# Patient Record
Sex: MALE | Race: WHITE | Employment: OTHER | URBAN - METROPOLITAN AREA
[De-identification: names, ages, dates, MRNs, and addresses within clinical notes are randomized per-mention and may not be internally consistent; named-entity substitution may affect disease eponyms.]

---

## 2017-01-09 ENCOUNTER — HOSPITAL ENCOUNTER (OUTPATIENT)
Dept: WOUND CARE | Age: 62
Discharge: HOME | End: 2017-01-09
Attending: PODIATRIST | Admitting: PODIATRIST

## 2017-01-09 VITALS
TEMPERATURE: 97.3 F | SYSTOLIC BLOOD PRESSURE: 141 MMHG | RESPIRATION RATE: 20 BRPM | DIASTOLIC BLOOD PRESSURE: 58 MMHG | HEART RATE: 78 BPM

## 2017-01-09 DIAGNOSIS — I73.9 PVD (PERIPHERAL VASCULAR DISEASE) (HCC): ICD-10-CM

## 2017-01-09 DIAGNOSIS — L97.919 VENOUS STASIS ULCER OF BOTH LOWER EXTREMITIES WITHOUT VARICOSE VEINS (HCC): Primary | ICD-10-CM

## 2017-01-09 DIAGNOSIS — L97.929 VENOUS STASIS ULCER OF BOTH LOWER EXTREMITIES WITHOUT VARICOSE VEINS (HCC): Primary | ICD-10-CM

## 2017-01-09 DIAGNOSIS — L97.912 CHRONIC ULCER OF RIGHT LEG WITH FAT LAYER EXPOSED (HCC): ICD-10-CM

## 2017-01-09 DIAGNOSIS — I87.2 VENOUS STASIS ULCER OF BOTH LOWER EXTREMITIES WITHOUT VARICOSE VEINS (HCC): Primary | ICD-10-CM

## 2017-01-09 DIAGNOSIS — L97.922 CHRONIC ULCER OF LEFT LEG WITH FAT LAYER EXPOSED (HCC): ICD-10-CM

## 2017-01-09 DIAGNOSIS — E11.51 TYPE 2 DIABETES MELLITUS WITH DIABETIC PERIPHERAL ANGIOPATHY WITHOUT GANGRENE, WITHOUT LONG-TERM CURRENT USE OF INSULIN (HCC): ICD-10-CM

## 2018-02-28 ENCOUNTER — HOSPITAL ENCOUNTER (OUTPATIENT)
Dept: GENERAL RADIOLOGY | Age: 63
Discharge: HOME | End: 2018-02-28

## 2018-02-28 DIAGNOSIS — J44.1 COPD WITH ACUTE EXACERBATION (HCC): ICD-10-CM

## 2018-02-28 LAB
ALBUMIN SERPL-MCNC: 4.4 GM/DL (ref 3.4–5)
ALP BLD-CCNC: 74 IU/L (ref 40–128)
ALT SERPL-CCNC: 41 U/L (ref 10–40)
ANION GAP SERPL CALCULATED.3IONS-SCNC: 19 MMOL/L (ref 4–16)
AST SERPL-CCNC: 22 IU/L (ref 15–37)
BILIRUB SERPL-MCNC: 0.2 MG/DL (ref 0–1)
BUN BLDV-MCNC: 25 MG/DL (ref 6–23)
CALCIUM SERPL-MCNC: 9 MG/DL (ref 8.3–10.6)
CHLORIDE BLD-SCNC: 94 MMOL/L (ref 99–110)
CHOLESTEROL: 162 MG/DL
CO2: 23 MMOL/L (ref 21–32)
CREAT SERPL-MCNC: 1.1 MG/DL (ref 0.9–1.3)
ESTIMATED AVERAGE GLUCOSE: 183 MG/DL
GFR AFRICAN AMERICAN: >60 ML/MIN/1.73M2
GFR NON-AFRICAN AMERICAN: >60 ML/MIN/1.73M2
GLUCOSE FASTING: 204 MG/DL (ref 70–99)
HBA1C MFR BLD: 8 % (ref 4.2–6.3)
HCT VFR BLD CALC: 56.3 % (ref 42–52)
HDLC SERPL-MCNC: 63 MG/DL
HEMOGLOBIN: 17.9 GM/DL (ref 13.5–18)
LDL CHOLESTEROL DIRECT: 77 MG/DL
MCH RBC QN AUTO: 29.7 PG (ref 27–31)
MCHC RBC AUTO-ENTMCNC: 31.8 % (ref 32–36)
MCV RBC AUTO: 93.5 FL (ref 78–100)
PDW BLD-RTO: 14.4 % (ref 11.7–14.9)
PLATELET # BLD: 171 K/CU MM (ref 140–440)
PMV BLD AUTO: 11.2 FL (ref 7.5–11.1)
POTASSIUM SERPL-SCNC: 4.9 MMOL/L (ref 3.5–5.1)
PROSTATE SPECIFIC ANTIGEN: 0.49 NG/ML (ref 0–4)
RBC # BLD: 6.02 M/CU MM (ref 4.6–6.2)
SODIUM BLD-SCNC: 136 MMOL/L (ref 135–145)
T4 FREE: 1.39 NG/DL (ref 0.9–1.8)
TOTAL PROTEIN: 7.2 GM/DL (ref 6.4–8.2)
TRIGL SERPL-MCNC: 155 MG/DL
WBC # BLD: 17.8 K/CU MM (ref 4–10.5)

## 2018-10-02 ENCOUNTER — HOSPITAL ENCOUNTER (EMERGENCY)
Age: 63
Discharge: HOME | End: 2018-10-02
Attending: EMERGENCY MEDICINE
Payer: COMMERCIAL

## 2018-10-02 ENCOUNTER — APPOINTMENT (OUTPATIENT)
Dept: ULTRASOUND IMAGING | Age: 63
End: 2018-10-02
Payer: COMMERCIAL

## 2018-10-02 VITALS
OXYGEN SATURATION: 98 % | TEMPERATURE: 97.6 F | BODY MASS INDEX: 46.65 KG/M2 | WEIGHT: 315 LBS | HEIGHT: 69 IN | HEART RATE: 84 BPM | SYSTOLIC BLOOD PRESSURE: 127 MMHG | DIASTOLIC BLOOD PRESSURE: 69 MMHG | RESPIRATION RATE: 16 BRPM

## 2018-10-02 DIAGNOSIS — N28.9 ACUTE RENAL INSUFFICIENCY: ICD-10-CM

## 2018-10-02 DIAGNOSIS — S80.829A: Primary | ICD-10-CM

## 2018-10-02 DIAGNOSIS — L08.9: Primary | ICD-10-CM

## 2018-10-02 LAB
ALBUMIN SERPL-MCNC: 3.8 GM/DL (ref 3.4–5)
ALP BLD-CCNC: 64 IU/L (ref 40–128)
ALT SERPL-CCNC: 31 U/L (ref 10–40)
ANION GAP SERPL CALCULATED.3IONS-SCNC: 15 MMOL/L (ref 4–16)
AST SERPL-CCNC: 24 IU/L (ref 15–37)
BASOPHILS ABSOLUTE: 0 K/CU MM
BASOPHILS RELATIVE PERCENT: 0.3 % (ref 0–1)
BILIRUB SERPL-MCNC: 0.4 MG/DL (ref 0–1)
BUN BLDV-MCNC: 45 MG/DL (ref 6–23)
CALCIUM SERPL-MCNC: 9.3 MG/DL (ref 8.3–10.6)
CHLORIDE BLD-SCNC: 92 MMOL/L (ref 99–110)
CO2: 26 MMOL/L (ref 21–32)
CREAT SERPL-MCNC: 1.8 MG/DL (ref 0.9–1.3)
DIFFERENTIAL TYPE: ABNORMAL
EOSINOPHILS ABSOLUTE: 0.2 K/CU MM
EOSINOPHILS RELATIVE PERCENT: 1.2 % (ref 0–3)
GFR AFRICAN AMERICAN: 46 ML/MIN/1.73M2
GFR NON-AFRICAN AMERICAN: 38 ML/MIN/1.73M2
GLUCOSE BLD-MCNC: 148 MG/DL (ref 70–99)
HCT VFR BLD CALC: 51.4 % (ref 42–52)
HEMOGLOBIN: 17.1 GM/DL (ref 13.5–18)
IMMATURE NEUTROPHIL %: 0.5 % (ref 0–0.43)
LYMPHOCYTES ABSOLUTE: 2.6 K/CU MM
LYMPHOCYTES RELATIVE PERCENT: 20.4 % (ref 24–44)
MCH RBC QN AUTO: 31.5 PG (ref 27–31)
MCHC RBC AUTO-ENTMCNC: 33.3 % (ref 32–36)
MCV RBC AUTO: 94.7 FL (ref 78–100)
MONOCYTES ABSOLUTE: 0.8 K/CU MM
MONOCYTES RELATIVE PERCENT: 5.9 % (ref 0–4)
NUCLEATED RBC %: 0 %
PDW BLD-RTO: 14.6 % (ref 11.7–14.9)
PLATELET # BLD: 188 K/CU MM (ref 140–440)
PMV BLD AUTO: 10.9 FL (ref 7.5–11.1)
POTASSIUM SERPL-SCNC: 4.1 MMOL/L (ref 3.5–5.1)
PRO-BNP: 404.5 PG/ML
RBC # BLD: 5.43 M/CU MM (ref 4.6–6.2)
SEGMENTED NEUTROPHILS ABSOLUTE COUNT: 9.2 K/CU MM
SEGMENTED NEUTROPHILS RELATIVE PERCENT: 71.7 % (ref 36–66)
SODIUM BLD-SCNC: 133 MMOL/L (ref 135–145)
TOTAL IMMATURE NEUTOROPHIL: 0.07 K/CU MM
TOTAL NUCLEATED RBC: 0 K/CU MM
TOTAL PROTEIN: 7.1 GM/DL (ref 6.4–8.2)
WBC # BLD: 12.9 K/CU MM (ref 4–10.5)

## 2018-10-02 PROCEDURE — 85025 COMPLETE CBC W/AUTO DIFF WBC: CPT

## 2018-10-02 PROCEDURE — 93970 EXTREMITY STUDY: CPT

## 2018-10-02 PROCEDURE — 80053 COMPREHEN METABOLIC PANEL: CPT

## 2018-10-02 PROCEDURE — 36415 COLL VENOUS BLD VENIPUNCTURE: CPT

## 2018-10-02 PROCEDURE — 83880 ASSAY OF NATRIURETIC PEPTIDE: CPT

## 2018-10-02 PROCEDURE — 99283 EMERGENCY DEPT VISIT LOW MDM: CPT

## 2018-10-02 RX ORDER — CEPHALEXIN 500 MG/1
500 CAPSULE ORAL 4 TIMES DAILY
COMMUNITY
Start: 2020-04-02 | End: 2021-09-30 | Stop reason: SDUPTHER

## 2018-10-02 ASSESSMENT — PAIN SCALES - GENERAL: PAINLEVEL_OUTOF10: 8

## 2018-10-02 ASSESSMENT — PAIN DESCRIPTION - PAIN TYPE: TYPE: ACUTE PAIN

## 2018-10-02 ASSESSMENT — PAIN DESCRIPTION - ORIENTATION: ORIENTATION: LEFT

## 2018-10-02 ASSESSMENT — PAIN DESCRIPTION - LOCATION: LOCATION: LEG

## 2018-10-02 NOTE — ED NOTES
Discharge instructions reviewed with patient, verbalized understanding and agreeable to discharge.      Janeen Nicolas RN  10/02/18 [id number]

## 2018-10-02 NOTE — ED PROVIDER NOTES
children: N/A    Years of education: N/A     Social History Main Topics    Smoking status: Current Every Day Smoker     Packs/day: 1.00     Types: Cigarettes    Smokeless tobacco: Never Used    Alcohol use None    Drug use: No    Sexual activity: Not Asked     Other Topics Concern    None     Social History Narrative    None     Family History   Problem Relation Age of Onset    Asthma Mother     Breast Cancer Mother     Cancer Mother     Diabetes Mother     High Blood Pressure Mother     Cancer Father     Early Death Brother     Arthritis Paternal Grandmother          PHYSICAL EXAM    VITAL SIGNS: /69   Pulse 84   Temp 97.6 °F (36.4 °C) (Oral)   Resp 16   Ht 5' 9\" (1.753 m)   Wt (!) 335 lb (152 kg)   SpO2 98%   BMI 49.47 kg/m²    Constitutional:  Well developed, No acute distress. HENT:  Normocephalic, Atraumatic, PERRL. EOMI. Sclera clear. Conjunctiva normal.  Neck: supple without ridigity  Cardiovascular:  Regular rate and rhythm, No murmurs  Respiratory:  Nonlabored breathing. Normal breath sounds  Abdomen: Soft, Non tender, bowel sounds present. Musculoskeletal: No edema, No tenderness  Integument:  Warm. Chronic venous stasis of the lower extremities. There are several superficial ulcerations noted over the anterior left lower leg. One small superficial laceration noted over the right anterior lower leg. No surrounding erythema or warmth. No tenderness surrounding the areas. Neurologic:  Alert & oriented , No focal deficits noted.   Speech normal.  Psychiatric:  Affect normal, Mood normal.       Labs:  Labs Reviewed   CBC WITH AUTO DIFFERENTIAL - Abnormal; Notable for the following:        Result Value    WBC 12.9 (*)     MCH 31.5 (*)     Segs Relative 71.7 (*)     Lymphocytes % 20.4 (*)     Monocytes % 5.9 (*)     Immature Neutrophil % 0.5 (*)     All other components within normal limits   COMPREHENSIVE METABOLIC PANEL - Abnormal; Notable for the following:     Sodium 133

## 2018-12-04 ENCOUNTER — HOSPITAL ENCOUNTER (EMERGENCY)
Age: 63
Discharge: HOME | End: 2018-12-04
Attending: EMERGENCY MEDICINE
Payer: COMMERCIAL

## 2018-12-04 VITALS
TEMPERATURE: 98.1 F | WEIGHT: 315 LBS | DIASTOLIC BLOOD PRESSURE: 74 MMHG | HEART RATE: 71 BPM | BODY MASS INDEX: 46.65 KG/M2 | RESPIRATION RATE: 17 BRPM | SYSTOLIC BLOOD PRESSURE: 139 MMHG | OXYGEN SATURATION: 96 % | HEIGHT: 69 IN

## 2018-12-04 DIAGNOSIS — M54.41 RIGHT-SIDED LOW BACK PAIN WITH RIGHT-SIDED SCIATICA, UNSPECIFIED CHRONICITY: Primary | ICD-10-CM

## 2018-12-04 LAB
ALBUMIN SERPL-MCNC: 3.8 GM/DL (ref 3.4–5)
ALP BLD-CCNC: 61 IU/L (ref 40–129)
ALT SERPL-CCNC: 25 U/L (ref 10–40)
ANION GAP SERPL CALCULATED.3IONS-SCNC: 12 MMOL/L (ref 4–16)
AST SERPL-CCNC: 20 IU/L (ref 15–37)
BACTERIA: NEGATIVE /HPF
BASOPHILS ABSOLUTE: 0.1 K/CU MM
BASOPHILS RELATIVE PERCENT: 0.7 % (ref 0–1)
BILIRUB SERPL-MCNC: 0.2 MG/DL (ref 0–1)
BILIRUBIN URINE: NEGATIVE MG/DL
BLOOD, URINE: NEGATIVE
BUN BLDV-MCNC: 16 MG/DL (ref 6–23)
CALCIUM SERPL-MCNC: 9 MG/DL (ref 8.3–10.6)
CHLORIDE BLD-SCNC: 99 MMOL/L (ref 99–110)
CLARITY: CLEAR
CO2: 28 MMOL/L (ref 21–32)
COLOR: YELLOW
CREAT SERPL-MCNC: 0.9 MG/DL (ref 0.9–1.3)
DIFFERENTIAL TYPE: ABNORMAL
EOSINOPHILS ABSOLUTE: 0.5 K/CU MM
EOSINOPHILS RELATIVE PERCENT: 4.5 % (ref 0–3)
GFR AFRICAN AMERICAN: >60 ML/MIN/1.73M2
GFR NON-AFRICAN AMERICAN: >60 ML/MIN/1.73M2
GLUCOSE BLD-MCNC: 108 MG/DL (ref 70–99)
GLUCOSE, URINE: NEGATIVE MG/DL
HCT VFR BLD CALC: 51.1 % (ref 42–52)
HEMOGLOBIN: 16.2 GM/DL (ref 13.5–18)
IMMATURE NEUTROPHIL %: 0.4 % (ref 0–0.43)
KETONES, URINE: NEGATIVE MG/DL
LACTATE: 1 MMOL/L (ref 0.4–2)
LEUKOCYTE ESTERASE, URINE: NEGATIVE
LYMPHOCYTES ABSOLUTE: 2.9 K/CU MM
LYMPHOCYTES RELATIVE PERCENT: 26.3 % (ref 24–44)
MCH RBC QN AUTO: 31.3 PG (ref 27–31)
MCHC RBC AUTO-ENTMCNC: 31.7 % (ref 32–36)
MCV RBC AUTO: 98.8 FL (ref 78–100)
MONOCYTES ABSOLUTE: 0.8 K/CU MM
MONOCYTES RELATIVE PERCENT: 6.8 % (ref 0–4)
MUCUS: ABNORMAL HPF
NITRITE URINE, QUANTITATIVE: NEGATIVE
NUCLEATED RBC %: 0 %
PDW BLD-RTO: 13.8 % (ref 11.7–14.9)
PH, URINE: 6 (ref 5–8)
PLATELET # BLD: 166 K/CU MM (ref 140–440)
PMV BLD AUTO: 10.7 FL (ref 7.5–11.1)
POTASSIUM SERPL-SCNC: 3.9 MMOL/L (ref 3.5–5.1)
PROTEIN UA: 30 MG/DL
RBC # BLD: 5.17 M/CU MM (ref 4.6–6.2)
RBC URINE: <1 /HPF (ref 0–3)
SEGMENTED NEUTROPHILS ABSOLUTE COUNT: 6.9 K/CU MM
SEGMENTED NEUTROPHILS RELATIVE PERCENT: 61.3 % (ref 36–66)
SODIUM BLD-SCNC: 139 MMOL/L (ref 135–145)
SPECIFIC GRAVITY UA: 1.01 (ref 1–1.03)
TOTAL IMMATURE NEUTOROPHIL: 0.04 K/CU MM
TOTAL NUCLEATED RBC: 0 K/CU MM
TOTAL PROTEIN: 7.3 GM/DL (ref 6.4–8.2)
TRICHOMONAS: ABNORMAL /HPF
UROBILINOGEN, URINE: NORMAL MG/DL (ref 0.2–1)
WBC # BLD: 11.2 K/CU MM (ref 4–10.5)
WBC UA: <1 /HPF (ref 0–2)

## 2018-12-04 PROCEDURE — 6370000000 HC RX 637 (ALT 250 FOR IP): Performed by: EMERGENCY MEDICINE

## 2018-12-04 PROCEDURE — 81001 URINALYSIS AUTO W/SCOPE: CPT

## 2018-12-04 PROCEDURE — 83605 ASSAY OF LACTIC ACID: CPT

## 2018-12-04 PROCEDURE — 99284 EMERGENCY DEPT VISIT MOD MDM: CPT

## 2018-12-04 PROCEDURE — 85025 COMPLETE CBC W/AUTO DIFF WBC: CPT

## 2018-12-04 PROCEDURE — 36415 COLL VENOUS BLD VENIPUNCTURE: CPT

## 2018-12-04 PROCEDURE — 80053 COMPREHEN METABOLIC PANEL: CPT

## 2018-12-04 RX ORDER — LIDOCAINE 50 MG/G
1 PATCH TOPICAL DAILY
Qty: 30 PATCH | Refills: 0 | Status: SHIPPED | OUTPATIENT
Start: 2018-12-04 | End: 2020-10-30

## 2018-12-04 RX ORDER — IBUPROFEN 600 MG/1
600 TABLET ORAL EVERY 6 HOURS PRN
Qty: 30 TABLET | Refills: 0 | Status: SHIPPED | OUTPATIENT
Start: 2018-12-04

## 2018-12-04 RX ORDER — LIDOCAINE 4 G/G
1 PATCH TOPICAL DAILY
Status: DISCONTINUED | OUTPATIENT
Start: 2018-12-04 | End: 2018-12-04 | Stop reason: HOSPADM

## 2018-12-04 ASSESSMENT — ENCOUNTER SYMPTOMS
EYE REDNESS: 0
ABDOMINAL PAIN: 0
VOMITING: 0
NAUSEA: 0
RHINORRHEA: 0
BACK PAIN: 1
SORE THROAT: 0
COUGH: 0
SHORTNESS OF BREATH: 0

## 2018-12-04 ASSESSMENT — PAIN DESCRIPTION - ORIENTATION: ORIENTATION: RIGHT;LEFT

## 2018-12-04 ASSESSMENT — PAIN DESCRIPTION - LOCATION: LOCATION: FLANK

## 2018-12-04 ASSESSMENT — PAIN SCALES - GENERAL: PAINLEVEL_OUTOF10: 10

## 2018-12-04 ASSESSMENT — PAIN DESCRIPTION - PAIN TYPE: TYPE: ACUTE PAIN

## 2018-12-04 NOTE — ED PROVIDER NOTES
of motion. He exhibits no deformity. Lumbar back: He exhibits tenderness. He exhibits no bony tenderness. Back:    Neurological: He is alert. No cranial nerve deficit or sensory deficit. He exhibits normal muscle tone. Coordination normal.   Skin: Skin is warm and dry. He is not diaphoretic. Psychiatric: He has a normal mood and affect.  His behavior is normal.       I have reviewed and interpreted all of the currently available lab results from this visit (if applicable):  Results for orders placed or performed during the hospital encounter of 12/04/18   Urinalysis with microscopic   Result Value Ref Range    Color, UA YELLOW UYELL    Clarity, UA CLEAR CLEAR    Glucose, Urine NEGATIVE NEG MG/DL    Bilirubin Urine NEGATIVE NEG MG/DL    Ketones, Urine NEGATIVE NEG MG/DL    Specific Gravity, UA 1.014 1.001 - 1.035    Blood, Urine NEGATIVE NEG    pH, Urine 6.0 5.0 - 8.0    Protein, UA 30 (A) NEG MG/DL    Urobilinogen, Urine NORMAL 0.2 - 1.0 MG/DL    Nitrite Urine, Quantitative NEGATIVE NEG    Leukocyte Esterase, Urine NEGATIVE NEG    RBC, UA <1 0 - 3 /HPF    WBC, UA <1 0 - 2 /HPF    Bacteria, UA NEGATIVE NEG /HPF    Mucus, UA RARE (A) NEG HPF    Trichomonas, UA NONE SEEN NOSEE /HPF   CBC auto diff   Result Value Ref Range    WBC 11.2 (H) 4.0 - 10.5 K/CU MM    RBC 5.17 4.6 - 6.2 M/CU MM    Hemoglobin 16.2 13.5 - 18.0 GM/DL    Hematocrit 51.1 42 - 52 %    MCV 98.8 78 - 100 FL    MCH 31.3 (H) 27 - 31 PG    MCHC 31.7 (L) 32.0 - 36.0 %    RDW 13.8 11.7 - 14.9 %    Platelets 033 567 - 783 K/CU MM    MPV 10.7 7.5 - 11.1 FL    Differential Type AUTOMATED DIFFERENTIAL     Segs Relative 61.3 36 - 66 %    Lymphocytes % 26.3 24 - 44 %    Monocytes % 6.8 (H) 0 - 4 %    Eosinophils % 4.5 (H) 0 - 3 %    Basophils % 0.7 0 - 1 %    Segs Absolute 6.9 K/CU MM    Lymphocytes # 2.9 K/CU MM    Monocytes # 0.8 K/CU MM    Eosinophils # 0.5 K/CU MM    Basophils # 0.1 K/CU MM    Nucleated RBC % 0.0 %    Total Nucleated RBC 0.0

## 2019-04-10 ENCOUNTER — HOSPITAL ENCOUNTER (OUTPATIENT)
Age: 64
Discharge: HOME | End: 2019-04-10
Payer: COMMERCIAL

## 2019-04-10 LAB
ALBUMIN SERPL-MCNC: 3.9 GM/DL (ref 3.4–5)
ALP BLD-CCNC: 67 IU/L (ref 40–128)
ALT SERPL-CCNC: 24 U/L (ref 10–40)
ANION GAP SERPL CALCULATED.3IONS-SCNC: 12 MMOL/L (ref 4–16)
AST SERPL-CCNC: 25 IU/L (ref 15–37)
BILIRUB SERPL-MCNC: 0.6 MG/DL (ref 0–1)
BUN BLDV-MCNC: 18 MG/DL (ref 6–23)
CALCIUM SERPL-MCNC: 9 MG/DL (ref 8.3–10.6)
CHLORIDE BLD-SCNC: 94 MMOL/L (ref 99–110)
CO2: 25 MMOL/L (ref 21–32)
CREAT SERPL-MCNC: 0.9 MG/DL (ref 0.9–1.3)
ESTIMATED AVERAGE GLUCOSE: 146 MG/DL
GFR AFRICAN AMERICAN: >60 ML/MIN/1.73M2
GFR NON-AFRICAN AMERICAN: >60 ML/MIN/1.73M2
GLUCOSE FASTING: 78 MG/DL (ref 70–99)
HBA1C MFR BLD: 6.7 % (ref 4.2–6.3)
POTASSIUM SERPL-SCNC: 4.3 MMOL/L (ref 3.5–5.1)
SODIUM BLD-SCNC: 131 MMOL/L (ref 135–145)
TOTAL PROTEIN: 6.9 GM/DL (ref 6.4–8.2)

## 2019-04-10 PROCEDURE — 36415 COLL VENOUS BLD VENIPUNCTURE: CPT

## 2019-04-10 PROCEDURE — 80053 COMPREHEN METABOLIC PANEL: CPT

## 2019-04-10 PROCEDURE — 83036 HEMOGLOBIN GLYCOSYLATED A1C: CPT

## 2020-04-14 ENCOUNTER — HOSPITAL ENCOUNTER (OUTPATIENT)
Dept: WOUND CARE | Age: 65
Discharge: HOME | End: 2020-04-14
Payer: MEDICARE

## 2020-04-14 VITALS
TEMPERATURE: 97 F | BODY MASS INDEX: 44.43 KG/M2 | WEIGHT: 300 LBS | HEIGHT: 69 IN | SYSTOLIC BLOOD PRESSURE: 143 MMHG | DIASTOLIC BLOOD PRESSURE: 67 MMHG | HEART RATE: 70 BPM | RESPIRATION RATE: 18 BRPM

## 2020-04-14 PROBLEM — I89.0 LYMPHEDEMA OF BOTH LOWER EXTREMITIES: Status: ACTIVE | Noted: 2020-04-14

## 2020-04-14 PROBLEM — I87.8 VENOUS STASIS OF BOTH LOWER EXTREMITIES: Status: ACTIVE | Noted: 2020-04-14

## 2020-04-14 PROCEDURE — 87073 CULTURE BACTERIA ANAEROBIC: CPT

## 2020-04-14 PROCEDURE — 11042 DBRDMT SUBQ TIS 1ST 20SQCM/<: CPT | Performed by: NURSE PRACTITIONER

## 2020-04-14 PROCEDURE — 11042 DBRDMT SUBQ TIS 1ST 20SQCM/<: CPT

## 2020-04-14 PROCEDURE — 99213 OFFICE O/P EST LOW 20 MIN: CPT

## 2020-04-14 PROCEDURE — 87071 CULTURE AEROBIC QUANT OTHER: CPT

## 2020-04-14 PROCEDURE — 99203 OFFICE O/P NEW LOW 30 MIN: CPT | Performed by: NURSE PRACTITIONER

## 2020-04-14 NOTE — PROGRESS NOTES
[facility] Initial Visit      Deb Tavares  AGE: 72 y.o. GENDER: male  : 1955  EPISODE DATE:  2020   Referred by: Dr. Vivienne Carrington    Subjective:     CHIEF COMPLAINT WOUNDS LEFT LOWER LEG     HISTORY of PRESENT ILLNESS      Deb Tavares is a 72 y.o. male who presents to the [address] [place] [address] for an initial visit for evaluation and treatment of Chronic venous  ulcer(s) of of the left lower leg. The condition is of moderate severity. The ulcer has been present for over a year. The underlying cause is thought to be venous. The patients care to date has included Silvadene and Keflex. The patient has significant underlying medical conditions as below. Wound Pain Timing/Severity: intermittent, mild  Quality of pain: aching, tender  Severity of pain:  3 / 10   Modifying Factors: edema, venous stasis, lymphedema, diabetes, obesity, smoking and non-adherence  Associated Signs/Symptoms: edema, erythema, drainage and pain        PAST MEDICAL HISTORY        Diagnosis Date    CHF (congestive heart failure) (HCC)     Chronic ulcer of left leg with fat layer exposed ([address] 2016    Chronic ulcer of right leg with fat layer exposed ([address] 2016    Diabetes mellitus ([address]     Hypertension     PVD (peripheral vascular disease) ([address] 2016    Type 2 diabetes mellitus with diabetic peripheral angiopathy without gangrene, without long-term current use of insulin ([address] 2016    Venous stasis ulcer of both lower extremities without varicose veins ([address] 2016       PAST SURGICAL HISTORY    History reviewed. No pertinent surgical history.     FAMILY HISTORY    Family History   Problem Relation Age of Onset    Asthma Mother     Breast Cancer Mother     Cancer Mother     Diabetes Mother     High Blood Pressure Mother     Cancer Father     Early Death Brother     Arthritis Paternal Grandmother        SOCIAL HISTORY    Social History     Tobacco Use    Smoking status: Current status. At this time appropriate care would include: periodic debridement and wound care as below. Problem List Items Addressed This Visit     WD-Chronic ulcer of left leg with fat layer exposed ([address] - Primary    Relevant Orders    Culture, Wound    WD-Venous stasis of both lower extremities    WD-Lymphedema of both lower extremities            Procedure Note    Indications:  Based on my examination of this patient's wound(s) today, sharp excision into necrotic subcutaneous tissue is required to promote healing and evaluate the extent of previous healing. Performed by: FAITH Silva - CNP    Consent obtained: Yes    Time out taken:  Yes    Pain Control: Anesthetic  Anesthetic: 4% Lidocaine Liquid Topical     Debridement:Excisional Debridement    Using curette the wound(s) was/were sharply debrided down through and including the removal of subcutaneous tissue.         Devitalized Tissue Debrided:  fibrin, biofilm, slough and exudate    Pre Debridement Measurements:  Are located in the Wound Documentation Flow Sheet    All active wounds listed below with today's date are evaluated  Wound(s)    debrided this date include # : 3 and 4     Post  Debridement Measurements:  Wound 04/14/20 Pretibial Left #3 LEFT LATERAL (Active)   Wound Image   4/14/2020 10:50 AM   Wound Cleansed Rinsed/Irrigated with saline 4/14/2020 10:50 AM   Wound Length (cm) 1.3 cm 4/14/2020 10:50 AM   Wound Width (cm) 1.3 cm 4/14/2020 10:50 AM   Wound Depth (cm) 0.2 cm 4/14/2020 10:50 AM   Wound Surface Area (cm^2) 1.69 cm^2 4/14/2020 10:50 AM   Wound Volume (cm^3) 0.34 cm^3 4/14/2020 10:50 AM   Post-Procedure Length (cm) 1.3 cm 4/14/2020 11:12 AM   Post-Procedure Width (cm) 1.3 cm 4/14/2020 11:12 AM   Post-Procedure Depth (cm) 0.2 cm 4/14/2020 11:12 AM   Post-Procedure Surface Area (cm^2) 1.69 cm^2 4/14/2020 11:12 AM   Post-Procedure Volume (cm^3) 0.34 cm^3 4/14/2020 11:12 AM   Distance Tunneling (cm) 0 cm 4/14/2020 10:50 AM Tunneling Position ___ O'Clock 0 4/14/2020 10:50 AM   Undermining Starts ___ O'Clock 0 4/14/2020 10:50 AM   Undermining Ends___ O'Clock 0 4/14/2020 10:50 AM   Undermining Maxium Distance (cm) 0 4/14/2020 10:50 AM   Wound Assessment Yellow 4/14/2020 10:50 AM   Drainage Amount Moderate 4/14/2020 10:50 AM   Drainage Description Serosanguinous 4/14/2020 10:50 AM   Odor None 4/14/2020 10:50 AM   Margins Defined edges 4/14/2020 10:50 AM   [place]-wound Assessment Pink 4/14/2020 10:50 AM   Non-staged Wound Description Full thickness 4/14/2020 10:50 AM   [place]%Wound Bed 0 4/14/2020 10:50 AM   Red%Wound Bed 0 4/14/2020 10:50 AM   Yellow%Wound Bed 100 4/14/2020 10:50 AM   Black%Wound Bed 0 4/14/2020 10:50 AM   Purple%Wound Bed 0 4/14/2020 10:50 AM   Other%Wound Bed 0 4/14/2020 10:50 AM   Number of days: 0       Wound 04/14/20 Tibial Left;Posterior #4 LEFT POSTERIOR LEG  (Active)   Wound Image   4/14/2020 10:50 AM   Wound Cleansed Rinsed/Irrigated with saline 4/14/2020 10:50 AM   Wound Length (cm) 2.8 cm 4/14/2020 10:50 AM   Wound Width (cm) 2.2 cm 4/14/2020 10:50 AM   Wound Depth (cm) 0.2 cm 4/14/2020 10:50 AM   Wound Surface Area (cm^2) 6.16 cm^2 4/14/2020 10:50 AM   Wound Volume (cm^3) 1.23 cm^3 4/14/2020 10:50 AM   Post-Procedure Length (cm) 2.8 cm 4/14/2020 11:12 AM   Post-Procedure Width (cm) 2.2 cm 4/14/2020 11:12 AM   Post-Procedure Depth (cm) 0.2 cm 4/14/2020 11:12 AM   Post-Procedure Surface Area (cm^2) 6.16 cm^2 4/14/2020 11:12 AM   Post-Procedure Volume (cm^3) 1.23 cm^3 4/14/2020 11:12 AM   Distance Tunneling (cm) 0 cm 4/14/2020 10:50 AM   Tunneling Position ___ O'Clock 0 4/14/2020 10:50 AM   Undermining Starts ___ O'Clock 0 4/14/2020 10:50 AM   Undermining Ends___ O'Clock 0 4/14/2020 10:50 AM   Undermining Maxium Distance (cm) 0 4/14/2020 10:50 AM   Wound Assessment Pink 4/14/2020 10:50 AM   Drainage Amount Moderate 4/14/2020 10:50 AM   Drainage Description Serosanguinous 4/14/2020 10:50 AM   Margins Defined edges 4/14/2020 10:50 AM   [place]%Wound Bed 0 4/14/2020 10:50 AM   Red%Wound Bed 0 4/14/2020 10:50 AM   Yellow%Wound Bed 100 4/14/2020 10:50 AM   Black%Wound Bed 0 4/14/2020 10:50 AM   Purple%Wound Bed 0 4/14/2020 10:50 AM   Other%Wound Bed 0 4/14/2020 10:50 AM   Number of days: 0       Percent of Wound(s) Debrided: 100%    Total  Area  Debrided:  7.85 sq cm     Bleeding:  Minimal    Hemostasis Achieved:  by pressure    Procedural Pain:  2  / 10     Post Procedural Pain:  0 / 10     Response to treatment:  Well tolerated by patient. Wound culture obtained, has had arterial blockage in the past with atherectomy and balloon PTA of the left SFA lesion   decreased from 99% to 10% per Dr. Izabella Hughes in January 2017. The patient is a diabetic, states he inconsistently checks blood sugar. He is also a smoker, approximately one pack per day. Diabetes education provided today:    Metabolic syndrome: association of diabetes with dyslipidemia, HTN and obesity. Diabetic Neuropathy: signs and therapy. Foot care: advised to wash feet daily, pat dry and apply lotion at night, avoiding between toes. Need to look at feet daily and report to a physician any signs of inflammation or skin damage. Discussed diabetes shoes and socks. Nutrition as a mainstream of diabetes therapy. [place] about label reading. Carbs: good carbs and bad carbs, importance of carb counting, incorporation of protein with each meal to reduce Glycemic index, importance of portions, Carb/insulin ratio. Different diabetic medications. Managing high and low sugar readings. Effects of smoking and diabetes. Patient counseled in length on smoking cessation including benefits of quitting and health risks of continuing to smoke including but not limited to lung cancer, COPD, risk of coronary artery disease. Patient verbalized understanding today, is not ready to quit.     Plan:     Discharge Instructions       PHYSICIAN ORDERS AND DISCHARGE

## 2020-04-17 ENCOUNTER — HOSPITAL ENCOUNTER (OUTPATIENT)
Dept: WOUND CARE | Age: 65
Discharge: HOME | End: 2020-04-17
Payer: MEDICARE

## 2020-04-17 VITALS
HEART RATE: 89 BPM | DIASTOLIC BLOOD PRESSURE: 79 MMHG | TEMPERATURE: 96.8 F | RESPIRATION RATE: 20 BRPM | SYSTOLIC BLOOD PRESSURE: 186 MMHG

## 2020-04-17 PROCEDURE — 29581 APPL MULTLAYER CMPRN SYS LEG: CPT

## 2020-04-17 PROCEDURE — 97602 WOUND(S) CARE NON-SELECTIVE: CPT

## 2020-04-17 NOTE — PROGRESS NOTES
Nonselective enzymatic debridement performed with Santyl per physician order to wound(s) of the Left Lateral leg  Patient tolerated the procedure well. Multilayer Compression Wrap   (Not Unna) Below the Knee    NAME:  Gabe Tavares  YOB: 1955  MEDICAL RECORD NUMBER:  [id number]  DATE:  4/17/2020    Multilayer compression wrap: Removed old Multilayer wrap if indicated and wash leg with mild soap/water. Applied moisturizing agent to dry skin as needed. Applied primary and secondary dressing as ordered. Applied multilayered dressing below the knee to left lower leg. Instructed patient/caregiver not to remove dressing and to keep it clean and dry. Instructed patient/caregiver on complications to report to provider, such as pain, numbness in toes, heavy drainage, and slippage of dressing. Instructed patient on purpose of compression dressing and on activity and exercise recommendations.       Electronically signed by Feliciano Chavarria RN on 4/17/2020 at 11:29 AM

## 2020-04-17 NOTE — PLAN OF CARE
Problem: Venous:  Goal: Signs of wound healing will improve  Description: Signs of wound healing will improve  Outcome: Ongoing  Note: See Flowsheet     Problem: Venous:  Intervention: Apply contact layer dressing to maintain a moist healing but prevent maceration  Note: See Flowsheet  Intervention: Provide appropriate compression therapy  Note: See Flowsheet  Intervention: Assess lower extremities each visit  Note: See Flowsheet

## 2020-04-18 LAB
CULTURE: NORMAL
Lab: NORMAL
SPECIMEN: NORMAL

## 2020-04-21 ENCOUNTER — HOSPITAL ENCOUNTER (OUTPATIENT)
Dept: WOUND CARE | Age: 65
Discharge: HOME | End: 2020-04-21
Payer: MEDICARE

## 2020-04-21 VITALS
RESPIRATION RATE: 20 BRPM | TEMPERATURE: 97.9 F | HEART RATE: 77 BPM | SYSTOLIC BLOOD PRESSURE: 137 MMHG | DIASTOLIC BLOOD PRESSURE: 70 MMHG

## 2020-04-21 PROBLEM — I87.312 IDIOPATHIC CHRONIC VENOUS HYPERTENSION OF LEFT LOWER EXTREMITY WITH ULCER (HCC): Status: ACTIVE | Noted: 2020-04-21

## 2020-04-21 PROBLEM — L97.929 IDIOPATHIC CHRONIC VENOUS HYPERTENSION OF LEFT LOWER EXTREMITY WITH ULCER (HCC): Status: ACTIVE | Noted: 2020-04-21

## 2020-04-21 PROCEDURE — 11042 DBRDMT SUBQ TIS 1ST 20SQCM/<: CPT

## 2020-04-21 PROCEDURE — 11042 DBRDMT SUBQ TIS 1ST 20SQCM/<: CPT | Performed by: NURSE PRACTITIONER

## 2020-04-21 NOTE — PROGRESS NOTES
Patient is appropriate, is  oriented to place and plan of care. Dermatologic exam: Visual inspection of the periwound reveals the skin to be edematous  Wound exam: see wound description below in procedure note      Assessment:     Problem List Items Addressed This Visit     WD-Chronic ulcer of left leg with fat layer exposed ([address]    PVD (peripheral vascular disease) ([address]    Type 2 diabetes mellitus with diabetic peripheral angiopathy without gangrene, without long-term current use of insulin (HCC)    WD-Venous stasis of both lower extremities    WD-Lymphedema of both lower extremities - Primary    WD-Idiopathic chronic venous hypertension of left lower extremity with ulcer ([address]        Procedure Note    Indications:  Based on my examination of this patient's wound(s) today, sharp excision into necrotic subcutaneous tissue is required to promote healing and evaluate the extent of previous healing. Performed by: FAITH Tinajero - CNP    Consent obtained: Yes    Time out taken:  Yes    Pain Control: Anesthetic  Anesthetic: 4% Lidocaine Liquid Topical     Debridement:Excisional Debridement    Using curette the wound(s) was/were sharply debrided down through and including the removal of subcutaneous tissue. Devitalized Tissue Debrided:  slough and exudate    Pre Debridement Measurements:  Are located in the Wound Documentation Flow Sheet    All active wounds listed below with today's date are evaluated  Wound(s)    debrided this date include # : 3 and 4     Post  Debridement Measurements:  Wound 04/14/20 Pretibial Left #3 LEFT LATERAL (Active)   Wound Image   4/14/2020 10:50 AM   Wound Venous 4/21/2020  9:12 AM   Dressing Status Clean;Dry; Intact 4/21/2020  9:38 AM   Dressing Changed Changed/New 4/21/2020  9:38 AM   Wound Cleansed Wound cleanser;Rinsed/Irrigated with saline 4/21/2020  9:12 AM   Wound Length (cm) 3 cm 4/21/2020  9:12 AM   Wound Width (cm) 2.6 cm 4/21/2020  9:12 AM   Wound Depth (cm) 0.2 cm 4/21/2020  9:12 AM   Wound Surface Area (cm^2) 7.8 cm^2 4/21/2020  9:12 AM   Change in Wound Size % (l*w) -361.54 4/21/2020  9:12 AM   Wound Volume (cm^3) 1.56 cm^3 4/21/2020  9:12 AM   Wound Healing % -359 4/21/2020  9:12 AM   Post-Procedure Length (cm) 3 cm 4/21/2020  9:37 AM   Post-Procedure Width (cm) 2.6 cm 4/21/2020  9:37 AM   Post-Procedure Depth (cm) 0.2 cm 4/21/2020  9:37 AM   Post-Procedure Surface Area (cm^2) 7.8 cm^2 4/21/2020  9:37 AM   Post-Procedure Volume (cm^3) 1.56 cm^3 4/21/2020  9:37 AM   Distance Tunneling (cm) 0 cm 4/21/2020  9:12 AM   Tunneling Position ___ O'Clock 0 4/21/2020  9:12 AM   Undermining Starts ___ O'Clock 0 4/21/2020  9:12 AM   Undermining Ends___ O'Clock 0 4/21/2020  9:12 AM   Undermining [place] Distance (cm) 0 4/21/2020  9:12 AM   Wound Assessment Pink;Yellow 4/21/2020  9:12 AM   Drainage Amount Moderate 4/21/2020  9:12 AM   Drainage Description Serosanguinous 4/21/2020  9:12 AM   Odor None 4/21/2020  9:12 AM   Margins Unattached edges; Undefined edges 4/21/2020  9:12 AM   [place]-wound Assessment Pink 4/21/2020  9:12 AM   Non-staged Wound Description Full thickness 4/21/2020  9:12 AM   [place]%Wound Bed 20 4/21/2020  9:12 AM   Red%Wound Bed 0 4/21/2020  9:12 AM   Yellow%Wound Bed 80 4/21/2020  9:12 AM   Black%Wound Bed 0 4/21/2020  9:12 AM   Purple%Wound Bed 0 4/21/2020  9:12 AM   Other%Wound Bed 0 4/21/2020  9:12 AM   Number of days: 6       Wound 04/14/20 Tibial Left;Posterior #4 LEFT POSTERIOR LEG  (Active)   Wound Image   4/14/2020 10:50 AM   Wound Venous 4/21/2020  9:12 AM   Dressing Status Clean;Dry; Intact 4/21/2020  9:38 AM   Dressing Changed Changed/New 4/21/2020  9:38 AM   Wound Cleansed Wound cleanser;Rinsed/Irrigated with saline 4/21/2020  9:12 AM   Wound Length (cm) 4.1 cm 4/21/2020  9:12 AM   Wound Width (cm) 1.5 cm 4/21/2020  9:12 AM   Wound Depth (cm) 0.2 cm 4/21/2020  9:12 AM   Wound Surface Area (cm^2) 6.15 cm^2 4/21/2020  9:12 AM   Change in Wound Size % (l*w)

## 2020-04-21 NOTE — PROGRESS NOTES
Nonselective enzymatic debridement performed with Santyl per physician order to wound(s) of the left lower leg  Patient tolerated the procedure well. Multilayer Compression Wrap   (Not Unna) Below the Knee    NAME:  Sukumar Tavares  YOB: 1955  MEDICAL RECORD NUMBER:  [id number]  DATE:  4/21/2020    Multilayer compression wrap: Removed old Multilayer wrap if indicated and wash leg with mild soap/water. Applied moisturizing agent to dry skin as needed. Applied primary and secondary dressing as ordered. Applied multilayered dressing below the knee to left lower leg. Instructed patient/caregiver not to remove dressing and to keep it clean and dry. Instructed patient/caregiver on complications to report to provider, such as pain, numbness in toes, heavy drainage, and slippage of dressing. Instructed patient on purpose of compression dressing and on activity and exercise recommendations.       Electronically signed by Speedy Elaine RN on 4/21/2020 at 9:39 AM

## 2020-04-28 ENCOUNTER — HOSPITAL ENCOUNTER (OUTPATIENT)
Dept: WOUND CARE | Age: 65
Discharge: HOME | End: 2020-04-28
Payer: MEDICARE

## 2020-04-28 VITALS
DIASTOLIC BLOOD PRESSURE: 79 MMHG | RESPIRATION RATE: 20 BRPM | HEART RATE: 84 BPM | SYSTOLIC BLOOD PRESSURE: 136 MMHG | TEMPERATURE: 96.8 F

## 2020-04-28 PROCEDURE — 11042 DBRDMT SUBQ TIS 1ST 20SQCM/<: CPT | Performed by: NURSE PRACTITIONER

## 2020-04-28 PROCEDURE — 11042 DBRDMT SUBQ TIS 1ST 20SQCM/<: CPT

## 2020-04-28 NOTE — PROGRESS NOTES
Wound Care Center Progress Note With Procedure    Gabino Tavares  AGE: 72 y.o. GENDER: male  : 1955  EPISODE DATE:  2020     Subjective:     Chief Complaint   Patient presents with    Wound Check     Left Lower Leg         HISTORY of PRESENT ILLNESS      Janae Tavares is a 72 y.o. male who presents today for wound evaluation of Chronic venous and diabetic ulcer(s) of the left lower leg. The ulcer is of moderate severity. The underlying cause of the wound is venous and diabetic. Wound Pain Timing/Severity: intermittent, mild  Quality of pain: aching, tender  Severity of pain:  3 / 10   Modifying Factors: edema, venous stasis, lymphedema, diabetes, obesity, smoking and non-adherence  Associated Signs/Symptoms: edema, erythema, drainage and pain        PAST MEDICAL HISTORY        Diagnosis Date    CHF (congestive heart failure) (HCC)     Chronic ulcer of left leg with fat layer exposed ([address] 2016    Chronic ulcer of right leg with fat layer exposed ([address] 2016    Diabetes mellitus ([address]     Hypertension     PVD (peripheral vascular disease) ([address] 2016    Type 2 diabetes mellitus with diabetic peripheral angiopathy without gangrene, without long-term current use of insulin ([address] 2016    Venous stasis ulcer of both lower extremities without varicose veins ([address] 2016       PAST SURGICAL HISTORY    History reviewed. No pertinent surgical history.     FAMILY HISTORY    Family History   Problem Relation Age of Onset    Asthma Mother     Breast Cancer Mother     Cancer Mother     Diabetes Mother     High Blood Pressure Mother     Cancer Father     Early Death Brother     Arthritis Paternal Grandmother        SOCIAL HISTORY    Social History     Tobacco Use    Smoking status: Current Every Day Smoker     Packs/day: 1.00     Types: Cigarettes    Smokeless tobacco: Never Used    Tobacco comment: healing    Substance Use Topics    Alcohol use: Not on file    status:  Patient is appropriate, is  oriented to place and plan of care. Dermatologic exam: Visual inspection of the periwound reveals the skin to be edematous  Wound exam: see wound description below in procedure note      Assessment:     Problem List Items Addressed This Visit     WD-Chronic ulcer of left leg with fat layer exposed ([address]    WD-Venous stasis of both lower extremities    WD-Lymphedema of both lower extremities    WD-Idiopathic chronic venous hypertension of left lower extremity with ulcer ([address] - Primary        Procedure Note    Indications:  Based on my examination of this patient's wound(s) today, sharp excision into necrotic subcutaneous tissue is required to promote healing and evaluate the extent of previous healing. Performed by: FAITH Carbone CNP    Consent obtained: Yes    Time out taken:  Yes    Pain Control: Anesthetic  Anesthetic: 4% Lidocaine Liquid Topical     Debridement:Excisional Debridement    Using curette the wound(s) was/were sharply debrided down through and including the removal of subcutaneous tissue. Devitalized Tissue Debrided:  slough and exudate    Pre Debridement Measurements:  Are located in the Wound Documentation Flow Sheet    All active wounds listed below with today's date are evaluated  Wound(s)    debrided this date include # : 3 and 4     Post  Debridement Measurements:  Wound 04/14/20 Pretibial Left #3 LEFT LATERAL (Active)   Wound Image   4/14/2020 10:50 AM   Wound Venous 4/28/2020 10:00 AM   Dressing Status Clean;Dry; Intact 4/28/2020 10:23 AM   Dressing Changed Changed/New 4/28/2020 10:23 AM   Wound Cleansed Rinsed/Irrigated with saline 4/28/2020 10:00 AM   Wound Length (cm) 3.8 cm 4/28/2020 10:00 AM   Wound Width (cm) 3 cm 4/28/2020 10:00 AM   Wound Depth (cm) 0.2 cm 4/28/2020 10:00 AM   Wound Surface Area (cm^2) 11.4 cm^2 4/28/2020 10:00 AM   Change in Wound Size % (l*w) -574.56 4/28/2020 10:00 AM   Wound Volume (cm^3) 2.28 cm^3 4/28/2020 10:00 AM   Wound Healing % -571 4/28/2020 10:00 AM   Post-Procedure Length (cm) 3.8 cm 4/28/2020 10:07 AM   Post-Procedure Width (cm) 3 cm 4/28/2020 10:07 AM   Post-Procedure Depth (cm) 0.2 cm 4/28/2020 10:07 AM   Post-Procedure Surface Area (cm^2) 11.4 cm^2 4/28/2020 10:07 AM   Post-Procedure Volume (cm^3) 2.28 cm^3 4/28/2020 10:07 AM   Distance Tunneling (cm) 0 cm 4/28/2020 10:00 AM   Tunneling Position ___ O'Clock 0 4/28/2020 10:00 AM   Undermining Starts ___ O'Clock 0 4/28/2020 10:00 AM   Undermining Ends___ O'Clock 00 4/28/2020 10:00 AM   Undermining Maxium Distance (cm) 0 4/28/2020 10:00 AM   Wound Assessment Pink;Yellow 4/28/2020 10:00 AM   Drainage Amount Moderate 4/28/2020 10:00 AM   Drainage Description Serosanguinous 4/28/2020 10:00 AM   Odor None 4/28/2020 10:00 AM   Margins Unattached edges; Undefined edges 4/28/2020 10:00 AM   Merary-wound Assessment Pink 4/28/2020 10:00 AM   Non-staged Wound Description Full thickness 4/28/2020 10:00 AM   [place]%Wound Bed 20 4/28/2020 10:00 AM   Red%Wound Bed 0 4/28/2020 10:00 AM   Yellow%Wound Bed 80 4/28/2020 10:00 AM   Black%Wound Bed 0 4/28/2020 10:00 AM   Purple%Wound Bed 0 4/28/2020 10:00 AM   Other%Wound Bed 0 4/28/2020 10:00 AM   Number of days: 13       Wound 04/14/20 Tibial Left;Posterior #4 LEFT POSTERIOR LEG  (Active)   Wound Image   4/14/2020 10:50 AM   Wound Venous 4/28/2020 10:00 AM   Dressing Status Clean;Dry; Intact 4/28/2020 10:23 AM   Dressing Changed Changed/New 4/28/2020 10:23 AM   Wound Cleansed Rinsed/Irrigated with saline 4/28/2020 10:00 AM   Wound Length (cm) 2.4 cm 4/28/2020 10:00 AM   Wound Width (cm) 1.9 cm 4/28/2020 10:00 AM   Wound Depth (cm) 0.01 cm 4/28/2020 10:00 AM   Wound Surface Area (cm^2) 4.56 cm^2 4/28/2020 10:00 AM   Change in Wound Size % (l*w) 25.97 4/28/2020 10:00 AM   Wound Volume (cm^3) 0.05 cm^3 4/28/2020 10:00 AM   Wound Healing % 96 4/28/2020 10:00 AM   Post-Procedure Length (cm) 2.4 cm 4/28/2020 10:07 AM   Post-Procedure Width (cm) 1.9 cm 4/28/2020 10:07 AM   Post-Procedure Depth (cm) 0.1 cm 4/28/2020 10:07 AM   Post-Procedure Surface Area (cm^2) 4.56 cm^2 4/28/2020 10:07 AM   Post-Procedure Volume (cm^3) 0.46 cm^3 4/28/2020 10:07 AM   Distance Tunneling (cm) 0 cm 4/28/2020 10:00 AM   Tunneling Position ___ O'Clock 0 4/28/2020 10:00 AM   Undermining Starts ___ O'Clock 0 4/28/2020 10:00 AM   Undermining Ends___ O'Clock 0 4/28/2020 10:00 AM   Undermining Maxium Distance (cm) 0 4/28/2020 10:00 AM   Wound Assessment Pink;Yellow 4/28/2020 10:00 AM   Drainage Amount Moderate 4/28/2020 10:00 AM   Drainage Description Serosanguinous 4/28/2020 10:00 AM   Odor None 4/28/2020 10:00 AM   Margins Unattached edges; Undefined edges 4/28/2020 10:00 AM   Merary-wound Assessment Pink 4/28/2020 10:00 AM   Non-staged Wound Description Full thickness 4/28/2020 10:00 AM   [place]%Wound Bed 30 4/28/2020 10:00 AM   Red%Wound Bed 0 4/28/2020 10:00 AM   Yellow%Wound Bed 70 4/28/2020 10:00 AM   Black%Wound Bed 0 4/28/2020 10:00 AM   Purple%Wound Bed 0 4/28/2020 10:00 AM   Other%Wound Bed 0 4/28/2020 10:00 AM   Number of days: 13       Percent of Wound(s) Debrided: approximately 100%    Total  Area  Debrided:  15.96 sq cm     Bleeding:  Minimal    Hemostasis Achieved:  by pressure    Procedural Pain:  0  / 10     Post Procedural Pain:  0 / 10     Response to treatment:  Well tolerated by patient. Status of wound progress and description from last visit: Left lateral wound larger, left posterior leg smaller. Plan:       Discharge Instructions       PHYSICIAN ORDERS AND DISCHARGE INSTRUCTIONS     NOTE: Upon discharge from the [address], you will receive a patient experience survey.  We would be grateful if you would take the time to fill this survey out.     Wound care order history:                 ANDREW's   Right 0.92     Left 0.95             Date 4/14/20              Vascular studies:   Date               Imaging:   Date               Cultures: Nevaeh Burris

## 2020-05-05 ENCOUNTER — HOSPITAL ENCOUNTER (OUTPATIENT)
Dept: WOUND CARE | Age: 65
Discharge: HOME | End: 2020-05-05
Payer: MEDICARE

## 2020-05-05 VITALS
TEMPERATURE: 97.8 F | SYSTOLIC BLOOD PRESSURE: 166 MMHG | DIASTOLIC BLOOD PRESSURE: 79 MMHG | HEART RATE: 82 BPM | RESPIRATION RATE: 18 BRPM

## 2020-05-05 PROCEDURE — 11042 DBRDMT SUBQ TIS 1ST 20SQCM/<: CPT | Performed by: NURSE PRACTITIONER

## 2020-05-05 PROCEDURE — 11045 DBRDMT SUBQ TISS EACH ADDL: CPT

## 2020-05-05 PROCEDURE — 11045 DBRDMT SUBQ TISS EACH ADDL: CPT | Performed by: NURSE PRACTITIONER

## 2020-05-05 PROCEDURE — 11042 DBRDMT SUBQ TIS 1ST 20SQCM/<: CPT

## 2020-05-05 NOTE — PROGRESS NOTES
Wound Care Center Progress Note With Procedure    Gabino Tavares  AGE: 72 y.o. GENDER: male  : 1955  EPISODE DATE:  2020     Subjective:     Chief Complaint   Patient presents with    Wound Check     Left Lower Leg         HISTORY of PRESENT ILLNESS      Ancelmo Tavares is a 72 y.o. male who presents today for wound evaluation of Chronic venous and diabetic ulcer(s) of the left lower leg. The ulcer is of moderate severity. The underlying cause of the wound is venous and diabetic. Wound Pain Timing/Severity: intermittent, mild  Quality of pain: aching, tender  Severity of pain:  2 / 10   Modifying Factors: edema, venous stasis, lymphedema, diabetes, obesity, smoking and non-adherence  Associated Signs/Symptoms: edema, erythema, drainage and pain        PAST MEDICAL HISTORY        Diagnosis Date    CHF (congestive heart failure) (HCC)     Chronic ulcer of left leg with fat layer exposed ([address] 2016    Chronic ulcer of right leg with fat layer exposed ([address] 2016    Diabetes mellitus ([address]     Hypertension     PVD (peripheral vascular disease) ([address] 2016    Type 2 diabetes mellitus with diabetic peripheral angiopathy without gangrene, without long-term current use of insulin ([address] 2016    Venous stasis ulcer of both lower extremities without varicose veins ([address] 2016       PAST SURGICAL HISTORY    History reviewed. No pertinent surgical history.     FAMILY HISTORY    Family History   Problem Relation Age of Onset    Asthma Mother     Breast Cancer Mother     Cancer Mother     Diabetes Mother     High Blood Pressure Mother     Cancer Father     Early Death Brother     Arthritis Paternal Grandmother        SOCIAL HISTORY    Social History     Tobacco Use    Smoking status: Current Every Day Smoker     Packs/day: 1.00     Types: Cigarettes    Smokeless tobacco: Never Used    Tobacco comment: healing    Substance Use Topics    Alcohol use: Not on file    Drug                  Follow up with CAMRYN HENDERSON   In 1 weeks in the wound care center  Call [phone] for any questions or concerns.   Date__________   Time___________        Treatment Note Wound 04/14/20 Pretibial Left #3 LEFT LATERAL-Dressing/Treatment: (Mitali, Ca Alg, Coban 2)  Wound 04/14/20 Tibial Left;Posterior #4 LEFT POSTERIOR LEG -Dressing/Treatment: (Mitali, Ca Alg, Coban 2)    Written Patient Dismissal Instructions Given            Electronically signed by FAITH Cho CNP on 5/5/2020 at 12:01 PM

## 2020-05-05 NOTE — PLAN OF CARE
Problem: Venous:  Goal: Signs of wound healing will improve  Description: Signs of wound healing will improve  Outcome: Ongoing

## 2020-05-12 ENCOUNTER — HOSPITAL ENCOUNTER (OUTPATIENT)
Dept: WOUND CARE | Age: 65
Discharge: HOME | End: 2020-05-12
Payer: MEDICARE

## 2020-05-12 VITALS
SYSTOLIC BLOOD PRESSURE: 136 MMHG | DIASTOLIC BLOOD PRESSURE: 55 MMHG | RESPIRATION RATE: 18 BRPM | TEMPERATURE: 96.9 F | HEART RATE: 79 BPM

## 2020-05-12 PROCEDURE — 15271 SKIN SUB GRAFT TRNK/ARM/LEG: CPT

## 2020-05-12 PROCEDURE — 15271 SKIN SUB GRAFT TRNK/ARM/LEG: CPT | Performed by: NURSE PRACTITIONER

## 2020-05-12 NOTE — PROGRESS NOTES
Wound Care Center Progress Note and Bioengineered Skin Application      Gabino Tavares  AGE: 72 y.o. GENDER: male  : 1955  EPISODE DATE:  2020     Subjective:     Chief Complaint   Patient presents with    Wound Check     Left Lower Leg         HISTORY of PRESENT ILLNESS      Angelika Tavares is a 72 y.o. male who presents today for wound evaluation of Chronic venous and diabetic ulcer(s) of left lower leg. The ulcer is of moderate severity. The underlying cause of the wound is venous and diabetic. If appropriate skin graft will be applied. Wound Pain Timing/Severity: intermittent, mild  Quality of pain: aching, tender  Severity of pain:  2 / 10   Modifying Factors: edema, venous stasis, lymphedema, diabetes, obesity, smoking and non-adherence  Associated Signs/Symptoms: edema, erythema, drainage and pain        PAST MEDICAL HISTORY        Diagnosis Date    CHF (congestive heart failure) (HCC)     Chronic ulcer of left leg with fat layer exposed ([address] 2016    Chronic ulcer of right leg with fat layer exposed ([address] 2016    Diabetes mellitus ([address]     Hypertension     PVD (peripheral vascular disease) ([address] 2016    Type 2 diabetes mellitus with diabetic peripheral angiopathy without gangrene, without long-term current use of insulin ([address] 2016    Venous stasis ulcer of both lower extremities without varicose veins ([address] 2016       PAST SURGICAL HISTORY    History reviewed. No pertinent surgical history.     FAMILY HISTORY    Family History   Problem Relation Age of Onset    Asthma Mother     Breast Cancer Mother     Cancer Mother     Diabetes Mother     High Blood Pressure Mother     Cancer Father     Early Death Brother     Arthritis Paternal Grandmother        SOCIAL HISTORY    Social History     Tobacco Use    Smoking status: Current Every Day Smoker     Packs/day: 1.00     Types: Cigarettes    Smokeless tobacco: Never Used    Tobacco comment: healing Substance Use Topics    Alcohol use: Not on file    Drug use: No       ALLERGIES    No Known Allergies    MEDICATIONS    Current Outpatient Medications on File Prior to Encounter   Medication Sig Dispense Refill    Latanoprost 0.005 % EMUL Apply to eye daily      ibuprofen (ADVIL;MOTRIN) 600 MG tablet Take 1 tablet by mouth every 6 hours as needed for Pain 30 tablet 0    lidocaine (LIDODERM) 5 % Place 1 patch onto the skin daily 12 hours on, 12 hours off. 30 patch 0    glipiZIDE (GLUCOTROL) 5 MG tablet Take 5 mg by mouth 2 times daily (before meals)      potassium chloride (KLOR-CON M) 20 MEQ extended release tablet Take 20 mEq by mouth daily      ALPRAZolam (XANAX) 0.5 MG tablet Take 0.5 mg by mouth nightly. Reyes Fierro ipratropium-albuterol (DUONEB) 0.5-2.5 (3) MG/3ML SOLN nebulizer solution Inhale 1 vial into the lungs every 4 hours      albuterol sulfate  (90 Base) MCG/ACT inhaler Inhale 2 puffs into the lungs every 6 hours as needed for Wheezing      aspirin EC 81 MG EC tablet Take 1 tablet by mouth daily 30 tablet 3    HYDROcodone-acetaminophen (NORCO) 7.5-325 MG per tablet Take 1 tablet by mouth 3 times daily .  metFORMIN (GLUCOPHAGE) 1000 MG tablet Take 1,000 mg by mouth 2 times daily (with meals)      furosemide (LASIX) 80 MG tablet Take 80 mg by mouth 2 times daily      lisinopril (PRINIVIL;ZESTRIL) 20 MG tablet Take 40 mg by mouth daily       tamsulosin (FLOMAX) 0.4 MG capsule Take 0.4 mg by mouth daily      finasteride (PROSCAR) 5 MG tablet Take 5 mg by mouth daily      metolazone (ZAROXOLYN) 2.5 MG tablet Take 2 tablets by mouth daily 60 tablet 0     No current facility-administered medications on file prior to encounter. REVIEW OF SYSTEMS    Pertinent items are noted in HPI. Constitutional: Negative for systemic symptoms including fever, chills and malaise.       Objective:      BP (!) 136/55   Pulse 79   Temp 96.9 °F (36.1 °C) (Temporal)   Resp 18     PHYSICAL 5/12/2020 10:35 AM   Distance Tunneling (cm) 0 cm 5/12/2020 10:23 AM   Tunneling Position ___ O'Clock 0 5/12/2020 10:23 AM   Undermining Starts ___ O'Clock 0 5/12/2020 10:23 AM   Undermining Ends___ O'Clock 0 5/12/2020 10:23 AM   Undermining Maxium Distance (cm) 0 5/12/2020 10:23 AM   Wound Assessment Pink 5/12/2020 10:23 AM   Drainage Amount Moderate 5/12/2020 10:23 AM   Drainage Description Serosanguinous 5/12/2020 10:23 AM   Odor None 5/12/2020 10:23 AM   Margins Unattached edges; Undefined edges 5/12/2020 10:23 AM   Merary-wound Assessment Pink 5/12/2020 10:23 AM   Non-staged Wound Description Full thickness 5/12/2020 10:23 AM   [place]%Wound Bed 100 5/12/2020 10:23 AM   Red%Wound Bed 0 5/12/2020 10:23 AM   Yellow%Wound Bed 0 5/12/2020 10:23 AM   Black%Wound Bed 0 5/12/2020 10:23 AM   Purple%Wound Bed 0 5/12/2020 10:23 AM   Other%Wound Bed 0 5/12/2020 10:23 AM   Number of days: 28       Wound 04/14/20 Tibial Left;Posterior #4 LEFT POSTERIOR LEG  (Active)   Wound Image   4/14/2020 10:50 AM   Wound Venous 5/12/2020 10:23 AM   Dressing Status Clean;Dry; Intact 5/12/2020 10:42 AM   Dressing Changed Changed/New 5/12/2020 10:42 AM   Wound Cleansed Rinsed/Irrigated with saline 5/12/2020 10:23 AM   Wound Length (cm) 2.1 cm 5/12/2020 10:23 AM   Wound Width (cm) 1.5 cm 5/12/2020 10:23 AM   Wound Depth (cm) 0.1 cm 5/12/2020 10:23 AM   Wound Surface Area (cm^2) 3.15 cm^2 5/12/2020 10:23 AM   Change in Wound Size % (l*w) 48.86 5/12/2020 10:23 AM   Wound Volume (cm^3) 0.32 cm^3 5/12/2020 10:23 AM   Wound Healing % 74 5/12/2020 10:23 AM   Post-Procedure Length (cm) 2.1 cm 5/12/2020 10:35 AM   Post-Procedure Width (cm) 1.5 cm 5/12/2020 10:35 AM   Post-Procedure Depth (cm) 0.1 cm 5/12/2020 10:35 AM   Post-Procedure Surface Area (cm^2) 3.15 cm^2 5/12/2020 10:35 AM   Post-Procedure Volume (cm^3) 0.32 cm^3 5/12/2020 10:35 AM   Distance Tunneling (cm) 0 cm 5/12/2020 10:23 AM   Tunneling Position ___ O'Clock 0 5/12/2020 10:23 AM Undermining Starts ___ O'Clock 0 5/12/2020 10:23 AM   Undermining Ends___ O'Clock 0 5/12/2020 10:23 AM   Undermining Maxium Distance (cm) 0 5/12/2020 10:23 AM   Wound Assessment Pink;Yellow 5/12/2020 10:23 AM   Drainage Amount Moderate 5/12/2020 10:23 AM   Drainage Description Serosanguinous 5/12/2020 10:23 AM   Odor None 5/12/2020 10:23 AM   Margins Unattached edges; Undefined edges 5/12/2020 10:23 AM   Mer[place]-wound Assessment Pink 5/12/2020 10:23 AM   Non-staged Wound Description Full thickness 5/12/2020 10:23 AM   [place]%Wound Bed 30 5/12/2020 10:23 AM   Red%Wound Bed 0 5/12/2020 10:23 AM   Yellow%Wound Bed 70 5/12/2020 10:23 AM   Black%Wound Bed 0 5/12/2020 10:23 AM   Purple%Wound Bed 0 5/12/2020 10:23 AM   Other%Wound Bed 0 5/12/2020 10:23 AM   Number of days: 28         Total Surface Area Covered 25 sq/cm    Was the Product Layered  No      Amount Wasted 0 sq/cm    Reason for Waste: material provided was greater than wound size      Secured: Yes    Instruments used to complete placement of graft::scissors and forceps    Secured With:   [] N/A  [x]Steri Strips    []Sutures     []Staples [x]Other Mepitel       Procedural Pain: 0/10     Post Procedural Pain: 0 / 10    Response to Treatment:  Well tolerated by patient. Status of wound progress and description from last visit: Larger today, graft applied, edema better managed with compression. Wound culture was negative for growth 4/14/2020. Has had arterial blockage in the past with atherectomy and balloon PTA of the left SFA lesion  decreased from 99% to 10% per Dr. Amira Trejo in January 2017. The patient is a diabetic, states he inconsistently checks blood sugar. He is also a smoker, approximately one pack per day. The patient would benefit from lymphedema pumps to manage his lymphedema and venous insufficiency.     DIAGNOSIS ASSESSMENT:  Lymphedema is caused by:  Lymphedema, not elsewhere classified [I89.0] (includes CVI-related lymphedema) [x]? Yes []?  No                             JX not stop taking until medicine is all gone.                                                       Orders for this week: 5/12/2020  Puraply # 1 5/12/20       LEFT LATERAL AND POSTERIOR LEG -- [address] WITH SOAP AND WATER, PAT DRY. Apply Puraply AM #1, Mepitel, and Steri strips. Then CALCIUM ALGINATE AND WRAP WITH COBAN 2 WRAP                  Follow up with CAMRYN HENDERSON   In 1 weeks in the wound care center  Call [phone] for any questions or concerns.   Date__________   Time___________        Treatment Note Wound 04/14/20 Pretibial Left #3 LEFT LATERAL-Dressing/Treatment: (Ca Alg, Coban 2)  Wound 04/14/20 Tibial Left;Posterior #4 LEFT POSTERIOR LEG -Dressing/Treatment: (Ca Alg, Coban 2)    Written Patient Dismissal Instructions Given            Electronically signed by FAITH Mckee CNP on 5/12/2020 at 12:54 PM

## 2020-05-12 NOTE — PROGRESS NOTES
Multilayer Compression Wrap   (Not Unna) Below the Knee    NAME:  Olive Tavares  YOB: 1955  MEDICAL RECORD NUMBER:  [id number]  DATE:  5/12/2020    Multilayer compression wrap: Removed old Multilayer wrap if indicated and wash leg with mild soap/water. Applied moisturizing agent to dry skin as needed. Applied primary and secondary dressing as ordered. Applied multilayered dressing below the knee to left lower leg. Instructed patient/caregiver not to remove dressing and to keep it clean and dry. Instructed patient/caregiver on complications to report to provider, such as pain, numbness in toes, heavy drainage, and slippage of dressing. Instructed patient on purpose of compression dressing and on activity and exercise recommendations.       Electronically signed by Rukhsana Marks RN on 5/12/2020 at 10:43 AM

## 2020-05-19 ENCOUNTER — HOSPITAL ENCOUNTER (OUTPATIENT)
Dept: WOUND CARE | Age: 65
Discharge: HOME | End: 2020-05-19
Payer: MEDICARE

## 2020-05-19 VITALS
DIASTOLIC BLOOD PRESSURE: 56 MMHG | RESPIRATION RATE: 16 BRPM | SYSTOLIC BLOOD PRESSURE: 143 MMHG | TEMPERATURE: 96.2 F | HEART RATE: 71 BPM

## 2020-05-19 PROCEDURE — 15271 SKIN SUB GRAFT TRNK/ARM/LEG: CPT

## 2020-05-19 PROCEDURE — 15271 SKIN SUB GRAFT TRNK/ARM/LEG: CPT | Performed by: NURSE PRACTITIONER

## 2020-05-19 RX ORDER — LIDOCAINE HYDROCHLORIDE 40 MG/ML
SOLUTION TOPICAL ONCE
Status: DISCONTINUED | OUTPATIENT
Start: 2020-05-19 | End: 2020-05-20 | Stop reason: HOSPADM

## 2020-05-19 NOTE — PROGRESS NOTES
[facility] Center Progress Note and Bioengineered Skin Application      Gabino Tavares  AGE: 72 y.o. GENDER: male  : 1955  EPISODE DATE:  2020     Subjective:     Chief Complaint   Patient presents with    Wound Check     LLE         HISTORY of PRESENT ILLNESS      Robyn Tavares is a 72 y.o. male who presents today for wound evaluation of Chronic venous and diabetic ulcer(s) of left lower leg. The ulcer is of moderate severity. The underlying cause of the wound is venous and diabetes. If appropriate skin graft will be applied. Wound Pain Timing/Severity: intermittent, mild  Quality of pain: aching, tender  Severity of pain:  2 / 10   Modifying Factors: edema, venous stasis, lymphedema, diabetes, obesity, smoking and non-adherence  Associated Signs/Symptoms: edema, erythema, drainage and pain        PAST MEDICAL HISTORY        Diagnosis Date    CHF (congestive heart failure) (HCC)     Chronic ulcer of left leg with fat layer exposed ([address] 2016    Chronic ulcer of right leg with fat layer exposed ([address] 2016    Chronic ulcer of right leg with fat layer exposed ([address] 2016    Diabetes mellitus ([address]     Hypertension     PVD (peripheral vascular disease) ([address] 2016    Type 2 diabetes mellitus with diabetic peripheral angiopathy without gangrene, without long-term current use of insulin ([address] 2016    Venous stasis ulcer of both lower extremities without varicose veins ([address] 2016       PAST SURGICAL HISTORY    History reviewed. No pertinent surgical history.     FAMILY HISTORY    Family History   Problem Relation Age of Onset    Asthma Mother     Breast Cancer Mother     Cancer Mother     Diabetes Mother     High Blood Pressure Mother     Cancer Father     Early Death Brother     Arthritis Paternal Grandmother        SOCIAL HISTORY    Social History     Tobacco Use    Smoking status: Current Every Day Smoker     Packs/day: 1.00     Types: Cigarettes    Smokeless tobacco: Never Used    Tobacco comment: healing    Substance Use Topics    Alcohol use: Not on file    Drug use: No       ALLERGIES    No Known Allergies    MEDICATIONS    Current Outpatient Medications on File Prior to Encounter   Medication Sig Dispense Refill    Latanoprost 0.005 % EMUL Apply to eye daily      ibuprofen (ADVIL;MOTRIN) 600 MG tablet Take 1 tablet by mouth every 6 hours as needed for Pain 30 tablet 0    lidocaine (LIDODERM) 5 % Place 1 patch onto the skin daily 12 hours on, 12 hours off. 30 patch 0    glipiZIDE (GLUCOTROL) 5 MG tablet Take 5 mg by mouth 2 times daily (before meals)      potassium chloride (KLOR-CON M) 20 MEQ extended release tablet Take 20 mEq by mouth daily      ALPRAZolam (XANAX) 0.5 MG tablet Take 0.5 mg by mouth nightly. Shawn Bacca ipratropium-albuterol (DUONEB) 0.5-2.5 (3) MG/3ML SOLN nebulizer solution Inhale 1 vial into the lungs every 4 hours      albuterol sulfate  (90 Base) MCG/ACT inhaler Inhale 2 puffs into the lungs every 6 hours as needed for Wheezing      aspirin EC 81 MG EC tablet Take 1 tablet by mouth daily 30 tablet 3    HYDROcodone-acetaminophen (NORCO) 7.5-325 MG per tablet Take 1 tablet by mouth 3 times daily .  metFORMIN (GLUCOPHAGE) 1000 MG tablet Take 1,000 mg by mouth 2 times daily (with meals)      furosemide (LASIX) 80 MG tablet Take 80 mg by mouth 2 times daily      lisinopril (PRINIVIL;ZESTRIL) 20 MG tablet Take 40 mg by mouth daily       tamsulosin (FLOMAX) 0.4 MG capsule Take 0.4 mg by mouth daily      finasteride (PROSCAR) 5 MG tablet Take 5 mg by mouth daily      metolazone (ZAROXOLYN) 2.5 MG tablet Take 2 tablets by mouth daily 60 tablet 0     No current facility-administered medications on file prior to encounter. REVIEW OF SYSTEMS    Pertinent items are noted in HPI. Constitutional: Negative for systemic symptoms including fever, chills and malaise.       Objective:      BP (!) 143/56   Pulse 71   Temp inconsistently checks blood sugar. He is also a smoker, approximately one pack per day. The patient would benefit from lymphedema pumps to manage his lymphedema and venous insufficiency.     DIAGNOSIS ASSESSMENT:  Lymphedema is caused by:  Lymphedema, not elsewhere classified [I89.0] (includes CVI-related lymphedema) [x]? Yes []? No   Hereditary lymphedema [Q82.0] []? Yes [x]? No   Chronic Venous Stasis ulcers [I87.2] (non-healing despite 6 months of ongoing treatment)  [x]? Yes []? No       SWELLING SEVERITY:   Patient exhibits the following swelling severity (International Society of Lymphology clinical classification):     [x]? Stage II: Limb elevation alone rarely reduces the tissue swelling and pitting is manifest.  Later in Stage II, the limb may not pit as excess subcutaneous fat and fibrosis develop.     SYMPTOMS DESPITE CONSERVATIVE THERAPY:  [x]? Hyperkeratosis  [x]? Hyperpigmentation  [x]? Skin breakdown with lymphorrhea (skin weeping)  [x]? Recurrent cellulitis   [x]? Progressive edema  [x]? Unable to control swelling  [x]? Pain     CONSERVATIVE TREATMENT:  Patient has tried conservative treatments (compression/exercise/elevation):  [x]? Yes []? No     Instructed on self-manual lymphatic drainage techniques (self-MLD): []? Yes []? No  Instructed on appropriate skin/nail care practices: [x]? Yes []? No  Compression garments of at least 20-30mmHg, Bandaging, Elevation, Exercise are being used at this time.     Diabetes education provided today:     Diabetic Neuropathy: signs and therapy. Foot care: advised to wash feet daily, pat dry and apply lotion at night, avoiding between toes. Need to look at feet daily and report to a physician any signs of inflammation or skin damage. Discussed diabetes shoes and socks. Managing high and low sugar readings.   Effects of smoking and diabetes.      Patient counseled in length on smoking cessation including benefits of quitting and health risks of continuing to smoke

## 2020-05-26 ENCOUNTER — HOSPITAL ENCOUNTER (OUTPATIENT)
Dept: WOUND CARE | Age: 65
Discharge: HOME | End: 2020-05-26
Payer: MEDICARE

## 2020-05-26 VITALS
SYSTOLIC BLOOD PRESSURE: 134 MMHG | HEART RATE: 70 BPM | RESPIRATION RATE: 18 BRPM | DIASTOLIC BLOOD PRESSURE: 56 MMHG | TEMPERATURE: 97.1 F

## 2020-05-26 PROCEDURE — 15271 SKIN SUB GRAFT TRNK/ARM/LEG: CPT

## 2020-05-26 PROCEDURE — 15271 SKIN SUB GRAFT TRNK/ARM/LEG: CPT | Performed by: NURSE PRACTITIONER

## 2020-05-26 NOTE — PROGRESS NOTES
0 / 10     Having prepared the wound bed, the skin graft was completed as below. Product Utilized:          [] APLIGRAF   []44 sq cm   []88 sq cm    []132 sq cm  []176 sq cm           [] DERMAGRAFT  [] 38 sq cm   []76 sq cm    []114 sq cm  []152 sq cm      [] NUSHIELD  [] 1.6 sq/cm disc   [] (2X3) 6 sq/cm    [] (2X4) 8 sq/cm         [] (3X4) 12 sq/cm  [] (4x4) 16 sq/cm   [] (4X6) 24 sq/cm         [] (6X6) 36 sq/cm        [] AFFINITY    [] (1.5 cm x 1.5cm) 2.25 cm   [] (2.5 cm x 2.5 cm) 6.25 cm         [] THERASKIN  [] 13 sq cm   []37.34 sq cm             [] EpiFix   [] 1.54 sq cm disc    [] 2 pieces (3.08 sq cm)    [] 3  pieces (4.62 sq  cm)   [] 6 sq/cm    [] 16 sq cm     [] 18 sq cm   Fenestrated        [] OASIS   [] 10.5 sq cm   []21 sq cm    []35 sq cm Tri-Matrix  []70 sq cm          Tri-Matrix                    [x] PURAPLY   [] 1.6 sq cm disc     [] 4 sq cm   [x] 8 sq cm   [] 25sq cm  [] 54 sq cm                  [] Grafix      [] 1.54 sq cm disc    [] 3 sq cm    [] 6 sq cm   [] 12 sq cm   [] 25 sq cm        Skin Substitute Applied:    Performed by: FAITH Crouch CNP    Consent obtained: Yes    Time out taken: Yes     Fenestrated: No    Skin Substitute was Applied to Wound Number(s):     3 and 4      Wound 04/14/20 Pretibial Left #3 LEFT LATERAL (Active)   Wound Image   5/19/2020 10:22 AM   Wound Venous 5/26/2020 10:01 AM   Dressing Status Clean;Dry; Intact 5/26/2020 10:21 AM   Dressing Changed Changed/New 5/26/2020 10:21 AM   Wound Cleansed Soap and water 5/26/2020 10:01 AM   Wound Length (cm) 1.3 cm 5/26/2020 10:01 AM   Wound Width (cm) 1.3 cm 5/26/2020 10:01 AM   Wound Depth (cm) 0.3 cm 5/26/2020 10:01 AM   Wound Surface Area (cm^2) 1.69 cm^2 5/26/2020 10:01 AM   Change in Wound Size % (l*w) 0 5/26/2020 10:01 AM   Wound Volume (cm^3) 0.51 cm^3 5/26/2020 10:01 AM   Wound Healing % -50 5/26/2020 10:01 AM   Post-Procedure Length (cm) 1.3 cm 5/26/2020 10:14 AM   Post-Procedure Width (cm) 1.3 cm verbalized understanding today, is not ready to quit.         Plan:     Discharge instructions:  Discharge Instructions       PHYSICIAN ORDERS AND DISCHARGE INSTRUCTIONS     NOTE: Upon discharge from the [address], you will receive a patient experience survey. We would be grateful if you would take the time to fill this survey out.     Wound care order history:                 ANDREW's   Right 0.92     Left 0.95             Date 4/14/20              Vascular studies:   Date               Imaging:   Date               Cultures:   OBTAINED 4/14/2020               Labs/ HbA1c:   Date               Grafts:  Date               HBO:                Antibiotics:               Earlier Wound care treatments:                Authorizations:                        Consults:   [facility]                          Primary care physician: DR Rita Ochoa     Continuing wound care orders and information:              Residence:                Continue home health care with:               Your wound-care supplies will be provided by: Helga Mora provider:              ROBERTA with  Otilia Yates loading:  Date               [facility] Medications:              KJYEZ cleansing:                           SL not scrub or use excessive force.                          Wash hands with soap and water before and after dressing changes.                           Prior to applying a clean dressing, cleanse wound with normal saline,                                wound cleanser, or mild soap and water.                           Ask the physician or nurse before getting the wound(s) wet in a shower              Daily Wound management:                          Keep weight off wounds and reposition every 2 hours.                          Avoid standing for long periods of time.                          FYULA wraps/stockings in AM and remove at bedtime.                          If swelling is present, elevate legs to the level of the heart or above for 30                              minutes 4-5 times a day and/or when sitting.                                             When taking antibiotics take entire prescription as ordered by physician                             do not stop taking until medicine is all gone.                                                       Orders for this week: 5/26/2020  Puraply # 1 5/12/20  Apligraf # 1 5/18/20  Puraply # 2 5/26/20        LEFT LATERAL AND POSTERIOR LEG -- [address] WITH SOAP AND WATER, PAT DRY.  Apply Purapy # 2, Mepitel, and Steri strips. Then CALCIUM ALGINATE AND WRAP WITH COBAN 2 WRAP                  Follow up with CAMRYN HENDERSON   In 1 weeks in the wound care center  Call [phone] for any questions or concerns.   Date__________   Time___________        Treatment Note Wound 04/14/20 Pretibial Left #3 LEFT LATERAL-Dressing/Treatment: (Ca Alg, Coban 2)  Wound 04/14/20 Tibial Left;Posterior #4 LEFT POSTERIOR LEG -Dressing/Treatment: (Ca Alg, Coban 2)    Written Patient Dismissal Instructions Given            Electronically signed by FAITH Weber CNP on 5/26/2020 at 10:39 AM

## 2020-06-02 ENCOUNTER — HOSPITAL ENCOUNTER (OUTPATIENT)
Dept: WOUND CARE | Age: 65
Discharge: HOME | End: 2020-06-02
Payer: MEDICARE

## 2020-06-02 PROCEDURE — 15271 SKIN SUB GRAFT TRNK/ARM/LEG: CPT

## 2020-06-02 PROCEDURE — 15271 SKIN SUB GRAFT TRNK/ARM/LEG: CPT | Performed by: NURSE PRACTITIONER

## 2020-06-03 NOTE — PROGRESS NOTES
1.00     Types: Cigarettes    Smokeless tobacco: Never Used    Tobacco comment: healing    Substance Use Topics    Alcohol use: Not on file    Drug use: No       ALLERGIES    No Known Allergies    MEDICATIONS    Current Outpatient Medications on File Prior to Encounter   Medication Sig Dispense Refill    Latanoprost 0.005 % EMUL Apply to eye daily      ibuprofen (ADVIL;MOTRIN) 600 MG tablet Take 1 tablet by mouth every 6 hours as needed for Pain 30 tablet 0    lidocaine (LIDODERM) 5 % Place 1 patch onto the skin daily 12 hours on, 12 hours off. 30 patch 0    glipiZIDE (GLUCOTROL) 5 MG tablet Take 5 mg by mouth 2 times daily (before meals)      potassium chloride (KLOR-CON M) 20 MEQ extended release tablet Take 20 mEq by mouth daily      ALPRAZolam (XANAX) 0.5 MG tablet Take 0.5 mg by mouth nightly. Kavin Dienes ipratropium-albuterol (DUONEB) 0.5-2.5 (3) MG/3ML SOLN nebulizer solution Inhale 1 vial into the lungs every 4 hours      albuterol sulfate  (90 Base) MCG/ACT inhaler Inhale 2 puffs into the lungs every 6 hours as needed for Wheezing      aspirin EC 81 MG EC tablet Take 1 tablet by mouth daily 30 tablet 3    HYDROcodone-acetaminophen (NORCO) 7.5-325 MG per tablet Take 1 tablet by mouth 3 times daily .  metFORMIN (GLUCOPHAGE) 1000 MG tablet Take 1,000 mg by mouth 2 times daily (with meals)      furosemide (LASIX) 80 MG tablet Take 80 mg by mouth 2 times daily      lisinopril (PRINIVIL;ZESTRIL) 20 MG tablet Take 40 mg by mouth daily       tamsulosin (FLOMAX) 0.4 MG capsule Take 0.4 mg by mouth daily      finasteride (PROSCAR) 5 MG tablet Take 5 mg by mouth daily      metolazone (ZAROXOLYN) 2.5 MG tablet Take 2 tablets by mouth daily 60 tablet 0     No current facility-administered medications on file prior to encounter. REVIEW OF SYSTEMS    Pertinent items are noted in HPI. Constitutional: Negative for systemic symptoms including fever, chills and malaise.       Objective: There were no vitals taken for this visit. PHYSICAL EXAM  General: The patient is in no acute distress. Mental status:  Patient is appropriate, is  oriented to place and plan of care. Dermatologic exam: Visual inspection of the periwound reveals the skin to be edematous  Wound exam: see wound description below in procedure note      Assessment:     Problem List Items Addressed This Visit     None          Conditions above and those listed in the past history are being treated appropriately and are considered under adequate control so as not to preclude the use of a graft. SKIN SUBSTITUTES/GRAFT APPLICATIONS PROCEDURE NOTE    Indicaton:     Chronic ulcer(s) of the left lower leg  that has(have) failed to heal with the usual wound protocol used for greater than 30 days. See Epic chart for previous modalities and details of previous treatment. The patient has no contraindications or evidence of infection. The patient's competency and support system is appropriate for proper care and follow up for the use of this graft, therefore a graft was placed as below. Procedure: The wound bed was cleansed and prepared as necessary to accept the graft. Debridement was required. Consent obtained: Yes    Time out taken: Yes    Using curette sharp Excisional Debridement of the wound(s) was/were performed down through and including the removal of subcutaneous tissue. Devitalized Tissue Debrided:  slough and exudate      Bleeding:  Minimal    Hemostasis Achieved:  by pressure    Procedural Pain:  0  / 10     Post Procedural Pain:  0 / 10     Having prepared the wound bed, the skin graft was completed as below.     Product Utilized:          [] APLIGRAF   []44 sq cm   []88 sq cm    []132 sq cm  []176 sq cm           [] DERMAGRAFT  [] 38 sq cm   []76 sq cm    []114 sq cm  []152 sq cm      [] NUSHIELD  [] 1.6 sq/cm disc   [] (2X3) 6 sq/cm    [] (2X4) 8 sq/cm         [] (3X4) 12 sq/cm  [] (4x4) 16 sq/cm   [] 12:01 PM   Undermining Maxium Distance (cm) 0 6/2/2020 12:01 PM   Wound Assessment Red;Yellow 6/2/2020 12:01 PM   Drainage Amount Moderate 6/2/2020 12:01 PM   Drainage Description Serosanguinous 6/2/2020 12:01 PM   Odor None 6/2/2020 12:01 PM   Margins Defined edges 6/2/2020 12:01 PM   Merary-wound Assessment Pink 6/2/2020 12:01 PM   Non-staged Wound Description Full thickness 6/2/2020 12:01 PM   Pink%Wound Bed 0 6/2/2020 12:01 PM   Red%Wound Bed 60 6/2/2020 12:01 PM   Yellow%Wound Bed 40 6/2/2020 12:01 PM   Black%Wound Bed 0 6/2/2020 12:01 PM   Purple%Wound Bed 0 6/2/2020 12:01 PM   Other%Wound Bed 0 6/2/2020 12:01 PM   Number of days: 49       Wound 04/14/20 Tibial Left;Posterior #4 LEFT POSTERIOR LEG  (Active)   Wound Image   5/19/2020 10:22 AM   Wound Venous 6/2/2020 12:01 PM   Dressing Status Clean;Dry; Intact 5/26/2020 10:21 AM   Dressing Changed Changed/New 5/26/2020 10:21 AM   Wound Cleansed Soap and water 6/2/2020 12:01 PM   Wound Length (cm) 1.8 cm 6/2/2020 12:01 PM   Wound Width (cm) 1.4 cm 6/2/2020 12:01 PM   Wound Depth (cm) 0.2 cm 6/2/2020 12:01 PM   Wound Surface Area (cm^2) 2.52 cm^2 6/2/2020 12:01 PM   Change in Wound Size % (l*w) 59.09 6/2/2020 12:01 PM   Wound Volume (cm^3) 0.5 cm^3 6/2/2020 12:01 PM   Wound Healing % 59 6/2/2020 12:01 PM   Post-Procedure Length (cm) 1.8 cm 6/2/2020 12:31 PM   Post-Procedure Width (cm) 1.4 cm 6/2/2020 12:31 PM   Post-Procedure Depth (cm) 0.2 cm 6/2/2020 12:31 PM   Post-Procedure Surface Area (cm^2) 2.52 cm^2 6/2/2020 12:31 PM   Post-Procedure Volume (cm^3) 0.5 cm^3 6/2/2020 12:31 PM   Distance Tunneling (cm) 0 cm 6/2/2020 12:01 PM   Tunneling Position ___ O'Clock 0 6/2/2020 12:01 PM   Undermining Starts ___ O'Clock 0 6/2/2020 12:01 PM   Undermining Ends___ O'Clock 0 6/2/2020 12:01 PM   Undermining Maxium Distance (cm) 0 6/2/2020 12:01 PM   Wound Assessment Pink;Yellow 6/2/2020 12:01 PM   Drainage Amount Moderate 6/2/2020 12:01 PM   Drainage Description Serosanguinous 6/2/2020 12:01 PM   Odor None 6/2/2020 12:01 PM   Margins Defined edges; Unattached edges 6/2/2020 12:01 PM   Merary-wound Assessment Pink 6/2/2020 12:01 PM   Non-staged Wound Description Full thickness 6/2/2020 12:01 PM   Pink%Wound Bed 0 6/2/2020 12:01 PM   Red%Wound Bed 90 6/2/2020 12:01 PM   Yellow%Wound Bed 10 6/2/2020 12:01 PM   Black%Wound Bed 0 6/2/2020 12:01 PM   Purple%Wound Bed 0 6/2/2020 12:01 PM   Other%Wound Bed 0 6/2/2020 12:01 PM   Number of days: 49         Total Surface Area Covered 3.95 sq/cm    Was the Product Layered  Yes      Amount Wasted 0 sq/cm    Reason for Waste: material provided was greater than wound size      Secured: Yes    Instruments used to complete placement of graft::scissors and forceps    Secured With:   [] N/A  [x]Steri Strips    []Sutures     []Staples [x]Other Mepitel    Procedural Pain: 0/10     Post Procedural Pain: 0 / 10    Response to Treatment:  Well tolerated by patient. Status of wound progress and description from last visit: Improving. Plan:     Discharge instructions:  Discharge Instructions           PHYSICIAN ORDERS AND DISCHARGE INSTRUCTIONS     NOTE: Upon discharge from the [address], you will receive a patient experience survey.  We would be grateful if you would take the time to fill this survey out.     Wound care order history:                 ANDREW's   Right 0.92     Left 0.95             Date 4/14/20              Vascular studies:   Date               Imaging:   Date               Cultures:   OBTAINED 4/14/2020               Labs/ HbA1c:   Date               Grafts:  Date               HBO:                Antibiotics:               Earlier Wound care treatments:                Authorizations:                        Consults:   [facility]                          Primary care physician: DR Rita Ochoa     Continuing wound care orders and information:              Residence:                Continue home health care with:             CP[facility] wound-care supplies will be provided by: Eve Dupree provider:              MICHELLE with  Moriah Gayle loading:  Date               [facility] Medications:              XBAAY cleansing:                           HA not scrub or use excessive force.                          Wash hands with soap and water before and after dressing changes.                         RJGUO to applying a clean dressing, cleanse wound with normal saline,                                wound cleanser, or mild soap and water.                           Ask the physician or nurse before getting the wound(s) wet in a shower              Daily Wound management:                          Keep weight off wounds and reposition every 2 hours.                          EBOEC standing for long periods of time.                          IVYHY wraps/stockings in AM and remove at bedtime.                          If swelling is present, elevate legs to the level of the heart or above for 30                              minutes 4-5 times a day and/or when sitting.                                             When taking antibiotics take entire prescription as ordered by physician                             PN not stop taking until medicine is all gone.                                                       Orders for this week: 6/2/2020  Puraply # 1 5/12/20  Apligraf # 1 5/18/20  Puraply # 2 5/26/20  Puraply #3 6/2/2020        LEFT LATERAL AND POSTERIOR LEG -- [facility] WITH SOAP AND WATER, PAT DRY.  Apply Purapy # 3, Mepitel, and Steri strips. Then CALCIUM ALGINATE AND WRAP WITH COBAN 2 WRAP                  Follow up with CAMRYN HENDERSON   In 1 weeks in the wound care center  Call [phone] for any questions or concerns.   Date__________   Time___________             Treatment Note Wound 04/14/20 Pretibial Left #3 LEFT LATERAL-Dressing/Treatment: (#3 puraply , Calcium alginate,coban 2 )    Written Patient Dismissal Instructions Given

## 2020-06-09 ENCOUNTER — HOSPITAL ENCOUNTER (OUTPATIENT)
Dept: WOUND CARE | Age: 65
Discharge: HOME | End: 2020-06-09
Payer: MEDICARE

## 2020-06-09 VITALS
TEMPERATURE: 97.1 F | RESPIRATION RATE: 18 BRPM | HEART RATE: 76 BPM | SYSTOLIC BLOOD PRESSURE: 154 MMHG | DIASTOLIC BLOOD PRESSURE: 62 MMHG

## 2020-06-09 PROCEDURE — 11720 DEBRIDE NAIL 1-5: CPT

## 2020-06-09 PROCEDURE — 15271 SKIN SUB GRAFT TRNK/ARM/LEG: CPT | Performed by: NURSE PRACTITIONER

## 2020-06-09 PROCEDURE — 11721 DEBRIDE NAIL 6 OR MORE: CPT

## 2020-06-09 PROCEDURE — 15271 SKIN SUB GRAFT TRNK/ARM/LEG: CPT

## 2020-06-09 NOTE — PROGRESS NOTES
[facility] Progress Note and Bioengineered Skin Application      Gabino Tavares  AGE: 72 y.o. GENDER: male  : 1955  EPISODE DATE:  2020     Subjective:     Chief Complaint   Patient presents with    Wound Check     left leg          HISTORY of PRESENT ILLNESS      Robyn Tavares is a 72 y.o. male who presents today for wound evaluation of Chronic venous, diabetic and lymphedema ulcer(s) of left lower leg. The ulcer is of moderate severity. The underlying cause of the wound is venous, diabetic and lymphedema. If appropriate skin graft will be applied. Wound Pain Timing/Severity: intermittent, mild  Quality of pain: aching, tender  Severity of pain:  2 / 10   Modifying Factors: edema, venous stasis, lymphedema, diabetes, obesity, smoking and non-adherence  Associated Signs/Symptoms: edema, erythema, drainage and pain        PAST MEDICAL HISTORY        Diagnosis Date    CHF (congestive heart failure) (HCC)     Chronic ulcer of left leg with fat layer exposed ([address] 2016    Chronic ulcer of right leg with fat layer exposed ([address] 2016    Chronic ulcer of right leg with fat layer exposed ([address] 2016    Diabetes mellitus ([address]     Hypertension     PVD (peripheral vascular disease) ([address] 2016    Type 2 diabetes mellitus with diabetic peripheral angiopathy without gangrene, without long-term current use of insulin ([address] 2016    Venous stasis ulcer of both lower extremities without varicose veins ([address] 2016       PAST SURGICAL HISTORY    History reviewed. No pertinent surgical history.     FAMILY HISTORY    Family History   Problem Relation Age of Onset    Asthma Mother     Breast Cancer Mother     Cancer Mother     Diabetes Mother     High Blood Pressure Mother     Cancer Father     Early Death Brother     Arthritis Paternal Grandmother        SOCIAL HISTORY    Social History     Tobacco Use    Smoking status: Current Every Day Smoker     Packs/day: 1.00     Types: Cigarettes    Smokeless tobacco: Never Used    Tobacco comment: healing    Substance Use Topics    Alcohol use: Not on file    Drug use: No       ALLERGIES    No Known Allergies    MEDICATIONS    Current Outpatient Medications on File Prior to Encounter   Medication Sig Dispense Refill    Latanoprost 0.005 % EMUL Apply to eye daily      ibuprofen (ADVIL;MOTRIN) 600 MG tablet Take 1 tablet by mouth every 6 hours as needed for Pain 30 tablet 0    lidocaine (LIDODERM) 5 % Place 1 patch onto the skin daily 12 hours on, 12 hours off. 30 patch 0    glipiZIDE (GLUCOTROL) 5 MG tablet Take 5 mg by mouth 2 times daily (before meals)      potassium chloride (KLOR-CON M) 20 MEQ extended release tablet Take 20 mEq by mouth daily      ALPRAZolam (XANAX) 0.5 MG tablet Take 0.5 mg by mouth nightly. Reyes Fierro ipratropium-albuterol (DUONEB) 0.5-2.5 (3) MG/3ML SOLN nebulizer solution Inhale 1 vial into the lungs every 4 hours      albuterol sulfate  (90 Base) MCG/ACT inhaler Inhale 2 puffs into the lungs every 6 hours as needed for Wheezing      aspirin EC 81 MG EC tablet Take 1 tablet by mouth daily 30 tablet 3    HYDROcodone-acetaminophen (NORCO) 7.5-325 MG per tablet Take 1 tablet by mouth 3 times daily .  metFORMIN (GLUCOPHAGE) 1000 MG tablet Take 1,000 mg by mouth 2 times daily (with meals)      furosemide (LASIX) 80 MG tablet Take 80 mg by mouth 2 times daily      lisinopril (PRINIVIL;ZESTRIL) 20 MG tablet Take 40 mg by mouth daily       tamsulosin (FLOMAX) 0.4 MG capsule Take 0.4 mg by mouth daily      finasteride (PROSCAR) 5 MG tablet Take 5 mg by mouth daily      metolazone (ZAROXOLYN) 2.5 MG tablet Take 2 tablets by mouth daily 60 tablet 0     No current facility-administered medications on file prior to encounter. REVIEW OF SYSTEMS    Pertinent items are noted in HPI. Constitutional: Negative for systemic symptoms including fever, chills and malaise.       Objective:      BP Post Procedural Pain:  0 / 10     Having prepared the wound bed, the skin graft was completed as below. Product Utilized:          [] APLIGRAF   []44 sq cm   []88 sq cm    []132 sq cm  []176 sq cm           [] DERMAGRAFT  [] 38 sq cm   []76 sq cm    []114 sq cm  []152 sq cm      [] NUSHIELD  [] 1.6 sq/cm disc   [] (2X3) 6 sq/cm    [] (2X4) 8 sq/cm         [] (3X4) 12 sq/cm  [] (4x4) 16 sq/cm   [] (4X6) 24 sq/cm         [] (6X6) 36 sq/cm        [] AFFINITY    [] (1.5 cm x 1.5cm) 2.25 cm   [] (2.5 cm x 2.5 cm) 6.25 cm         [] THERASKIN  [] 13 sq cm   []37.34 sq cm             [] EpiFix   [] 1.54 sq cm disc    [] 2 pieces (3.08 sq cm)    [] 3  pieces (4.62 sq  cm)   [] 6 sq/cm    [] 16 sq cm     [] 18 sq cm   Fenestrated        [] OASIS   [] 10.5 sq cm   []21 sq cm    []35 sq cm Tri-Matrix  []70 sq cm          Tri-Matrix                    [x] PURAPLY   [] 1.6 sq cm disc     [x] 4 sq cm   [] 8 sq cm   [] 25sq cm  [] 54 sq cm                  [] Grafix      [] 1.54 sq cm disc    [] 3 sq cm    [] 6 sq cm   [] 12 sq cm   [] 25 sq cm        Skin Substitute Applied:    Performed by: FAITH Cornejo CNP    Consent obtained: Yes    Time out taken: Yes     Fenestrated: No    Skin Substitute was Applied to Wound Number(s):     3 and 4      Wound 04/14/20 Pretibial Left #3 LEFT LATERAL (Active)   Wound Image   5/19/2020 10:22 AM   Wound Venous 6/9/2020  9:59 AM   Dressing Status Clean;Dry; Intact 6/9/2020 10:15 AM   Dressing Changed Changed/New 6/9/2020 10:15 AM   Wound Cleansed Soap and water 6/9/2020  9:59 AM   Wound Length (cm) 1.5 cm 6/9/2020  9:59 AM   Wound Width (cm) 1.1 cm 6/9/2020  9:59 AM   Wound Depth (cm) 0.2 cm 6/9/2020  9:59 AM   Wound Surface Area (cm^2) 1.65 cm^2 6/9/2020  9:59 AM   Change in Wound Size % (l*w) 2.37 6/9/2020  9:59 AM   Wound Volume (cm^3) 0.33 cm^3 6/9/2020  9:59 AM   Wound Healing % 3 6/9/2020  9:59 AM   Post-Procedure Length (cm) 1.5 cm 6/9/2020 10:13 AM   Post-Procedure Width 6/9/2020 10:13 AM   Distance Tunneling (cm) 0 cm 6/9/2020  9:59 AM   Tunneling Position ___ O'Clock 0 6/9/2020  9:59 AM   Undermining Starts ___ O'Clock 0 6/9/2020  9:59 AM   Undermining Ends___ O'Clock 0 6/9/2020  9:59 AM   Undermining Maxium Distance (cm) 0 6/9/2020  9:59 AM   Wound Assessment Pink;Yellow 6/9/2020  9:59 AM   Drainage Amount Moderate 6/9/2020  9:59 AM   Drainage Description Serosanguinous 6/9/2020  9:59 AM   Odor None 6/9/2020  9:59 AM   Margins Defined edges; Unattached edges 6/9/2020  9:59 AM   Merary-wound Assessment Pink 6/9/2020  9:59 AM   Non-staged Wound Description Full thickness 6/9/2020  9:59 AM   [place]%Wound Bed 0 6/9/2020  9:59 AM   Red%Wound Bed 90 6/9/2020  9:59 AM   Yellow%Wound Bed 10 6/9/2020  9:59 AM   Black%Wound Bed 0 6/9/2020  9:59 AM   Purple%Wound Bed 0 6/9/2020  9:59 AM   Other%Wound Bed 0 6/9/2020  9:59 AM   Number of days: 55         Total Surface Area Covered 3.30 sq/cm    Was the Product Layered  Yes      Amount Wasted 0 sq/cm    Reason for Waste: material provided was greater than wound size      Secured: Yes    Instruments used to complete placement of graft::scissors and forceps    Secured With:   [] N/A  [x]Steri Strips  []Sutures  []Staples   [x] Mepitel  [] Sorbact  []Other    Procedural Pain: 0/10     Post Procedural Pain: 0 / 10    Response to Treatment:  Well tolerated by patient. Status of wound progress and description from last visit: Improved. Edema is improving with compression wrap.  Wound culture 4/14/2020 was negative for growth (NO GROWTH AT 48 HOURS NO ANAEROBIC GROWTH AT 72 HOURS). Had arterial blockage in the past with atherectomy and balloon PTA of the left SFA lesion decreased from 99% to 10% per Dr. Luu Reading in January 2017. The patient is a diabetic, states he inconsistently checks blood sugar.  He is also a smoker, approximately one pack per day. The patient would benefit from lymphedema pumps to manage his lymphedema and venous not ready to quit.     Plan:     Discharge instructions:  Discharge Instructions       PHYSICIAN ORDERS AND DISCHARGE INSTRUCTIONS     NOTE: Upon discharge from the [address], you will receive a patient experience survey. We would be grateful if you would take the time to fill this survey out.     Wound care order history:                 ANDREW's   Right 0.92     Left 0.95             Date 4/14/20              Vascular studies:   Date               Imaging:   Date               Cultures:   OBTAINED 4/14/2020               Labs/ HbA1c:   Date               Grafts:  Date               HBO:                Antibiotics:               Earlier Wound care treatments:                Authorizations:                        Consults:   PT OF [facility]                          Primary care physician: DR Radha De La Cruz     Continuing wound care orders and information:              Residence:                Continue home health care with:               Your wound-care supplies will be provided by: Maryam Ma provider:              [id number] with  Jennifer Davies loading:  Date               VCIND Medications:              GUAAS cleansing:                           DJ not scrub or use excessive force.                          Wash hands with soap and water before and after dressing changes.                           Prior to applying a clean dressing, cleanse wound with normal saline,                                wound cleanser, or mild soap and water.                           Ask the physician or nurse before getting the wound(s) wet in a shower              Daily Wound management:                          Keep weight off wounds and reposition every 2 hours.                          Avoid standing for long periods of time.                          FFHKI wraps/stockings in AM and remove at bedtime.                          If swelling is present, elevate legs to the level of the heart or above for 30

## 2020-06-09 NOTE — PROGRESS NOTES
on complications to report to provider, such as pain, numbness in toes, heavy drainage, and slippage of dressing. Instructed patient on purpose of compression dressing and on activity and exercise recommendations.       Electronically signed by Wilfrid Carter RN on 6/9/2020 at 10:18 AM

## 2020-06-16 ENCOUNTER — HOSPITAL ENCOUNTER (OUTPATIENT)
Dept: WOUND CARE | Age: 65
Discharge: HOME | End: 2020-06-16
Payer: MEDICARE

## 2020-06-16 VITALS
RESPIRATION RATE: 20 BRPM | HEART RATE: 80 BPM | SYSTOLIC BLOOD PRESSURE: 168 MMHG | DIASTOLIC BLOOD PRESSURE: 86 MMHG | TEMPERATURE: 97.8 F

## 2020-06-16 PROCEDURE — 15271 SKIN SUB GRAFT TRNK/ARM/LEG: CPT

## 2020-06-16 PROCEDURE — 15271 SKIN SUB GRAFT TRNK/ARM/LEG: CPT | Performed by: NURSE PRACTITIONER

## 2020-06-16 NOTE — PROGRESS NOTES
1.00     Types: Cigarettes    Smokeless tobacco: Never Used    Tobacco comment: healing    Substance Use Topics    Alcohol use: Not on file    Drug use: No       ALLERGIES    No Known Allergies    MEDICATIONS    Current Outpatient Medications on File Prior to Encounter   Medication Sig Dispense Refill    Latanoprost 0.005 % EMUL Apply to eye daily      glipiZIDE (GLUCOTROL) 5 MG tablet Take 5 mg by mouth 2 times daily (before meals)      potassium chloride (KLOR-CON M) 20 MEQ extended release tablet Take 20 mEq by mouth daily      ALPRAZolam (XANAX) 0.5 MG tablet Take 0.5 mg by mouth nightly. Leonides Lopez ipratropium-albuterol (DUONEB) 0.5-2.5 (3) MG/3ML SOLN nebulizer solution Inhale 1 vial into the lungs every 4 hours      aspirin EC 81 MG EC tablet Take 1 tablet by mouth daily 30 tablet 3    HYDROcodone-acetaminophen (NORCO) 7.5-325 MG per tablet Take 1 tablet by mouth 3 times daily .  metFORMIN (GLUCOPHAGE) 1000 MG tablet Take 1,000 mg by mouth 2 times daily (with meals)      furosemide (LASIX) 80 MG tablet Take 80 mg by mouth 2 times daily      lisinopril (PRINIVIL;ZESTRIL) 20 MG tablet Take 40 mg by mouth daily       tamsulosin (FLOMAX) 0.4 MG capsule Take 0.4 mg by mouth daily      finasteride (PROSCAR) 5 MG tablet Take 5 mg by mouth daily      ibuprofen (ADVIL;MOTRIN) 600 MG tablet Take 1 tablet by mouth every 6 hours as needed for Pain 30 tablet 0    lidocaine (LIDODERM) 5 % Place 1 patch onto the skin daily 12 hours on, 12 hours off. 30 patch 0    albuterol sulfate  (90 Base) MCG/ACT inhaler Inhale 2 puffs into the lungs every 6 hours as needed for Wheezing      metolazone (ZAROXOLYN) 2.5 MG tablet Take 2 tablets by mouth daily 60 tablet 0     No current facility-administered medications on file prior to encounter. REVIEW OF SYSTEMS    Pertinent items are noted in HPI. Constitutional: Negative for systemic symptoms including fever, chills and malaise.       Objective:      BP Minimal    Hemostasis Achieved:  by pressure    Procedural Pain:  0  / 10     Post Procedural Pain:  0 / 10     Having prepared the wound bed, the skin graft was completed as below. Product Utilized:          [] APLIGRAF   []44 sq cm   []88 sq cm    []132 sq cm  []176 sq cm           [] DERMAGRAFT  [] 38 sq cm   []76 sq cm    []114 sq cm  []152 sq cm      [] NUSHIELD  [] 1.6 sq/cm disc   [] (2X3) 6 sq/cm    [] (2X4) 8 sq/cm         [] (3X4) 12 sq/cm  [] (4x4) 16 sq/cm   [] (4X6) 24 sq/cm         [] (6X6) 36 sq/cm        [] AFFINITY    [] (1.5 cm x 1.5cm) 2.25 cm   [] (2.5 cm x 2.5 cm) 6.25 cm         [] THERASKIN  [] 13 sq cm   []37.34 sq cm             [] EpiFix   [] 1.54 sq cm disc    [] 2 pieces (3.08 sq cm)    [] 3  pieces (4.62 sq  cm)   [] 6 sq/cm    [] 16 sq cm     [] 18 sq cm   Fenestrated        [] OASIS   [] 10.5 sq cm   []21 sq cm    []35 sq cm Tri-Matrix  []70 sq cm          Tri-Matrix                    [x] PURAPLY   [] 1.6 sq cm disc     [x] 4 sq cm   [] 8 sq cm   [] 25sq cm  [] 54 sq cm                  [] Grafix      [] 1.54 sq cm disc    [] 3 sq cm    [] 6 sq cm   [] 12 sq cm   [] 25 sq cm        Skin Substitute Applied:    Performed by: FAITH Kingston CNP    Consent obtained: Yes    Time out taken: Yes     Fenestrated: No    Skin Substitute was Applied to Wound Number(s):     3 and 4      Wound 04/14/20 Pretibial Left #3 LEFT LATERAL (Active)   Wound Image   5/19/2020 10:22 AM   Wound Venous 6/16/2020 10:03 AM   Dressing Status Clean;Dry; Intact 6/9/2020 10:15 AM   Dressing Changed Changed/New 6/9/2020 10:15 AM   Wound Cleansed Soap and water 6/16/2020 10:03 AM   Wound Length (cm) 1.4 cm 6/16/2020 10:03 AM   Wound Width (cm) 1.1 cm 6/16/2020 10:03 AM   Wound Depth (cm) 0.2 cm 6/16/2020 10:03 AM   Wound Surface Area (cm^2) 1.54 cm^2 6/16/2020 10:03 AM   Change in Wound Size % (l*w) 8.88 6/16/2020 10:03 AM   Wound Volume (cm^3) 0.31 cm^3 6/16/2020 10:03 AM   Wound Healing % 9 6/16/2020 6/16/2020 10:30 AM   Post-Procedure Surface Area (cm^2) 1.95 cm^2 6/16/2020 10:30 AM   Post-Procedure Volume (cm^3) 0.39 cm^3 6/16/2020 10:30 AM   Distance Tunneling (cm) 0 cm 6/16/2020 10:03 AM   Tunneling Position ___ O'Clock 0 6/16/2020 10:03 AM   Undermining Starts ___ O'Clock 0 6/16/2020 10:03 AM   Undermining Ends___ O'Clock 0 6/16/2020 10:03 AM   Undermining Maxium Distance (cm) 0 6/16/2020 10:03 AM   Wound Assessment Pink;Yellow 6/16/2020 10:03 AM   Drainage Amount Moderate 6/16/2020 10:03 AM   Drainage Description Serosanguinous 6/16/2020 10:03 AM   Odor None 6/16/2020 10:03 AM   Margins Defined edges 6/16/2020 10:03 AM   Merary-wound Assessment Pink 6/16/2020 10:03 AM   Non-staged Wound Description Full thickness 6/16/2020 10:03 AM   [place]%Wound Bed 90 6/16/2020 10:03 AM   Red%Wound Bed 0 6/16/2020 10:03 AM   Yellow%Wound Bed 10 6/16/2020 10:03 AM   Black%Wound Bed 0 6/16/2020 10:03 AM   Purple%Wound Bed 0 6/16/2020 10:03 AM   Other%Wound Bed 0 6/16/2020 10:03 AM   Number of days: 62         Total Surface Area Covered 3.49 sq/cm    Was the Product Layered  Yes      Amount Wasted 0 sq/cm    Reason for Waste: material provided was greater than wound size      Secured: Yes    Instruments used to complete placement of graft::scissors and forceps    Secured With:   [] N/A  [x]Steri Strips  []Sutures  []Staples   [x] Mepitel  [] Sorbact  []Other    Procedural Pain: 0/10     Post Procedural Pain: 0 / 10    Response to Treatment:  Well tolerated by patient. Status of wound progress and description from last visit: Improving. Patient approved for lymphedema pumps, delivered today during clinic with initiation of therapy and instruction provided by vendor. Plan:     Discharge instructions:  Discharge Instructions       PHYSICIAN ORDERS AND DISCHARGE INSTRUCTIONS     NOTE: Upon discharge from the [address], you will receive a patient experience survey.  We would be grateful if you would take the time to fill this survey out.     Wound care order history:                 ANDREW's   Right 0.92     Left 0.95             Date 4/14/20              Vascular studies:   Date               Imaging:   Date               Cultures:   OBTAINED 4/14/2020               Labs/ HbA1c:   Date               Grafts:  Date               HBO:                Antibiotics:               Earlier Wound care treatments:                Authorizations:                        Consults:   PT OF [facility]                          Primary care physician: DR Leny Bangura     Continuing wound care orders and information:              Residence:                Continue home health care with:               Your wound-care supplies will be provided by: Siena Richards provider:              [id number] with  Aliyah Mcgarry loading:  Date               FFTRI Medications:              NZCCM cleansing:                           RP not scrub or use excessive force.                          Wash hands with soap and water before and after dressing changes.                         Prior to applying a clean dressing, cleanse wound with normal saline,                                wound cleanser, or mild soap and water.                           Ask the physician or nurse before getting the wound(s) wet in a shower              Daily Wound management:                          Keep weight off wounds and reposition every 2 hours.                          DXNVX standing for long periods of time.                          PMRLF wraps/stockings in AM and remove at bedtime.                          If swelling is present, elevate legs to the level of the heart or above for 30                              minutes 4-5 times a day and/or when sitting.                                             When taking antibiotics take entire prescription as ordered by physician                             FZ not stop taking until medicine is all gone.                                                       Orders for this week: 6/16/2020  Puraply # 1 5/12/20  Apligraf # 1 5/18/20  Puraply # 2 5/26/20  Puraply #3 6/2/2020  Puraply # 4 6/9/20  Puraply # 5 6/16/20        LEFT LATERAL AND POSTERIOR LEG -- [address] WITH SOAP AND WATER, PAT DRY.  Apply Purapy # 5, Mepitel, and Steri strips. Then CALCIUM ALGINATE AND WRAP WITH COBAN 2 WRAP                  Follow up with CAMRYN HENDERSON   In 1 weeks in the wound care center  Call [phone] for any questions or concerns.   Date__________   Time___________        Treatment Note Wound 04/14/20 Pretibial Left #3 LEFT LATERAL-Dressing/Treatment: (Puraply Mepitel SteriStrips)  Wound 04/14/20 Tibial Left;Posterior #4 LEFT POSTERIOR LEG -Dressing/Treatment: (Puraply Mepitel SteriStrips)    Written Patient Dismissal Instructions Given            Electronically signed by FAITH Talbert CNP on 6/16/2020 at 10:46 AM

## 2020-06-23 ENCOUNTER — HOSPITAL ENCOUNTER (OUTPATIENT)
Dept: WOUND CARE | Age: 65
Discharge: HOME | End: 2020-06-23
Payer: MEDICARE

## 2020-06-23 VITALS
TEMPERATURE: 97.6 F | SYSTOLIC BLOOD PRESSURE: 138 MMHG | HEART RATE: 80 BPM | RESPIRATION RATE: 20 BRPM | DIASTOLIC BLOOD PRESSURE: 63 MMHG

## 2020-06-23 PROCEDURE — 15271 SKIN SUB GRAFT TRNK/ARM/LEG: CPT | Performed by: NURSE PRACTITIONER

## 2020-06-23 PROCEDURE — 15271 SKIN SUB GRAFT TRNK/ARM/LEG: CPT

## 2020-06-23 NOTE — PROGRESS NOTES
Wound Care Center Progress Note and Bioengineered Skin Application      Gabino Tavares  AGE: 72 y.o. GENDER: male  : 1955  EPISODE DATE:  2020     Subjective:     Chief Complaint   Patient presents with    Wound Check     left leg         HISTORY of PRESENT ILLNESS      Arianna Tavares is a 72 y.o. male who presents today for wound evaluation of Chronic venous, diabetic and lymphedema ulcer(s) of left lower leg. The ulcer is of moderate severity. The underlying cause of the wound is venous, diabetic and lymphedema. If appropriate skin graft will be applied. Wound Pain Timing/Severity: intermittent, mild  Quality of pain: aching, tender  Severity of pain:  2 / 10   Modifying Factors: edema, venous stasis, lymphedema, diabetes, obesity, smoking and non-adherence  Associated Signs/Symptoms: edema, erythema, drainage and pain        PAST MEDICAL HISTORY        Diagnosis Date    CHF (congestive heart failure) (HCC)     Chronic ulcer of left leg with fat layer exposed ([address] 2016    Chronic ulcer of right leg with fat layer exposed ([address] 2016    Chronic ulcer of right leg with fat layer exposed ([address] 2016    Diabetes mellitus ([address]     Hypertension     PVD (peripheral vascular disease) ([address] 2016    Type 2 diabetes mellitus with diabetic peripheral angiopathy without gangrene, without long-term current use of insulin ([address] 2016    Venous stasis ulcer of both lower extremities without varicose veins ([address] 2016       PAST SURGICAL HISTORY    History reviewed. No pertinent surgical history.     FAMILY HISTORY    Family History   Problem Relation Age of Onset    Asthma Mother     Breast Cancer Mother     Cancer Mother     Diabetes Mother     High Blood Pressure Mother     Cancer Father     Early Death Brother     Arthritis Paternal Grandmother        SOCIAL HISTORY    Social History     Tobacco Use    Smoking status: Current Every Day Smoker     Packs/day: 9:54 AM   Post-Procedure Length (cm) 1.2 cm 6/23/2020 10:26 AM   Post-Procedure Width (cm) 0.8 cm 6/23/2020 10:26 AM   Post-Procedure Depth (cm) 0.1 cm 6/23/2020 10:26 AM   Post-Procedure Surface Area (cm^2) 0.96 cm^2 6/23/2020 10:26 AM   Post-Procedure Volume (cm^3) 0.1 cm^3 6/23/2020 10:26 AM   Distance Tunneling (cm) 0 cm 6/23/2020  9:54 AM   Tunneling Position ___ O'Clock 0 6/23/2020  9:54 AM   Undermining Starts ___ O'Clock 0 6/23/2020  9:54 AM   Undermining Ends___ O'Clock 0 6/23/2020  9:54 AM   Undermining Maxium Distance (cm) 0 6/23/2020  9:54 AM   Wound Assessment Pink;Yellow 6/23/2020  9:54 AM   Drainage Amount Moderate 6/23/2020  9:54 AM   Drainage Description Serosanguinous 6/23/2020  9:54 AM   Odor None 6/23/2020  9:54 AM   Margins Defined edges 6/23/2020  9:54 AM   Merary-wound Assessment Pink 6/23/2020  9:54 AM   Non-staged Wound Description Full thickness 6/23/2020  9:54 AM   Christie%Wound Bed 80 6/23/2020  9:54 AM   Red%Wound Bed 0 6/23/2020  9:54 AM   Yellow%Wound Bed 20 6/23/2020  9:54 AM   Black%Wound Bed 0 6/23/2020  9:54 AM   Purple%Wound Bed 0 6/23/2020  9:54 AM   Other%Wound Bed 0 6/23/2020  9:54 AM   Number of days: 69       Wound 04/14/20 Tibial Left;Posterior #4 LEFT POSTERIOR LEG  (Active)   Wound Image   6/16/2020 10:03 AM   Wound Venous 6/23/2020  9:54 AM   Dressing Status Clean;Dry; Intact 6/23/2020 10:32 AM   Dressing Changed Changed/New 6/23/2020 10:32 AM   Wound Cleansed Soap and water 6/23/2020  9:54 AM   Wound Length (cm) 1.5 cm 6/23/2020  9:54 AM   Wound Width (cm) 0.1 cm 6/23/2020  9:54 AM   Wound Depth (cm) 0.2 cm 6/23/2020  9:54 AM   Wound Surface Area (cm^2) 0.15 cm^2 6/23/2020  9:54 AM   Change in Wound Size % (l*w) 97.56 6/23/2020  9:54 AM   Wound Volume (cm^3) 0.03 cm^3 6/23/2020  9:54 AM   Wound Healing % 98 6/23/2020  9:54 AM   Post-Procedure Length (cm) 1.5 cm 6/23/2020 10:26 AM   Post-Procedure Width (cm) 0.1 cm 6/23/2020 10:26 AM   Post-Procedure Depth (cm) 0.2 cm 6/23/2020 10:26 AM   Post-Procedure Surface Area (cm^2) 0.15 cm^2 6/23/2020 10:26 AM   Post-Procedure Volume (cm^3) 0.03 cm^3 6/23/2020 10:26 AM   Distance Tunneling (cm) 0 cm 6/23/2020  9:54 AM   Tunneling Position ___ O'Clock 0 6/23/2020  9:54 AM   Undermining Starts ___ O'Clock 0 6/23/2020  9:54 AM   Undermining Ends___ O'Clock 0 6/23/2020  9:54 AM   Undermining Maxium Distance (cm) 0 6/23/2020  9:54 AM   Wound Assessment Pink;Yellow 6/23/2020  9:54 AM   Drainage Amount Moderate 6/23/2020  9:54 AM   Drainage Description Serosanguinous 6/23/2020  9:54 AM   Odor None 6/23/2020  9:54 AM   Margins Defined edges 6/23/2020  9:54 AM   Merary-wound Assessment Pink 6/23/2020  9:54 AM   Non-staged Wound Description Full thickness 6/23/2020  9:54 AM   [place]%Wound Bed 90 6/23/2020  9:54 AM   Red%Wound Bed 0 6/23/2020  9:54 AM   Yellow%Wound Bed 10 6/23/2020  9:54 AM   Black%Wound Bed 0 6/23/2020  9:54 AM   Purple%Wound Bed 0 6/23/2020  9:54 AM   Other%Wound Bed 0 6/23/2020  9:54 AM   Number of days: 69         Total Surface Area Covered 1.11 sq/cm    Was the Product Layered  Yes      Amount Wasted 0 sq/cm    Reason for Waste: material provided was greater than wound size      Secured: Yes    Instruments used to complete placement of graft::scissors and forceps    Secured With:   [] N/A  [x]Steri Strips  []Sutures  []Staples   [] Mepitel  [x] Sorbact  []Other    Procedural Pain: 0/10     Post Procedural Pain: 0 / 10    Response to Treatment:  Well tolerated by patient. Status of wound progress and description from last visit: Improving. Plan:     Discharge instructions:  Discharge Instructions       PHYSICIAN ORDERS AND DISCHARGE INSTRUCTIONS     NOTE: Upon discharge from the [address], you will receive a patient experience survey.  We would be grateful if you would take the time to fill this survey out.     Wound care order history:                 ANDREW's   Right 0.92     Left 0.95             Date 5/26/20  Puraply #3 6/2/2020  Puraply # 4 6/9/20  Puraply # 5 6/16/20  Puraply # 6 6/23/20        LEFT LATERAL AND POSTERIOR LEG -- [facility] WITH SOAP AND WATER, PAT DRY.  Apply Purapy # 6, Mepitel, and Steri strips. Then CALCIUM ALGINATE AND WRAP WITH COBAN 2 WRAP                  Follow up with CAMRYN HENDERSON   In 1 weeks in the wound care center  Call [phone] for any questions or concerns.   Date__________   Time___________        Treatment Note Wound 04/14/20 Pretibial Left #3 LEFT LATERAL-Dressing/Treatment: (Ca Alg, Coban 2)  Wound 04/14/20 Tibial Left;Posterior #4 LEFT POSTERIOR LEG -Dressing/Treatment: (Ca Alg, Cob[place] 2)    Written Patient Dismissal Instructions Given            Electronically signed by FAITH Silva CNP on 6/23/2020 at 10:39 AM

## 2020-06-23 NOTE — PROGRESS NOTES
Multilayer Compression Wrap   (Not Unna) Below the Knee    NAME:  Robyn Tavares  YOB: 1955  MEDICAL RECORD NUMBER:  [id number]  DATE:  6/23/2020    Multilayer compression wrap: Removed old Multilayer wrap if indicated and wash leg with mild soap/water. Applied moisturizing agent to dry skin as needed. Applied primary and secondary dressing as ordered. Applied multilayered dressing below the knee to left lower leg. Instructed patient/caregiver not to remove dressing and to keep it clean and dry. Instructed patient/caregiver on complications to report to provider, such as pain, numbness in toes, heavy drainage, and slippage of dressing. Instructed patient on purpose of compression dressing and on activity and exercise recommendations.       Electronically signed by Ge Suarez RN on 6/23/2020 at 10:33 AM

## 2020-06-30 ENCOUNTER — HOSPITAL ENCOUNTER (OUTPATIENT)
Dept: WOUND CARE | Age: 65
Discharge: HOME | End: 2020-06-30
Payer: MEDICARE

## 2020-06-30 VITALS
DIASTOLIC BLOOD PRESSURE: 68 MMHG | TEMPERATURE: 96.9 F | HEART RATE: 82 BPM | RESPIRATION RATE: 18 BRPM | SYSTOLIC BLOOD PRESSURE: 139 MMHG

## 2020-06-30 PROCEDURE — 15271 SKIN SUB GRAFT TRNK/ARM/LEG: CPT | Performed by: NURSE PRACTITIONER

## 2020-06-30 PROCEDURE — 15271 SKIN SUB GRAFT TRNK/ARM/LEG: CPT

## 2020-06-30 NOTE — PROGRESS NOTES
1.00     Types: Cigarettes    Smokeless tobacco: Never Used    Tobacco comment: healing    Substance Use Topics    Alcohol use: Not on file    Drug use: No       ALLERGIES    No Known Allergies    MEDICATIONS    Current Outpatient Medications on File Prior to Encounter   Medication Sig Dispense Refill    Latanoprost 0.005 % EMUL Apply to eye daily      lidocaine (LIDODERM) 5 % Place 1 patch onto the skin daily 12 hours on, 12 hours off. 30 patch 0    glipiZIDE (GLUCOTROL) 5 MG tablet Take 5 mg by mouth 2 times daily (before meals)      potassium chloride (KLOR-CON M) 20 MEQ extended release tablet Take 20 mEq by mouth daily      ALPRAZolam (XANAX) 0.5 MG tablet Take 0.5 mg by mouth nightly. Ronan Chaidez ipratropium-albuterol (DUONEB) 0.5-2.5 (3) MG/3ML SOLN nebulizer solution Inhale 1 vial into the lungs every 4 hours      albuterol sulfate  (90 Base) MCG/ACT inhaler Inhale 2 puffs into the lungs every 6 hours as needed for Wheezing      aspirin EC 81 MG EC tablet Take 1 tablet by mouth daily 30 tablet 3    HYDROcodone-acetaminophen (NORCO) 7.5-325 MG per tablet Take 1 tablet by mouth 3 times daily .  metFORMIN (GLUCOPHAGE) 1000 MG tablet Take 1,000 mg by mouth 2 times daily (with meals)      furosemide (LASIX) 80 MG tablet Take 80 mg by mouth 2 times daily      lisinopril (PRINIVIL;ZESTRIL) 20 MG tablet Take 40 mg by mouth daily       tamsulosin (FLOMAX) 0.4 MG capsule Take 0.4 mg by mouth daily      finasteride (PROSCAR) 5 MG tablet Take 5 mg by mouth daily      ibuprofen (ADVIL;MOTRIN) 600 MG tablet Take 1 tablet by mouth every 6 hours as needed for Pain 30 tablet 0    metolazone (ZAROXOLYN) 2.5 MG tablet Take 2 tablets by mouth daily 60 tablet 0     No current facility-administered medications on file prior to encounter. REVIEW OF SYSTEMS    Pertinent items are noted in HPI. Constitutional: Negative for systemic symptoms including fever, chills and malaise.       Objective:      BP 139/68   Pulse 82   Temp 96.9 °F (36.1 °C) (Temporal)   Resp 18     PHYSICAL EXAM  General: The patient is in no acute distress. Mental status:  Patient is appropriate, is  oriented to place and plan of care. Dermatologic exam: Visual inspection of the periwound reveals the skin to be edematous  Wound exam: see wound description below in procedure note      Assessment:     Problem List Items Addressed This Visit     Venous stasis ulcer of both lower extremities without varicose veins ([address]    WD-Chronic ulcer of left leg with fat layer exposed ([address]    PVD (peripheral vascular disease) ([address]    Type 2 diabetes mellitus with diabetic peripheral angiopathy without gangrene, without long-term current use of insulin (HCC)    WD-Venous stasis of both lower extremities    WD-Lymphedema of both lower extremities    WD-Idiopathic chronic venous hypertension of left lower extremity with ulcer ([address] - Primary          Conditions above and those listed in the past history are being treated appropriately and are considered under adequate control so as not to preclude the use of a graft. SKIN SUBSTITUTES/GRAFT APPLICATIONS PROCEDURE NOTE    Indicaton:     Chronic ulcer(s) of the left lower leg  that has(have) failed to heal with the usual wound protocol used for greater than 30 days. See Epic chart for previous modalities and details of previous treatment. The patient has no contraindications or evidence of infection. The patient's competency and support system is appropriate for proper care and follow up for the use of this graft, therefore a graft was placed as below. Procedure: The wound bed was cleansed and prepared as necessary to accept the graft. Debridement was required. Consent obtained: Yes    Time out taken: Yes    Using curette sharp Excisional Debridement of the wound(s) was/were performed down through and including the removal of subcutaneous tissue.      Devitalized Tissue Debrided: Wound Healing % 85 6/30/2020  9:57 AM   Post-Procedure Length (cm) 1 cm 6/30/2020 10:36 AM   Post-Procedure Width (cm) 0.5 cm 6/30/2020 10:36 AM   Post-Procedure Depth (cm) 0.1 cm 6/30/2020 10:36 AM   Post-Procedure Surface Area (cm^2) 0.5 cm^2 6/30/2020 10:36 AM   Post-Procedure Volume (cm^3) 0.05 cm^3 6/30/2020 10:36 AM   Distance Tunneling (cm) 0 cm 6/30/2020  9:57 AM   Tunneling Position ___ O'Clock 0 6/30/2020  9:57 AM   Undermining Starts ___ O'Clock 0 6/30/2020  9:57 AM   Undermining Ends___ O'Clock 0 6/30/2020  9:57 AM   Undermining Maxium Distance (cm) 0 6/30/2020  9:57 AM   Wound Assessment Red 6/30/2020  9:57 AM   Drainage Amount None 6/30/2020  9:57 AM   Drainage Description Serosanguinous 6/23/2020  9:54 AM   Odor None 6/30/2020  9:57 AM   Margins Attached edges 6/30/2020  9:57 AM   Merary-wound Assessment Pink 6/30/2020  9:57 AM   Non-staged Wound Description Full thickness 6/30/2020  9:57 AM   [place]%Wound Bed 0 6/30/2020  9:57 AM   Red%Wound Bed 100 6/30/2020  9:57 AM   Yellow%Wound Bed 0 6/30/2020  9:57 AM   Black%Wound Bed 0 6/30/2020  9:57 AM   Purple%Wound Bed 0 6/30/2020  9:57 AM   Other%Wound Bed 0 6/30/2020  9:57 AM   Number of days: 77       Wound 04/14/20 Tibial Left;Posterior #4 LEFT POSTERIOR LEG  (Active)   Wound Image   6/16/2020 10:03 AM   Wound Venous 6/30/2020  9:57 AM   Dressing Status Clean;Dry; Intact 6/23/2020 10:32 AM   Dressing Changed Changed/New 6/23/2020 10:32 AM   Wound Cleansed Soap and water 6/30/2020  9:57 AM   Wound Length (cm) 1 cm 6/30/2020  9:57 AM   Wound Width (cm) 0.4 cm 6/30/2020  9:57 AM   Wound Depth (cm) 0.1 cm 6/30/2020  9:57 AM   Wound Surface Area (cm^2) 0.4 cm^2 6/30/2020  9:57 AM   Change in Wound Size % (l*w) 93.51 6/30/2020  9:57 AM   Wound Volume (cm^3) 0.04 cm^3 6/30/2020  9:57 AM   Wound Healing % 97 6/30/2020  9:57 AM   Post-Procedure Length (cm) 1 cm 6/30/2020 10:36 AM   Post-Procedure Width (cm) 0.4 cm 6/30/2020 10:36 AM   Post-Procedure Depth (cm) 0.1 cm 6/30/2020 10:36 AM   Post-Procedure Surface Area (cm^2) 0.4 cm^2 6/30/2020 10:36 AM   Post-Procedure Volume (cm^3) 0.04 cm^3 6/30/2020 10:36 AM   Distance Tunneling (cm) 0 cm 6/30/2020  9:57 AM   Tunneling Position ___ O'Clock 0 6/30/2020  9:57 AM   Undermining Starts ___ O'Clock 0 6/30/2020  9:57 AM   Undermining Ends___ O'Clock 0 6/30/2020  9:57 AM   Undermining Maxium Distance (cm) 0 6/30/2020  9:57 AM   Wound Assessment Red 6/30/2020  9:57 AM   Drainage Amount None 6/30/2020  9:57 AM   Drainage Description Serosanguinous 6/23/2020  9:54 AM   Odor None 6/30/2020  9:57 AM   Margins Attached edges 6/30/2020  9:57 AM   Merary-wound Assessment Pink 6/30/2020  9:57 AM   Non-staged Wound Description Full thickness 6/30/2020  9:57 AM   [place]%Wound Bed 0 6/30/2020  9:57 AM   Red%Wound Bed 100 6/30/2020  9:57 AM   Yellow%Wound Bed 0 6/30/2020  9:57 AM   Black%Wound Bed 0 6/30/2020  9:57 AM   Purple%Wound Bed 0 6/30/2020  9:57 AM   Other%Wound Bed 0 6/30/2020  9:57 AM   Number of days: 77         Total Surface Area Covered 0.9 sq/cm    Was the Product Layered  Yes      Amount Wasted 0 sq/cm    Reason for Waste: material provided was greater than wound size      Secured: Yes    Instruments used to complete placement of graft::scissors and forceps    Secured With:   [] N/A  [x]Steri Strips  []Sutures  []Staples   [] Mepitel  [x] Sorbact  []Other    Procedural Pain: 0/10     Post Procedural Pain: 0 / 10    Response to Treatment:  Well tolerated by patient. Status of wound progress and description from last visit: Greatly improved, almost healed. Now has lymphedema pumps, will measure for [place] wrap today. Plan:     Discharge instructions:  Discharge Instructions       PHYSICIAN ORDERS AND DISCHARGE INSTRUCTIONS     NOTE: Upon discharge from the [address], you will receive a patient experience survey.  We would be grateful if you would take the time to fill this survey out.     Wound care order history:                 ANDREW's   Right 0.92     Left 0.95             Date 4/14/20              Vascular studies:   Date               Imaging:   Date               Cultures:   OBTAINED 4/14/2020               Labs/ HbA1c:   Date               Grafts:  Date               HBO:                Antibiotics:               Earlier Wound care treatments:                Authorizations:                        Consults:   [facility]                          Primary care physician: DR John Levine     Continuing wound care orders and information:              Residence:                Continue home health care with:               Your wound-care supplies will be provided by: Lucinda Khan provider:              FERNANDO with  Kimberley Verdugoain loading:  Date               DMNQO Medications:              BMCNI cleansing:                           GG not scrub or use excessive force.                          Wash hands with soap and water before and after dressing changes.                           Prior to applying a clean dressing, cleanse wound with normal saline,                                wound cleanser, or mild soap and water.                           Ask the physician or nurse before getting the wound(s) wet in a shower              Daily Wound management:                          Keep weight off wounds and reposition every 2 hours.                          WFMBT standing for long periods of time.                          ROUDE wraps/stockings in AM and remove at bedtime.                          If swelling is present, elevate legs to the level of the heart or above for 30                              minutes 4-5 times a day and/or when sitting.                                             When taking antibiotics take entire prescription as ordered by physician                             EM not stop taking until medicine is all gone.                                                       Orders for this week: 6/30/2020    Vaishnavi Kugel Measurements  Posterior Knee to Heel  39 CM  Ankle Circumference  26.3 CM  Calf Circumference 43.5 CM      Puraply # 1 5/12/20  Apligraf # 1 5/18/20  Puraply # 2 5/26/20  Puraply #3 6/2/2020  Puraply # 4 6/9/20  Puraply # 5 6/16/20  Puraply # 6 6/23/20  Puraply # 7 6/30/20        LEFT LATERAL AND POSTERIOR LEG -- [address] WITH SOAP AND WATER, PAT DRY.  Apply Purapy # 7, Mepitel, and Steri strips. Then CALCIUM ALGINATE AND WRAP WITH COBAN 2 WRAP                  Follow up with CAMRYN HENDERSON   In 1 weeks in the wound care center  Call [phone] for any questions or concerns.   Date__________   Time___________      Written Patient Dismissal Instructions Given            Electronically signed by FAITH Johnson CNP on 6/30/2020 at 10:54 AM

## 2020-06-30 NOTE — PROGRESS NOTES
Multilayer Compression Wrap   (Not Unna) Below the Knee    NAME:  Hilda Tavares  YOB: 1955  MEDICAL RECORD NUMBER:  [id number]  DATE:  6/30/2020    Multilayer compression wrap: Removed old Multilayer wrap if indicated and wash leg with mild soap/water. Applied moisturizing agent to dry skin as needed. Applied primary and secondary dressing as ordered. Applied multilayered dressing below the knee to left lower leg. Instructed patient/caregiver not to remove dressing and to keep it clean and dry. Instructed patient/caregiver on complications to report to provider, such as pain, numbness in toes, heavy drainage, and slippage of dressing. Instructed patient on purpose of compression dressing and on activity and exercise recommendations.       Electronically signed by Jewels Freeman LPN on 9/67/7120 at 24:32 AM

## 2020-07-07 ENCOUNTER — HOSPITAL ENCOUNTER (OUTPATIENT)
Dept: WOUND CARE | Age: 65
Discharge: HOME | End: 2020-07-07
Payer: MEDICARE

## 2020-07-07 VITALS
TEMPERATURE: 97.8 F | SYSTOLIC BLOOD PRESSURE: 149 MMHG | HEART RATE: 81 BPM | DIASTOLIC BLOOD PRESSURE: 75 MMHG | RESPIRATION RATE: 18 BRPM

## 2020-07-07 PROCEDURE — 15271 SKIN SUB GRAFT TRNK/ARM/LEG: CPT

## 2020-07-07 PROCEDURE — 15271 SKIN SUB GRAFT TRNK/ARM/LEG: CPT | Performed by: NURSE PRACTITIONER

## 2020-07-07 NOTE — PROGRESS NOTES
[facility] Care Center Progress Note and Bioengineered Skin Application      Gabino Tavares  AGE: 72 y.o. GENDER: male  : 1955  EPISODE DATE:  2020     Subjective:     Chief Complaint   Patient presents with    Wound Check     left leg         HISTORY of PRESENT ILLNESS      Yulisa Tavares is a 72 y.o. male who presents today for wound evaluation of Chronic venous, diabetic and lymphedema ulcer(s) of left lower leg. The ulcer is of moderate severity. The underlying cause of the wound is venous, diabetic and lymphedema. If appropriate skin graft will be applied. Wound Pain Timing/Severity: intermittent, mild  Quality of pain: aching, tender  Severity of pain:  1 / 10   Modifying Factors: edema, venous stasis, lymphedema, diabetes, obesity, smoking and non-adherence  Associated Signs/Symptoms: edema, erythema, drainage and pain        PAST MEDICAL HISTORY        Diagnosis Date    CHF (congestive heart failure) (HCC)     Chronic ulcer of left leg with fat layer exposed ([address] 2016    Chronic ulcer of right leg with fat layer exposed ([address] 2016    Chronic ulcer of right leg with fat layer exposed ([address] 2016    Diabetes mellitus ([address]     Hypertension     PVD (peripheral vascular disease) ([address] 2016    Type 2 diabetes mellitus with diabetic peripheral angiopathy without gangrene, without long-term current use of insulin ([address] 2016    Venous stasis ulcer of both lower extremities without varicose veins ([address] 2016       PAST SURGICAL HISTORY    History reviewed. No pertinent surgical history.     FAMILY HISTORY    Family History   Problem Relation Age of Onset    Asthma Mother     Breast Cancer Mother     Cancer Mother     Diabetes Mother     High Blood Pressure Mother     Cancer Father     Early Death Brother     Arthritis Paternal Grandmother        SOCIAL HISTORY    Social History     Tobacco Use    Smoking status: Current Every Day Smoker     Packs/day: 1.00     Types: Cigarettes    Smokeless tobacco: Never Used    Tobacco comment: healing    Substance Use Topics    Alcohol use: Not on file    Drug use: No       ALLERGIES    No Known Allergies    MEDICATIONS    Current Outpatient Medications on File Prior to Encounter   Medication Sig Dispense Refill    Latanoprost 0.005 % EMUL Apply to eye daily      ibuprofen (ADVIL;MOTRIN) 600 MG tablet Take 1 tablet by mouth every 6 hours as needed for Pain 30 tablet 0    lidocaine (LIDODERM) 5 % Place 1 patch onto the skin daily 12 hours on, 12 hours off. 30 patch 0    glipiZIDE (GLUCOTROL) 5 MG tablet Take 5 mg by mouth 2 times daily (before meals)      potassium chloride (KLOR-CON M) 20 MEQ extended release tablet Take 20 mEq by mouth daily      ALPRAZolam (XANAX) 0.5 MG tablet Take 0.5 mg by mouth nightly. Lorean Ro ipratropium-albuterol (DUONEB) 0.5-2.5 (3) MG/3ML SOLN nebulizer solution Inhale 1 vial into the lungs every 4 hours      albuterol sulfate  (90 Base) MCG/ACT inhaler Inhale 2 puffs into the lungs every 6 hours as needed for Wheezing      aspirin EC 81 MG EC tablet Take 1 tablet by mouth daily 30 tablet 3    HYDROcodone-acetaminophen (NORCO) 7.5-325 MG per tablet Take 1 tablet by mouth 3 times daily .  metFORMIN (GLUCOPHAGE) 1000 MG tablet Take 1,000 mg by mouth 2 times daily (with meals)      furosemide (LASIX) 80 MG tablet Take 80 mg by mouth 2 times daily      lisinopril (PRINIVIL;ZESTRIL) 20 MG tablet Take 40 mg by mouth daily       tamsulosin (FLOMAX) 0.4 MG capsule Take 0.4 mg by mouth daily      finasteride (PROSCAR) 5 MG tablet Take 5 mg by mouth daily      metolazone (ZAROXOLYN) 2.5 MG tablet Take 2 tablets by mouth daily 60 tablet 0     No current facility-administered medications on file prior to encounter. REVIEW OF SYSTEMS    Pertinent items are noted in HPI. Constitutional: Negative for systemic symptoms including fever, chills and malaise.       Objective:      BP Michael Rondon ) 149/75   Pulse 81   Temp 97.8 °F (36.6 °C) (Tympanic)   Resp 18     PHYSICAL EXAM  General: The patient is in no acute distress. Mental status:  Patient is appropriate, is  oriented to place and plan of care. Dermatologic exam: Visual inspection of the periwound reveals the skin to be edematous  Wound exam: see wound description below in procedure note      Assessment:     Problem List Items Addressed This Visit     WD-Venous stasis of both lower extremities    WD-Lymphedema of both lower extremities    WD-Idiopathic chronic venous hypertension of left lower extremity with ulcer ([address] - Primary          Conditions above and those listed in the past history are being treated appropriately and are considered under adequate control so as not to preclude the use of a graft. SKIN SUBSTITUTES/GRAFT APPLICATIONS PROCEDURE NOTE    Indicaton:     Chronic ulcer(s) of the left lower leg  that has(have) failed to heal with the usual wound protocol used for greater than 30 days. See Epic chart for previous modalities and details of previous treatment. The patient has no contraindications or evidence of infection. The patient's competency and support system is appropriate for proper care and follow up for the use of this graft, therefore a graft was placed as below. Procedure: The wound bed was cleansed and prepared as necessary to accept the graft. Debridement was required. Consent obtained: Yes    Time out taken: Yes    Using curette sharp Excisional Debridement of the wound(s) was/were performed down through and including the removal of subcutaneous tissue. Devitalized Tissue Debrided:  slough and exudate      Bleeding:  Minimal    Hemostasis Achieved:  by pressure    Procedural Pain:  0  / 10     Post Procedural Pain:  0 / 10     Having prepared the wound bed, the skin graft was completed as below.     Product Utilized:          [] APLIGRAF   []44 sq cm   []88 sq cm    []132 sq cm  []176 sq cm           [] DERMAGRAFT  [] 38 sq cm   []76 sq cm    []114 sq cm  []152 sq cm      [] NUSHIELD  [] 1.6 sq/cm disc   [] (2X3) 6 sq/cm    [] (2X4) 8 sq/cm         [] (3X4) 12 sq/cm  [] (4x4) 16 sq/cm   [] (4X6) 24 sq/cm         [] (6X6) 36 sq/cm        [] AFFINITY    [] (1.5 cm x 1.5cm) 2.25 cm   [] (2.5 cm x 2.5 cm) 6.25 cm         [] THERASKIN  [] 13 sq cm   []37.34 sq cm             [] EpiFix   [] 1.54 sq cm disc    [] 2 pieces (3.08 sq cm)    [] 3  pieces (4.62 sq  cm)   [] 6 sq/cm    [] 16 sq cm     [] 18 sq cm   Fenestrated        [] OASIS   [] 10.5 sq cm   []21 sq cm    []35 sq cm Tri-Matrix  []70 sq cm          Tri-Matrix                    [x] PURAPLY   [] 1.6 sq cm disc     [x] 4 sq cm   [] 8 sq cm   [] 25sq cm  [] 54 sq cm                  [] Grafix      [] 1.54 sq cm disc    [] 3 sq cm    [] 6 sq cm   [] 12 sq cm   [] 25 sq cm        Skin Substitute Applied:    Performed by: FAITH Campbell CNP    Consent obtained: Yes    Time out taken: Yes     Fenestrated: No    Skin Substitute was Applied to Wound Number(s):     3 and 4      Wound 04/14/20 Pretibial Left #3 LEFT LATERAL (Active)   Wound Venous 6/30/2020  9:57 AM   Dressing Status Clean;Dry; Intact 7/7/2020 10:16 AM   Dressing Changed Changed/New 7/7/2020 10:16 AM   Wound Cleansed Soap and water 6/30/2020  9:57 AM   Wound Length (cm) 0.4 cm 7/7/2020  9:51 AM   Wound Width (cm) 0.5 cm 7/7/2020  9:51 AM   Wound Depth (cm) 0.1 cm 7/7/2020  9:51 AM   Wound Surface Area (cm^2) 0.2 cm^2 7/7/2020  9:51 AM   Change in Wound Size % (l*w) 88.17 7/7/2020  9:51 AM   Wound Volume (cm^3) 0.02 cm^3 7/7/2020  9:51 AM   Wound Healing % 94 7/7/2020  9:51 AM   Post-Procedure Length (cm) 0.4 cm 7/7/2020 10:12 AM   Post-Procedure Width (cm) 0.5 cm 7/7/2020 10:12 AM   Post-Procedure Depth (cm) 0.1 cm 7/7/2020 10:12 AM   Post-Procedure Surface Area (cm^2) 0.2 cm^2 7/7/2020 10:12 AM   Post-Procedure Volume (cm^3) 0.02 cm^3 7/7/2020 10:12 AM   Distance Tunneling (cm) 0 cm 7/7/2020  9:51 AM   Tunneling Position ___ O'Clock 0 7/7/2020  9:51 AM   Undermining Starts ___ O'Clock 0 7/7/2020  9:51 AM   Undermining Ends___ O'Clock 0 7/7/2020  9:51 AM   Undermining Maxium Distance (cm) 0 7/7/2020  9:51 AM   Wound Assessment Red;Pink 7/7/2020  9:51 AM   Drainage Amount None 7/7/2020  9:51 AM   Drainage Description Serosanguinous 7/7/2020  9:51 AM   Odor None 7/7/2020  9:51 AM   Margins Attached edges 7/7/2020  9:51 AM   Merary-wound Assessment Pink 7/7/2020  9:51 AM   Non-staged Wound Description Full thickness 7/7/2020  9:51 AM   [place]%Wound Bed 100 7/7/2020  9:51 AM   Red%Wound Bed 0 7/7/2020  9:51 AM   Yellow%Wound Bed 0 7/7/2020  9:51 AM   Black%Wound Bed 0 7/7/2020  9:51 AM   Purple%Wound Bed 0 7/7/2020  9:51 AM   Other%Wound Bed 0 7/7/2020  9:51 AM   Number of days: 84       Wound 04/14/20 Tibial Left;Posterior #4 LEFT POSTERIOR LEG  (Active)   Wound Image   6/16/2020 10:03 AM   Wound Venous 6/30/2020  9:57 AM   Dressing Status Clean;Dry; Intact 7/7/2020 10:16 AM   Dressing Changed Changed/New 7/7/2020 10:16 AM   Wound Cleansed Soap and water 7/7/2020  9:51 AM   Wound Length (cm) 0.5 cm 7/7/2020  9:51 AM   Wound Width (cm) 0.3 cm 7/7/2020  9:51 AM   Wound Depth (cm) 0.1 cm 7/7/2020  9:51 AM   Wound Surface Area (cm^2) 0.15 cm^2 7/7/2020  9:51 AM   Change in Wound Size % (l*w) 97.56 7/7/2020  9:51 AM   Wound Volume (cm^3) 0.02 cm^3 7/7/2020  9:51 AM   Wound Healing % 98 7/7/2020  9:51 AM   Post-Procedure Length (cm) 0.5 cm 7/7/2020 10:12 AM   Post-Procedure Width (cm) 0.3 cm 7/7/2020 10:12 AM   Post-Procedure Depth (cm) 0.1 cm 7/7/2020 10:12 AM   Post-Procedure Surface Area (cm^2) 0.15 cm^2 7/7/2020 10:12 AM   Post-Procedure Volume (cm^3) 0.02 cm^3 7/7/2020 10:12 AM   Distance Tunneling (cm) 0 cm 7/7/2020  9:51 AM   Tunneling Position ___ O'Clock 0 7/7/2020  9:51 AM   Undermining Starts ___ O'Clock 0 7/7/2020  9:51 AM   Undermining Ends___ O'Clock 0 7/7/2020  9:51 AM   Undermining Maxium Distance (cm) 0 7/7/2020  9:51 AM   Wound Assessment Pink 7/7/2020  9:51 AM   Drainage Amount None 7/7/2020  9:51 AM   Drainage Description Serosanguinous 7/7/2020  9:51 AM   Odor None 7/7/2020  9:51 AM   Margins Attached edges 7/7/2020  9:51 AM   Merary-wound Assessment Pink 7/7/2020  9:51 AM   Non-staged Wound Description Full thickness 7/7/2020  9:51 AM   [place]%Wound Bed 0 7/7/2020  9:51 AM   Red%Wound Bed 100 7/7/2020  9:51 AM   Yellow%Wound Bed 0 7/7/2020  9:51 AM   Black%Wound Bed 0 7/7/2020  9:51 AM   Purple%Wound Bed 0 7/7/2020  9:51 AM   Other%Wound Bed 0 7/7/2020  9:51 AM   Number of days: 84         Total Surface Area Covered 4 sq/cm    Was the Product Layered  Yes      Amount Wasted 0 sq/cm    Reason for Waste: material provided was greater than wound size      Secured: Yes    Instruments used to complete placement of graft::scissors and forceps    Secured With:   [] N/A  [x]Steri Strips  []Sutures  []Staples   [x] Mepitel  [] Sorbact  []Other    Procedural Pain: 0/10     Post Procedural Pain: 0 / 10    Response to Treatment:  Well tolerated by patient. Status of wound progress and description from last visit: Improving. Plan:     Discharge instructions:  Discharge Instructions       PHYSICIAN ORDERS AND DISCHARGE INSTRUCTIONS     NOTE: Upon discharge from the [address], you will receive a patient experience survey.  We would be grateful if you would take the time to fill this survey out.     Wound care order history:                 ANDREW's   Right 0.92     Left 0.95             Date 4/14/20              Vascular studies:   Date               Imaging:   Date               Cultures:   OBTAINED 4/14/2020               Labs/ HbA1c:   Date               Grafts:  Date               HBO:                Antibiotics:               Earlier Wound care treatments:                Authorizations:                        Consults:   PT OF [facility]OS[facility] GUNTER                          Primary care physician: DR Markos Lares MACK     Continuing wound care orders and information:              Residence:                Continue home health care with:               Your wound-care supplies will be provided by: Ruth Parish provider:              [id number] with  Yris Gonzalez loading:  Date               DJRPP Medications:              JRQOG cleansing:                           KH not scrub or use excessive force.                          Wash hands with soap and water before and after dressing changes.                         Prior to applying a clean dressing, cleanse wound with normal saline,                                wound cleanser, or mild soap and water.                           Ask the physician or nurse before getting the wound(s) wet in a shower              Daily Wound management:                          Keep weight off wounds and reposition every 2 hours.                          BZDIK standing for long periods of time.                          DGNMT wraps/stockings in AM and remove at bedtime.                          If swelling is present, elevate legs to the level of the heart or above for 30                              minutes 4-5 times a day and/or when sitting.                                             When taking antibiotics take entire prescription as ordered by physician                             do not stop taking until medicine is all gone.                                                       Orders for this week: 6/30/2020     Sharlotte Hollow Wrap Measurements  Posterior Knee to Heel  39 CM  Ankle Circumference  26.3 CM  Calf Circumference 43.5 CM        Puraply # 1 5/12/20  Apligraf # 1 5/18/20  Puraply # 2 5/26/20  Puraply #3 6/2/2020  Puraply # 4 6/9/20  Puraply # 5 6/16/20  Puraply # 6 6/23/20  Puraply # 7 6/30/20  Puraply # 8 7/7/20 Graft # 9        LEFT LATERAL AND POSTERIOR LEG -- [address] WITH SOAP AND WATER, PAT DRY.  Apply Purapy # 8, Mepitel, and Steri strips.  Then CALCIUM ALGINATE AND WRAP WITH COBAN 2 WRAP                  Follow up with CAMRYN HENDERSON   In 1 weeks in the wound care center  Call [phone] for any questions or concerns.   Date__________   Time___________        Treatment Note Wound 04/14/20 Pretibial Left #3 LEFT LATERAL-Dressing/Treatment: (Ca Alg, Coban 2)  Wound 04/14/20 Tibial Left;Posterior #4 LEFT POSTERIOR LEG -Dressing/Treatment: (Ca Alg, Coban 2)    Written Patient Dismissal Instructions Given            Electronically signed by FAITH Simental CNP on 7/7/2020 at 1:12 PM

## 2020-07-14 ENCOUNTER — HOSPITAL ENCOUNTER (OUTPATIENT)
Dept: WOUND CARE | Age: 65
Discharge: HOME | End: 2020-07-14
Payer: MEDICARE

## 2020-07-14 VITALS
SYSTOLIC BLOOD PRESSURE: 148 MMHG | TEMPERATURE: 98 F | DIASTOLIC BLOOD PRESSURE: 63 MMHG | RESPIRATION RATE: 20 BRPM | HEART RATE: 69 BPM

## 2020-07-14 PROCEDURE — 15271 SKIN SUB GRAFT TRNK/ARM/LEG: CPT | Performed by: NURSE PRACTITIONER

## 2020-07-14 PROCEDURE — 15271 SKIN SUB GRAFT TRNK/ARM/LEG: CPT

## 2020-07-14 NOTE — PROGRESS NOTES
PuraPly AMTreatment Note    NAME:  Maricel Tavares  YOB: 1955  MEDICAL RECORD NUMBER:  [id number]  DATE:  7/14/2020    Goal:  Patient will receive safe and proper application of skin substitute. Patient will comply with caring for dressing, and reporting complications. Expiration date checked immediately prior to use. Package intact prior to use and no damage noted. Transport temperature controlled and acceptable. PuraPly AM was removed from protective sterile packaging by provider and applied to prepared ulcer bed. PuraPly AM was hydrated with sterile normal saline per provider. PuraPly AM was applied to Left Lower Leg and affixed with steri-strips by the provider. PuraPly AM was covered with non-adherent ulcer dressing. Applied Ag alg, coban 2 over non-adherent. Applied dry gauze and/or roll gauze. Patient/caregiver was instructed not to remove dressing and to keep it clean and dry. Pt/family/caregiver was instructed on signs and symptoms of complications to report such as draining through dressing, dressing falling down/slipping, getting wet, or severe pain or tingling. PuraPly AM may be applied a total of 10 times per wound over a 12 week period. Date of first application of PuraPly for this current wound is May 12, 2020 .     Guidelines followed    Electronically signed by Diamond Ryder RN on 7/14/2020 at 10:14 AM

## 2020-07-20 ENCOUNTER — HOSPITAL ENCOUNTER (OUTPATIENT)
Age: 65
Discharge: HOME | End: 2020-07-20
Payer: MEDICARE

## 2020-07-20 LAB
ESTIMATED AVERAGE GLUCOSE: 103 MG/DL
HBA1C MFR BLD: 5.2 % (ref 4.2–6.3)

## 2020-07-20 PROCEDURE — 36415 COLL VENOUS BLD VENIPUNCTURE: CPT

## 2020-07-20 PROCEDURE — 83036 HEMOGLOBIN GLYCOSYLATED A1C: CPT

## 2020-07-23 ENCOUNTER — HOSPITAL ENCOUNTER (OUTPATIENT)
Dept: WOUND CARE | Age: 65
Discharge: HOME | End: 2020-07-23
Payer: MEDICARE

## 2020-07-23 VITALS
SYSTOLIC BLOOD PRESSURE: 127 MMHG | RESPIRATION RATE: 16 BRPM | TEMPERATURE: 98.2 F | HEART RATE: 87 BPM | DIASTOLIC BLOOD PRESSURE: 54 MMHG

## 2020-07-23 PROCEDURE — 99213 OFFICE O/P EST LOW 20 MIN: CPT | Performed by: NURSE PRACTITIONER

## 2020-07-23 PROCEDURE — 99212 OFFICE O/P EST SF 10 MIN: CPT

## 2020-07-23 NOTE — PROGRESS NOTES
file    Drug use: No       ALLERGIES    No Known Allergies    MEDICATIONS    Current Outpatient Medications on File Prior to Encounter   Medication Sig Dispense Refill    Latanoprost 0.005 % EMUL Apply to eye daily      ibuprofen (ADVIL;MOTRIN) 600 MG tablet Take 1 tablet by mouth every 6 hours as needed for Pain 30 tablet 0    lidocaine (LIDODERM) 5 % Place 1 patch onto the skin daily 12 hours on, 12 hours off. 30 patch 0    glipiZIDE (GLUCOTROL) 5 MG tablet Take 5 mg by mouth 2 times daily (before meals)      potassium chloride (KLOR-CON M) 20 MEQ extended release tablet Take 20 mEq by mouth daily      ALPRAZolam (XANAX) 0.5 MG tablet Take 0.5 mg by mouth nightly. Garrett Hodgson ipratropium-albuterol (DUONEB) 0.5-2.5 (3) MG/3ML SOLN nebulizer solution Inhale 1 vial into the lungs every 4 hours      albuterol sulfate  (90 Base) MCG/ACT inhaler Inhale 2 puffs into the lungs every 6 hours as needed for Wheezing      aspirin EC 81 MG EC tablet Take 1 tablet by mouth daily 30 tablet 3    HYDROcodone-acetaminophen (NORCO) 7.5-325 MG per tablet Take 1 tablet by mouth 3 times daily .  metFORMIN (GLUCOPHAGE) 1000 MG tablet Take 1,000 mg by mouth 2 times daily (with meals)      furosemide (LASIX) 80 MG tablet Take 80 mg by mouth 2 times daily      lisinopril (PRINIVIL;ZESTRIL) 20 MG tablet Take 40 mg by mouth daily       tamsulosin (FLOMAX) 0.4 MG capsule Take 0.4 mg by mouth daily      finasteride (PROSCAR) 5 MG tablet Take 5 mg by mouth daily      metolazone (ZAROXOLYN) 2.5 MG tablet Take 2 tablets by mouth daily 60 tablet 0     No current facility-administered medications on file prior to encounter. REVIEW OF SYSTEMS    Pertinent items are noted in HPI. Constitutional: Negative for systemic symptoms including fever, chills and malaise. Objective:      BP (!) 127/54   Pulse 87   Temp 98.2 °F (36.8 °C) (Temporal)   Resp 16     PHYSICAL EXAM  General: The patient is in no acute distress. Mental status:  Patient is appropriate, is  oriented to place and plan of care. Dermatologic exam: Visual inspection of the periwound reveals the skin to be edematous. Wound exam:  see wound description below     All active wounds listed below with today's date are evaluated           Assessment:       Problem List Items Addressed This Visit     Type 2 diabetes mellitus with diabetic peripheral angiopathy without gangrene, without long-term current use of insulin (HCC)    WD-Venous stasis of both lower extremities    WD-Lymphedema of both lower extremities    WD-Idiopathic chronic venous hypertension of left lower extremity with ulcer ([facility] - Primary          Status of wound progress and description from last visit: Healed. Discussed importance of edema management and compression to minimize risk of reoccurrence. Reinforced use of lymphedema pumps twice daily for one hour each session. Edema Control Instructions:  -Whenever you are resting, raise your legs up. Try to keep the swollen area higher than the level of your heart.  -Take breaks from standing or sitting in one position.  -Walk around to increase the blood flow in your lower legs. -Move your feet and ankles often while you stand, or tighten and relax your leg muscles.  -Wear support stockings. Put them on in the morning, before swelling gets worse.  -Eat a balanced diet. Lose weight if you need to.  -Limit the amount of salt (sodium) in your diet. Salt holds fluid in the body and may increase swelling  -Apply compression stocking(s) every morning as soon as you get up. Remove at bedtime unless instructed to wear day and night. Hand wash and line dry to prevent loss of elasticity. Replace every 3-4 months to ensure proper fit. Plan:     Discharge Instructions       PHYSICIAN ORDERS AND DISCHARGE INSTRUCTIONS     NOTE: Upon discharge from the [address], you will receive a patient experience survey.  We would be grateful if you would take the time to fill this survey out.     Wound care order history:                 ANDREW's   Right 0.92     Left 0.95             Date 4/14/20              Vascular studies:   Date               Imaging:   Date               Cultures:   OBTAINED 4/14/2020               Labs/ HbA1c:   Date               Grafts:  Date               HBO:                Antibiotics:               Earlier Wound care treatments:                Authorizations:                        Consults:   PT OF JOSHUA GUNTER                          Primary care physician: DR Doyle Pichardo     Continuing wound care orders and information:              Residence:                Continue home health care with:               Your wound-care supplies will be provided by: Gamal Parsons provider:              [id number] with  Kenton Dancer loading:  Date               XTYIM Medications:              ZXCPW cleansing:                           PZ not scrub or use excessive force.                          Wash hands with soap and water before and after dressing changes.                         Prior to applying a clean dressing, cleanse wound with normal saline,                                wound cleanser, or mild soap and water.                           Ask the physician or nurse before getting the wound(s) wet in a shower              Daily Wound management:                          Keep weight off wounds and reposition every 2 hours.                          CZEMG standing for long periods of time.                          ETGXL wraps/stockings in AM and remove at bedtime.                          If swelling is present, elevate legs to the level of the heart or above for 30                              minutes 4-5 times a day and/or when sitting.                                             When taking antibiotics take entire prescription as ordered by physician                             AF not stop taking until medicine is all gone.                                                       Orders for this week: 7/23/2020     [place] Wrap Measurements  Posterior Knee to Heel  39 CM  Ankle Circumference  26.3 CM  Calf Circumference 43.5 CM        Puraply # 1 5/12/20  Apligraf # 1 5/18/20  Puraply # 2 5/26/20  Puraply #3 6/2/2020  Puraply # 4 6/9/20  Puraply # 5 6/16/20  Puraply # 6 6/23/20  Puraply # 7 6/30/20  Puraply # 8 7/7/20 Graft # 9  Puraply #9 7/14/20 Graft # 10        LEFT LATERAL AND POSTERIOR LEG -- [address] WITH SOAP AND WATER, PAT DRY. A&D and  Tubi  F      DISCHARGED  Call [phone] for any questions or concerns.   Date__________   Time___________        Treatment Note      Written Patient Dismissal Instructions Given            Electronically signed by FAITH Duran CNP on 7/23/2020 at 4:16 PM

## 2020-09-09 ENCOUNTER — APPOINTMENT (OUTPATIENT)
Dept: GENERAL RADIOLOGY | Age: 65
End: 2020-09-09
Payer: MEDICARE

## 2020-09-09 ENCOUNTER — HOSPITAL ENCOUNTER (EMERGENCY)
Age: 65
Discharge: HOME | End: 2020-09-09
Payer: MEDICARE

## 2020-09-09 VITALS
HEIGHT: 69 IN | DIASTOLIC BLOOD PRESSURE: 60 MMHG | OXYGEN SATURATION: 97 % | BODY MASS INDEX: 40.43 KG/M2 | WEIGHT: 273 LBS | RESPIRATION RATE: 17 BRPM | TEMPERATURE: 97.8 F | HEART RATE: 74 BPM | SYSTOLIC BLOOD PRESSURE: 126 MMHG

## 2020-09-09 PROCEDURE — 99283 EMERGENCY DEPT VISIT LOW MDM: CPT

## 2020-09-09 PROCEDURE — 96372 THER/PROPH/DIAG INJ SC/IM: CPT

## 2020-09-09 PROCEDURE — 6360000002 HC RX W HCPCS: Performed by: PHYSICIAN ASSISTANT

## 2020-09-09 PROCEDURE — 71101 X-RAY EXAM UNILAT RIBS/CHEST: CPT

## 2020-09-09 RX ORDER — KETOROLAC TROMETHAMINE 30 MG/ML
60 INJECTION, SOLUTION INTRAMUSCULAR; INTRAVENOUS ONCE
Status: COMPLETED | OUTPATIENT
Start: 2020-09-09 | End: 2020-09-09

## 2020-09-09 RX ORDER — ORPHENADRINE CITRATE 30 MG/ML
60 INJECTION INTRAMUSCULAR; INTRAVENOUS ONCE
Status: COMPLETED | OUTPATIENT
Start: 2020-09-09 | End: 2020-09-09

## 2020-09-09 RX ORDER — METHOCARBAMOL 500 MG/1
500 TABLET, FILM COATED ORAL 4 TIMES DAILY PRN
Qty: 40 TABLET | Refills: 0 | Status: SHIPPED | OUTPATIENT
Start: 2020-09-09 | End: 2020-09-19

## 2020-09-09 RX ADMIN — ORPHENADRINE CITRATE 60 MG: 30 INJECTION INTRAMUSCULAR; INTRAVENOUS at 10:34

## 2020-09-09 RX ADMIN — KETOROLAC TROMETHAMINE 60 MG: 30 INJECTION, SOLUTION INTRAMUSCULAR at 10:33

## 2020-09-09 ASSESSMENT — PAIN DESCRIPTION - ORIENTATION: ORIENTATION: LEFT

## 2020-09-09 ASSESSMENT — PAIN DESCRIPTION - LOCATION: LOCATION: RIB CAGE

## 2020-09-09 ASSESSMENT — PAIN SCALES - GENERAL
PAINLEVEL_OUTOF10: 8
PAINLEVEL_OUTOF10: 8

## 2020-09-09 ASSESSMENT — PAIN DESCRIPTION - PAIN TYPE: TYPE: ACUTE PAIN

## 2020-09-09 NOTE — ED PROVIDER NOTES
eMERGENCY dEPARTMENT eNCOUnter      PCP: FAITH Fonseca - S[place]    CHIEF COMPLAINT    Chief Complaint   Patient presents with    Rib Pain     Left side     Pt was not seen by physician    HPI    Rajni Tavares is a 72 y.o. male who presents with left anterolateral rib pain. Patient with inciting injury yesterday. Patient does report that he had injury to his ribs around a year ago. States that he always has some issues with pain since that time. Yesterday he was pulling a toolbox when he felt a pop in his left lateral chest wall. This suddenly increased his pain. Symptoms worse with movements and deep inspiration. He denies any concerns for heart type chest pain or shortness of breath not related to pain. He did not fall or injure himself.       REVIEW OF SYSTEMS    Constitutional:  Denies fever, chills, weight loss or weakness   HENT:  Denies sore throat or ear pain   Cardiovascular:  Denies chest pain, palpitations   Respiratory:  Denies cough or shortness of breath    GI:  Denies abdominal pain, nausea, vomiting, or diarrhea  :  Denies any urinary symptoms   Musculoskeletal:  Denies back pain,   Skin:  Denies rash  Neurologic:  Denies headache, focal weakness or sensory changes   Endocrine:  Denies polyuria or polydypsia   Lymphatic:  Denies swollen glands     All other review of systems are negative  See HPI and nursing notes for additional information     PAST MEDICAL AND SURGICAL HISTORY    Past Medical History:   Diagnosis Date    CHF (congestive heart failure) ([address]     Chronic ulcer of left leg with fat layer exposed ([address] 12/12/2016    Chronic ulcer of right leg with fat layer exposed ([address] 12/12/2016    Chronic ulcer of right leg with fat layer exposed ([address] 12/12/2016    Diabetes mellitus ([address]     Hypertension     PVD (peripheral vascular disease) ([address] 12/12/2016    Type 2 diabetes mellitus with diabetic peripheral angiopathy without gangrene, without long-term current use of insulin ([facility] 12/12/2016    Venous stasis ulcer of both lower extremities without varicose veins ([facility] 12/12/2016     No past surgical history on file. CURRENT MEDICATIONS    Current Outpatient Rx   Medication Sig Dispense Refill    methocarbamol (ROBAXIN) 500 MG tablet Take 1 tablet by mouth 4 times daily as needed (spasms) 40 tablet 0    Latanoprost 0.005 % EMUL Apply to eye daily      ibuprofen (ADVIL;MOTRIN) 600 MG tablet Take 1 tablet by mouth every 6 hours as needed for Pain 30 tablet 0    lidocaine (LIDODERM) 5 % Place 1 patch onto the skin daily 12 hours on, 12 hours off. 30 patch 0    glipiZIDE (GLUCOTROL) 5 MG tablet Take 5 mg by mouth 2 times daily (before meals)      potassium chloride (KLOR-CON M) 20 MEQ extended release tablet Take 20 mEq by mouth daily      ALPRAZolam (XANAX) 0.5 MG tablet Take 0.5 mg by mouth nightly. Adam Villegas ipratropium-albuterol (DUONEB) 0.5-2.5 (3) MG/3ML SOLN nebulizer solution Inhale 1 vial into the lungs every 4 hours      albuterol sulfate  (90 Base) MCG/ACT inhaler Inhale 2 puffs into the lungs every 6 hours as needed for Wheezing      metolazone (ZAROXOLYN) 2.5 MG tablet Take 2 tablets by mouth daily 60 tablet 0    aspirin EC 81 MG EC tablet Take 1 tablet by mouth daily 30 tablet 3    HYDROcodone-acetaminophen (NORCO) 7.5-325 MG per tablet Take 1 tablet by mouth 3 times daily .       metFORMIN (GLUCOPHAGE) 1000 MG tablet Take 1,000 mg by mouth 2 times daily (with meals)      furosemide (LASIX) 80 MG tablet Take 80 mg by mouth 2 times daily      lisinopril (PRINIVIL;ZESTRIL) 20 MG tablet Take 40 mg by mouth daily       tamsulosin (FLOMAX) 0.4 MG capsule Take 0.4 mg by mouth daily      finasteride (PROSCAR) 5 MG tablet Take 5 mg by mouth daily         ALLERGIES    No Known Allergies    SOCIAL AND FAMILY HISTORY    Social History     Socioeconomic History    Marital status:      Spouse name: Not on file    Number of children: Not on file    Years of education: Not on file    Highest education level: Not on file   Occupational History    Not on file   Social Needs    Financial resource strain: Not on file    Food insecurity     Worry: Not on file     Inability: Not on file    Transportation needs     Medical: Not on file     Non-medical: Not on file   Tobacco Use    Smoking status: Current Every Day Smoker     Packs/day: 1.00     Types: Cigarettes    Smokeless tobacco: Never Used    Tobacco comment: healing    Substance and Sexual Activity    Alcohol use: Not on file    Drug use: No    Sexual activity: Not on file   Lifestyle    Physical activity     Days per week: Not on file     Minutes per session: Not on file    Stress: Not on file   Relationships    Social connections     Talks on phone: Not on file     Gets together: Not on file     Attends Hinduism service: Not on file     Active member of club or organization: Not on file     Attends meetings of clubs or organizations: Not on file     Relationship status: Not on file    Intimate partner violence     Fear of current or ex partner: Not on file     Emotionally abused: Not on file     Physically abused: Not on file     Forced sexual activity: Not on file   Other Topics Concern    Not on file   Social History Narrative    Not on file     Family History   Problem Relation Age of Onset    Asthma Mother     Breast Cancer Mother     Cancer Mother     Diabetes Mother     High Blood Pressure Mother     Cancer Father     Early Death Brother     Arthritis Paternal Grandmother          PHYSICAL EXAM    VITAL SIGNS: /60   Pulse 74   Temp 97.8 °F (36.6 °C) (Oral)   Resp 17   Ht 5' 9\" (1.753 m)   Wt 273 lb (123.8 kg)   SpO2 97%   BMI 40.32 kg/m²    Constitutional:  Well developed, Well nourished  HENT:  Normocephalic, Atraumatic, PERRL. EOMI. Sclera clear. Conjunctiva normal, No discharge.   Neck/Lymphatics: supple, no JVD, no swollen nodes  Cardiovascular:  Normal heart rate, Normal rhythm, No murmurs  Respiratory:  Nonlabored breathing. Normal breath sounds, No wheezing  Patient does have reproducible tenderness to the left anterior lateral chest wall without bruising or deformity noted. Abdomen: Bowel sounds normal, Soft, No tenderness, no masses. Musculoskeletal: No edema, No tenderness, No cyanosis  Integument:  Warm, Dry  Neurologic:  Alert & oriented , No focal deficits noted. Cranial nerves II through XII grossly intact. Finger to nose intact, rapid alternating movements intact. Normal gross motor coordination & motor strength bilateral upper and lower extremities. Sensation intact. Psychiatric:  Affect normal, Mood normal.       RADIOLOGY    Xr Ribs Left Include Chest (min 3 Views)    Result Date: 9/9/2020  EXAMINATION: 2 XRAY VIEWS OF THE LEFT RIBS WITH FRONTAL XRAY VIEW OF THE CHEST 9/9/2020 9:18 am COMPARISON: Chest radiograph 07/10/2018 and 02/28/2018 HISTORY: ORDERING SYSTEM PROVIDED HISTORY: left rib pain TECHNOLOGIST PROVIDED HISTORY: Reason for exam:->left rib pain Reason for Exam: left rib pain Acuity: Acute Type of Exam: Initial Mechanism of Injury: pulling inj FINDINGS: No focal consolidation, pleural effusion or pneumothorax. The cardiomediastinal silhouette is stable. No overt pulmonary edema. The osseous structures are stable. No discrete left rib fracture identified. No acute cardiopulmonary findings or discrete left rib fracture identified. ED COURSE & MEDICAL DECISION MAKING       Vital signs and nursing notes reviewed during ED course. Patient seen independently. Attending available throughout ED stay for consultation. All pertinent Lab data and radiographic results reviewed with patient at bedside. The patient and/or the family were informed of the results of any tests/labs/imaging, the treatment plan, and time was allotted to answer questions. Clinical  IMPRESSION    1.  Rib pain on left side      Patient presents as above. Had an inciting pop that led to pain while he was pulling a toolbox. Has some chronic elements of pain to the left lateral rib cage since an injury over the last year. X-ray was negative for any acute findings. Patient's vital signs are within normal limits. Offered him a CT evaluation which he declined. He does have pain medicine at home however I will add on a muscle relaxant. Educated him on his increased risk for development of pneumonia and advised to recheck in PCPs office in 2 to 3 days. Advised return here for new or worsening symptoms. Patient verbalized understanding and agreement with the plan of care. Comment: Please note this report has been produced using speech recognition software and may contain errors related to that system including errors in grammar, punctuation, and spelling, as well as words and phrases that may be inappropriate. If there are any questions or concerns please feel free to contact the dictating provider for clarification.         Mattie Richards PA-C  09/09/20 2036

## 2020-10-04 ENCOUNTER — HOSPITAL ENCOUNTER (EMERGENCY)
Age: 65
Discharge: HOME | End: 2020-10-04
Payer: MEDICARE

## 2020-10-04 ENCOUNTER — APPOINTMENT (OUTPATIENT)
Dept: GENERAL RADIOLOGY | Age: 65
End: 2020-10-04
Payer: MEDICARE

## 2020-10-04 VITALS
DIASTOLIC BLOOD PRESSURE: 61 MMHG | TEMPERATURE: 97.9 F | WEIGHT: 270 LBS | HEART RATE: 86 BPM | OXYGEN SATURATION: 96 % | RESPIRATION RATE: 18 BRPM | SYSTOLIC BLOOD PRESSURE: 124 MMHG | BODY MASS INDEX: 39.99 KG/M2 | HEIGHT: 69 IN

## 2020-10-04 PROCEDURE — 99283 EMERGENCY DEPT VISIT LOW MDM: CPT

## 2020-10-04 PROCEDURE — 90715 TDAP VACCINE 7 YRS/> IM: CPT | Performed by: PHYSICIAN ASSISTANT

## 2020-10-04 PROCEDURE — 73590 X-RAY EXAM OF LOWER LEG: CPT

## 2020-10-04 PROCEDURE — 90471 IMMUNIZATION ADMIN: CPT | Performed by: PHYSICIAN ASSISTANT

## 2020-10-04 PROCEDURE — 2500000003 HC RX 250 WO HCPCS: Performed by: PHYSICIAN ASSISTANT

## 2020-10-04 PROCEDURE — 6360000002 HC RX W HCPCS: Performed by: PHYSICIAN ASSISTANT

## 2020-10-04 PROCEDURE — 4500000027

## 2020-10-04 RX ORDER — LIDOCAINE HYDROCHLORIDE 20 MG/ML
10 INJECTION, SOLUTION INFILTRATION; PERINEURAL ONCE
Status: COMPLETED | OUTPATIENT
Start: 2020-10-04 | End: 2020-10-04

## 2020-10-04 RX ADMIN — LIDOCAINE HYDROCHLORIDE 10 ML: 20 INJECTION, SOLUTION INFILTRATION; PERINEURAL at 13:58

## 2020-10-04 RX ADMIN — TETANUS TOXOID, REDUCED DIPHTHERIA TOXOID AND ACELLULAR PERTUSSIS VACCINE, ADSORBED 0.5 ML: 5; 2.5; 8; 8; 2.5 SUSPENSION INTRAMUSCULAR at 13:59

## 2020-10-04 ASSESSMENT — PAIN SCALES - GENERAL: PAINLEVEL_OUTOF10: 0

## 2020-10-04 NOTE — ED NOTES
Non stick dressing applied. Pt educated on wound care at home and s/s of infection to watch for. Pt instructed to follow up for suture removal in 10-14 days. Pt voiced understanding.  Pt denies further needs at this time     Jimbo Chapman RN  10/04/20 [id number]

## 2020-10-04 NOTE — ED PROVIDER NOTES
eMERGENCY dEPARTMENT eNCOUnter         [address] [facility]      PCP: FAITH Khan - CNP    CHIEF COMPLAINT    Chief Complaint   Patient presents with    Laceration     pt accidentally hit left lower leg on metal plate of Michelle       HPI    Avery Tavares is a 72 y.o. male who presents with left anterior shin laceration. Onset was just prior to ED arrival.  Context is patient reports a home injury where he struck his left lower leg on the metal plate of a moving michelle. Bleeding is controlled prior to ED arrival.  Unknown last tetanus vaccination. He is a diabetic with chronic bilateral lower leg diabetic neuropathy and denies any new numbness tingling weakness on the lower legs. Has been ambulatory on the left lower leg since time of injury. Denying any pain at the laceration site. REVIEW OF SYSTEMS    General: Denies preceding dizziness, difficulty breathing, chest pain. Denies loss of consciousness. Skin: + Laceration. SEE HPI  Musculoskeletal:  No distal numbness, tingling. No obvious tendon or motor deficits. Denies any other musculoskeletal injuries or skin trauma. All other review of systems are negative  See HPI and nursing notes for additional information     PAST MEDICAL & SURGICAL HISTORY    Past Medical History:   Diagnosis Date    CHF (congestive heart failure) ([address]     Chronic ulcer of left leg with fat layer exposed ([address] 12/12/2016    Chronic ulcer of right leg with fat layer exposed ([address] 12/12/2016    Chronic ulcer of right leg with fat layer exposed ([address] 12/12/2016    Diabetes mellitus ([address]     Hypertension     PVD (peripheral vascular disease) ([address] 12/12/2016    Type 2 diabetes mellitus with diabetic peripheral angiopathy without gangrene, without long-term current use of insulin ([address] 12/12/2016    Venous stasis ulcer of both lower extremities without varicose veins ([address] 12/12/2016     History reviewed.  No pertinent surgical history. CURRENT MEDICATIONS    Current Outpatient Rx   Medication Sig Dispense Refill    Latanoprost 0.005 % EMUL Apply to eye daily      ibuprofen (ADVIL;MOTRIN) 600 MG tablet Take 1 tablet by mouth every 6 hours as needed for Pain 30 tablet 0    lidocaine (LIDODERM) 5 % Place 1 patch onto the skin daily 12 hours on, 12 hours off. 30 patch 0    glipiZIDE (GLUCOTROL) 5 MG tablet Take 5 mg by mouth 2 times daily (before meals)      potassium chloride (KLOR-CON M) 20 MEQ extended release tablet Take 20 mEq by mouth daily      ALPRAZolam (XANAX) 0.5 MG tablet Take 0.5 mg by mouth nightly. Illene Soles ipratropium-albuterol (DUONEB) 0.5-2.5 (3) MG/3ML SOLN nebulizer solution Inhale 1 vial into the lungs every 4 hours      albuterol sulfate  (90 Base) MCG/ACT inhaler Inhale 2 puffs into the lungs every 6 hours as needed for Wheezing      metolazone (ZAROXOLYN) 2.5 MG tablet Take 2 tablets by mouth daily 60 tablet 0    aspirin EC 81 MG EC tablet Take 1 tablet by mouth daily 30 tablet 3    HYDROcodone-acetaminophen (NORCO) 7.5-325 MG per tablet Take 1 tablet by mouth 3 times daily .       metFORMIN (GLUCOPHAGE) 1000 MG tablet Take 1,000 mg by mouth 2 times daily (with meals)      furosemide (LASIX) 80 MG tablet Take 80 mg by mouth 2 times daily      lisinopril (PRINIVIL;ZESTRIL) 20 MG tablet Take 40 mg by mouth daily       tamsulosin (FLOMAX) 0.4 MG capsule Take 0.4 mg by mouth daily      finasteride (PROSCAR) 5 MG tablet Take 5 mg by mouth daily         ALLERGIES    No Known Allergies    SOCIAL & FAMILY HISTORY    Social History     Socioeconomic History    Marital status:      Spouse name: None    Number of children: None    Years of education: None    Highest education level: None   Occupational History    None   Social Needs    Financial resource strain: None    Food insecurity     Worry: None     Inability: None    Transportation needs     Medical: None Non-medical: None   Tobacco Use    Smoking status: Current Every Day Smoker     Packs/day: 1.00     Types: Cigarettes    Smokeless tobacco: Never Used    Tobacco comment: healing    Substance and Sexual Activity    Alcohol use: None    Drug use: No    Sexual activity: None   Lifestyle    Physical activity     Days per week: None     Minutes per session: None    Stress: None   Relationships    Social connections     Talks on phone: None     Gets together: None     Attends Latter-day service: None     Active member of club or organization: None     Attends meetings of clubs or organizations: None     Relationship status: None    Intimate partner violence     Fear of current or ex partner: None     Emotionally abused: None     Physically abused: None     Forced sexual activity: None   Other Topics Concern    None   Social History Narrative    None     Family History   Problem Relation Age of Onset    Asthma Mother     Breast Cancer Mother     Cancer Mother     Diabetes Mother     High Blood Pressure Mother     Cancer Father     Early Death Brother     Arthritis Paternal Grandmother            PHYSICAL EXAM    VITAL SIGNS: /61   Pulse 86   Temp 97.9 °F (36.6 °C) (Oral)   Resp 18   Ht 5' 9\" (1.753 m)   Wt 270 lb (122.5 kg)   SpO2 96%   BMI 39.87 kg/m²   Constitutional:  Well developed, well nourished. Appears comfortable  HEENT:  Normocephalic, Atraumatic. PERRL, EOMI. Ear canals, nasal passages, oropharynx clear of blood or clear fluid. Musculoskeletal:  No gross deformities. No motor deficits. Distal sensation and capillary refill intact. Vascular: Distal pulses and capillary refill intact. Integument:    The midline anterior shin is a she states slightly gaping laceration measuring approximately 9.0 cm in length. No obvious foreign body or tendon involvement. No pulsatile or seeping blood. No palpable step-offs or crepitus of the underlying tibia/fibula.   Calf is supple nontender. Extensor mechanism of the left knee is grossly intact. 5/5 dorsi/plantar flexion against resistance. Patient is weightbearing in the ED on the left lower leg. Distal sensation, capillary refill intact. Distal joints with full range of motion. See below for further details. Neurologic:  Awake and alert, normal flow of speech. CN 2-12 intact. Psychiatric: Cooperative, pleasant affect        RADIOLOGY/PROCEDURES         XR TIBIA FIBULA LEFT (2 VIEWS) (Final result)   Result time 10/04/20 15:09:54   Final result by Julianna Oleary MD (10/04/20 15:09:54)                 Impression:     No radiographic evidence of fracture. Narrative:     EXAMINATION:   XRAY VIEWS OF THE LEFT TIBIA AND FIBULA     10/4/2020 2:41 pm     COMPARISON:   None. HISTORY:   ORDERING SYSTEM PROVIDED HISTORY: trauma, laceration   TECHNOLOGIST PROVIDED HISTORY:   Reason for exam:->trauma, laceration   Reason for Exam:  laceration   Acuity: Acute   Type of Exam: Initial   Mechanism of Injury: struck matal object (ruby)   Relevant Medical/Surgical History: none     FINDINGS:   No acute fracture or dislocation is identified.  No radiopaque foreign bodies   are visualized. There is a soft tissue laceration along the ventral aspect of the mid lower   leg.                   ________________________________________________________________________       Procedure Note - Colten Vaughn PA-C      Laceration Repair Procedure Note    Indication: Skin Laceration - Left lower leg    Procedure:   - Procedure explained, including risks and benefits explained to the patient who expressed understanding. All questions were answered. Verbal consent obtained.     - The Wound was prepped and draped in the usual sterile fashion using Betadine and sterile saline.  - The wound is anesthetized using 6 cc of 2% lidocaine without epinephrine  - Wound was explored to it's depth, no compromise of neurovascular or tendon structures, no foreign bodies. - Wound was irrigated with copious amounts of sterile saline and mechanically debrided utilizing sterile gauze. - The laceration was Closed with 4.0 Prolene sutures, total number of 17 simple interrupted  - Hemostasis and good cosmesis was achieved. Blood loss minimal.  - Patient is neurovascularly intact following laceration repair  - The wound area was then dressed with Sterile nonstick dressing, sterile gauze, and tape. - Patient tolerated procedure well without complications. Total repaired wound length: 9.0 cm  ________________________________________________________________________          ED COURSE & MEDICAL DECISION MAKING       Vital signs and nursing notes reviewed during ED course. I have independently evaluated this patient . Supervising  - Dr. Jamal Parrish - present in the Emergency Department, available for consultation, throughout entirety of  patient care. All pertinent Lab data and radiographic results reviewed with patient at bedside. The patient and/or the family were informed of the results of any tests/labs/imaging, the treatment plan, and time was allotted to answer questions. I discussed possibility of infection, retained foreign body, tendon injury, nerve injury. Clinical  IMPRESSION    1. Laceration of left lower extremity, initial encounter          Patient presents with left lower leg laceration. On exam, no gross bony deformity of extremity. There is a C-shaped gaping laceration to the left anterior shin without obvious tendon involvement foreign body or bony injury. Patient is neurovascular intact distally. Soft compartments. No evidence of traumatic injury to the knee or ankle joint. Tetanus is updated in the ED today. X-ray of the left tibia/fibula reveals no evidence of acute osseous abnormality or radiopaque foreign body. Laceration was repaired as in above procedure note, patient tolerated procedure well.   Patient is discharged in

## 2020-10-04 NOTE — ED TRIAGE NOTES
Patient to triage via private vehicle with c/o laceration to left shin. Patient states he cut it on a ruby while cleaning out the garage. [facility] is mildly seeping at this time. resps even and unlabored.

## 2020-10-05 ENCOUNTER — HOSPITAL ENCOUNTER (OUTPATIENT)
Age: 65
Setting detail: SPECIMEN
Discharge: HOME | End: 2020-10-05
Payer: MEDICARE

## 2020-10-05 PROCEDURE — 87070 CULTURE OTHR SPECIMN AEROBIC: CPT

## 2020-10-05 PROCEDURE — 87075 CULTR BACTERIA EXCEPT BLOOD: CPT

## 2020-10-08 ENCOUNTER — HOSPITAL ENCOUNTER (OUTPATIENT)
Dept: WOUND CARE | Age: 65
Discharge: HOME | End: 2020-10-08
Payer: MEDICARE

## 2020-10-08 VITALS
RESPIRATION RATE: 18 BRPM | DIASTOLIC BLOOD PRESSURE: 69 MMHG | TEMPERATURE: 98.3 F | SYSTOLIC BLOOD PRESSURE: 127 MMHG | HEART RATE: 74 BPM

## 2020-10-08 PROBLEM — S81.802A TRAUMATIC OPEN WOUND OF LEFT LOWER LEG: Status: ACTIVE | Noted: 2020-10-08

## 2020-10-08 PROCEDURE — 29581 APPL MULTLAYER CMPRN SYS LEG: CPT

## 2020-10-08 PROCEDURE — 99213 OFFICE O/P EST LOW 20 MIN: CPT

## 2020-10-08 PROCEDURE — 99213 OFFICE O/P EST LOW 20 MIN: CPT | Performed by: NURSE PRACTITIONER

## 2020-10-08 RX ORDER — GINSENG 100 MG
CAPSULE ORAL ONCE
Status: CANCELLED | OUTPATIENT
Start: 2020-10-08

## 2020-10-08 RX ORDER — LIDOCAINE 50 MG/G
OINTMENT TOPICAL ONCE
Status: CANCELLED | OUTPATIENT
Start: 2020-10-08

## 2020-10-08 RX ORDER — BACITRACIN ZINC AND POLYMYXIN B SULFATE 500; 1000 [USP'U]/G; [USP'U]/G
OINTMENT TOPICAL ONCE
Status: CANCELLED | OUTPATIENT
Start: 2020-10-08

## 2020-10-08 RX ORDER — BETAMETHASONE DIPROPIONATE 0.05 %
OINTMENT (GRAM) TOPICAL ONCE
Status: CANCELLED | OUTPATIENT
Start: 2020-10-08

## 2020-10-08 RX ORDER — LIDOCAINE HYDROCHLORIDE 20 MG/ML
JELLY TOPICAL ONCE
Status: CANCELLED | OUTPATIENT
Start: 2020-10-08

## 2020-10-08 RX ORDER — CLOBETASOL PROPIONATE 0.5 MG/G
OINTMENT TOPICAL ONCE
Status: CANCELLED | OUTPATIENT
Start: 2020-10-08

## 2020-10-08 RX ORDER — LIDOCAINE HYDROCHLORIDE 40 MG/ML
SOLUTION TOPICAL ONCE
Status: CANCELLED | OUTPATIENT
Start: 2020-10-08

## 2020-10-08 RX ORDER — BACITRACIN, NEOMYCIN, POLYMYXIN B 400; 3.5; 5 [USP'U]/G; MG/G; [USP'U]/G
OINTMENT TOPICAL ONCE
Status: CANCELLED | OUTPATIENT
Start: 2020-10-08

## 2020-10-08 RX ORDER — GENTAMICIN SULFATE 1 MG/G
OINTMENT TOPICAL ONCE
Status: DISCONTINUED | OUTPATIENT
Start: 2020-10-08 | End: 2020-10-09 | Stop reason: HOSPADM

## 2020-10-08 RX ORDER — LIDOCAINE 40 MG/G
CREAM TOPICAL ONCE
Status: CANCELLED | OUTPATIENT
Start: 2020-10-08

## 2020-10-08 RX ORDER — GENTAMICIN SULFATE 1 MG/G
OINTMENT TOPICAL ONCE
Status: CANCELLED | OUTPATIENT
Start: 2020-10-08

## 2020-10-08 ASSESSMENT — PAIN DESCRIPTION - ORIENTATION: ORIENTATION: LEFT

## 2020-10-08 ASSESSMENT — PAIN DESCRIPTION - PAIN TYPE: TYPE: ACUTE PAIN

## 2020-10-08 ASSESSMENT — PAIN SCALES - GENERAL: PAINLEVEL_OUTOF10: 5

## 2020-10-08 NOTE — PROGRESS NOTES
[facility], [place] Initial Visit      Angelique Tavares  AGE: 72 y.o. GENDER: male  : 1955  EPISODE DATE:  10/8/2020   Referred by: ER team    Subjective:     CHIEF COMPLAINT Traumatic wound of left lower leg complicated by diabetes and venous insufficiency     HISTORY of PRESENT ILLNESS      Angelique Tavares is a 72 y.o. male who presents to the [address] [place] [address] for an initial visit for evaluation and treatment of Acute traumatic ulcer(s) of  L lower leg anterior. The condition is of moderate severity. The ulcer has been present for 5 days. The underlying cause is thought to be trauma from having a steel plate fall on his leg while moving items between houses. The patients care to date has included a trip to the ED, where they placed 17 sutures. At that time they did not prescribe an antibiotic or any other medications. The patient is a smoker, but states that he is quitting tonight and placing a nicotine patch. The patient has significant underlying medical conditions as below. His attending physician is Dr. Kadie Mcduffie. Patient is a diabetic, and is admittedly noncompliant with his monitoring. He has lost weight recently, and his oral metformin has recently been decreased. Wound Pain Timing/Severity: waxing and waning  Quality of pain: sharp, throbbing, tender  Severity of pain:  2 / 10   Modifying Factors: venous stasis, diabetes, poor glucose control, shear force and smoking Patient is also obese.   Associated Signs/Symptoms: edema, drainage and pain        PAST MEDICAL HISTORY        Diagnosis Date    CHF (congestive heart failure) (HCC)     Chronic ulcer of left leg with fat layer exposed ([address] 2016    Chronic ulcer of right leg with fat layer exposed ([address] 2016    Chronic ulcer of right leg with fat layer exposed ([address] 2016    Diabetes mellitus ([address]     Hypertension     PVD (peripheral vascular disease) ([address] 2016    Type 2 diabetes mellitus with diabetic peripheral angiopathy without gangrene, without long-term current use of insulin ([facility] 12/12/2016    Venous stasis ulcer of both lower extremities without varicose veins ([facility] 12/12/2016       PAST SURGICAL HISTORY    History reviewed. No pertinent surgical history. FAMILY HISTORY    Family History   Problem Relation Age of Onset    Asthma Mother     Breast Cancer Mother     Cancer Mother     Diabetes Mother     High Blood Pressure Mother     Cancer Father     Early Death Brother     Arthritis Paternal Grandmother        SOCIAL HISTORY    Social History     Tobacco Use    Smoking status: Current Every Day Smoker     Packs/day: 1.00     Types: Cigarettes    Smokeless tobacco: Never Used    Tobacco comment: healing    Substance Use Topics    Alcohol use: Not on file    Drug use: No       ALLERGIES    No Known Allergies    MEDICATIONS    Current Outpatient Medications on File Prior to Encounter   Medication Sig Dispense Refill    Latanoprost 0.005 % EMUL Apply to eye daily      ibuprofen (ADVIL;MOTRIN) 600 MG tablet Take 1 tablet by mouth every 6 hours as needed for Pain 30 tablet 0    lidocaine (LIDODERM) 5 % Place 1 patch onto the skin daily 12 hours on, 12 hours off. 30 patch 0    glipiZIDE (GLUCOTROL) 5 MG tablet Take 5 mg by mouth 2 times daily (before meals)      potassium chloride (KLOR-CON M) 20 MEQ extended release tablet Take 20 mEq by mouth daily      ALPRAZolam (XANAX) 0.5 MG tablet Take 0.5 mg by mouth nightly. Rut Jim ipratropium-albuterol (DUONEB) 0.5-2.5 (3) MG/3ML SOLN nebulizer solution Inhale 1 vial into the lungs every 4 hours      albuterol sulfate  (90 Base) MCG/ACT inhaler Inhale 2 puffs into the lungs every 6 hours as needed for Wheezing      aspirin EC 81 MG EC tablet Take 1 tablet by mouth daily 30 tablet 3    HYDROcodone-acetaminophen (NORCO) 7.5-325 MG per tablet Take 1 tablet by mouth 3 times daily .       metFORMIN (GLUCOPHAGE) 1000 MG tablet Take 1,000 mg by mouth 2 times daily (with meals)      furosemide (LASIX) 80 MG tablet Take 80 mg by mouth 2 times daily      lisinopril (PRINIVIL;ZESTRIL) 20 MG tablet Take 40 mg by mouth daily       tamsulosin (FLOMAX) 0.4 MG capsule Take 0.4 mg by mouth daily      finasteride (PROSCAR) 5 MG tablet Take 5 mg by mouth daily      metolazone (ZAROXOLYN) 2.5 MG tablet Take 2 tablets by mouth daily 60 tablet 0     No current facility-administered medications on file prior to encounter.         PROBLEM LIST    Patient Active Problem List   Diagnosis    Venous stasis ulcer of both lower extremities without varicose veins (HCC)    WD-Chronic ulcer of left leg with fat layer exposed ([facility]    PVD (peripheral vascular disease) ([facility]    Type 2 diabetes mellitus with diabetic peripheral angiopathy without gangrene, without long-term current use of insulin (HCC)    WD-Venous stasis of both lower extremities    WD-Lymphedema of both lower extremities    WD-Idiopathic chronic venous hypertension of left lower extremity with ulcer ([facility]    WD-Traumatic open wound of left lower leg, complicated by diabetes and venous insufficiency       REVIEW OF SYSTEMS    Constitutional: negative for anorexia, chills, fatigue, fevers, malaise and sweats  Respiratory: negative for cough, dyspnea on exertion, shortness of breath and wheezing  Cardiovascular: negative for chest pain, dyspnea, near-syncope, orthopnea, palpitations, syncope and tachypnea  Integument/breast: positive for skin lesion(s)      Objective:      /69   Pulse 74   Temp 98.3 °F (36.8 °C) (Temporal)   Resp 18     PHYSICAL EXAM  General Appearance: alert and oriented to person, place and time, well-developed and well-nourished, in no acute distress  Pulmonary/Chest: clear to auscultation bilaterally- no wheezes, rales or rhonchi, normal air movement, no respiratory distress  Cardiovascular: normal rate, normal S1 and S2, no gallops, intact distal pulses and no carotid bruits  Dermatologic exam: Visual inspection of the periwound reveals the skin to be coarse, scaly and edematous  Wound exam: see wound description below in procedure note      Assessment:       Nathen Tavares  appears to have a non-healing wound of the left lower anterior leg. The etiology of the wound is felt to be traumatic. There are multiple complicating factors including venous stasis, diabetes, poor glucose control, shear force, obesity and smoking. A comprehensive wound management program would be helpful to heal this wound. Assessments completed include fall risk and nutritional, functional,and psychological status. At this time appropriate care would include: periodic debridement and wound care as below. Problem List Items Addressed This Visit     WD-Venous stasis of both lower extremities - Primary    WD-Idiopathic chronic venous hypertension of left lower extremity with ulcer (HCC)    WD-Traumatic open wound of left lower leg, complicated by diabetes and venous insufficiency        Patient incision and stiches appear stable and well approximated. There is no evidence of infection, however we will culture at this time and follow-up. Patient was educated on POC and dressing. He will return in a week for suture removal. We will apply gentamycin ointment and bactroban, telfa, and a compression wrap. Plan:     Discharge instructions:    Discharge Instructions       PHYSICIAN ORDERS AND DISCHARGE INSTRUCTIONS    NOTE: Upon discharge from the [address], you will receive a patient experience survey. We would be grateful if you would take the time to fill this survey out. Wound care order history:     ANDREW's   Right       Left    Date:   Cultures:     Grafts:     Antibiotics:        Continuing wound care orders and information:                Residence:  Home              Continue home health care with:    Your wound-care supplies will be provided by:      Wound cleansing:     Do not scrub or use excessive force. Wash hands with soap and water before and after dressing changes. Prior to applying a clean dressing, cleanse wound with normal saline, wound cleanser, or mild soap and water. Ask the physician or nurse before getting the wound(s) wet in a shower    Daily Wound management:   Keep weight off wounds and reposition every 2 hours. Avoid standing for long periods of time. Apply wraps/stockings in AM and remove at bedtime. If swelling is present, elevate legs to the level of the heart or above for 30 minutes 4-5 times a day and/or when sitting. When taking antibiotics take entire prescription as ordered by physician do not stop taking until medicine is all gone. Orders for this week:  10/08/20       Left Lower leg-- Clean with soap and water, pat dry. A&D Oint, Bactroban, Telfa, Coban 2. Leave in place until next week. Follow up with Dahlia HENDERSON In 1 weeks in the wound care center  Call [phone] for any questions or concerns.   Date__________   Time____________        Treatment Note      Written Patient Dismissal Instructions Given            Electronically signed by FAITH Brice CNP on 10/8/2020 at 2:23 PM

## 2020-10-08 NOTE — PROGRESS NOTES
Multilayer Compression Wrap   (Not Unna) Below the Knee    NAME:  Linda Tavares  YOB: 1955  MEDICAL RECORD NUMBER:  [id number]  DATE:  10/8/2020    Multilayer compression wrap: Removed old Multilayer wrap if indicated and wash leg with mild soap/water. Applied moisturizing agent to dry skin as needed. Applied primary and secondary dressing as ordered. Applied multilayered dressing below the knee to left lower leg. Instructed patient/caregiver not to remove dressing and to keep it clean and dry. Instructed patient/caregiver on complications to report to provider, such as pain, numbness in toes, heavy drainage, and slippage of dressing. Instructed patient on purpose of compression dressing and on activity and exercise recommendations.       Electronically signed by Sandra Lopez RN on 10/8/2020 at 2:13 PM

## 2020-10-13 ENCOUNTER — OFFICE VISIT (OUTPATIENT)
Dept: FAMILY MEDICINE CLINIC | Age: 65
End: 2020-10-13
Payer: MEDICARE

## 2020-10-13 VITALS
SYSTOLIC BLOOD PRESSURE: 130 MMHG | HEIGHT: 69 IN | DIASTOLIC BLOOD PRESSURE: 68 MMHG | WEIGHT: 253 LBS | OXYGEN SATURATION: 96 % | BODY MASS INDEX: 37.47 KG/M2 | HEART RATE: 68 BPM | TEMPERATURE: 96.6 F

## 2020-10-13 PROCEDURE — 1123F ACP DISCUSS/DSCN MKR DOCD: CPT | Performed by: PHYSICIAN ASSISTANT

## 2020-10-13 PROCEDURE — 4004F PT TOBACCO SCREEN RCVD TLK: CPT | Performed by: PHYSICIAN ASSISTANT

## 2020-10-13 PROCEDURE — 3017F COLORECTAL CA SCREEN DOC REV: CPT | Performed by: PHYSICIAN ASSISTANT

## 2020-10-13 PROCEDURE — 99213 OFFICE O/P EST LOW 20 MIN: CPT | Performed by: PHYSICIAN ASSISTANT

## 2020-10-13 PROCEDURE — G8484 FLU IMMUNIZE NO ADMIN: HCPCS | Performed by: PHYSICIAN ASSISTANT

## 2020-10-13 PROCEDURE — G8427 DOCREV CUR MEDS BY ELIG CLIN: HCPCS | Performed by: PHYSICIAN ASSISTANT

## 2020-10-13 PROCEDURE — G8417 CALC BMI ABV UP PARAM F/U: HCPCS | Performed by: PHYSICIAN ASSISTANT

## 2020-10-13 PROCEDURE — 4040F PNEUMOC VAC/ADMIN/RCVD: CPT | Performed by: PHYSICIAN ASSISTANT

## 2020-10-13 PROCEDURE — 4130F TOPICAL PREP RX AOE: CPT | Performed by: PHYSICIAN ASSISTANT

## 2020-10-13 SDOH — HEALTH STABILITY: MENTAL HEALTH: HOW OFTEN DO YOU HAVE A DRINK CONTAINING ALCOHOL?: NEVER

## 2020-10-13 NOTE — PROGRESS NOTES
10/13/2020    Mayda Hsieh Coffee    Chief Complaint   Patient presents with    Lesion(s)     c/o sore area/lesion to rt ear onset two months ago     HPI  History was obtained from patient. Patricia Krause is a 72 y.o. male who presents today with complaints of 2 month hx of skin lesion on right external ear. He states he first noticed it after he used his facial venkatesh on the ear and caused a small cut. His wife told him it looked like a pimple initially. Now it continues to scab over and looks \"crusty. \"  It has not healed. There is associated pain. There is no drainage, unsure if it bleeds. Unsure if lesion is changing in size. When questioned, he does admit to new lesions on scalp over the past 6 months that are dark in color and increasing in size. He denies fever, chills, weight loss, chest pain, cough, shortness of breath. No known hx of skin cancer. No history of regular sun exposure. REVIEW OF SYMPTOMS    Constitutional:  Denies fever, chills, weight loss  Cardiovascular:  Denies chest pain  Respiratory:  Denies cough or shortness of breath  Skin:  Admits skin lesion outer right ear. See HPI.     PAST MEDICAL HISTORY  Past Medical History:   Diagnosis Date    CHF (congestive heart failure) (HCC)     Chronic ulcer of left leg with fat layer exposed ([address] 12/12/2016    Chronic ulcer of right leg with fat layer exposed ([address] 12/12/2016    Chronic ulcer of right leg with fat layer exposed ([address] 12/12/2016    Diabetes mellitus ([address]     Hypertension     PVD (peripheral vascular disease) ([address] 12/12/2016    Type 2 diabetes mellitus with diabetic peripheral angiopathy without gangrene, without long-term current use of insulin ([address] 12/12/2016    Venous stasis ulcer of both lower extremities without varicose veins ([address] 12/12/2016       FAMILY HISTORY  Family History   Problem Relation Age of Onset    Asthma Mother     Breast Cancer Mother     Cancer Mother     Diabetes Mother     High Blood Pressure Mother     Cancer Father     Early Death Brother     Arthritis Paternal Grandmother        SOCIAL HISTORY  Social History     Socioeconomic History    Marital status:      Spouse name: None    Number of children: None    Years of education: None    Highest education level: None   Occupational History    None   Social Needs    Financial resource strain: None    Food insecurity     Worry: None     Inability: None    Transportation needs     Medical: None     Non-medical: None   Tobacco Use    Smoking status: Current Every Day Smoker     Packs/day: 1.00     Years: 60.00     Pack years: 60.00     Types: Cigarettes    Smokeless tobacco: Never Used    Tobacco comment: healing    Substance and Sexual Activity    Alcohol use: Never     Frequency: Never    Drug use: No    Sexual activity: None   Lifestyle    Physical activity     Days per week: None     Minutes per session: None    Stress: None   Relationships    Social connections     Talks on phone: None     Gets together: None     Attends Buddhism service: None     Active member of club or organization: None     Attends meetings of clubs or organizations: None     Relationship status: None    Intimate partner violence     Fear of current or ex partner: None     Emotionally abused: None     Physically abused: None     Forced sexual activity: None   Other Topics Concern    None   Social History Narrative    None        SURGICAL HISTORY  History reviewed. No pertinent surgical history. CURRENT MEDICATIONS  Current Outpatient Medications   Medication Sig Dispense Refill    Latanoprost 0.005 % EMUL Apply to eye daily      ibuprofen (ADVIL;MOTRIN) 600 MG tablet Take 1 tablet by mouth every 6 hours as needed for Pain 30 tablet 0    lidocaine (LIDODERM) 5 % Place 1 patch onto the skin daily 12 hours on, 12 hours off.  30 patch 0    glipiZIDE (GLUCOTROL) 5 MG tablet Take 5 mg by mouth 2 times daily (before meals)      potassium chloride (KLOR-CON M) 20 MEQ extended release tablet Take 20 mEq by mouth daily      ALPRAZolam (XANAX) 0.5 MG tablet Take 0.5 mg by mouth nightly. Louanna Manuel ipratropium-albuterol (DUONEB) 0.5-2.5 (3) MG/3ML SOLN nebulizer solution Inhale 1 vial into the lungs every 4 hours      albuterol sulfate  (90 Base) MCG/ACT inhaler Inhale 2 puffs into the lungs every 6 hours as needed for Wheezing      aspirin EC 81 MG EC tablet Take 1 tablet by mouth daily 30 tablet 3    HYDROcodone-acetaminophen (NORCO) 7.5-325 MG per tablet Take 1 tablet by mouth 3 times daily .  metFORMIN (GLUCOPHAGE) 1000 MG tablet Take 1,000 mg by mouth 2 times daily (with meals)      furosemide (LASIX) 80 MG tablet Take 80 mg by mouth 2 times daily      lisinopril (PRINIVIL;ZESTRIL) 20 MG tablet Take 40 mg by mouth daily       tamsulosin (FLOMAX) 0.4 MG capsule Take 0.4 mg by mouth daily      finasteride (PROSCAR) 5 MG tablet Take 5 mg by mouth daily      metolazone (ZAROXOLYN) 2.5 MG tablet Take 2 tablets by mouth daily 60 tablet 0     No current facility-administered medications for this visit. ALLERGIES  No Known Allergies    PHYSICAL EXAM    /68   Pulse 68   Temp 96.6 °F (35.9 °C)   Ht 5' 9\" (1.753 m)   Wt 253 lb (114.8 kg)   SpO2 96%   BMI 37.36 kg/m²     Constitutional:  Well developed, well nourished  HENT:  Normocephalic, atraumatic, bilateral ear canals normal, TMs normal.  See skin exam.    Eyes:  PERRLA, EOMI, conjunctiva normal, no discharge, no scleral icterus  Neck:  No tenderness, supple  Lymphatic:  No lymphadenopathy noted  Cardiovascular:  Normal heart rate, normal rhythm, no murmurs, gallops or rubs  Thorax & Lungs:  Normal breath sounds, no respiratory distress, no wheezing  Skin:  Multiple skin lesions scattered on body. Special attention noted to the right external ear and scalp. See photos below. Patient gave verbal permission for these photos to be taken through [facility].

## 2020-10-14 LAB
CULTURE: NORMAL
Lab: NORMAL
SPECIMEN: NORMAL

## 2020-10-16 ENCOUNTER — HOSPITAL ENCOUNTER (OUTPATIENT)
Dept: WOUND CARE | Age: 65
Discharge: HOME | End: 2020-10-16
Payer: MEDICARE

## 2020-10-16 VITALS
TEMPERATURE: 96.5 F | SYSTOLIC BLOOD PRESSURE: 160 MMHG | HEART RATE: 76 BPM | DIASTOLIC BLOOD PRESSURE: 67 MMHG | RESPIRATION RATE: 18 BRPM

## 2020-10-16 PROCEDURE — 99213 OFFICE O/P EST LOW 20 MIN: CPT | Performed by: NURSE PRACTITIONER

## 2020-10-16 PROCEDURE — 99213 OFFICE O/P EST LOW 20 MIN: CPT

## 2020-10-16 RX ORDER — BACITRACIN ZINC AND POLYMYXIN B SULFATE 500; 1000 [USP'U]/G; [USP'U]/G
OINTMENT TOPICAL ONCE
Status: CANCELLED | OUTPATIENT
Start: 2020-10-16

## 2020-10-16 RX ORDER — LIDOCAINE HYDROCHLORIDE 20 MG/ML
JELLY TOPICAL ONCE
Status: CANCELLED | OUTPATIENT
Start: 2020-10-16

## 2020-10-16 RX ORDER — BETAMETHASONE DIPROPIONATE 0.05 %
OINTMENT (GRAM) TOPICAL ONCE
Status: CANCELLED | OUTPATIENT
Start: 2020-10-16

## 2020-10-16 RX ORDER — LIDOCAINE HYDROCHLORIDE 40 MG/ML
SOLUTION TOPICAL ONCE
Status: DISCONTINUED | OUTPATIENT
Start: 2020-10-16 | End: 2020-10-17 | Stop reason: HOSPADM

## 2020-10-16 RX ORDER — LIDOCAINE 40 MG/G
CREAM TOPICAL ONCE
Status: CANCELLED | OUTPATIENT
Start: 2020-10-16

## 2020-10-16 RX ORDER — GENTAMICIN SULFATE 1 MG/G
OINTMENT TOPICAL ONCE
Status: CANCELLED | OUTPATIENT
Start: 2020-10-16

## 2020-10-16 RX ORDER — BACITRACIN, NEOMYCIN, POLYMYXIN B 400; 3.5; 5 [USP'U]/G; MG/G; [USP'U]/G
OINTMENT TOPICAL ONCE
Status: CANCELLED | OUTPATIENT
Start: 2020-10-16

## 2020-10-16 RX ORDER — GINSENG 100 MG
CAPSULE ORAL ONCE
Status: CANCELLED | OUTPATIENT
Start: 2020-10-16

## 2020-10-16 RX ORDER — LIDOCAINE 50 MG/G
OINTMENT TOPICAL ONCE
Status: CANCELLED | OUTPATIENT
Start: 2020-10-16

## 2020-10-16 RX ORDER — CLOBETASOL PROPIONATE 0.5 MG/G
OINTMENT TOPICAL ONCE
Status: CANCELLED | OUTPATIENT
Start: 2020-10-16

## 2020-10-16 RX ORDER — LIDOCAINE HYDROCHLORIDE 40 MG/ML
SOLUTION TOPICAL ONCE
Status: CANCELLED | OUTPATIENT
Start: 2020-10-16

## 2020-10-16 NOTE — PROGRESS NOTES
Wound Care Center Progress Note       Tyler Tavares  AGE: 72 y.o. GENDER: male  : 1955  TODAY'S DATE:  10/16/2020        Subjective:     Chief Complaint   Patient presents with    Wound Check     left leg         HISTORY of PRESENT ILLNESS    Tyler Tavares is a 72 y.o. male who presents to the [address] [place] [address] for an initial visit for evaluation and treatment of Acute traumatic ulcer(s) of  L lower leg anterior. The condition is of moderate severity. The ulcer has been present for 5 days. The underlying cause is thought to be trauma from having a steel plate fall on his leg while moving items between houses. The patients care to date has included a trip to the ED, where they placed 17 sutures. At that time they did not prescribe an antibiotic or any other medications. The patient is a smoker, but states that he is quitting tonight and placing a nicotine patch. The patient has significant underlying medical conditions as below. His attending physician is Dr. Pranav Shine. Patient is a diabetic, and is admittedly noncompliant with his monitoring. He has lost weight recently, and his oral metformin has recently been decreased.     Wound Pain Timing/Severity: waxing and waning  Quality of pain: sharp, throbbing, tender  Severity of pain:  2 / 10   Modifying Factors: venous stasis, diabetes, poor glucose control, shear force and smoking Patient is also obese.   Associated Signs/Symptoms: edema, drainage and pain    PAST MEDICAL HISTORY        Diagnosis Date    CHF (congestive heart failure) (HCC)     Chronic ulcer of left leg with fat layer exposed ([address] 2016    Chronic ulcer of right leg with fat layer exposed ([address] 2016    Chronic ulcer of right leg with fat layer exposed ([address] 2016    Diabetes mellitus ([address]     Hypertension     PVD (peripheral vascular disease) ([address] 2016    Type 2 diabetes mellitus with diabetic peripheral angiopathy without gangrene, without long-term current use of insulin ([facility] 12/12/2016    Venous stasis ulcer of both lower extremities without varicose veins ([facility] 12/12/2016       PAST SURGICAL HISTORY    History reviewed. No pertinent surgical history. FAMILY HISTORY    Family History   Problem Relation Age of Onset    Asthma Mother     Breast Cancer Mother     Cancer Mother     Diabetes Mother     High Blood Pressure Mother     Cancer Father     Early Death Brother     Arthritis Paternal Grandmother        SOCIAL HISTORY    Social History     Tobacco Use    Smoking status: Current Every Day Smoker     Packs/day: 1.00     Years: 60.00     Pack years: 60.00     Types: Cigarettes    Smokeless tobacco: Never Used    Tobacco comment: healing    Substance Use Topics    Alcohol use: Never     Frequency: Never    Drug use: No       ALLERGIES    No Known Allergies    MEDICATIONS    Current Outpatient Medications on File Prior to Encounter   Medication Sig Dispense Refill    Latanoprost 0.005 % EMUL Apply to eye daily      ibuprofen (ADVIL;MOTRIN) 600 MG tablet Take 1 tablet by mouth every 6 hours as needed for Pain 30 tablet 0    lidocaine (LIDODERM) 5 % Place 1 patch onto the skin daily 12 hours on, 12 hours off. 30 patch 0    glipiZIDE (GLUCOTROL) 5 MG tablet Take 5 mg by mouth 2 times daily (before meals)      potassium chloride (KLOR-CON M) 20 MEQ extended release tablet Take 20 mEq by mouth daily      ALPRAZolam (XANAX) 0.5 MG tablet Take 0.5 mg by mouth nightly. Eufemia [place] ipratropium-albuterol (DUONEB) 0.5-2.5 (3) MG/3ML SOLN nebulizer solution Inhale 1 vial into the lungs every 4 hours      albuterol sulfate  (90 Base) MCG/ACT inhaler Inhale 2 puffs into the lungs every 6 hours as needed for Wheezing      aspirin EC 81 MG EC tablet Take 1 tablet by mouth daily 30 tablet 3    HYDROcodone-acetaminophen (NORCO) 7.5-325 MG per tablet Take 1 tablet by mouth 3 times daily .       metFORMIN (GLUCOPHAGE) 1000 MG tablet Take 1,000 mg by mouth 2 times daily (with meals)      furosemide (LASIX) 80 MG tablet Take 80 mg by mouth 2 times daily      lisinopril (PRINIVIL;ZESTRIL) 20 MG tablet Take 40 mg by mouth daily       tamsulosin (FLOMAX) 0.4 MG capsule Take 0.4 mg by mouth daily      finasteride (PROSCAR) 5 MG tablet Take 5 mg by mouth daily      metolazone (ZAROXOLYN) 2.5 MG tablet Take 2 tablets by mouth daily 60 tablet 0     No current facility-administered medications on file prior to encounter. REVIEW OF SYSTEMS    Pertinent items are noted in HPI. Constitutional: Negative for systemic symptoms including fever, chills and malaise. Objective:      BP (!) 160/67   Pulse 76   Temp 96.5 °F (35.8 °C) (Temporal)   Resp 18     PHYSICAL EXAM      General: The patient is in no acute distress. Mental status:  Patient is appropriate, is  oriented to place and plan of care. Dermatologic exam: Visual inspection of the periwound reveals the skin to be normal in turgor and texture, well hydrated and dry.   Wound exam:  see wound description below     All active wounds listed below with today's date are evaluated      Wound 10/16/20 Pretibial Left #1  left leg (Active)   Wound Cleansed Irrigated with saline 10/16/20 0916   Wound Length (cm) 6 cm 10/16/20 0916   Wound Width (cm) 4.3 cm 10/16/20 0916   Wound Depth (cm) 0.1 cm 10/16/20 0916   Wound Surface Area (cm^2) 25.8 cm^2 10/16/20 0916   Wound Volume (cm^3) 2.58 cm^3 10/16/20 0916   Distance Tunneling (cm) 0 cm 10/16/20 0916   Tunneling Position ___ O'Clock 0 10/16/20 0916   Undermining Starts ___ O'Clock 0 10/16/20 0916   Undermining Ends___ O'Clock 0 10/16/20 0916   Undermining Maxium Distance (cm) 0 10/16/20 0916   Wound Assessment Granulation tissue 10/16/20 0916   Drainage Amount Moderate 10/16/20 0916   Drainage Description Serosanguinous 10/16/20 0916   Odor Moderate 10/16/20 0916   Merary-wound Assessment Hyperpigmented 10/16/20 0916   Margins Defined edges 10/16/20 0916   Number of wet in a shower     Daily Wound management:              Keep weight off wounds and reposition every 2 hours. Avoid standing for long periods of time. Apply wraps/stockings in AM and remove at bedtime. If swelling is present, elevate legs to the level of the heart or above for 30 minutes 4-5 times a day and/or when sitting. When taking antibiotics take entire prescription as ordered by physician do not stop taking until medicine is all gone.                                                      Orders for this week:  10/16/20                  Left Lower leg-- Clean with soap and water, pat dry. Sutures removed. Leave steristrips in place until the fall off by themselves. Apply rubi and bactroban to wound bed. Cover with mepilex border. Leave in place for 3 days.     Follow up with Dahlia HENDERSON In 1 weeks in the wound care center  Call [phone] for any questions or concerns.   Date__________   Time____________           Treatment Note      Written Patient Dismissal Instructions Given            Electronically signed by FAITH Gonzalez CNP on 10/16/2020 at 9:48 AM

## 2020-10-23 ENCOUNTER — HOSPITAL ENCOUNTER (OUTPATIENT)
Dept: WOUND CARE | Age: 65
Discharge: HOME | End: 2020-10-23
Payer: MEDICARE

## 2020-10-23 VITALS
TEMPERATURE: 97.9 F | RESPIRATION RATE: 20 BRPM | DIASTOLIC BLOOD PRESSURE: 73 MMHG | SYSTOLIC BLOOD PRESSURE: 143 MMHG | HEART RATE: 78 BPM

## 2020-10-23 PROCEDURE — 99213 OFFICE O/P EST LOW 20 MIN: CPT

## 2020-10-23 RX ORDER — GINSENG 100 MG
CAPSULE ORAL ONCE
Status: CANCELLED | OUTPATIENT
Start: 2020-10-23

## 2020-10-23 RX ORDER — LIDOCAINE HYDROCHLORIDE 20 MG/ML
JELLY TOPICAL ONCE
Status: CANCELLED | OUTPATIENT
Start: 2020-10-23

## 2020-10-23 RX ORDER — BACITRACIN, NEOMYCIN, POLYMYXIN B 400; 3.5; 5 [USP'U]/G; MG/G; [USP'U]/G
OINTMENT TOPICAL ONCE
Status: CANCELLED | OUTPATIENT
Start: 2020-10-23

## 2020-10-23 RX ORDER — GENTAMICIN SULFATE 1 MG/G
OINTMENT TOPICAL ONCE
Status: CANCELLED | OUTPATIENT
Start: 2020-10-23

## 2020-10-23 RX ORDER — LIDOCAINE 40 MG/G
CREAM TOPICAL ONCE
Status: CANCELLED | OUTPATIENT
Start: 2020-10-23

## 2020-10-23 RX ORDER — BETAMETHASONE DIPROPIONATE 0.05 %
OINTMENT (GRAM) TOPICAL ONCE
Status: CANCELLED | OUTPATIENT
Start: 2020-10-23

## 2020-10-23 RX ORDER — CLOBETASOL PROPIONATE 0.5 MG/G
OINTMENT TOPICAL ONCE
Status: CANCELLED | OUTPATIENT
Start: 2020-10-23

## 2020-10-23 RX ORDER — LIDOCAINE HYDROCHLORIDE 40 MG/ML
SOLUTION TOPICAL ONCE
Status: CANCELLED | OUTPATIENT
Start: 2020-10-23

## 2020-10-23 RX ORDER — BACITRACIN ZINC AND POLYMYXIN B SULFATE 500; 1000 [USP'U]/G; [USP'U]/G
OINTMENT TOPICAL ONCE
Status: CANCELLED | OUTPATIENT
Start: 2020-10-23

## 2020-10-23 RX ORDER — LIDOCAINE 50 MG/G
OINTMENT TOPICAL ONCE
Status: CANCELLED | OUTPATIENT
Start: 2020-10-23

## 2020-10-23 NOTE — PROGRESS NOTES
Wound Care Center Progress Note       Miladis Tavares  AGE: 72 y.o. GENDER: male  : 1955  TODAY'S DATE:  10/23/2020        Subjective:     Chief Complaint   Patient presents with    Wound Check     left leg         HISTORY of PRESENT ILLNESS     Miladis Tavares is a 72 y.o. male who presents today for wound evaluation of Acute traumatic wound(s) of L lower leg lateral.  The wound is of moderate severity. The underlying cause of the wound is trauma. Wound Pain Timing/Severity: none  Quality of pain: N/A  Severity of pain:  0 / 10   Modifying Factors: venous stasis, lymphedema, diabetes and obesity  Associated Signs/Symptoms: none        PAST MEDICAL HISTORY        Diagnosis Date    CHF (congestive heart failure) (HCC)     Chronic ulcer of left leg with fat layer exposed ([address] 2016    Chronic ulcer of right leg with fat layer exposed ([address] 2016    Chronic ulcer of right leg with fat layer exposed ([address] 2016    Diabetes mellitus ([address]     Hypertension     PVD (peripheral vascular disease) ([address] 2016    Type 2 diabetes mellitus with diabetic peripheral angiopathy without gangrene, without long-term current use of insulin ([address] 2016    Venous stasis ulcer of both lower extremities without varicose veins ([address] 2016       PAST SURGICAL HISTORY    History reviewed. No pertinent surgical history.     FAMILY HISTORY    Family History   Problem Relation Age of Onset    Asthma Mother     Breast Cancer Mother     Cancer Mother     Diabetes Mother     High Blood Pressure Mother     Cancer Father     Early Death Brother     Arthritis Paternal Grandmother        SOCIAL HISTORY    Social History     Tobacco Use    Smoking status: Current Every Day Smoker     Packs/day: 1.00     Years: 60.00     Pack years: 60.00     Types: Cigarettes    Smokeless tobacco: Never Used    Tobacco comment: healing    Substance Use Topics  Alcohol use: Never     Frequency: Never    Drug use: No       ALLERGIES    No Known Allergies    MEDICATIONS    Current Outpatient Medications on File Prior to Encounter   Medication Sig Dispense Refill    Latanoprost 0.005 % EMUL Apply to eye daily      ibuprofen (ADVIL;MOTRIN) 600 MG tablet Take 1 tablet by mouth every 6 hours as needed for Pain 30 tablet 0    lidocaine (LIDODERM) 5 % Place 1 patch onto the skin daily 12 hours on, 12 hours off. 30 patch 0    glipiZIDE (GLUCOTROL) 5 MG tablet Take 5 mg by mouth 2 times daily (before meals)      potassium chloride (KLOR-CON M) 20 MEQ extended release tablet Take 20 mEq by mouth daily      ALPRAZolam (XANAX) 0.5 MG tablet Take 0.5 mg by mouth nightly. Saw Hough ipratropium-albuterol (DUONEB) 0.5-2.5 (3) MG/3ML SOLN nebulizer solution Inhale 1 vial into the lungs every 4 hours      albuterol sulfate  (90 Base) MCG/ACT inhaler Inhale 2 puffs into the lungs every 6 hours as needed for Wheezing      aspirin EC 81 MG EC tablet Take 1 tablet by mouth daily 30 tablet 3    HYDROcodone-acetaminophen (NORCO) 7.5-325 MG per tablet Take 1 tablet by mouth 3 times daily .  metFORMIN (GLUCOPHAGE) 1000 MG tablet Take 1,000 mg by mouth 2 times daily (with meals)      furosemide (LASIX) 80 MG tablet Take 80 mg by mouth 2 times daily      lisinopril (PRINIVIL;ZESTRIL) 20 MG tablet Take 40 mg by mouth daily       tamsulosin (FLOMAX) 0.4 MG capsule Take 0.4 mg by mouth daily      finasteride (PROSCAR) 5 MG tablet Take 5 mg by mouth daily      metolazone (ZAROXOLYN) 2.5 MG tablet Take 2 tablets by mouth daily 60 tablet 0     No current facility-administered medications on file prior to encounter. REVIEW OF SYSTEMS      Constitutional: Negative for systemic symptoms including fever, chills and malaise.     Objective:      BP (!) 143/73   Pulse 78   Temp 97.9 °F (36.6 °C)   Resp 20     PHYSICAL EXAM  Constitutional: Negative for systemic symptoms including fever, chills and malaise. General: The patient is in no acute distress. Mental status:  Patient is appropriate, is  oriented to place and plan of care. Dermatologic exam: Visual inspection of the periwound reveals the skin to be normal in turgor and texture. Wound exam:  see wound description below     All active wounds listed below with today's date are evaluated      Wound 10/16/20 Pretibial Left #1  left leg (Active)   Wound Cleansed Irrigated with saline 10/16/20 0916   Wound Length (cm) 6 cm 10/16/20 0916   Wound Width (cm) 4.3 cm 10/16/20 0916   Wound Depth (cm) 0.1 cm 10/16/20 0916   Wound Surface Area (cm^2) 25.8 cm^2 10/16/20 0916   Wound Volume (cm^3) 2.58 cm^3 10/16/20 0916   Distance Tunneling (cm) 0 cm 10/16/20 0916   Tunneling Position ___ O'Clock 0 10/16/20 0916   Undermining Starts ___ O'Clock 0 10/16/20 0916   Undermining Ends___ O'Clock 0 10/16/20 0916   Undermining Maxium Distance (cm) 0 10/16/20 0916   Wound Assessment Granulation tissue 10/16/20 0916   Drainage Amount Moderate 10/16/20 0916   Drainage Description Serosanguinous 10/16/20 0916   Odor Moderate 10/16/20 0916   Merary-wound Assessment Hyperpigmented 10/16/20 0916   Margins Defined edges 10/16/20 0916   Number of days: 7       Assessment:       Problem List Items Addressed This Visit     WD-Traumatic open wound of left lower leg, complicated by diabetes and venous insufficiency - Primary    Relevant Orders    Supply: Wound Cleanser    Supply: Wound Dressings    Supply: Cover and Secure          Wound is has moderately improved      Plan:     Discharge Instructions       PHYSICIAN ORDERS AND DISCHARGE INSTRUCTIONS     NOTE: Upon discharge from the [address], you will receive a patient experience survey.  We would be grateful if you would take the time to fill this survey out.     Wound care order history:                 ANDREW's   Right       Left               Date:              Cultures:                Grafts:

## 2020-10-30 ENCOUNTER — HOSPITAL ENCOUNTER (OUTPATIENT)
Dept: WOUND CARE | Age: 65
Discharge: HOME | End: 2020-10-30
Payer: MEDICARE

## 2020-10-30 VITALS
SYSTOLIC BLOOD PRESSURE: 147 MMHG | RESPIRATION RATE: 16 BRPM | DIASTOLIC BLOOD PRESSURE: 61 MMHG | TEMPERATURE: 96.6 F | HEART RATE: 68 BPM

## 2020-10-30 PROCEDURE — 11042 DBRDMT SUBQ TIS 1ST 20SQCM/<: CPT

## 2020-10-30 PROCEDURE — 11042 DBRDMT SUBQ TIS 1ST 20SQCM/<: CPT | Performed by: NURSE PRACTITIONER

## 2020-10-30 RX ORDER — CLOBETASOL PROPIONATE 0.5 MG/G
OINTMENT TOPICAL ONCE
Status: CANCELLED | OUTPATIENT
Start: 2020-10-30

## 2020-10-30 RX ORDER — LIDOCAINE HYDROCHLORIDE 20 MG/ML
JELLY TOPICAL ONCE
Status: CANCELLED | OUTPATIENT
Start: 2020-10-30

## 2020-10-30 RX ORDER — BACITRACIN, NEOMYCIN, POLYMYXIN B 400; 3.5; 5 [USP'U]/G; MG/G; [USP'U]/G
OINTMENT TOPICAL ONCE
Status: CANCELLED | OUTPATIENT
Start: 2020-10-30

## 2020-10-30 RX ORDER — GINSENG 100 MG
CAPSULE ORAL ONCE
Status: CANCELLED | OUTPATIENT
Start: 2020-10-30

## 2020-10-30 RX ORDER — BETAMETHASONE DIPROPIONATE 0.05 %
OINTMENT (GRAM) TOPICAL ONCE
Status: CANCELLED | OUTPATIENT
Start: 2020-10-30

## 2020-10-30 RX ORDER — LIDOCAINE HYDROCHLORIDE 40 MG/ML
SOLUTION TOPICAL ONCE
Status: CANCELLED | OUTPATIENT
Start: 2020-10-30

## 2020-10-30 RX ORDER — LIDOCAINE HYDROCHLORIDE 40 MG/ML
SOLUTION TOPICAL ONCE
Status: DISCONTINUED | OUTPATIENT
Start: 2020-10-30 | End: 2020-10-31 | Stop reason: HOSPADM

## 2020-10-30 RX ORDER — GENTAMICIN SULFATE 1 MG/G
OINTMENT TOPICAL ONCE
Status: CANCELLED | OUTPATIENT
Start: 2020-10-30

## 2020-10-30 RX ORDER — LIDOCAINE 50 MG/G
OINTMENT TOPICAL ONCE
Status: CANCELLED | OUTPATIENT
Start: 2020-10-30

## 2020-10-30 RX ORDER — LIDOCAINE 40 MG/G
CREAM TOPICAL ONCE
Status: CANCELLED | OUTPATIENT
Start: 2020-10-30

## 2020-10-30 RX ORDER — BACITRACIN ZINC AND POLYMYXIN B SULFATE 500; 1000 [USP'U]/G; [USP'U]/G
OINTMENT TOPICAL ONCE
Status: CANCELLED | OUTPATIENT
Start: 2020-10-30

## 2020-10-30 NOTE — PROGRESS NOTES
Wound Care Center Progress Note With Procedure    Gabino Tavares  AGE: 72 y.o. GENDER: male  : 1955  EPISODE DATE:  10/30/2020     Subjective:     Chief Complaint   Patient presents with    Wound Check     LLE         HISTORY of PRESENT ILLNESS     Gabriel Tavares is a 72 y.o. male who presents today for wound evaluation of Acute traumatic wound(s) of L lower leg lateral.  The wound is of moderate severity. The underlying cause of the wound is trauma. Wound Pain Timing/Severity: none  Quality of pain: N/A  Severity of pain:  0 / 10   Modifying Factors: venous stasis, lymphedema, diabetes and obesity  Associated Signs/Symptoms: none        PAST MEDICAL HISTORY        Diagnosis Date    CHF (congestive heart failure) (HCC)     Chronic ulcer of left leg with fat layer exposed ([address] 2016    Chronic ulcer of right leg with fat layer exposed ([address] 2016    Chronic ulcer of right leg with fat layer exposed ([address] 2016    Diabetes mellitus ([address]     Hypertension     PVD (peripheral vascular disease) ([address] 2016    Type 2 diabetes mellitus with diabetic peripheral angiopathy without gangrene, without long-term current use of insulin ([address] 2016    Venous stasis ulcer of both lower extremities without varicose veins ([address] 2016       PAST SURGICAL HISTORY    History reviewed. No pertinent surgical history.     FAMILY HISTORY    Family History   Problem Relation Age of Onset    Asthma Mother     Breast Cancer Mother     Cancer Mother     Diabetes Mother     High Blood Pressure Mother     Cancer Father     Early Death Brother     Arthritis Paternal Grandmother        SOCIAL HISTORY    Social History     Tobacco Use    Smoking status: Current Every Day Smoker     Packs/day: 1.00     Years: 60.00     Pack years: 60.00     Types: Cigarettes    Smokeless tobacco: Never Used    Tobacco comment: healing    Substance Use Topics    Alcohol use: Never     Frequency: Never exam: Visual inspection of the periwound reveals the skin to be moist and edematous  Wound exam: see wound description below in procedure note      Assessment:     Problem List Items Addressed This Visit     WD-Venous stasis of both lower extremities    WD-Lymphedema of both lower extremities    WD-Idiopathic chronic venous hypertension of left lower extremity with ulcer ([facility]    WD-Traumatic open wound of left lower leg, complicated by diabetes and venous insufficiency - Primary    Relevant Medications    lidocaine (XYLOCAINE) 4 % external solution    Other Relevant Orders    Supply: Wound Cleanser    Supply: Wound Dressings    Supply: Cover and Secure        Procedure Note    Indications:  Based on my examination of this patient's wound(s) today, sharp excision into necrotic subcutaneous tissue is required to promote healing and evaluate the extent of previous healing. Performed by: FAITH Orozco - CNP    Consent obtained: Yes    Time out taken:  Yes    Pain Control: Anesthetic  Anesthetic: 4% Lidocaine Liquid Topical     Debridement:Excisional Debridement    Using curette the wound(s) was/were sharply debrided down through and including the removal of subcutaneous tissue.         Devitalized Tissue Debrided:  fibrin, slough and exudate    Pre Debridement Measurements:  Are located in the Wound Documentation Flow Sheet    All active wounds listed below with today's date are evaluated  Wound(s)    debrided this date include # : 1     Post  Debridement Measurements:  Wound 10/16/20 Pretibial Left #1 Left Lateral Lower Leg (Active)   Wound Image   10/30/20 0929   Wound Etiology Other 10/30/20 0929   Dressing Status New dressing applied 10/23/20 1010   Wound Cleansed Betadine/povidone iodine 10/23/20 1010   Wound Length (cm) 3.7 cm 10/30/20 0929   Wound Width (cm) 4.4 cm 10/30/20 0929   Wound Depth (cm) 0.1 cm 10/30/20 0929   Wound Surface Area (cm^2) 16.28 cm^2 10/30/20 0929   Change in Wound Size % (l*w) 36.9 10/30/20 0929   Wound Volume (cm^3) 1.63 cm^3 10/30/20 0929   Wound Healing % 37 10/30/20 0929   Post-Procedure Length (cm) 3.7 cm 10/30/20 0951   Post-Procedure Width (cm) 4.4 cm 10/30/20 0951   Post-Procedure Depth (cm) 0.1 cm 10/30/20 0951   Post-Procedure Surface Area (cm^2) 16.28 cm^2 10/30/20 0951   Post-Procedure Volume (cm^3) 1.63 cm^3 10/30/20 0951   Distance Tunneling (cm) 0 cm 10/30/20 0929   Tunneling Position ___ O'Clock 0 10/30/20 0929   Undermining Starts ___ O'Clock 0 10/30/20 0929   Undermining Ends___ O'Clock 0 10/30/20 0929   Undermining Maxium Distance (cm) 0 10/30/20 0929   Wound Assessment Granulation tissue;Slough 10/30/20 0929   Drainage Amount Moderate 10/30/20 0929   Drainage Description Serosanguinous 10/30/20 0929   Odor None 10/30/20 0929   Merary-wound Assessment Hyperpigmented 10/30/20 0929   Margins Unattached edges; Undefined edges 10/30/20 0929   Wound Thickness Description not for Pressure Injury Full thickness 10/30/20 0929   Number of days: 14       Percent of Wound(s) Debrided: approximately 100%    Total  Area  Debrided:  16.28 sq cm     Bleeding:  Minimal    Hemostasis Achieved:  by pressure    Procedural Pain:  0  / 10     Post Procedural Pain:  0 / 10     Response to treatment:  Well tolerated by patient. Status of wound progress and description from last visit:   Steris strips were removed and wound has good healthy growing tissue. We will add hydrofera blue and cover. Will continue to monitor. May need to culture wound if no improvement or worsening. Plan:       Discharge Instructions       PHYSICIAN ORDERS AND DISCHARGE INSTRUCTIONS     NOTE: Upon discharge from the [address], you will receive a patient experience survey.  We would be grateful if you would take the time to fill this survey out.     Wound care order history:                 ANDREW's   Right       Left               Date:              Cultures:                Grafts:                Antibiotics:           Continuing wound care orders and information:                 Residence:  Home              Continue home health care with:               Your wound-care supplies will be provided by:      Wound cleansing:               WN not scrub or use excessive force.              Wash hands with soap and water before and after dressing changes.             CIKYO to applying a clean dressing, cleanse wound with normal saline, wound cleanser, or mild soap and water.               Ask the physician or nurse before getting the wound(s) wet in a shower     Daily Wound management:              Keep weight off wounds and reposition every 2 hours.              Avoid standing for long periods of time.              Apply wraps/stockings in AM and remove at bedtime.              If swelling is present, elevate legs to the level of the heart or above for 30 minutes 4-5 times a day and/or when sitting.                                      When taking antibiotics take entire prescription as ordered by physician do not stop taking until medicine is all gone.                                                      Orders for this week:  10/30/20       Left Lower leg-- Clean with soap and water, pat dry.   Apply saline damp hydrofera blue (hydrofera blue ready at home) to wound bed, cover with mepilex border. Change every 3 days.      Follow up with Dahlia HENDERSON In 1 weeks in the wound care center  Call [phone] for any questions or concerns.   Date__________   Time____________        Treatment Note      Written Patient Dismissal Instructions Given            Electronically signed by FAITH Richardson CNP on 10/30/2020 at 10:02 AM

## 2020-11-06 ENCOUNTER — HOSPITAL ENCOUNTER (OUTPATIENT)
Dept: WOUND CARE | Age: 65
Discharge: HOME | End: 2020-11-06
Payer: MEDICARE

## 2020-11-06 VITALS
TEMPERATURE: 98.6 F | SYSTOLIC BLOOD PRESSURE: 139 MMHG | HEART RATE: 77 BPM | RESPIRATION RATE: 16 BRPM | DIASTOLIC BLOOD PRESSURE: 49 MMHG

## 2020-11-06 PROCEDURE — 11044 DBRDMT BONE 1ST 20 SQ CM/<: CPT

## 2020-11-06 PROCEDURE — 11042 DBRDMT SUBQ TIS 1ST 20SQCM/<: CPT | Performed by: NURSE PRACTITIONER

## 2020-11-06 RX ORDER — BACITRACIN ZINC AND POLYMYXIN B SULFATE 500; 1000 [USP'U]/G; [USP'U]/G
OINTMENT TOPICAL ONCE
Status: CANCELLED | OUTPATIENT
Start: 2020-11-06

## 2020-11-06 RX ORDER — BETAMETHASONE DIPROPIONATE 0.05 %
OINTMENT (GRAM) TOPICAL ONCE
Status: CANCELLED | OUTPATIENT
Start: 2020-11-06

## 2020-11-06 RX ORDER — CLOBETASOL PROPIONATE 0.5 MG/G
OINTMENT TOPICAL ONCE
Status: CANCELLED | OUTPATIENT
Start: 2020-11-06

## 2020-11-06 RX ORDER — BACITRACIN, NEOMYCIN, POLYMYXIN B 400; 3.5; 5 [USP'U]/G; MG/G; [USP'U]/G
OINTMENT TOPICAL ONCE
Status: CANCELLED | OUTPATIENT
Start: 2020-11-06

## 2020-11-06 RX ORDER — LIDOCAINE HYDROCHLORIDE 20 MG/ML
JELLY TOPICAL ONCE
Status: CANCELLED | OUTPATIENT
Start: 2020-11-06

## 2020-11-06 RX ORDER — GENTAMICIN SULFATE 1 MG/G
OINTMENT TOPICAL ONCE
Status: CANCELLED | OUTPATIENT
Start: 2020-11-06

## 2020-11-06 RX ORDER — LIDOCAINE 50 MG/G
OINTMENT TOPICAL ONCE
Status: CANCELLED | OUTPATIENT
Start: 2020-11-06

## 2020-11-06 RX ORDER — LIDOCAINE HYDROCHLORIDE 40 MG/ML
SOLUTION TOPICAL ONCE
Status: DISCONTINUED | OUTPATIENT
Start: 2020-11-06 | End: 2020-11-07 | Stop reason: HOSPADM

## 2020-11-06 RX ORDER — LIDOCAINE HYDROCHLORIDE 40 MG/ML
SOLUTION TOPICAL ONCE
Status: CANCELLED | OUTPATIENT
Start: 2020-11-06

## 2020-11-06 RX ORDER — LIDOCAINE 40 MG/G
CREAM TOPICAL ONCE
Status: CANCELLED | OUTPATIENT
Start: 2020-11-06

## 2020-11-06 RX ORDER — GINSENG 100 MG
CAPSULE ORAL ONCE
Status: CANCELLED | OUTPATIENT
Start: 2020-11-06

## 2020-11-06 NOTE — PROGRESS NOTES
Wound Care Center Progress Note With Procedure    Gabino Tavares  AGE: 72 y.o. GENDER: male  : 1955  EPISODE DATE:  2020     Subjective:     Chief Complaint   Patient presents with    Wound Check     left lower leg          HISTORY of PRESENT ILLNESS        [place] a 72 y. o. male who presents today for wound evaluation of Acute traumatic wound(s) of L lower leg lateral.  The wound is of moderate severity.  The underlying cause of the wound is trauma.    Wound Pain Timing/Severity: none  Quality of pain: N/A  Severity of pain:  0 / 10   Modifying Factors: venous stasis, lymphedema, diabetes and obesity  Associated Signs/Symptoms: none        PAST MEDICAL HISTORY        Diagnosis Date    CHF (congestive heart failure) (HCC)     Chronic ulcer of left leg with fat layer exposed ([address] 2016    Chronic ulcer of right leg with fat layer exposed ([address] 2016    Chronic ulcer of right leg with fat layer exposed ([address] 2016    Diabetes mellitus ([address]     Hypertension     PVD (peripheral vascular disease) ([address] 2016    Type 2 diabetes mellitus with diabetic peripheral angiopathy without gangrene, without long-term current use of insulin ([address] 2016    Venous stasis ulcer of both lower extremities without varicose veins ([address] 2016       PAST SURGICAL HISTORY    History reviewed. No pertinent surgical history.     FAMILY HISTORY    Family History   Problem Relation Age of Onset    Asthma Mother     Breast Cancer Mother     Cancer Mother     Diabetes Mother     High Blood Pressure Mother     Cancer Father     Early Death Brother     Arthritis Paternal Grandmother        SOCIAL HISTORY    Social History     Tobacco Use    Smoking status: Current Every Day Smoker     Packs/day: 1.00     Years: 60.00     Pack years: 60.00     Types: Cigarettes    Smokeless tobacco: Never Used    Tobacco comment: healing    Substance Use Topics    Alcohol use: Never care.  Dermatologic exam: Visual inspection of the periwound reveals the skin to be normal in turgor and texture and dry  Wound exam: see wound description below in procedure note      Assessment:     Problem List Items Addressed This Visit     WD-Venous stasis of both lower extremities - Primary    WD-Lymphedema of both lower extremities    WD-Idiopathic chronic venous hypertension of left lower extremity with ulcer ([address]    WD-Traumatic open wound of left lower leg, complicated by diabetes and venous insufficiency    Relevant Medications    lidocaine (XYLOCAINE) 4 % external solution (Start on 11/6/2020  9:30 AM)    Other Relevant Orders    Supply: Wound Cleanser    Supply: Merary Wound    Supply: Wound Dressings    Supply: Edema Control        Procedure Note    Indications:  Based on my examination of this patient's wound(s) today, sharp excision into necrotic subcutaneous tissue is required to promote healing and evaluate the extent of previous healing. Performed by: FAITH White CNP    Consent obtained: Yes    Time out taken:  Yes    Pain Control: Anesthetic  Anesthetic: 4% Lidocaine Liquid Topical     Debridement:Excisional Debridement    Using curette the wound(s) was/were sharply debrided down through and including the removal of subcutaneous tissue.         Devitalized Tissue Debrided:  fibrin, slough and clots    Pre Debridement Measurements:  Are located in the Wound Documentation Flow Sheet    All active wounds listed below with today's date are evaluated  Wound(s)    debrided this date include # : 1     Post  Debridement Measurements:  Wound 10/16/20 Pretibial Left #1 Left Lateral Lower Leg (Active)   Wound Image   10/30/20 0929   Wound Etiology Other 10/30/20 0929   Dressing Status New dressing applied 10/30/20 1152   Wound Cleansed Betadine/povidone iodine 10/23/20 1010   Wound Length (cm) 3.5 cm 11/06/20 0859   Wound Width (cm) 3.5 cm 11/06/20 0859   Wound Depth (cm) 0.1 cm 11/06/20 0859 history:                 ANDREW's   Right       Left               Date:              Cultures:                Grafts:                Antibiotics:           Continuing wound care orders and information:                 Residence:  Home              Continue home health care with:               Your wound-care supplies will be provided by:      Wound cleansing:               JW not scrub or use excessive force.              Wash hands with soap and water before and after dressing changes.             DONRJ to applying a clean dressing, cleanse wound with normal saline, wound cleanser, or mild soap and water.               Ask the physician or nurse before getting the wound(s) wet in a shower     Daily Wound management:              Keep weight off wounds and reposition every 2 hours.              Avoid standing for long periods of time.              Apply wraps/stockings in AM and remove at bedtime.              If swelling is present, elevate legs to the level of the heart or above for 30 minutes 4-5 times a day and/or when sitting.                                      When taking antibiotics take entire prescription as ordered by physician do not stop taking until medicine is all gone.                                                      Orders for this week:  11/6/20       Left Lower leg-- Clean with soap and water, pat dry. A&D to leg and foot.  Apply saline damp rubi to wound bed, cover with calcium alginate and ABD, wrap with Coban 2 lite. Leave in place x 1 week     Follow up with Dahlia HENDERSON In 1 weeks in the wound care center  Call [phone] for any questions or concerns.   Date__________   Time____________        Treatment Note      Written Patient Dismissal Instructions Given            Electronically signed by FAITH White CNP on 11/6/2020 at 9:11 AM

## 2020-11-06 NOTE — PROGRESS NOTES
Multilayer Compression Wrap   (Not Unna) Below the Knee    NAME:  Florentina Gowers Coffee  YOB: 1955  MEDICAL RECORD NUMBER:  [id number]  DATE:  11/6/2020    Multilayer compression wrap: Removed old Multilayer wrap if indicated and wash leg with mild soap/water. Applied moisturizing agent to dry skin as needed. Applied primary and secondary dressing as ordered. Applied multilayered dressing below the knee to left lower leg. Instructed patient/caregiver not to remove dressing and to keep it clean and dry. Instructed patient/caregiver on complications to report to provider, such as pain, numbness in toes, heavy drainage, and slippage of dressing. Instructed patient on purpose of compression dressing and on activity and exercise recommendations.       Electronically signed by Yuko Golden LPN on 02/7/6642 at 0:79 AM

## 2020-11-06 NOTE — PLAN OF CARE
Problem: Wound:  Goal: Will show signs of wound healing; wound closure and no evidence of infection  Description: Will show signs of wound healing; wound closure and no evidence of infection  Outcome: Met This Shift

## 2020-11-13 ENCOUNTER — HOSPITAL ENCOUNTER (OUTPATIENT)
Dept: WOUND CARE | Age: 65
Discharge: HOME | End: 2020-11-13
Payer: MEDICARE

## 2020-11-13 VITALS
DIASTOLIC BLOOD PRESSURE: 64 MMHG | HEART RATE: 64 BPM | SYSTOLIC BLOOD PRESSURE: 149 MMHG | TEMPERATURE: 97.8 F | RESPIRATION RATE: 18 BRPM

## 2020-11-13 PROCEDURE — 11042 DBRDMT SUBQ TIS 1ST 20SQCM/<: CPT | Performed by: NURSE PRACTITIONER

## 2020-11-13 PROCEDURE — 11042 DBRDMT SUBQ TIS 1ST 20SQCM/<: CPT

## 2020-11-13 RX ORDER — GENTAMICIN SULFATE 1 MG/G
OINTMENT TOPICAL ONCE
Status: CANCELLED | OUTPATIENT
Start: 2020-11-13

## 2020-11-13 RX ORDER — LIDOCAINE 40 MG/G
CREAM TOPICAL ONCE
Status: CANCELLED | OUTPATIENT
Start: 2020-11-13

## 2020-11-13 RX ORDER — LIDOCAINE HYDROCHLORIDE 40 MG/ML
SOLUTION TOPICAL ONCE
Status: CANCELLED | OUTPATIENT
Start: 2020-11-13

## 2020-11-13 RX ORDER — BACITRACIN, NEOMYCIN, POLYMYXIN B 400; 3.5; 5 [USP'U]/G; MG/G; [USP'U]/G
OINTMENT TOPICAL ONCE
Status: CANCELLED | OUTPATIENT
Start: 2020-11-13

## 2020-11-13 RX ORDER — BETAMETHASONE DIPROPIONATE 0.05 %
OINTMENT (GRAM) TOPICAL ONCE
Status: CANCELLED | OUTPATIENT
Start: 2020-11-13

## 2020-11-13 RX ORDER — LIDOCAINE HYDROCHLORIDE 40 MG/ML
SOLUTION TOPICAL ONCE
Status: DISCONTINUED | OUTPATIENT
Start: 2020-11-13 | End: 2020-11-14 | Stop reason: HOSPADM

## 2020-11-13 RX ORDER — LIDOCAINE HYDROCHLORIDE 20 MG/ML
JELLY TOPICAL ONCE
Status: CANCELLED | OUTPATIENT
Start: 2020-11-13

## 2020-11-13 RX ORDER — CLOBETASOL PROPIONATE 0.5 MG/G
OINTMENT TOPICAL ONCE
Status: CANCELLED | OUTPATIENT
Start: 2020-11-13

## 2020-11-13 RX ORDER — LIDOCAINE 50 MG/G
OINTMENT TOPICAL ONCE
Status: CANCELLED | OUTPATIENT
Start: 2020-11-13

## 2020-11-13 RX ORDER — BACITRACIN ZINC AND POLYMYXIN B SULFATE 500; 1000 [USP'U]/G; [USP'U]/G
OINTMENT TOPICAL ONCE
Status: CANCELLED | OUTPATIENT
Start: 2020-11-13

## 2020-11-13 RX ORDER — GENTAMICIN SULFATE 1 MG/G
OINTMENT TOPICAL ONCE
Status: DISCONTINUED | OUTPATIENT
Start: 2020-11-13 | End: 2020-11-14 | Stop reason: HOSPADM

## 2020-11-13 RX ORDER — GINSENG 100 MG
CAPSULE ORAL ONCE
Status: CANCELLED | OUTPATIENT
Start: 2020-11-13

## 2020-11-13 ASSESSMENT — PAIN SCALES - GENERAL: PAINLEVEL_OUTOF10: 0

## 2020-11-13 NOTE — PROGRESS NOTES
Multilayer Compression Wrap   (Not Unna) Below the Knee    NAME:  Eufemia Tavares  YOB: 1955  MEDICAL RECORD NUMBER:  [id number]  DATE:  11/13/2020    Multilayer compression wrap: Removed old Multilayer wrap if indicated and wash leg with mild soap/water. Applied moisturizing agent to dry skin as needed. Applied primary and secondary dressing as ordered. Applied multilayered dressing below the knee to left lower leg. Instructed patient/caregiver not to remove dressing and to keep it clean and dry. Instructed patient/caregiver on complications to report to provider, such as pain, numbness in toes, heavy drainage, and slippage of dressing. Instructed patient on purpose of compression dressing and on activity and exercise recommendations.       Electronically signed by Derek Chaney LPN on 96/49/9349 at 10:28 AM

## 2020-11-13 NOTE — PROGRESS NOTES
Wound Care Center Progress Note With Procedure    Gabino Tavares  AGE: 72 y.o. GENDER: male  : 1955  EPISODE DATE:  2020     Subjective:     Chief Complaint   Patient presents with    Wound Check     left leg          HISTORY of PRESENT ILLNESS     Carmina Tavares is a 72 y. o. male who presents today for wound evaluation of Acute traumatic wound(s) of L lower leg lateral.  The wound is of moderate severity.  The underlying cause of the wound is trauma.    Wound Pain Timing/Severity: none  Quality of pain: N/A  Severity of pain:  0 / 10   Modifying Factors: venous stasis, lymphedema, diabetes and obesity  Associated Signs/Symptoms: none        PAST MEDICAL HISTORY        Diagnosis Date    CHF (congestive heart failure) (HCC)     Chronic ulcer of left leg with fat layer exposed ([address] 2016    Chronic ulcer of right leg with fat layer exposed ([address] 2016    Chronic ulcer of right leg with fat layer exposed ([address] 2016    Diabetes mellitus ([address]     Hypertension     PVD (peripheral vascular disease) ([address] 2016    Type 2 diabetes mellitus with diabetic peripheral angiopathy without gangrene, without long-term current use of insulin ([address] 2016    Venous stasis ulcer of both lower extremities without varicose veins ([address] 2016       PAST SURGICAL HISTORY    History reviewed. No pertinent surgical history.     FAMILY HISTORY    Family History   Problem Relation Age of Onset    Asthma Mother     Breast Cancer Mother     Cancer Mother     Diabetes Mother     High Blood Pressure Mother     Cancer Father     Early Death Brother     Arthritis Paternal Grandmother        SOCIAL HISTORY    Social History     Tobacco Use    Smoking status: Current Every Day Smoker     Packs/day: 1.00     Years: 60.00     Pack years: 60.00     Types: Cigarettes    Smokeless tobacco: Never Used    Tobacco comment: healing    Substance Use Topics    Alcohol use: Never     Frequency: Never    Drug use: No       ALLERGIES    No Known Allergies    MEDICATIONS    Current Outpatient Medications on File Prior to Encounter   Medication Sig Dispense Refill    Latanoprost 0.005 % EMUL Apply to eye daily      ibuprofen (ADVIL;MOTRIN) 600 MG tablet Take 1 tablet by mouth every 6 hours as needed for Pain 30 tablet 0    glipiZIDE (GLUCOTROL) 5 MG tablet Take 5 mg by mouth 2 times daily (before meals)      potassium chloride (KLOR-CON M) 20 MEQ extended release tablet Take 20 mEq by mouth daily      ALPRAZolam (XANAX) 0.5 MG tablet Take 0.5 mg by mouth nightly. Ute Kennedy ipratropium-albuterol (DUONEB) 0.5-2.5 (3) MG/3ML SOLN nebulizer solution Inhale 1 vial into the lungs every 4 hours      albuterol sulfate  (90 Base) MCG/ACT inhaler Inhale 2 puffs into the lungs every 6 hours as needed for Wheezing      aspirin EC 81 MG EC tablet Take 1 tablet by mouth daily 30 tablet 3    HYDROcodone-acetaminophen (NORCO) 7.5-325 MG per tablet Take 1 tablet by mouth 3 times daily .  metFORMIN (GLUCOPHAGE) 1000 MG tablet Take 1,000 mg by mouth 2 times daily (with meals)      furosemide (LASIX) 80 MG tablet Take 80 mg by mouth 2 times daily      lisinopril (PRINIVIL;ZESTRIL) 20 MG tablet Take 40 mg by mouth daily       tamsulosin (FLOMAX) 0.4 MG capsule Take 0.4 mg by mouth daily      finasteride (PROSCAR) 5 MG tablet Take 5 mg by mouth daily      metolazone (ZAROXOLYN) 2.5 MG tablet Take 2 tablets by mouth daily 60 tablet 0     No current facility-administered medications on file prior to encounter. REVIEW OF SYSTEMS    Pertinent items are noted in HPI. Constitutional: Negative for systemic symptoms including fever, chills and malaise. Objective:      BP (!) 149/64   Pulse 64   Temp 97.8 °F (36.6 °C) (Temporal)   Resp 18     PHYSICAL EXAM      General: The patient is in no acute distress.     Mental status:  Patient is appropriate, is  oriented to place and plan of care.  Dermatologic exam: Visual inspection of the periwound reveals the skin to be moist and edematous  Wound exam: see wound description below in procedure note      Assessment:     Problem List Items Addressed This Visit     WD-Venous stasis of both lower extremities - Primary    WD-Traumatic open wound of left lower leg, complicated by diabetes and venous insufficiency    Relevant Medications    lidocaine (XYLOCAINE) 4 % external solution    gentamicin (GARAMYCIN) 0.1 % ointment (Start on 11/13/2020 10:30 AM)    mupirocin (BACTROBAN) 2 % ointment (Start on 11/13/2020 10:30 AM)    Other Relevant Orders    Supply: Wound Cleanser    Supply: Wound Dressings    Supply: Cover and Secure    Supply: Edema Control        Procedure Note    Indications:  Based on my examination of this patient's wound(s) today, sharp excision into necrotic subcutaneous tissue is required to promote healing and evaluate the extent of previous healing. Performed by: FAITH Costa CNP    Consent obtained: Yes    Time out taken:  Yes    Pain Control: Anesthetic  Anesthetic: 4% Lidocaine Liquid Topical     Debridement:Excisional Debridement    Using curette the wound(s) was/were sharply debrided down through and including the removal of subcutaneous tissue. Devitalized Tissue Debrided:  fibrin, slough, exudate and callus    Pre Debridement Measurements:  Are located in the Wound Documentation Flow Sheet    All active wounds listed below with today's date are evaluated  Wound(s)    debrided this date include # : 1     Post  Debridement Measurements:  Wound 10/16/20 Pretibial Left #1 Left Lateral Lower Leg (Active)   Wound Image   10/30/20 0929   Wound Etiology Other 11/13/20 0921   Dressing Status Clean;Dry; Intact 11/06/20 0923   Wound Cleansed Soap and water 11/13/20 0921   Wound Length (cm) 3.5 cm 11/13/20 0921   Wound Width (cm) 1 cm 11/13/20 0921   Wound Depth (cm) 0.2 cm 11/13/20 0921   Wound Surface Area (cm^2) 3.5 cm^2 patient experience survey. We would be grateful if you would take the time to fill this survey out.     Wound care order history:                 ANDREW's   Right       Left               Date:              Cultures:                Grafts:                Antibiotics:           Continuing wound care orders and information:                 Residence:  Home              Continue home health care with:               Your wound-care supplies will be provided by:      Wound cleansing:               TC not scrub or use excessive force.              Wash hands with soap and water before and after dressing changes.             OCNNM to applying a clean dressing, cleanse wound with normal saline, wound cleanser, or mild soap and water.               Ask the physician or nurse before getting the wound(s) wet in a shower     Daily Wound management:              Keep weight off wounds and reposition every 2 hours.              Avoid standing for long periods of time.              Apply wraps/stockings in AM and remove at bedtime.              If swelling is present, elevate legs to the level of the heart or above for 30 minutes 4-5 times a day and/or when sitting.                                      When taking antibiotics take entire prescription as ordered by physician do not stop taking until medicine is all gone.                                                      Orders for this week:  11/13/20         Left Lower leg-- Clean with soap and water, pat dry. A&D to leg and foot.  Apply Gentamycin and bactroban and cover with calcium alginate and ABD, wrap with Coban 2 lite. Leave in place x 1 week     Follow up with Dahlia HENDERSON In 1 weeks in the wound care center  Call [phone] for any questions or concerns.   Date__________   Time____________           Treatment Note      Written Patient Dismissal Instructions Given            Electronically signed by FAITH Paige CNP on 11/13/2020 at 10:07 AM

## 2020-11-20 ENCOUNTER — HOSPITAL ENCOUNTER (OUTPATIENT)
Dept: WOUND CARE | Age: 65
Discharge: HOME | End: 2020-11-20
Payer: MEDICARE

## 2020-11-20 VITALS
RESPIRATION RATE: 16 BRPM | DIASTOLIC BLOOD PRESSURE: 64 MMHG | TEMPERATURE: 96.8 F | HEART RATE: 85 BPM | SYSTOLIC BLOOD PRESSURE: 153 MMHG

## 2020-11-20 PROCEDURE — 11042 DBRDMT SUBQ TIS 1ST 20SQCM/<: CPT

## 2020-11-20 RX ORDER — LIDOCAINE HYDROCHLORIDE 20 MG/ML
JELLY TOPICAL ONCE
Status: CANCELLED | OUTPATIENT
Start: 2020-11-20

## 2020-11-20 RX ORDER — LIDOCAINE HYDROCHLORIDE 40 MG/ML
SOLUTION TOPICAL ONCE
Status: CANCELLED | OUTPATIENT
Start: 2020-11-20

## 2020-11-20 RX ORDER — GINSENG 100 MG
CAPSULE ORAL ONCE
Status: CANCELLED | OUTPATIENT
Start: 2020-11-20

## 2020-11-20 RX ORDER — BACITRACIN, NEOMYCIN, POLYMYXIN B 400; 3.5; 5 [USP'U]/G; MG/G; [USP'U]/G
OINTMENT TOPICAL ONCE
Status: CANCELLED | OUTPATIENT
Start: 2020-11-20

## 2020-11-20 RX ORDER — BETAMETHASONE DIPROPIONATE 0.05 %
OINTMENT (GRAM) TOPICAL ONCE
Status: CANCELLED | OUTPATIENT
Start: 2020-11-20

## 2020-11-20 RX ORDER — BACITRACIN ZINC AND POLYMYXIN B SULFATE 500; 1000 [USP'U]/G; [USP'U]/G
OINTMENT TOPICAL ONCE
Status: CANCELLED | OUTPATIENT
Start: 2020-11-20

## 2020-11-20 RX ORDER — CLOBETASOL PROPIONATE 0.5 MG/G
OINTMENT TOPICAL ONCE
Status: CANCELLED | OUTPATIENT
Start: 2020-11-20

## 2020-11-20 RX ORDER — GENTAMICIN SULFATE 1 MG/G
OINTMENT TOPICAL ONCE
Status: CANCELLED | OUTPATIENT
Start: 2020-11-20

## 2020-11-20 RX ORDER — LIDOCAINE HYDROCHLORIDE 40 MG/ML
SOLUTION TOPICAL ONCE
Status: DISCONTINUED | OUTPATIENT
Start: 2020-11-20 | End: 2020-11-21 | Stop reason: HOSPADM

## 2020-11-20 RX ORDER — LIDOCAINE 50 MG/G
OINTMENT TOPICAL ONCE
Status: CANCELLED | OUTPATIENT
Start: 2020-11-20

## 2020-11-20 RX ORDER — LIDOCAINE 40 MG/G
CREAM TOPICAL ONCE
Status: CANCELLED | OUTPATIENT
Start: 2020-11-20

## 2020-11-23 ENCOUNTER — HOSPITAL ENCOUNTER (OUTPATIENT)
Dept: WOUND CARE | Age: 65
Discharge: HOME | DRG: 853 | End: 2020-11-23
Payer: MEDICARE

## 2020-11-23 VITALS
RESPIRATION RATE: 20 BRPM | HEART RATE: 135 BPM | TEMPERATURE: 97.6 F | SYSTOLIC BLOOD PRESSURE: 91 MMHG | DIASTOLIC BLOOD PRESSURE: 46 MMHG

## 2020-11-23 PROCEDURE — 11042 DBRDMT SUBQ TIS 1ST 20SQCM/<: CPT

## 2020-11-23 PROCEDURE — 11042 DBRDMT SUBQ TIS 1ST 20SQCM/<: CPT | Performed by: NURSE PRACTITIONER

## 2020-11-23 RX ORDER — LIDOCAINE 40 MG/G
CREAM TOPICAL ONCE
Status: CANCELLED | OUTPATIENT
Start: 2020-11-23

## 2020-11-23 RX ORDER — LIDOCAINE HYDROCHLORIDE 20 MG/ML
JELLY TOPICAL ONCE
Status: CANCELLED | OUTPATIENT
Start: 2020-11-23

## 2020-11-23 RX ORDER — GINSENG 100 MG
CAPSULE ORAL ONCE
Status: CANCELLED | OUTPATIENT
Start: 2020-11-23

## 2020-11-23 RX ORDER — GENTAMICIN SULFATE 1 MG/G
OINTMENT TOPICAL ONCE
Status: CANCELLED | OUTPATIENT
Start: 2020-11-23

## 2020-11-23 RX ORDER — BETAMETHASONE DIPROPIONATE 0.05 %
OINTMENT (GRAM) TOPICAL ONCE
Status: CANCELLED | OUTPATIENT
Start: 2020-11-23

## 2020-11-23 RX ORDER — BACITRACIN, NEOMYCIN, POLYMYXIN B 400; 3.5; 5 [USP'U]/G; MG/G; [USP'U]/G
OINTMENT TOPICAL ONCE
Status: CANCELLED | OUTPATIENT
Start: 2020-11-23

## 2020-11-23 RX ORDER — LIDOCAINE HYDROCHLORIDE 40 MG/ML
SOLUTION TOPICAL ONCE
Status: DISCONTINUED | OUTPATIENT
Start: 2020-11-23 | End: 2020-11-24 | Stop reason: HOSPADM

## 2020-11-23 RX ORDER — CLOBETASOL PROPIONATE 0.5 MG/G
OINTMENT TOPICAL ONCE
Status: CANCELLED | OUTPATIENT
Start: 2020-11-23

## 2020-11-23 RX ORDER — BACITRACIN ZINC AND POLYMYXIN B SULFATE 500; 1000 [USP'U]/G; [USP'U]/G
OINTMENT TOPICAL ONCE
Status: CANCELLED | OUTPATIENT
Start: 2020-11-23

## 2020-11-23 RX ORDER — LIDOCAINE HYDROCHLORIDE 40 MG/ML
SOLUTION TOPICAL ONCE
Status: CANCELLED | OUTPATIENT
Start: 2020-11-23

## 2020-11-23 RX ORDER — LIDOCAINE 50 MG/G
OINTMENT TOPICAL ONCE
Status: CANCELLED | OUTPATIENT
Start: 2020-11-23

## 2020-11-23 NOTE — PROGRESS NOTES
Multilayer Compression Wrap   (Not Unna) Below the Knee    NAME:  Carol Tavares  YOB: 1955  MEDICAL RECORD NUMBER:  [id number]  DATE:  11/23/2020    Multilayer compression wrap: Removed old Multilayer wrap if indicated and wash leg with mild soap/water. Applied moisturizing agent to dry skin as needed. Applied primary and secondary dressing as ordered. Applied multilayered dressing below the knee to left lower leg. Instructed patient/caregiver not to remove dressing and to keep it clean and dry. Instructed patient/caregiver on complications to report to provider, such as pain, numbness in toes, heavy drainage, and slippage of dressing. Instructed patient on purpose of compression dressing and on activity and exercise recommendations.       Electronically signed by Lucille Tapia LPN on [id number]/0452 at 8:53 AM

## 2020-11-23 NOTE — PROGRESS NOTES
Wound Care Center Progress Note With Procedure    Gabino Tavares  AGE: 72 y.o. GENDER: male  : 1955  EPISODE DATE:  2020     Subjective:     Chief Complaint   Patient presents with    Wound Check     left leg         HISTORY of PRESENT ILLNESS     Malia Tavares is a 72 y. o. male who presents today for wound evaluation of Acute traumatic wound(s) of L lower leg lateral.  The wound is of moderate severity.  The underlying cause of the wound is trauma.    Wound Pain Timing/Severity: none  Quality of pain: N/A  Severity of pain:  0 / 10   Modifying Factors: venous stasis, lymphedema, diabetes and obesity  Associated Signs/Symptoms: none        PAST MEDICAL HISTORY        Diagnosis Date    CHF (congestive heart failure) (HCC)     Chronic ulcer of left leg with fat layer exposed ([address] 2016    Chronic ulcer of right leg with fat layer exposed ([address] 2016    Chronic ulcer of right leg with fat layer exposed ([address] 2016    Diabetes mellitus ([address]     Hypertension     PVD (peripheral vascular disease) ([address] 2016    Type 2 diabetes mellitus with diabetic peripheral angiopathy without gangrene, without long-term current use of insulin ([address] 2016    Venous stasis ulcer of both lower extremities without varicose veins ([address] 2016       PAST SURGICAL HISTORY    History reviewed. No pertinent surgical history.     FAMILY HISTORY    Family History   Problem Relation Age of Onset    Asthma Mother     Breast Cancer Mother     Cancer Mother     Diabetes Mother     High Blood Pressure Mother     Cancer Father     Early Death Brother     Arthritis Paternal Grandmother        SOCIAL HISTORY    Social History     Tobacco Use    Smoking status: Current Every Day Smoker     Packs/day: 1.00     Years: 60.00     Pack years: 60.00     Types: Cigarettes    Smokeless tobacco: Never Used    Tobacco comment: healing    Substance Use Topics    Alcohol use: Never     Frequency: Never    Drug use: No       ALLERGIES    No Known Allergies    MEDICATIONS    Current Outpatient Medications on File Prior to Encounter   Medication Sig Dispense Refill    Latanoprost 0.005 % EMUL Apply to eye daily      glipiZIDE (GLUCOTROL) 5 MG tablet Take 5 mg by mouth 2 times daily (before meals)      potassium chloride (KLOR-CON M) 20 MEQ extended release tablet Take 20 mEq by mouth daily      ALPRAZolam (XANAX) 0.5 MG tablet Take 0.5 mg by mouth nightly. Julio Majano ipratropium-albuterol (DUONEB) 0.5-2.5 (3) MG/3ML SOLN nebulizer solution Inhale 1 vial into the lungs every 4 hours      aspirin EC 81 MG EC tablet Take 1 tablet by mouth daily 30 tablet 3    HYDROcodone-acetaminophen (NORCO) 7.5-325 MG per tablet Take 1 tablet by mouth 3 times daily .  metFORMIN (GLUCOPHAGE) 1000 MG tablet Take 1,000 mg by mouth 2 times daily (with meals)      furosemide (LASIX) 80 MG tablet Take 80 mg by mouth 2 times daily      lisinopril (PRINIVIL;ZESTRIL) 20 MG tablet Take 40 mg by mouth daily       tamsulosin (FLOMAX) 0.4 MG capsule Take 0.4 mg by mouth daily      finasteride (PROSCAR) 5 MG tablet Take 5 mg by mouth daily      ibuprofen (ADVIL;MOTRIN) 600 MG tablet Take 1 tablet by mouth every 6 hours as needed for Pain 30 tablet 0    albuterol sulfate  (90 Base) MCG/ACT inhaler Inhale 2 puffs into the lungs every 6 hours as needed for Wheezing      metolazone (ZAROXOLYN) 2.5 MG tablet Take 2 tablets by mouth daily 60 tablet 0     No current facility-administered medications on file prior to encounter. REVIEW OF SYSTEMS    Pertinent items are noted in HPI. Constitutional: Negative for systemic symptoms including fever, chills and malaise. Objective:      BP (!) 91/46   Pulse 135   Temp 97.6 °F (36.4 °C) (Temporal)   Resp 20     PHYSICAL EXAM      General: The patient is in no acute distress.     Mental status:  Patient is appropriate, is  oriented to place and plan of care.  Dermatologic exam: Visual inspection of the periwound reveals the skin to be moist and edematous  Wound exam: see wound description below in procedure note      Assessment:     Problem List Items Addressed This Visit     WD-Ulcer of shin, left, with fat layer exposed ([address]    WD-Venous stasis of both lower extremities - Primary    WD-Lymphedema of both lower extremities    WD-Traumatic open wound of left lower leg, complicated by diabetes and venous insufficiency    Relevant Medications    lidocaine (XYLOCAINE) 4 % external solution (Start on 11/23/2020  8:45 AM)    Other Relevant Orders    Supply: Wound Cleanser    Supply: Wound Dressings    Supply: Cover and Secure    Supply: Edema Control        Procedure Note    Indications:  Based on my examination of this patient's wound(s) today, sharp excision into necrotic subcutaneous tissue is required to promote healing and evaluate the extent of previous healing. Performed by: FAITH Dickinson CNP    Consent obtained: Yes    Time out taken:  Yes    Pain Control: Anesthetic  Anesthetic: 4% Lidocaine Liquid Topical     Debridement:Excisional Debridement    Using curette the wound(s) was/were sharply debrided down through and including the removal of subcutaneous tissue. Devitalized Tissue Debrided:  fibrin, biofilm and slough    Pre Debridement Measurements:  Are located in the Wound Documentation Flow Sheet    All active wounds listed below with today's date are evaluated  Wound(s)    debrided this date include # : 1     Post  Debridement Measurements:  Wound 10/16/20 Pretibial Left #1 Left Lateral Lower Leg (Active)   Wound Image   10/30/20 0929   Wound Etiology Other 11/23/20 0808   Dressing Status Clean;Dry; Intact 11/20/20 1636   Wound Cleansed Soap and water 11/23/20 0808   Wound Length (cm) 0.6 cm 11/23/20 0808   Wound Width (cm) 0.5 cm 11/23/20 0808   Wound Depth (cm) 0.2 cm 11/23/20 0808   Wound Surface Area (cm^2) 0.3 cm^2 11/23/20 5782 Change in Wound Size % (l*w) 98.84 11/23/20 0808   Wound Volume (cm^3) 0.06 cm^3 11/23/20 0808   Wound Healing % 98 11/23/20 0808   Post-Procedure Length (cm) 0.6 cm 11/23/20 0824   Post-Procedure Width (cm) 0.5 cm 11/23/20 0824   Post-Procedure Depth (cm) 0.2 cm 11/23/20 0824   Post-Procedure Surface Area (cm^2) 0.3 cm^2 11/23/20 0824   Post-Procedure Volume (cm^3) 0.06 cm^3 11/23/20 0824   Distance Tunneling (cm) 0 cm 11/23/20 0808   Tunneling Position ___ O'Clock 0 11/23/20 0808   Undermining Starts ___ O'Clock 0 11/23/20 0808   Undermining Ends___ O'Clock 0 11/23/20 0808   Undermining Maxium Distance (cm) 0 11/23/20 0808   Wound Assessment Granulation tissue;Slough 11/20/20 1526   Drainage Amount Moderate 11/23/20 0808   Drainage Description Serosanguinous 11/23/20 0808   Odor None 11/23/20 0808   Merary-wound Assessment Intact 11/23/20 0808   Margins Defined edges 11/23/20 0808   Wound Thickness Description not for Pressure Injury Full thickness 11/23/20 0808   Number of days: 38       Percent of Wound(s) Debrided: approximately 100%    Total  Area  Debrided:  0.3 sq cm     Bleeding:  Minimal    Hemostasis Achieved:  by pressure    Procedural Pain:  1  / 10     Post Procedural Pain:  0 / 10     Response to treatment:  Well tolerated by patient. Status of wound progress and description from last visit:   Improving. We will take away the santyl, as wound beds were soft and able to be debrided of dead tissue. Now that wound bed is more stable and undermining has improved, we will add rubi to wound bed. Continue to monitor. Plan:       Discharge Instructions       PHYSICIAN ORDERS AND DISCHARGE INSTRUCTIONS     NOTE: Upon discharge from the [address], you will receive a patient experience survey.  We would be grateful if you would take the time to fill this survey out.     Wound care order history:                 ANDREW's   Right       Left               Date:              Cultures:                Grafts:                Antibiotics:           Continuing wound care orders and information:                 Residence:  Home              Continue home health care with:               Your wound-care supplies will be provided by:      Wound cleansing:               HV not scrub or use excessive force.              Wash hands with soap and water before and after dressing changes.             QFKMB to applying a clean dressing, cleanse wound with normal saline, wound cleanser, or mild soap and water.               Ask the physician or nurse before getting the wound(s) wet in a shower     Daily Wound management:              Keep weight off wounds and reposition every 2 hours.              Avoid standing for long periods of time.              Apply wraps/stockings in AM and remove at bedtime.              If swelling is present, elevate legs to the level of the heart or above for 30 minutes 4-5 times a day and/or when sitting.                                      When taking antibiotics take entire prescription as ordered by physician do not stop taking until medicine is all gone.                                                      Orders for this week:  11/23/20          Left Lower leg-- Clean with soap and water, pat dry.  A&D to leg and foot. Apply Mitali to wound bed. cover with calcium alginate and ABD, wrap with Coban 2. Leave in place x 1 week           Follow up with Dahlia HENDERSON In 1 weeks in the wound care center  Call [phone] for any questions or concerns.   Date__________   Time____________        Treatment Note      Written Patient Dismissal Instructions Given            Electronically signed by FAITH Knight CNP on 11/23/2020 at 8:28 AM

## 2020-11-24 ENCOUNTER — HOSPITAL ENCOUNTER (INPATIENT)
Age: 65
LOS: 12 days | Discharge: NURSING, REHAB OR LONG-TERM CARE | DRG: 853 | End: 2020-12-06
Attending: INTERNAL MEDICINE | Admitting: INTERNAL MEDICINE
Payer: MEDICARE

## 2020-11-24 ENCOUNTER — ANESTHESIA EVENT (OUTPATIENT)
Dept: OPERATING ROOM | Age: 65
DRG: 853 | End: 2020-11-24
Payer: MEDICARE

## 2020-11-24 ENCOUNTER — ANESTHESIA (OUTPATIENT)
Dept: OPERATING ROOM | Age: 65
DRG: 853 | End: 2020-11-24
Payer: MEDICARE

## 2020-11-24 ENCOUNTER — APPOINTMENT (OUTPATIENT)
Dept: GENERAL RADIOLOGY | Age: 65
DRG: 853 | End: 2020-11-24
Payer: MEDICARE

## 2020-11-24 ENCOUNTER — APPOINTMENT (OUTPATIENT)
Dept: CT IMAGING | Age: 65
DRG: 853 | End: 2020-11-24
Payer: MEDICARE

## 2020-11-24 VITALS
RESPIRATION RATE: 11 BRPM | DIASTOLIC BLOOD PRESSURE: 50 MMHG | SYSTOLIC BLOOD PRESSURE: 92 MMHG | TEMPERATURE: 99.5 F | OXYGEN SATURATION: 100 %

## 2020-11-24 PROBLEM — N49.2 GANGRENE OF SCROTUM: Status: ACTIVE | Noted: 2020-11-24

## 2020-11-24 LAB
ALBUMIN SERPL-MCNC: 3.1 GM/DL (ref 3.4–5)
ALP BLD-CCNC: 127 IU/L (ref 40–129)
ALT SERPL-CCNC: 23 U/L (ref 10–40)
ANION GAP SERPL CALCULATED.3IONS-SCNC: 17 MMOL/L (ref 4–16)
AST SERPL-CCNC: 24 IU/L (ref 15–37)
BACTERIA: NEGATIVE /HPF
BANDED NEUTROPHILS ABSOLUTE COUNT: 1.11 K/CU MM
BANDED NEUTROPHILS RELATIVE PERCENT: 3 % (ref 5–11)
BASE EXCESS: 3 (ref 0–3.3)
BILIRUB SERPL-MCNC: 0.9 MG/DL (ref 0–1)
BILIRUBIN URINE: NEGATIVE MG/DL
BLOOD, URINE: NEGATIVE
BUN BLDV-MCNC: 42 MG/DL (ref 6–23)
CALCIUM SERPL-MCNC: 8.5 MG/DL (ref 8.3–10.6)
CARBON MONOXIDE, BLOOD: 4.3 % (ref 0–5)
CELLULAR CASTS: 1 /LPF
CHLORIDE BLD-SCNC: 97 MMOL/L (ref 99–110)
CLARITY: CLEAR
CO2 CONTENT: 28.4 MMOL/L (ref 19–24)
CO2: 22 MMOL/L (ref 21–32)
COLOR: YELLOW
COMMENT: ABNORMAL
CREAT SERPL-MCNC: 1.5 MG/DL (ref 0.9–1.3)
DIFFERENTIAL TYPE: ABNORMAL
GFR AFRICAN AMERICAN: 57 ML/MIN/1.73M2
GFR NON-AFRICAN AMERICAN: 47 ML/MIN/1.73M2
GLUCOSE BLD-MCNC: 128 MG/DL (ref 70–99)
GLUCOSE BLD-MCNC: 156 MG/DL (ref 70–99)
GLUCOSE BLD-MCNC: 163 MG/DL (ref 70–99)
GLUCOSE BLD-MCNC: 176 MG/DL (ref 70–99)
GLUCOSE, URINE: NEGATIVE MG/DL
HCO3 ARTERIAL: 26.4 MMOL/L (ref 18–23)
HCT VFR BLD CALC: 44.4 % (ref 42–52)
HEMOGLOBIN: 13.8 GM/DL (ref 13.5–18)
HYALINE CASTS: 10 /LPF
KETONES, URINE: NEGATIVE MG/DL
LACTATE: 1.6 MMOL/L (ref 0.4–2)
LACTIC ACID, SEPSIS: 1.4 MMOL/L (ref 0.5–1.9)
LEUKOCYTE ESTERASE, URINE: NEGATIVE
LYMPHOCYTES ABSOLUTE: 2.6 K/CU MM
LYMPHOCYTES RELATIVE PERCENT: 7 % (ref 24–44)
MCH RBC QN AUTO: 30.3 PG (ref 27–31)
MCHC RBC AUTO-ENTMCNC: 31.1 % (ref 32–36)
MCV RBC AUTO: 97.6 FL (ref 78–100)
METHEMOGLOBIN ARTERIAL: 1.4 %
MONOCYTES ABSOLUTE: 1.8 K/CU MM
MONOCYTES RELATIVE PERCENT: 5 % (ref 0–4)
MUCUS: ABNORMAL HPF
MYELOCYTE PERCENT: 1 %
MYELOCYTES ABSOLUTE COUNT: 0.37 K/CU MM
NITRITE URINE, QUANTITATIVE: NEGATIVE
O2 SATURATION: 91 % (ref 96–97)
PCO2 ARTERIAL: 66 MMHG (ref 32–45)
PDW BLD-RTO: 13.9 % (ref 11.7–14.9)
PH BLOOD: 7.21 (ref 7.34–7.45)
PH, URINE: 5 (ref 5–8)
PLATELET # BLD: 163 K/CU MM (ref 140–440)
PMV BLD AUTO: 11.5 FL (ref 7.5–11.1)
PO2 ARTERIAL: 88 MMHG (ref 75–100)
POTASSIUM SERPL-SCNC: 4 MMOL/L (ref 3.5–5.1)
PRO-BNP: 6153 PG/ML
PROTEIN UA: NEGATIVE MG/DL
RBC # BLD: 4.55 M/CU MM (ref 4.6–6.2)
RBC # BLD: ABNORMAL 10*6/UL
RBC URINE: ABNORMAL /HPF (ref 0–3)
SARS-COV-2, NAAT: NOT DETECTED
SEGMENTED NEUTROPHILS ABSOLUTE COUNT: 31 K/CU MM
SEGMENTED NEUTROPHILS RELATIVE PERCENT: 84 % (ref 36–66)
SODIUM BLD-SCNC: 136 MMOL/L (ref 135–145)
SOURCE: NORMAL
SPECIFIC GRAVITY UA: 1.01 (ref 1–1.03)
TOTAL PROTEIN: 6.1 GM/DL (ref 6.4–8.2)
TRICHOMONAS: ABNORMAL /HPF
TROPONIN T: <0.01 NG/ML
UROBILINOGEN, URINE: 2 MG/DL (ref 0.2–1)
WBC # BLD: 36.9 K/CU MM (ref 4–10.5)
WBC UA: ABNORMAL /HPF (ref 0–2)

## 2020-11-24 PROCEDURE — 83735 ASSAY OF MAGNESIUM: CPT

## 2020-11-24 PROCEDURE — 11004 DBRDMT SKIN XTRNL GENT&PER: CPT | Performed by: SURGERY

## 2020-11-24 PROCEDURE — 83605 ASSAY OF LACTIC ACID: CPT

## 2020-11-24 PROCEDURE — 2700000000 HC OXYGEN THERAPY PER DAY

## 2020-11-24 PROCEDURE — 05HN33Z INSERTION OF INFUSION DEVICE INTO LEFT INTERNAL JUGULAR VEIN, PERCUTANEOUS APPROACH: ICD-10-PCS | Performed by: SURGERY

## 2020-11-24 PROCEDURE — 6360000002 HC RX W HCPCS: Performed by: ANESTHESIOLOGY

## 2020-11-24 PROCEDURE — 94002 VENT MGMT INPAT INIT DAY: CPT

## 2020-11-24 PROCEDURE — 93005 ELECTROCARDIOGRAM TRACING: CPT | Performed by: ANESTHESIOLOGY

## 2020-11-24 PROCEDURE — 0KBM0ZZ EXCISION OF PERINEUM MUSCLE, OPEN APPROACH: ICD-10-PCS | Performed by: SURGERY

## 2020-11-24 PROCEDURE — 7100000001 HC PACU RECOVERY - ADDTL 15 MIN: Performed by: SURGERY

## 2020-11-24 PROCEDURE — 2580000003 HC RX 258: Performed by: PHYSICIAN ASSISTANT

## 2020-11-24 PROCEDURE — 87116 MYCOBACTERIA CULTURE: CPT

## 2020-11-24 PROCEDURE — 6360000002 HC RX W HCPCS: Performed by: PHYSICIAN ASSISTANT

## 2020-11-24 PROCEDURE — 80048 BASIC METABOLIC PNL TOTAL CA: CPT

## 2020-11-24 PROCEDURE — 87077 CULTURE AEROBIC IDENTIFY: CPT

## 2020-11-24 PROCEDURE — 2000000000 HC ICU R&B

## 2020-11-24 PROCEDURE — 2500000003 HC RX 250 WO HCPCS: Performed by: NURSE ANESTHETIST, CERTIFIED REGISTERED

## 2020-11-24 PROCEDURE — 87205 SMEAR GRAM STAIN: CPT

## 2020-11-24 PROCEDURE — 2580000003 HC RX 258: Performed by: SURGERY

## 2020-11-24 PROCEDURE — 89220 SPUTUM SPECIMEN COLLECTION: CPT

## 2020-11-24 PROCEDURE — 6360000004 HC RX CONTRAST MEDICATION: Performed by: SURGERY

## 2020-11-24 PROCEDURE — 2580000003 HC RX 258: Performed by: ANESTHESIOLOGY

## 2020-11-24 PROCEDURE — 82962 GLUCOSE BLOOD TEST: CPT

## 2020-11-24 PROCEDURE — 81001 URINALYSIS AUTO W/SCOPE: CPT

## 2020-11-24 PROCEDURE — 99283 EMERGENCY DEPT VISIT LOW MDM: CPT

## 2020-11-24 PROCEDURE — 85007 BL SMEAR W/DIFF WBC COUNT: CPT

## 2020-11-24 PROCEDURE — 36415 COLL VENOUS BLD VENIPUNCTURE: CPT

## 2020-11-24 PROCEDURE — 85027 COMPLETE CBC AUTOMATED: CPT

## 2020-11-24 PROCEDURE — 7100000000 HC PACU RECOVERY - FIRST 15 MIN: Performed by: SURGERY

## 2020-11-24 PROCEDURE — 94761 N-INVAS EAR/PLS OXIMETRY MLT: CPT

## 2020-11-24 PROCEDURE — 2500000003 HC RX 250 WO HCPCS

## 2020-11-24 PROCEDURE — 87186 SC STD MICRODIL/AGAR DIL: CPT

## 2020-11-24 PROCEDURE — 83880 ASSAY OF NATRIURETIC PEPTIDE: CPT

## 2020-11-24 PROCEDURE — 6370000000 HC RX 637 (ALT 250 FOR IP): Performed by: PHYSICIAN ASSISTANT

## 2020-11-24 PROCEDURE — 3600000002 HC SURGERY LEVEL 2 BASE: Performed by: SURGERY

## 2020-11-24 PROCEDURE — 3700000000 HC ANESTHESIA ATTENDED CARE: Performed by: SURGERY

## 2020-11-24 PROCEDURE — 80053 COMPREHEN METABOLIC PANEL: CPT

## 2020-11-24 PROCEDURE — 36600 WITHDRAWAL OF ARTERIAL BLOOD: CPT

## 2020-11-24 PROCEDURE — 71045 X-RAY EXAM CHEST 1 VIEW: CPT

## 2020-11-24 PROCEDURE — 3600000012 HC SURGERY LEVEL 2 ADDTL 15MIN: Performed by: SURGERY

## 2020-11-24 PROCEDURE — 94750 HC PULMONARY COMPLIANCE STUDY: CPT

## 2020-11-24 PROCEDURE — 99221 1ST HOSP IP/OBS SF/LOW 40: CPT | Performed by: SURGERY

## 2020-11-24 PROCEDURE — 2709999900 HC NON-CHARGEABLE SUPPLY: Performed by: SURGERY

## 2020-11-24 PROCEDURE — 5A1955Z RESPIRATORY VENTILATION, GREATER THAN 96 CONSECUTIVE HOURS: ICD-10-PCS | Performed by: STUDENT IN AN ORGANIZED HEALTH CARE EDUCATION/TRAINING PROGRAM

## 2020-11-24 PROCEDURE — 6360000002 HC RX W HCPCS: Performed by: NURSE ANESTHETIST, CERTIFIED REGISTERED

## 2020-11-24 PROCEDURE — 3700000001 HC ADD 15 MINUTES (ANESTHESIA): Performed by: SURGERY

## 2020-11-24 PROCEDURE — 87070 CULTURE OTHR SPECIMN AEROBIC: CPT

## 2020-11-24 PROCEDURE — 82803 BLOOD GASES ANY COMBINATION: CPT

## 2020-11-24 PROCEDURE — U0002 COVID-19 LAB TEST NON-CDC: HCPCS

## 2020-11-24 PROCEDURE — 85025 COMPLETE CBC W/AUTO DIFF WBC: CPT

## 2020-11-24 PROCEDURE — 87075 CULTR BACTERIA EXCEPT BLOOD: CPT

## 2020-11-24 PROCEDURE — 84484 ASSAY OF TROPONIN QUANT: CPT

## 2020-11-24 PROCEDURE — 72193 CT PELVIS W/DYE: CPT

## 2020-11-24 PROCEDURE — 2580000003 HC RX 258: Performed by: NURSE ANESTHETIST, CERTIFIED REGISTERED

## 2020-11-24 PROCEDURE — 2500000003 HC RX 250 WO HCPCS: Performed by: PHYSICIAN ASSISTANT

## 2020-11-24 PROCEDURE — 87040 BLOOD CULTURE FOR BACTERIA: CPT

## 2020-11-24 PROCEDURE — 87102 FUNGUS ISOLATION CULTURE: CPT

## 2020-11-24 PROCEDURE — 0202U NFCT DS 22 TRGT SARS-COV-2: CPT

## 2020-11-24 PROCEDURE — 6360000002 HC RX W HCPCS: Performed by: SURGERY

## 2020-11-24 PROCEDURE — 6360000002 HC RX W HCPCS

## 2020-11-24 RX ORDER — CHLORHEXIDINE GLUCONATE 0.12 MG/ML
15 RINSE ORAL 2 TIMES DAILY
Status: DISCONTINUED | OUTPATIENT
Start: 2020-11-24 | End: 2020-11-27

## 2020-11-24 RX ORDER — SODIUM CHLORIDE, SODIUM LACTATE, POTASSIUM CHLORIDE, CALCIUM CHLORIDE 600; 310; 30; 20 MG/100ML; MG/100ML; MG/100ML; MG/100ML
INJECTION, SOLUTION INTRAVENOUS CONTINUOUS PRN
Status: DISCONTINUED | OUTPATIENT
Start: 2020-11-24 | End: 2020-11-24 | Stop reason: SDUPTHER

## 2020-11-24 RX ORDER — FENTANYL CITRATE 50 UG/ML
25 INJECTION, SOLUTION INTRAMUSCULAR; INTRAVENOUS EVERY 5 MIN PRN
Status: DISCONTINUED | OUTPATIENT
Start: 2020-11-24 | End: 2020-11-24

## 2020-11-24 RX ORDER — FENTANYL CITRATE 50 UG/ML
50 INJECTION, SOLUTION INTRAMUSCULAR; INTRAVENOUS EVERY 5 MIN PRN
Status: DISCONTINUED | OUTPATIENT
Start: 2020-11-24 | End: 2020-11-24

## 2020-11-24 RX ORDER — 0.9 % SODIUM CHLORIDE 0.9 %
1500 INTRAVENOUS SOLUTION INTRAVENOUS ONCE
Status: COMPLETED | OUTPATIENT
Start: 2020-11-24 | End: 2020-11-24

## 2020-11-24 RX ORDER — PROPOFOL 10 MG/ML
INJECTION, EMULSION INTRAVENOUS PRN
Status: DISCONTINUED | OUTPATIENT
Start: 2020-11-24 | End: 2020-11-24 | Stop reason: SDUPTHER

## 2020-11-24 RX ORDER — MINERAL OIL AND WHITE PETROLATUM 150; 830 MG/G; MG/G
OINTMENT OPHTHALMIC EVERY 4 HOURS
Status: DISCONTINUED | OUTPATIENT
Start: 2020-11-24 | End: 2020-11-27

## 2020-11-24 RX ORDER — NICOTINE POLACRILEX 4 MG
15 LOZENGE BUCCAL PRN
Status: DISCONTINUED | OUTPATIENT
Start: 2020-11-24 | End: 2020-11-25 | Stop reason: SDUPTHER

## 2020-11-24 RX ORDER — HYDROMORPHONE HCL 110MG/55ML
PATIENT CONTROLLED ANALGESIA SYRINGE INTRAVENOUS PRN
Status: DISCONTINUED | OUTPATIENT
Start: 2020-11-24 | End: 2020-11-24 | Stop reason: SDUPTHER

## 2020-11-24 RX ORDER — ROCURONIUM BROMIDE 10 MG/ML
INJECTION, SOLUTION INTRAVENOUS PRN
Status: DISCONTINUED | OUTPATIENT
Start: 2020-11-24 | End: 2020-11-24 | Stop reason: SDUPTHER

## 2020-11-24 RX ORDER — DEXTROSE MONOHYDRATE 50 MG/ML
100 INJECTION, SOLUTION INTRAVENOUS PRN
Status: DISCONTINUED | OUTPATIENT
Start: 2020-11-24 | End: 2020-11-25 | Stop reason: SDUPTHER

## 2020-11-24 RX ORDER — MAGNESIUM HYDROXIDE 1200 MG/15ML
LIQUID ORAL
Status: COMPLETED | OUTPATIENT
Start: 2020-11-24 | End: 2020-11-24

## 2020-11-24 RX ORDER — LABETALOL HYDROCHLORIDE 5 MG/ML
5 INJECTION, SOLUTION INTRAVENOUS EVERY 10 MIN PRN
Status: DISCONTINUED | OUTPATIENT
Start: 2020-11-24 | End: 2020-11-24

## 2020-11-24 RX ORDER — ALBUTEROL SULFATE 90 UG/1
2 AEROSOL, METERED RESPIRATORY (INHALATION) EVERY 6 HOURS PRN
Status: DISCONTINUED | OUTPATIENT
Start: 2020-11-24 | End: 2020-12-06 | Stop reason: HOSPADM

## 2020-11-24 RX ORDER — HYDRALAZINE HYDROCHLORIDE 20 MG/ML
5 INJECTION INTRAMUSCULAR; INTRAVENOUS EVERY 10 MIN PRN
Status: DISCONTINUED | OUTPATIENT
Start: 2020-11-24 | End: 2020-11-24

## 2020-11-24 RX ORDER — MIDAZOLAM HYDROCHLORIDE 1 MG/ML
INJECTION INTRAMUSCULAR; INTRAVENOUS PRN
Status: DISCONTINUED | OUTPATIENT
Start: 2020-11-24 | End: 2020-11-24 | Stop reason: SDUPTHER

## 2020-11-24 RX ORDER — SODIUM CHLORIDE, SODIUM LACTATE, POTASSIUM CHLORIDE, CALCIUM CHLORIDE 600; 310; 30; 20 MG/100ML; MG/100ML; MG/100ML; MG/100ML
INJECTION, SOLUTION INTRAVENOUS CONTINUOUS
Status: DISCONTINUED | OUTPATIENT
Start: 2020-11-24 | End: 2020-11-30

## 2020-11-24 RX ORDER — LIDOCAINE HYDROCHLORIDE 20 MG/ML
INJECTION, SOLUTION INTRAVENOUS PRN
Status: DISCONTINUED | OUTPATIENT
Start: 2020-11-24 | End: 2020-11-24 | Stop reason: SDUPTHER

## 2020-11-24 RX ORDER — 0.9 % SODIUM CHLORIDE 0.9 %
1000 INTRAVENOUS SOLUTION INTRAVENOUS ONCE
Status: COMPLETED | OUTPATIENT
Start: 2020-11-24 | End: 2020-11-25

## 2020-11-24 RX ORDER — ONDANSETRON 2 MG/ML
4 INJECTION INTRAMUSCULAR; INTRAVENOUS
Status: DISCONTINUED | OUTPATIENT
Start: 2020-11-24 | End: 2020-11-24

## 2020-11-24 RX ORDER — FENTANYL CITRATE 50 UG/ML
INJECTION, SOLUTION INTRAMUSCULAR; INTRAVENOUS
Status: COMPLETED
Start: 2020-11-24 | End: 2020-11-24

## 2020-11-24 RX ORDER — PROPOFOL 10 MG/ML
10 INJECTION, EMULSION INTRAVENOUS CONTINUOUS
Status: DISCONTINUED | OUTPATIENT
Start: 2020-11-24 | End: 2020-11-24

## 2020-11-24 RX ORDER — PROPOFOL 10 MG/ML
10 INJECTION, EMULSION INTRAVENOUS
Status: DISCONTINUED | OUTPATIENT
Start: 2020-11-24 | End: 2020-11-27

## 2020-11-24 RX ORDER — DEXTROSE MONOHYDRATE 25 G/50ML
12.5 INJECTION, SOLUTION INTRAVENOUS PRN
Status: DISCONTINUED | OUTPATIENT
Start: 2020-11-24 | End: 2020-11-25 | Stop reason: SDUPTHER

## 2020-11-24 RX ORDER — ASPIRIN 81 MG/1
81 TABLET ORAL DAILY
Status: DISCONTINUED | OUTPATIENT
Start: 2020-11-25 | End: 2020-11-26

## 2020-11-24 RX ORDER — POLYVINYL ALCOHOL 14 MG/ML
1 SOLUTION/ DROPS OPHTHALMIC EVERY 4 HOURS
Status: DISCONTINUED | OUTPATIENT
Start: 2020-11-24 | End: 2020-11-27

## 2020-11-24 RX ORDER — ONDANSETRON 2 MG/ML
INJECTION INTRAMUSCULAR; INTRAVENOUS PRN
Status: DISCONTINUED | OUTPATIENT
Start: 2020-11-24 | End: 2020-11-24 | Stop reason: SDUPTHER

## 2020-11-24 RX ORDER — VECURONIUM BROMIDE 1 MG/ML
INJECTION, POWDER, LYOPHILIZED, FOR SOLUTION INTRAVENOUS PRN
Status: DISCONTINUED | OUTPATIENT
Start: 2020-11-24 | End: 2020-11-24 | Stop reason: SDUPTHER

## 2020-11-24 RX ORDER — FENTANYL CITRATE 50 UG/ML
50 INJECTION, SOLUTION INTRAMUSCULAR; INTRAVENOUS EVERY 5 MIN PRN
Status: COMPLETED | OUTPATIENT
Start: 2020-11-24 | End: 2020-11-24

## 2020-11-24 RX ORDER — FENTANYL CITRATE 50 UG/ML
25 INJECTION, SOLUTION INTRAMUSCULAR; INTRAVENOUS
Status: DISCONTINUED | OUTPATIENT
Start: 2020-11-24 | End: 2020-11-27

## 2020-11-24 RX ADMIN — SODIUM CHLORIDE 1500 ML: 9 INJECTION, SOLUTION INTRAVENOUS at 21:30

## 2020-11-24 RX ADMIN — FENTANYL CITRATE 50 MCG: 50 INJECTION INTRAMUSCULAR; INTRAVENOUS at 19:43

## 2020-11-24 RX ADMIN — VECURONIUM BROMIDE FOR INJECTION 5 MG: 1 INJECTION, POWDER, LYOPHILIZED, FOR SOLUTION INTRAVENOUS at 18:38

## 2020-11-24 RX ADMIN — PROPOFOL 80 MG: 10 INJECTION, EMULSION INTRAVENOUS at 17:17

## 2020-11-24 RX ADMIN — Medication 4 MCG/MIN: at 22:50

## 2020-11-24 RX ADMIN — LIDOCAINE HYDROCHLORIDE 100 MG: 20 INJECTION, SOLUTION INTRAVENOUS at 17:18

## 2020-11-24 RX ADMIN — PROPOFOL 5 MCG/KG/MIN: 10 INJECTION, EMULSION INTRAVENOUS at 20:07

## 2020-11-24 RX ADMIN — Medication 25 MCG/HR: at 20:07

## 2020-11-24 RX ADMIN — SODIUM CHLORIDE, POTASSIUM CHLORIDE, SODIUM LACTATE AND CALCIUM CHLORIDE: 600; 310; 30; 20 INJECTION, SOLUTION INTRAVENOUS at 17:15

## 2020-11-24 RX ADMIN — SODIUM CHLORIDE, POTASSIUM CHLORIDE, SODIUM LACTATE AND CALCIUM CHLORIDE: 600; 310; 30; 20 INJECTION, SOLUTION INTRAVENOUS at 18:48

## 2020-11-24 RX ADMIN — HYDROMORPHONE HYDROCHLORIDE 0.4 MG: 2 INJECTION INTRAMUSCULAR; INTRAVENOUS; SUBCUTANEOUS at 17:17

## 2020-11-24 RX ADMIN — DEXTROSE MONOHYDRATE 150 MG: 50 INJECTION, SOLUTION INTRAVENOUS at 20:55

## 2020-11-24 RX ADMIN — IOPAMIDOL 75 ML: 755 INJECTION, SOLUTION INTRAVENOUS at 16:46

## 2020-11-24 RX ADMIN — ROCURONIUM BROMIDE 50 MG: 10 INJECTION INTRAVENOUS at 17:18

## 2020-11-24 RX ADMIN — HYDROMORPHONE HYDROCHLORIDE 1.2 MG: 2 INJECTION INTRAMUSCULAR; INTRAVENOUS; SUBCUTANEOUS at 17:57

## 2020-11-24 RX ADMIN — ONDANSETRON 4 MG: 2 INJECTION INTRAMUSCULAR; INTRAVENOUS at 17:57

## 2020-11-24 RX ADMIN — VANCOMYCIN HYDROCHLORIDE 2000 MG: 1 INJECTION, POWDER, LYOPHILIZED, FOR SOLUTION INTRAVENOUS at 15:54

## 2020-11-24 RX ADMIN — MIDAZOLAM 2 MG: 1 INJECTION INTRAMUSCULAR; INTRAVENOUS at 18:38

## 2020-11-24 RX ADMIN — FAMOTIDINE 20 MG: 10 INJECTION INTRAVENOUS at 23:30

## 2020-11-24 RX ADMIN — HYDROMORPHONE HYDROCHLORIDE 0.4 MG: 2 INJECTION INTRAMUSCULAR; INTRAVENOUS; SUBCUTANEOUS at 17:34

## 2020-11-24 RX ADMIN — PIPERACILLIN AND TAZOBACTAM 3.38 G: 3; .375 INJECTION, POWDER, LYOPHILIZED, FOR SOLUTION INTRAVENOUS at 22:09

## 2020-11-24 RX ADMIN — VECURONIUM BROMIDE FOR INJECTION 5 MG: 1 INJECTION, POWDER, LYOPHILIZED, FOR SOLUTION INTRAVENOUS at 17:57

## 2020-11-24 RX ADMIN — FENTANYL CITRATE 50 MCG: 50 INJECTION INTRAMUSCULAR; INTRAVENOUS at 19:37

## 2020-11-24 RX ADMIN — VANCOMYCIN HYDROCHLORIDE 2 G: 1 INJECTION, POWDER, LYOPHILIZED, FOR SOLUTION INTRAVENOUS at 17:15

## 2020-11-24 RX ADMIN — SODIUM CHLORIDE 1000 ML: 9 INJECTION, SOLUTION INTRAVENOUS at 23:00

## 2020-11-24 ASSESSMENT — PULMONARY FUNCTION TESTS
PIF_VALUE: 23
PIF_VALUE: 25
PIF_VALUE: 24
PIF_VALUE: 30
PIF_VALUE: 26
PIF_VALUE: 26
PIF_VALUE: 36
PIF_VALUE: 23
PIF_VALUE: 23
PIF_VALUE: 27
PIF_VALUE: 34
PIF_VALUE: 26
PIF_VALUE: 35
PIF_VALUE: 23
PIF_VALUE: 24
PIF_VALUE: 25
PIF_VALUE: 1
PIF_VALUE: 35
PIF_VALUE: 0
PIF_VALUE: 22
PIF_VALUE: 25
PIF_VALUE: 32
PIF_VALUE: 25
PIF_VALUE: 25
PIF_VALUE: 24
PIF_VALUE: 24
PIF_VALUE: 23
PIF_VALUE: 30
PIF_VALUE: 31
PIF_VALUE: 24
PIF_VALUE: 25
PIF_VALUE: 24
PIF_VALUE: 25
PIF_VALUE: 22
PIF_VALUE: 23
PIF_VALUE: 24
PIF_VALUE: 22
PIF_VALUE: 25
PIF_VALUE: 20
PIF_VALUE: 29
PIF_VALUE: 26
PIF_VALUE: 22
PIF_VALUE: 27
PIF_VALUE: 23
PIF_VALUE: 35
PIF_VALUE: 30
PIF_VALUE: 23
PIF_VALUE: 26
PIF_VALUE: 23
PIF_VALUE: 22
PIF_VALUE: 25
PIF_VALUE: 2
PIF_VALUE: 25
PIF_VALUE: 23
PIF_VALUE: 24
PIF_VALUE: 25
PIF_VALUE: 24
PIF_VALUE: 33
PIF_VALUE: 26
PIF_VALUE: 22
PIF_VALUE: 22
PIF_VALUE: 36
PIF_VALUE: 35
PIF_VALUE: 23
PIF_VALUE: 26
PIF_VALUE: 23
PIF_VALUE: 24
PIF_VALUE: 24
PIF_VALUE: 25
PIF_VALUE: 28
PIF_VALUE: 35
PIF_VALUE: 23
PIF_VALUE: 28
PIF_VALUE: 37
PIF_VALUE: 32
PIF_VALUE: 26
PIF_VALUE: 24
PIF_VALUE: 0
PIF_VALUE: 0
PIF_VALUE: 21
PIF_VALUE: 23
PIF_VALUE: 24
PIF_VALUE: 24
PIF_VALUE: 25
PIF_VALUE: 26
PIF_VALUE: 20
PIF_VALUE: 24
PIF_VALUE: 22
PIF_VALUE: 23
PIF_VALUE: 24
PIF_VALUE: 25
PIF_VALUE: 23
PIF_VALUE: 23
PIF_VALUE: 25
PIF_VALUE: 0
PIF_VALUE: 24
PIF_VALUE: 24
PIF_VALUE: 23
PIF_VALUE: 18
PIF_VALUE: 27
PIF_VALUE: 23
PIF_VALUE: 23
PIF_VALUE: 24
PIF_VALUE: 24
PIF_VALUE: 28
PIF_VALUE: 23
PIF_VALUE: 24
PIF_VALUE: 25
PIF_VALUE: 22
PIF_VALUE: 24
PIF_VALUE: 25
PIF_VALUE: 31
PIF_VALUE: 20
PIF_VALUE: 23
PIF_VALUE: 32

## 2020-11-24 ASSESSMENT — PAIN SCALES - GENERAL
PAINLEVEL_OUTOF10: 0
PAINLEVEL_OUTOF10: 0
PAINLEVEL_OUTOF10: 10

## 2020-11-24 ASSESSMENT — PAIN DESCRIPTION - PAIN TYPE: TYPE: ACUTE PAIN

## 2020-11-24 ASSESSMENT — LIFESTYLE VARIABLES: SMOKING_STATUS: 1

## 2020-11-24 NOTE — CONSULTS
Department of General Surgery   Surgical Service Dr. Radha Henriquez   Consult Note    Date of Consult: 11/24/20    Reason for Consult:  Perineum abscess  Requesting Physician:  Devin PUENTES    CHIEF COMPLAINT:  Perineum abscess    History Obtained From:  patient    HISTORY OF PRESENT ILLNESS:    The patient is a 72 y.o. male who presented with perineum pain and drainage. He reports onset about a week ago. 6 days ago his wife used a diabetic lancet to talib the \"boil\" and some blood and puss was drained. Symptoms have persisted and he reported to the ED today due to worsening pain and drainage. He denies fevers or chills. He also has a left foot wound for which he is followed in wound care clinic. Denies other complaints. Labs and CT are pending. Past Medical History:    Past Medical History:   Diagnosis Date    CHF (congestive heart failure) ([address]     Chronic ulcer of left leg with fat layer exposed ([address] 12/12/2016    Chronic ulcer of right leg with fat layer exposed ([address] 12/12/2016    Chronic ulcer of right leg with fat layer exposed ([address] 12/12/2016    Diabetes mellitus ([address]     Hypertension     PVD (peripheral vascular disease) ([address] 12/12/2016    Type 2 diabetes mellitus with diabetic peripheral angiopathy without gangrene, without long-term current use of insulin ([address] 12/12/2016    Venous stasis ulcer of both lower extremities without varicose veins ([address] 12/12/2016       Past Surgical History:    History reviewed. No pertinent surgical history.     Current Medications:   Current Facility-Administered Medications   Medication Dose Route Frequency Provider Last Rate Last Dose    vancomycin (VANCOCIN) 2,000 mg in dextrose 5 % 500 mL IVPB  2,000 mg Intravenous Once Eli Arechiga [facility]  mL/hr at 11/24/20 1554 2,000 mg at 11/24/20 1554    piperacillin-tazobactam (ZOSYN) 3.375 g in dextrose 5 % 50 mL IVPB (mini-bag)  3.375 g Intravenous Once Rea TonyeniDHAVAL stein        clindamycin (CLEOCIN) 900 mg in dextrose 5 % 50 mL IVPB  900 mg Intravenous Once [place] DHAVAL George           Allergies:  Patient has no known allergies. Social History:   Social History     Socioeconomic History    Marital status:      Spouse name: None    Number of children: None    Years of education: None    Highest education level: None   Occupational History    None   Social Needs    Financial resource strain: None    Food insecurity     Worry: None     Inability: None    Transportation needs     Medical: None     Non-medical: None   Tobacco Use    Smoking status: Current Every Day Smoker     Packs/day: 1.00     Years: 60.00     Pack years: 60.00     Types: Cigarettes    Smokeless tobacco: Never Used    Tobacco comment: healing    Substance and Sexual Activity    Alcohol use: Never     Frequency: Never    Drug use: No    Sexual activity: None   Lifestyle    Physical activity     Days per week: None     Minutes per session: None    Stress: None   Relationships    Social connections     Talks on phone: None     Gets together: None     Attends Spiritism service: None     Active member of club or organization: None     Attends meetings of clubs or organizations: None     Relationship status: None    Intimate partner violence     Fear of current or ex partner: None     Emotionally abused: None     Physically abused: None     Forced sexual activity: None   Other Topics Concern    None   Social History Narrative    None       Family History:   Family History   Problem Relation Age of Onset    Asthma Mother     Breast Cancer Mother     Cancer Mother     Diabetes Mother     High Blood Pressure Mother     Cancer Father     Early Death Brother     Arthritis Paternal Grandmother        REVIEW OF SYSTEMS:    Review of Systems   Constitutional: Negative for chills. Negative for fever. HENT: Negative for congestion. Negative for rhinorrhea. Respiratory: Negative for cough. Negative for shortness of breath. Negative for wheezing.  +tobacco use  Cardiovascular: Negative for chest pain. Gastrointestinal: Negative for constipation. Negative for diarrhea. Negative for nausea and vomiting. Genitourinary: Negative for difficulty urinating. Neurological: Negative for dizziness, syncope and numbness. Hematological: Does not bruise/bleed easily. No blood thinners    PHYSICAL EXAM:  Vitals:    11/24/20 1135   BP: (!) 142/81   Pulse: 82   Resp: 14   Temp: 97.9 °F (36.6 °C)   TempSrc: Oral   SpO2: 96%       Physical Exam  General: awake, alert, in no acute distress  HEENT: mucous membranes moist  Respiratory: normal effort, no wheezes appreciated  CV: appears well perfused  Abdomen: Soft, non-tender, non-distended. Rectal: perineum with several skin openings draining foul smelling black drainage, areas of skin necrosis are present, no crepitus appreciated, induration and blanching erythema is present in the periwound  Skin: warm and dry  Extremities: left lower extremity with Unna boot in place  Neuro: no focal deficits noted  Psych: mood normal        DATA:    Lab Results   Component Value Date    WBC 36.9 (HH) 11/24/2020    HGB 13.8 11/24/2020    HCT 44.4 11/24/2020    MCV 97.6 11/24/2020     11/24/2020     Lab Results   Component Value Date     11/24/2020    K 4.0 11/24/2020    CL 97 11/24/2020    CO2 22 11/24/2020    BUN 42 11/24/2020    CREATININE 1.5 11/24/2020    GLUCOSE 128 11/24/2020    CALCIUM 8.5 11/24/2020          IMPRESSION:    72 y.o. male with perineum abscess.      Patient Active Problem List:     Venous stasis ulcer of both lower extremities without varicose veins (HCC)     WD-Ulcer of shin, left, with fat layer exposed ([address]     PVD (peripheral vascular disease) ([address]     Type 2 diabetes mellitus with diabetic peripheral angiopathy without gangrene, without long-term current use of insulin (HCC)     WD-Venous stasis of both lower extremities WD-Lymphedema of both lower extremities     WD-Idiopathic chronic venous hypertension of left lower extremity with ulcer ([facility]     WD-Traumatic open wound of left lower leg, complicated by diabetes and venous insufficiency        PLAN:  - recommend IV antibiotics  - CT pelvis to assess extent of disease  - NPO with IVF, will need OR for debridement/I&D    This procedure has been fully reviewed with the patient and written informed consent has been obtained.       Electronically signed by Juan R Holguin MD on 11/24/2020 at 4:16 PM

## 2020-11-24 NOTE — ED PROVIDER NOTES
EMERGENCY DEPARTMENT ENCOUNTER      PCP: Berneice Boeck, APRN - [place] [id number]    Chief Complaint   Patient presents with    Abscess     This patient was not evaluated by the attending physician. I have independently evaluated this patient . HPI    Eufemia Tavares is a 72 y.o. male who presented emergency department today with concern of an infection into the perineum. Patient states that he has had some pain and drainage in this area, he states that his wife drained a \"boil\" with one of his diabetic lancets 3 days ago, since that he states has had a lot of drainage in his area, there has been redness, he states that he noticed that there is a \"black tissue\" in the area. He states that the redness is also into the right buttocks. He has history of poor wound healing, he is a diabetic, he states that he has seen at a wound center for his left lower extremity ulcers. He denies fevers or chills. No exquisite pain into the perineum. He does admit that there have been mild scrotal involvement, no significant testicular pain. States he has been having bowel movements. REVIEW OF SYSTEMS    Constitutional: No fever, chills  HENT:  Denies upper respiratory symptoms  Respiratory:  No cough or shortness of breath   Cardiovascular:  No chest pain  GI:  Denies abdominal pain, nausea, vomiting, or diarrhea  :  Denies any urinary symptoms    Musculoskeletal:  Denies backpain. Skin: See HPI. Lymphatic:  Denies swollen glands or streaks.    Endocrine:  Denies polyuria or polydypsia   Neurologic:  Denies headache, focal weakness or sensory changes  All other review of systems are negative  See HPI and nursing notes for additional information     PAST MEDICAL HISTORY/SURGICAL HISTORY     Past Medical History:   Diagnosis Date    CHF (congestive heart failure) ([address]     Chronic ulcer of left leg with fat layer exposed ([address] 12/12/2016    Chronic ulcer of right leg with fat layer exposed ([address] 12/12/2016  Chronic ulcer of right leg with fat layer exposed ([facility] 12/12/2016    Diabetes mellitus ([facility]     Hypertension     PVD (peripheral vascular disease) ([facility] 12/12/2016    Type 2 diabetes mellitus with diabetic peripheral angiopathy without gangrene, without long-term current use of insulin ([facility] 12/12/2016    Venous stasis ulcer of both lower extremities without varicose veins ([facility] 12/12/2016     History reviewed. No pertinent surgical history.     CURRENT MEDICATIONS        ALLERGIES    No Known Allergies    FAMILY HISTORY/SOCIAL HISTORY    Family History   Problem Relation Age of Onset    Asthma Mother     Breast Cancer Mother     Cancer Mother     Diabetes Mother     High Blood Pressure Mother     Cancer Father     Early Death Brother     Arthritis Paternal Grandmother      Social History     Socioeconomic History    Marital status:      Spouse name: None    Number of children: None    Years of education: None    Highest education level: None   Occupational History    None   Social Needs    Financial resource strain: None    Food insecurity     Worry: None     Inability: None    Transportation needs     Medical: None     Non-medical: None   Tobacco Use    Smoking status: Current Every Day Smoker     Packs/day: 1.00     Years: 60.00     Pack years: 60.00     Types: Cigarettes    Smokeless tobacco: Never Used    Tobacco comment: healing    Substance and Sexual Activity    Alcohol use: Never     Frequency: Never    Drug use: No    Sexual activity: None   Lifestyle    Physical activity     Days per week: None     Minutes per session: None    Stress: None   Relationships    Social connections     Talks on phone: None     Gets together: None     Attends Jewish service: None     Active member of club or organization: None     Attends meetings of clubs or organizations: None     Relationship status: None    Intimate partner violence     Fear of current or ex partner: None billable procedures. Secondary to the patient's looming necrotizing fasciitis, initiating IV fluids, empiric antibiotic therapy, consultations to general surgery, reevaluation's. Clinical  IMPRESSION    1. Cellulitis of perineum        Comment: Please note this report has been produced using speech recognition software and may contain errors related to that system including errors in grammar, punctuation, and spelling, as well as words and phrases that may be inappropriate. If there are any questions or concerns please feel free to contact the dictating provider for clarification.       Elizabeth Coates 411, PA  11/24/20 Angelica 417, PA  11/24/20 1226

## 2020-11-24 NOTE — ANESTHESIA PRE PROCEDURE
Department of Anesthesiology  Preprocedure Note       Name:  Temi Tavares   Age:  72 y.o.  :  1955                                          MRN:  [id number]         Date:  2020      Surgeon: Radha Pelaez):  Juan R Holguin MD    Procedure: Procedure(s):  RECTAL PERIRECTAL INCISION AND DRAINAGE    Medications prior to admission:   Prior to Admission medications    Medication Sig Start Date End Date Taking? Authorizing Provider   Latanoprost 0.005 % EMUL Apply to eye daily    Historical Provider, MD   ibuprofen (ADVIL;MOTRIN) 600 MG tablet Take 1 tablet by mouth every 6 hours as needed for Pain 18   Kita Giang MD   glipiZIDE (GLUCOTROL) 5 MG tablet Take 5 mg by mouth 2 times daily (before meals)    Historical Provider, MD   potassium chloride (KLOR-CON M) 20 MEQ extended release tablet Take 20 mEq by mouth daily    Historical Provider, MD   ALPRAZolam (XANAX) 0.5 MG tablet Take 0.5 mg by mouth nightly. Kadeem Blanco Historical Provider, MD   ipratropium-albuterol (DUONEB) 0.5-2.5 (3) MG/3ML SOLN nebulizer solution Inhale 1 vial into the lungs every 4 hours    Historical Provider, MD   albuterol sulfate  (90 Base) MCG/ACT inhaler Inhale 2 puffs into the lungs every 6 hours as needed for Wheezing    Historical Provider, MD   metolazone (ZAROXOLYN) 2.5 MG tablet Take 2 tablets by mouth daily 7/10/18 8/9/18  Piero Herron PA-C   aspirin EC 81 MG EC tablet Take 1 tablet by mouth daily 1/3/17   Sharifa Parikh MD   HYDROcodone-acetaminophen ([address]) 7.5-325 MG per tablet Take 1 tablet by mouth 3 times daily .     Historical Provider, MD   metFORMIN (GLUCOPHAGE) 1000 MG tablet Take 1,000 mg by mouth 2 times daily (with meals)    Historical Provider, MD   furosemide (LASIX) 80 MG tablet Take 80 mg by mouth 2 times daily    Historical Provider, MD   lisinopril (PRINIVIL;ZESTRIL) 20 MG tablet Take 40 mg by mouth daily     Historical Provider, MD   tamsulosin (FLOMAX) 0.4 MG capsule Take 0.4 mg by mouth daily    Historical Provider, MD   finasteride (PROSCAR) 5 MG tablet Take 5 mg by mouth daily    Historical Provider, MD       Current medications:    Current Facility-Administered Medications   Medication Dose Route Frequency Provider Last Rate Last Dose    vancomycin (VANCOCIN) 2,000 mg in dextrose 5 % 500 mL IVPB  2,000 mg Intravenous Once Todd [place], PA        piperacillin-tazobactam (ZOSYN) 3.375 g in dextrose 5 % 50 mL IVPB (mini-bag)  3.375 g Intravenous Once Todd Blevinsmfield, PA        clindamycin (CLEOCIN) 900 mg in dextrose 5 % 50 mL IVPB  900 mg Intravenous Once Todd KAREN BlevinsSharad, PA           Allergies:  No Known Allergies    Problem List:    Patient Active Problem List   Diagnosis Code    Venous stasis ulcer of both lower extremities without varicose veins ([facility]) I87.2, L97.919, L97.929    WD-Ulcer of shin, left, with fat layer exposed ([facility] [facility] P07.285    PVD (peripheral vascular disease) ([facility]) I73.9    Type 2 diabetes mellitus with diabetic peripheral angiopathy without gangrene, without long-term current use of insulin ([facility]) E11.51    WD-Venous stasis of both lower extremities I87.8    WD-Lymphedema of both lower extremities I89.0    WD-Idiopathic chronic venous hypertension of left lower extremity with ulcer ([facility]) I87.312, L97.929    WD-Traumatic open wound of left lower leg, complicated by diabetes and venous insufficiency S81.802A       Past Medical History:        Diagnosis Date    CHF (congestive heart failure) ([facility])     Chronic ulcer of left leg with fat layer exposed ([address] 12/12/2016    Chronic ulcer of right leg with fat layer exposed ([address] 12/12/2016    Chronic ulcer of right leg with fat layer exposed ([address] 12/12/2016    Diabetes mellitus ([address]     Hypertension     PVD (peripheral vascular disease) ([address] 12/12/2016    Type 2 diabetes mellitus with diabetic peripheral angiopathy without gangrene, without long-term current use of insulin ([address] 12/12/2016    Venous stasis ulcer of both lower extremities without varicose veins ([facility] 12/12/2016       Past Surgical History:  History reviewed. No pertinent surgical history. Social History:    Social History     Tobacco Use    Smoking status: Current Every Day Smoker     Packs/day: 1.00     Years: 60.00     Pack years: 60.00     Types: Cigarettes    Smokeless tobacco: Never Used    Tobacco comment: healing    Substance Use Topics    Alcohol use: Never     Frequency: Never                                Ready to quit: Not Answered  Counseling given: Not Answered  Comment: healing       Vital Signs (Current):   Vitals:    11/24/20 1135   BP: (!) 142/81   Pulse: 82   Resp: 14   Temp: 36.6 °C (97.9 °F)   TempSrc: Oral   SpO2: 96%                                              BP Readings from Last 3 Encounters:   11/24/20 (!) 142/81   11/23/20 (!) 91/46   11/20/20 (!) 153/64       NPO Status:                                                                                 BMI:   Wt Readings from Last 3 Encounters:   10/13/20 253 lb (114.8 kg)   10/04/20 270 lb (122.5 kg)   09/09/20 273 lb (123.8 kg)     There is no height or weight on file to calculate BMI.    CBC:   Lab Results   Component Value Date    WBC 11.2 12/04/2018    RBC 5.17 12/04/2018    HGB 16.2 12/04/2018    HCT 51.1 12/04/2018    MCV 98.8 12/04/2018    RDW 13.8 12/04/2018     12/04/2018       CMP:   Lab Results   Component Value Date     04/10/2019    K 4.3 04/10/2019    CL 94 04/10/2019    CO2 25 04/10/2019    BUN 18 04/10/2019    CREATININE 0.9 04/10/2019    GFRAA >60 04/10/2019    LABGLOM >60 04/10/2019    GLUCOSE 108 12/04/2018    PROT 6.9 04/10/2019    CALCIUM 9.0 04/10/2019    BILITOT 0.6 04/10/2019    ALKPHOS 67 04/10/2019    AST 25 04/10/2019    ALT 24 04/10/2019       POC Tests: No results for input(s): POCGLU, POCNA, POCK, POCCL, POCBUN, POCHEMO, POCHCT in the last 72 hours.     Coags:   Lab Results   Component Value Date    PROTIME 11.9 01/18/2017    INR 1.04 01/18/2017    APTT 28.7 01/18/2017       HCG (If Applicable): No results found for: PREGTESTUR, PREGSERUM, HCG, HCGQUANT     ABGs: No results found for: PHART, PO2ART, NHU0LZZ, NMU0RCG, BEART, F9XLNJBZ     Type & Screen (If Applicable):  No results found for: LABABO, LABRH    Drug/Infectious Status (If Applicable):  No results found for: HIV, HEPCAB    COVID-19 Screening (If Applicable):   Lab Results   Component Value Date    COVID19 NOT DETECTED 11/24/2020         Anesthesia Evaluation  Patient summary reviewed  Airway: Mallampati: Unable to assess / NA        Dental:          Pulmonary:   (+) COPD:  decreased breath sounds,  current smoker                          ROS comment: Smoker - 60 pack years    Cardiovascular:    (+) hypertension:, CHF:, pulmonary hypertension:,         Rhythm: regular             Beta Blocker:  Not on Beta Blocker      ROS comment: PAULO 1/2017:  Summary    This is a limited echo.    Technically difficult study    mild LVH noted    EF is around 30-40% range, global hypokinesis noted    Mild MR and TR noted    Right ventricular systolic pressure of 43 mm Hg consistent with mild    pulmonary hypertension.    No pericardial effusion      Neuro/Psych:   (+) psychiatric history:depression/anxiety             GI/Hepatic/Renal:   (+) morbid obesity          Endo/Other:    (+) DiabetesType II DM, , .                 Abdominal:   (+) obese,         Vascular:   + PVD, aortic or cerebral, . Anesthesia Plan      general     ASA 4 - emergent       Induction: intravenous and rapid sequence. MIPS: Postoperative opioids intended. Anesthetic plan and risks discussed with patient. Plan discussed with CRNA. Attending anesthesiologist reviewed and agrees with Pre Eval content            FAITH Hyman - CRNA   11/24/2020         Pre Anesthesia Assessment complete.  Chart reviewed on 11/24/2020

## 2020-11-24 NOTE — PROGRESS NOTES
1340-Patient to [facility], patient had no IV access, CT was not completed , no labs drawn and no COVID done on patient. Urinalysis also not collected. This RN had to call ER for report and to inquire if orders were completed. Awaited call back from [facility], no call back.

## 2020-11-24 NOTE — ED NOTES
Pt transferred from room 11 to room 37; report given to [address] [place] [address]..  Ek[facility]  11/24/20 9531

## 2020-11-25 ENCOUNTER — APPOINTMENT (OUTPATIENT)
Dept: GENERAL RADIOLOGY | Age: 65
DRG: 853 | End: 2020-11-25
Payer: MEDICARE

## 2020-11-25 ENCOUNTER — APPOINTMENT (OUTPATIENT)
Dept: CT IMAGING | Age: 65
DRG: 853 | End: 2020-11-25
Payer: MEDICARE

## 2020-11-25 ENCOUNTER — APPOINTMENT (OUTPATIENT)
Dept: ULTRASOUND IMAGING | Age: 65
DRG: 853 | End: 2020-11-25
Payer: MEDICARE

## 2020-11-25 LAB
ADENOVIRUS DETECTION BY PCR: NOT DETECTED
ALBUMIN SERPL-MCNC: 2.6 GM/DL (ref 3.4–5)
ALP BLD-CCNC: 135 IU/L (ref 40–129)
ALT SERPL-CCNC: 20 U/L (ref 10–40)
ANION GAP SERPL CALCULATED.3IONS-SCNC: 16 MMOL/L (ref 4–16)
ANION GAP SERPL CALCULATED.3IONS-SCNC: 18 MMOL/L (ref 4–16)
APTT: 29.7 SECONDS (ref 25.1–37.1)
AST SERPL-CCNC: 19 IU/L (ref 15–37)
BANDED NEUTROPHILS ABSOLUTE COUNT: 15.35 K/CU MM
BANDED NEUTROPHILS RELATIVE PERCENT: 38 % (ref 5–11)
BASE EXCESS: 4 (ref 0–3.3)
BILIRUB SERPL-MCNC: 0.7 MG/DL (ref 0–1)
BORDETELLA PARAPERTUSSIS BY PCR: NOT DETECTED
BORDETELLA PERTUSSIS PCR: NOT DETECTED
BUN BLDV-MCNC: 38 MG/DL (ref 6–23)
BUN BLDV-MCNC: 39 MG/DL (ref 6–23)
CALCIUM SERPL-MCNC: 7.7 MG/DL (ref 8.3–10.6)
CALCIUM SERPL-MCNC: 7.8 MG/DL (ref 8.3–10.6)
CARBON MONOXIDE, BLOOD: 1.6 % (ref 0–5)
CHLAMYDOPHILA PNEUMONIA PCR: NOT DETECTED
CHLORIDE BLD-SCNC: 100 MMOL/L (ref 99–110)
CHLORIDE BLD-SCNC: 97 MMOL/L (ref 99–110)
CO2 CONTENT: 25 MMOL/L (ref 19–24)
CO2: 21 MMOL/L (ref 21–32)
CO2: 21 MMOL/L (ref 21–32)
COMMENT: ABNORMAL
CORONAVIRUS 229E PCR: NOT DETECTED
CORONAVIRUS HKU1 PCR: NOT DETECTED
CORONAVIRUS NL63 PCR: NOT DETECTED
CORONAVIRUS OC43 PCR: NOT DETECTED
CREAT SERPL-MCNC: 1.3 MG/DL (ref 0.9–1.3)
CREAT SERPL-MCNC: 1.5 MG/DL (ref 0.9–1.3)
D DIMER: 1694 NG/ML(DDU)
DIFFERENTIAL TYPE: ABNORMAL
EKG DIAGNOSIS: NORMAL
EKG Q-T INTERVAL: 324 MS
EKG QRS DURATION: 118 MS
EKG QTC CALCULATION (BAZETT): 511 MS
EKG R AXIS: -51 DEGREES
EKG T AXIS: 66 DEGREES
EKG VENTRICULAR RATE: 150 BPM
GFR AFRICAN AMERICAN: 57 ML/MIN/1.73M2
GFR AFRICAN AMERICAN: >60 ML/MIN/1.73M2
GFR NON-AFRICAN AMERICAN: 47 ML/MIN/1.73M2
GFR NON-AFRICAN AMERICAN: 55 ML/MIN/1.73M2
GIANT PLATELETS: PRESENT
GLUCOSE BLD-MCNC: 146 MG/DL (ref 70–99)
GLUCOSE BLD-MCNC: 164 MG/DL (ref 70–99)
GLUCOSE BLD-MCNC: 173 MG/DL (ref 70–99)
GLUCOSE BLD-MCNC: 193 MG/DL (ref 70–99)
GLUCOSE BLD-MCNC: 195 MG/DL (ref 70–99)
GLUCOSE BLD-MCNC: 201 MG/DL (ref 70–99)
GLUCOSE BLD-MCNC: 216 MG/DL (ref 70–99)
GLUCOSE BLD-MCNC: 231 MG/DL (ref 70–99)
HCO3 ARTERIAL: 23.5 MMOL/L (ref 18–23)
HCT VFR BLD CALC: 40.2 % (ref 42–52)
HCT VFR BLD CALC: 44 % (ref 42–52)
HEMOGLOBIN: 13.2 GM/DL (ref 13.5–18)
HEMOGLOBIN: 13.4 GM/DL (ref 13.5–18)
HUMAN METAPNEUMOVIRUS PCR: NOT DETECTED
INFLUENZA A BY PCR: NOT DETECTED
INFLUENZA A H1 (2009) PCR: NOT DETECTED
INFLUENZA A H1 PANDEMIC PCR: NOT DETECTED
INFLUENZA A H3 PCR: NOT DETECTED
INFLUENZA B BY PCR: NOT DETECTED
INR BLD: 1.14 INDEX
LACTIC ACID, SEPSIS: 1.6 MMOL/L (ref 0.5–1.9)
LV EF: 50 %
LVEF MODALITY: NORMAL
LYMPHOCYTES ABSOLUTE: 0.8 K/CU MM
LYMPHOCYTES RELATIVE PERCENT: 2 % (ref 24–44)
MAGNESIUM: 2 MG/DL (ref 1.8–2.4)
MAGNESIUM: 2.2 MG/DL (ref 1.8–2.4)
MCH RBC QN AUTO: 31.5 PG (ref 27–31)
MCH RBC QN AUTO: 31.8 PG (ref 27–31)
MCHC RBC AUTO-ENTMCNC: 30.5 % (ref 32–36)
MCHC RBC AUTO-ENTMCNC: 32.8 % (ref 32–36)
MCV RBC AUTO: 103.5 FL (ref 78–100)
MCV RBC AUTO: 96.9 FL (ref 78–100)
METHEMOGLOBIN ARTERIAL: 1.9 %
MONOCYTES ABSOLUTE: 0.8 K/CU MM
MONOCYTES RELATIVE PERCENT: 2 % (ref 0–4)
MYCOPLASMA PNEUMONIAE PCR: NOT DETECTED
MYELOCYTE PERCENT: 1 %
MYELOCYTES ABSOLUTE COUNT: 0.4 K/CU MM
O2 SATURATION: 95.2 % (ref 96–97)
PARAINFLUENZA 1 PCR: NOT DETECTED
PARAINFLUENZA 2 PCR: NOT DETECTED
PARAINFLUENZA 3 PCR: NOT DETECTED
PARAINFLUENZA 4 PCR: NOT DETECTED
PCO2 ARTERIAL: 50 MMHG (ref 32–45)
PDW BLD-RTO: 14 % (ref 11.7–14.9)
PDW BLD-RTO: 14.2 % (ref 11.7–14.9)
PH BLOOD: 7.28 (ref 7.34–7.45)
PLATELET # BLD: 128 K/CU MM (ref 140–440)
PLATELET # BLD: 161 K/CU MM (ref 140–440)
PMV BLD AUTO: 11.8 FL (ref 7.5–11.1)
PMV BLD AUTO: 12 FL (ref 7.5–11.1)
PO2 ARTERIAL: 201 MMHG (ref 75–100)
POTASSIUM SERPL-SCNC: 4 MMOL/L (ref 3.5–5.1)
POTASSIUM SERPL-SCNC: 4.3 MMOL/L (ref 3.5–5.1)
PROMYELOCYTES ABSOLUTE COUNT: 0.4 K/CU MM
PROMYELOCYTES PERCENT: 1 %
PROTHROMBIN TIME: 13.8 SECONDS (ref 11.7–14.5)
RBC # BLD: 4.15 M/CU MM (ref 4.6–6.2)
RBC # BLD: 4.25 M/CU MM (ref 4.6–6.2)
RHINOVIRUS ENTEROVIRUS PCR: NOT DETECTED
RSV PCR: NOT DETECTED
SARS-COV-2: NOT DETECTED
SEGMENTED NEUTROPHILS ABSOLUTE COUNT: 22.7 K/CU MM
SEGMENTED NEUTROPHILS RELATIVE PERCENT: 56 % (ref 36–66)
SODIUM BLD-SCNC: 136 MMOL/L (ref 135–145)
SODIUM BLD-SCNC: 137 MMOL/L (ref 135–145)
T4 FREE: 1.83 NG/DL (ref 0.9–1.8)
TOTAL PROTEIN: 5.5 GM/DL (ref 6.4–8.2)
TOXIC GRANULATION: PRESENT
TSH HIGH SENSITIVITY: 0.13 UIU/ML (ref 0.27–4.2)
WBC # BLD: 40.4 K/CU MM (ref 4–10.5)
WBC # BLD: 49.2 K/CU MM (ref 4–10.5)

## 2020-11-25 PROCEDURE — 99222 1ST HOSP IP/OBS MODERATE 55: CPT | Performed by: INTERNAL MEDICINE

## 2020-11-25 PROCEDURE — 93010 ELECTROCARDIOGRAM REPORT: CPT | Performed by: INTERNAL MEDICINE

## 2020-11-25 PROCEDURE — 94750 HC PULMONARY COMPLIANCE STUDY: CPT

## 2020-11-25 PROCEDURE — 2580000003 HC RX 258: Performed by: SURGERY

## 2020-11-25 PROCEDURE — 82803 BLOOD GASES ANY COMBINATION: CPT

## 2020-11-25 PROCEDURE — 85379 FIBRIN DEGRADATION QUANT: CPT

## 2020-11-25 PROCEDURE — 87077 CULTURE AEROBIC IDENTIFY: CPT

## 2020-11-25 PROCEDURE — 85730 THROMBOPLASTIN TIME PARTIAL: CPT

## 2020-11-25 PROCEDURE — 87070 CULTURE OTHR SPECIMN AEROBIC: CPT

## 2020-11-25 PROCEDURE — 71275 CT ANGIOGRAPHY CHEST: CPT

## 2020-11-25 PROCEDURE — 80053 COMPREHEN METABOLIC PANEL: CPT

## 2020-11-25 PROCEDURE — 93306 TTE W/DOPPLER COMPLETE: CPT

## 2020-11-25 PROCEDURE — 2580000003 HC RX 258: Performed by: INTERNAL MEDICINE

## 2020-11-25 PROCEDURE — 87205 SMEAR GRAM STAIN: CPT

## 2020-11-25 PROCEDURE — 82962 GLUCOSE BLOOD TEST: CPT

## 2020-11-25 PROCEDURE — 2500000003 HC RX 250 WO HCPCS: Performed by: PHYSICIAN ASSISTANT

## 2020-11-25 PROCEDURE — 71045 X-RAY EXAM CHEST 1 VIEW: CPT

## 2020-11-25 PROCEDURE — 83605 ASSAY OF LACTIC ACID: CPT

## 2020-11-25 PROCEDURE — 83735 ASSAY OF MAGNESIUM: CPT

## 2020-11-25 PROCEDURE — 99213 OFFICE O/P EST LOW 20 MIN: CPT

## 2020-11-25 PROCEDURE — 94003 VENT MGMT INPAT SUBQ DAY: CPT

## 2020-11-25 PROCEDURE — 6360000002 HC RX W HCPCS: Performed by: SURGERY

## 2020-11-25 PROCEDURE — 6370000000 HC RX 637 (ALT 250 FOR IP): Performed by: PHYSICIAN ASSISTANT

## 2020-11-25 PROCEDURE — 6360000004 HC RX CONTRAST MEDICATION: Performed by: INTERNAL MEDICINE

## 2020-11-25 PROCEDURE — 94761 N-INVAS EAR/PLS OXIMETRY MLT: CPT

## 2020-11-25 PROCEDURE — 2700000000 HC OXYGEN THERAPY PER DAY

## 2020-11-25 PROCEDURE — 6360000002 HC RX W HCPCS: Performed by: INTERNAL MEDICINE

## 2020-11-25 PROCEDURE — 6360000004 HC RX CONTRAST MEDICATION

## 2020-11-25 PROCEDURE — 85027 COMPLETE CBC AUTOMATED: CPT

## 2020-11-25 PROCEDURE — 84439 ASSAY OF FREE THYROXINE: CPT

## 2020-11-25 PROCEDURE — 84443 ASSAY THYROID STIM HORMONE: CPT

## 2020-11-25 PROCEDURE — 85610 PROTHROMBIN TIME: CPT

## 2020-11-25 PROCEDURE — 87186 SC STD MICRODIL/AGAR DIL: CPT

## 2020-11-25 PROCEDURE — 74018 RADEX ABDOMEN 1 VIEW: CPT

## 2020-11-25 PROCEDURE — 93970 EXTREMITY STUDY: CPT

## 2020-11-25 PROCEDURE — 99291 CRITICAL CARE FIRST HOUR: CPT | Performed by: INTERNAL MEDICINE

## 2020-11-25 PROCEDURE — 36620 INSERTION CATHETER ARTERY: CPT

## 2020-11-25 PROCEDURE — 2500000003 HC RX 250 WO HCPCS: Performed by: SURGERY

## 2020-11-25 PROCEDURE — 6360000002 HC RX W HCPCS: Performed by: PHYSICIAN ASSISTANT

## 2020-11-25 PROCEDURE — 89220 SPUTUM SPECIMEN COLLECTION: CPT

## 2020-11-25 PROCEDURE — 36600 WITHDRAWAL OF ARTERIAL BLOOD: CPT

## 2020-11-25 PROCEDURE — 6370000000 HC RX 637 (ALT 250 FOR IP): Performed by: INTERNAL MEDICINE

## 2020-11-25 PROCEDURE — 2000000000 HC ICU R&B

## 2020-11-25 PROCEDURE — 99024 POSTOP FOLLOW-UP VISIT: CPT | Performed by: SURGERY

## 2020-11-25 RX ORDER — DEXTROSE MONOHYDRATE 50 MG/ML
100 INJECTION, SOLUTION INTRAVENOUS PRN
Status: DISCONTINUED | OUTPATIENT
Start: 2020-11-25 | End: 2020-12-06 | Stop reason: HOSPADM

## 2020-11-25 RX ORDER — SODIUM CHLORIDE, SODIUM LACTATE, POTASSIUM CHLORIDE, AND CALCIUM CHLORIDE .6; .31; .03; .02 G/100ML; G/100ML; G/100ML; G/100ML
1000 INJECTION, SOLUTION INTRAVENOUS ONCE
Status: COMPLETED | OUTPATIENT
Start: 2020-11-25 | End: 2020-11-25

## 2020-11-25 RX ORDER — MIDODRINE HYDROCHLORIDE 5 MG/1
10 TABLET ORAL
Status: DISCONTINUED | OUTPATIENT
Start: 2020-11-25 | End: 2020-11-27

## 2020-11-25 RX ORDER — DEXTROSE MONOHYDRATE 25 G/50ML
12.5 INJECTION, SOLUTION INTRAVENOUS PRN
Status: DISCONTINUED | OUTPATIENT
Start: 2020-11-25 | End: 2020-12-06 | Stop reason: HOSPADM

## 2020-11-25 RX ORDER — NICOTINE POLACRILEX 4 MG
15 LOZENGE BUCCAL PRN
Status: DISCONTINUED | OUTPATIENT
Start: 2020-11-25 | End: 2020-12-06 | Stop reason: HOSPADM

## 2020-11-25 RX ORDER — FUROSEMIDE 10 MG/ML
40 INJECTION INTRAMUSCULAR; INTRAVENOUS ONCE
Status: COMPLETED | OUTPATIENT
Start: 2020-11-25 | End: 2020-11-25

## 2020-11-25 RX ADMIN — MINERAL OIL AND WHITE PETROLATUM: 150; 830 OINTMENT OPHTHALMIC at 13:53

## 2020-11-25 RX ADMIN — INSULIN LISPRO 2 UNITS: 100 INJECTION, SOLUTION INTRAVENOUS; SUBCUTANEOUS at 09:49

## 2020-11-25 RX ADMIN — SODIUM CHLORIDE, POTASSIUM CHLORIDE, SODIUM LACTATE AND CALCIUM CHLORIDE: 600; 310; 30; 20 INJECTION, SOLUTION INTRAVENOUS at 00:20

## 2020-11-25 RX ADMIN — PROPOFOL 30 MCG/KG/MIN: 10 INJECTION, EMULSION INTRAVENOUS at 03:55

## 2020-11-25 RX ADMIN — MINERAL OIL AND WHITE PETROLATUM: 150; 830 OINTMENT OPHTHALMIC at 10:17

## 2020-11-25 RX ADMIN — CLINDAMYCIN PHOSPHATE 600 MG: 150 INJECTION, SOLUTION INTRAVENOUS at 01:59

## 2020-11-25 RX ADMIN — PROPOFOL 30 MCG/KG/MIN: 10 INJECTION, EMULSION INTRAVENOUS at 14:07

## 2020-11-25 RX ADMIN — IOPAMIDOL 75 ML: 755 INJECTION, SOLUTION INTRAVENOUS at 16:33

## 2020-11-25 RX ADMIN — CLINDAMYCIN PHOSPHATE 600 MG: 150 INJECTION, SOLUTION INTRAVENOUS at 08:00

## 2020-11-25 RX ADMIN — PERFLUTREN 3.3 MG: 6.52 INJECTION, SUSPENSION INTRAVENOUS at 09:08

## 2020-11-25 RX ADMIN — MINERAL OIL AND WHITE PETROLATUM: 150; 830 OINTMENT OPHTHALMIC at 20:51

## 2020-11-25 RX ADMIN — CLINDAMYCIN PHOSPHATE 600 MG: 150 INJECTION, SOLUTION INTRAVENOUS at 14:07

## 2020-11-25 RX ADMIN — INSULIN LISPRO 2 UNITS: 100 INJECTION, SOLUTION INTRAVENOUS; SUBCUTANEOUS at 12:35

## 2020-11-25 RX ADMIN — SODIUM CHLORIDE, POTASSIUM CHLORIDE, SODIUM LACTATE AND CALCIUM CHLORIDE: 600; 310; 30; 20 INJECTION, SOLUTION INTRAVENOUS at 20:43

## 2020-11-25 RX ADMIN — INSULIN LISPRO 2 UNITS: 100 INJECTION, SOLUTION INTRAVENOUS; SUBCUTANEOUS at 18:11

## 2020-11-25 RX ADMIN — MINERAL OIL AND WHITE PETROLATUM: 150; 830 OINTMENT OPHTHALMIC at 17:33

## 2020-11-25 RX ADMIN — CHLORHEXIDINE GLUCONATE 0.12% ORAL RINSE 15 ML: 1.2 LIQUID ORAL at 02:28

## 2020-11-25 RX ADMIN — Medication 50 MCG/HR: at 18:06

## 2020-11-25 RX ADMIN — PROPOFOL 30 MCG/KG/MIN: 10 INJECTION, EMULSION INTRAVENOUS at 18:15

## 2020-11-25 RX ADMIN — PROPOFOL 30 MCG/KG/MIN: 10 INJECTION, EMULSION INTRAVENOUS at 08:02

## 2020-11-25 RX ADMIN — POLYVINYL ALCOHOL 1 DROP: 14 SOLUTION/ DROPS OPHTHALMIC at 20:51

## 2020-11-25 RX ADMIN — FAMOTIDINE 20 MG: 10 INJECTION INTRAVENOUS at 08:00

## 2020-11-25 RX ADMIN — SODIUM CHLORIDE, POTASSIUM CHLORIDE, SODIUM LACTATE AND CALCIUM CHLORIDE: 600; 310; 30; 20 INJECTION, SOLUTION INTRAVENOUS at 05:34

## 2020-11-25 RX ADMIN — PIPERACILLIN AND TAZOBACTAM 3.38 G: 3; .375 INJECTION, POWDER, LYOPHILIZED, FOR SOLUTION INTRAVENOUS at 14:07

## 2020-11-25 RX ADMIN — CLINDAMYCIN PHOSPHATE 600 MG: 150 INJECTION, SOLUTION INTRAVENOUS at 20:50

## 2020-11-25 RX ADMIN — FUROSEMIDE 40 MG: 10 INJECTION, SOLUTION INTRAMUSCULAR; INTRAVENOUS at 22:58

## 2020-11-25 RX ADMIN — HYDROCORTISONE SODIUM SUCCINATE 100 MG: 100 INJECTION, POWDER, FOR SOLUTION INTRAMUSCULAR; INTRAVENOUS at 20:59

## 2020-11-25 RX ADMIN — VANCOMYCIN HYDROCHLORIDE 1750 MG: 5 INJECTION, POWDER, LYOPHILIZED, FOR SOLUTION INTRAVENOUS at 17:33

## 2020-11-25 RX ADMIN — CHLORHEXIDINE GLUCONATE 0.12% ORAL RINSE 15 ML: 1.2 LIQUID ORAL at 08:00

## 2020-11-25 RX ADMIN — PROPOFOL 40 MCG/KG/MIN: 10 INJECTION, EMULSION INTRAVENOUS at 01:57

## 2020-11-25 RX ADMIN — POLYVINYL ALCOHOL 1 DROP: 14 SOLUTION/ DROPS OPHTHALMIC at 16:05

## 2020-11-25 RX ADMIN — POLYVINYL ALCOHOL 1 DROP: 14 SOLUTION/ DROPS OPHTHALMIC at 09:50

## 2020-11-25 RX ADMIN — HYDROCORTISONE SODIUM SUCCINATE 100 MG: 100 INJECTION, POWDER, FOR SOLUTION INTRAMUSCULAR; INTRAVENOUS at 14:08

## 2020-11-25 RX ADMIN — SODIUM CHLORIDE, POTASSIUM CHLORIDE, SODIUM LACTATE AND CALCIUM CHLORIDE 1000 ML: 600; 310; 30; 20 INJECTION, SOLUTION INTRAVENOUS at 12:42

## 2020-11-25 RX ADMIN — FAMOTIDINE 20 MG: 10 INJECTION INTRAVENOUS at 20:59

## 2020-11-25 RX ADMIN — CHLORHEXIDINE GLUCONATE 0.12% ORAL RINSE 15 ML: 1.2 LIQUID ORAL at 21:00

## 2020-11-25 RX ADMIN — POLYVINYL ALCOHOL 1 DROP: 14 SOLUTION/ DROPS OPHTHALMIC at 23:00

## 2020-11-25 RX ADMIN — ASPIRIN 81 MG: 81 TABLET, COATED ORAL at 16:08

## 2020-11-25 RX ADMIN — PIPERACILLIN AND TAZOBACTAM 3.38 G: 3; .375 INJECTION, POWDER, LYOPHILIZED, FOR SOLUTION INTRAVENOUS at 05:13

## 2020-11-25 RX ADMIN — POLYVINYL ALCOHOL 1 DROP: 14 SOLUTION/ DROPS OPHTHALMIC at 12:35

## 2020-11-25 RX ADMIN — PIPERACILLIN AND TAZOBACTAM 3.38 G: 3; .375 INJECTION, POWDER, LYOPHILIZED, FOR SOLUTION INTRAVENOUS at 21:00

## 2020-11-25 ASSESSMENT — PULMONARY FUNCTION TESTS
PIF_VALUE: 19
PIF_VALUE: 17
PIF_VALUE: 26
PIF_VALUE: 17
PIF_VALUE: 18
PIF_VALUE: 15
PIF_VALUE: 22
PIF_VALUE: 17
PIF_VALUE: 20
PIF_VALUE: 15
PIF_VALUE: 19
PIF_VALUE: 16
PIF_VALUE: 14
PIF_VALUE: 20
PIF_VALUE: 18
PIF_VALUE: 15
PIF_VALUE: 17

## 2020-11-25 ASSESSMENT — PAIN SCALES - GENERAL
PAINLEVEL_OUTOF10: 0
PAINLEVEL_OUTOF10: 1
PAINLEVEL_OUTOF10: 0

## 2020-11-25 ASSESSMENT — PAIN DESCRIPTION - PAIN TYPE: TYPE: ACUTE PAIN

## 2020-11-25 NOTE — CONSULTS
Date    RECTAL SURGERY N/A 11/24/2020    RECTAL PERIRECTAL INCISION AND DRAINAGE performed by Scotty Pacheco MD at [facility] OR     Current Medications:     clindamycin (CLEOCIN) IV  600 mg Intravenous Q6H    insulin lispro  0-12 Units Subcutaneous TID WC    insulin lispro  0-6 Units Subcutaneous Nightly    piperacillin-tazobactam  3.375 g Intravenous Q8H    chlorhexidine  15 mL Mouth/Throat BID    famotidine (PEPCID) injection  20 mg Intravenous BID    polyvinyl alcohol  1 drop Both Eyes Q4H    And    artificial tears   Both Eyes Q4H    [START ON 11/26/2020] influenza virus vaccine  0.5 mL Intramuscular Prior to discharge    aspirin EC  81 mg Oral Daily    vancomycin  1,750 mg Intravenous Q24H     Allergies:    Social History:    TOBACCO:   reports that he has been smoking cigarettes. He has a 60.00 pack-year smoking history. He has never used smokeless tobacco.  ETOH:   reports no history of alcohol use. Patient currently lives independently    Family History:       Problem Relation Age of Onset    Asthma Mother     Breast Cancer Mother     Cancer Mother     Diabetes Mother     High Blood Pressure Mother     Cancer Father     Early Death Brother     Arthritis Paternal Grandmother        REVIEW OF SYSTEMS:    CONSTITUTIONAL:  negative for fevers, chills, diaphoresis, activity change, appetite change, fatigue, night sweats and unexpected weight change.    EYES:  negative for blurred vision, eye discharge, visual disturbance and icterus  HEENT:  negative for hearing loss, tinnitus, ear drainage, sinus pressure, nasal congestion, epistaxis and snoring  RESPIRATORY:  See HPI  CARDIOVASCULAR:  negative for chest pain, palpitations, exertional chest pressure/discomfort, edema, syncope  GASTROINTESTINAL:  negative for nausea, vomiting, diarrhea, constipation, blood in stool and abdominal pain  GENITOURINARY:  negative for frequency, dysuria, urinary incontinence, decreased urine volume, and hematuria  HEMATOLOGIC/LYMPHATIC:  negative for easy bruising, bleeding and lymphadenopathy  ALLERGIC/IMMUNOLOGIC:  negative for recurrent infections, angioedema, anaphylaxis and drug reactions  ENDOCRINE:  negative for weight changes and diabetic symptoms including polyuria, polydipsia and polyphagia  MUSCULOSKELETAL:  negative for  pain, joint swelling, decreased range of motion and muscle weakness  NEUROLOGICAL:  negative for headaches, slurred speech, unilateral weakness  PSYCHIATRIC/BEHAVIORAL: negative for hallucinations, behavioral problems, confusion and agitation. Objective:   PHYSICAL EXAM:      VITALS:  BP (!) 99/50   Pulse 68   Temp 98.4 °F (36.9 °C) (Axillary)   Resp 18   Ht 5' 9\" (1.753 m)   Wt 261 lb 3.9 oz (118.5 kg)   SpO2 100%   BMI 38.58 kg/m²   24HR INTAKE/OUTPUT:      Intake/Output Summary (Last 24 hours) at 2020 1135  Last data filed at 2020 1030  Gross per 24 hour   Intake 6421.26 ml   Output 1650 ml   Net 4771.26 ml     CURRENT PULSE OXIMETRY:  SpO2: 100 %  24HR PULSE OXIMETRY RANGE:  SpO2  Av.5 %  Min: 83 %  Max: 100 %    CONSTITUTIONAL:  awake, alert, cooperative, no apparent distress, and appears stated age  NECK:  Supple, symmetrical, trachea midline, no adenopathy, thyroid symmetric, not enlarged and no tenderness, skin normal  LUNGS:  no increased work of breathing and clear to auscultation. No accessory muscle use  CARDIOVASCULAR:  normal S1 and S2, no edema and no JVD  ABDOMEN:  normal bowel sounds, non-distended and no masses palpated, and no tenderness to palpation. No hepatospleenomegaly  LYMPHADENOPATHY:  no axillary or supraclavicular adenopathy. No cervical adnenopathy  PSYCHIATRIC: Oriented to person place and time. No obvious depression or anxiety. MUSCULOSKELETAL: No obvious misalignment or effusion of the joints. No clubbing, cyanosis of the digits. SKIN:  normal skin color, texture, turgor and no redness, warmth, or swelling.  No palpable nodules    DATA:    Old records have been reviewed  CBC with Differential:    Lab Results   Component Value Date    WBC 49.2 11/25/2020    RBC 4.25 11/25/2020    HGB 13.4 11/25/2020    HCT 44.0 11/25/2020     11/25/2020    .5 11/25/2020    MCH 31.5 11/25/2020    MCHC 30.5 11/25/2020    RDW 14.2 11/25/2020    SEGSPCT 56.0 11/24/2020    BANDSPCT 38 11/24/2020    LYMPHOPCT 2.0 11/24/2020    PROMYELOPCT 1 11/24/2020    MONOPCT 2.0 11/24/2020    MYELOPCT 1 11/24/2020    BASOPCT 0.7 12/04/2018    MONOSABS 0.8 11/24/2020    LYMPHSABS 0.8 11/24/2020    EOSABS 0.5 12/04/2018    BASOSABS 0.1 12/04/2018    DIFFTYPE MANUAL DIFFERENTIAL 11/24/2020     BMP:    Lab Results   Component Value Date     11/25/2020    K 4.3 11/25/2020     11/25/2020    CO2 21 11/25/2020    BUN 38 11/25/2020    CREATININE 1.3 11/25/2020    CALCIUM 7.8 11/25/2020    GFRAA >60 11/25/2020    LABGLOM 55 11/25/2020    GLUCOSE 201 11/25/2020     Hepatic Function Panel:    Lab Results   Component Value Date    ALKPHOS 135 11/25/2020    ALT 20 11/25/2020    AST 19 11/25/2020    PROT 5.5 11/25/2020    BILITOT 0.7 11/25/2020     ABG:    Lab Results   Component Value Date    GEG0SRS 23.5 11/25/2020    EMI4WIP 50.0 11/25/2020    PO2ART 201 11/25/2020       Cultures:   Blood Culture:    Sputum Culture:        Radiology Review:      Findings consistent with the provided history of developing Jarek's    gangrene with extensive involvement of the subcutaneous fatty tissues as    described and possible involvement of the inferolateral aspect of the right    gluteus mary which shows only increased density but no evidence of fluid    or gas. 1. Endotracheal tube tip terminates approximately 6.1 cm above the asbrina. 2. Left internal jugular central venous catheter tip likely terminates in the    brachiocephalic vein. 3. Cardiomegaly with perihilar vascular prominence.     4. Consolidative opacities in the lung bases with left

## 2020-11-25 NOTE — CONSULTS
Consult Note (Hospitalist, Internal Medicine)  IDENTIFYING INFORMATION   PATIENT:  Michael Tavares  MRN:  [id number]  ADMIT DATE: 11/24/2020  TIME OF EVALUATION: 11/24/2020 7:54 PM    REASON FOR CONSULT   Medical management    HISTORY OF PRESENT ILLNESS   Michael Tavares is a 72 y.o. male with hypertension, diabetes mellitus type 2, not on insulin, chronic systolic congestive heart failure, peripheral vascular disease(left SFA-atherectomy and balloon PTA-1/2017), chronic venous insufficiency with venous stasis ulcers on the left lower extremity, COPD, BPH presented to ED with complaints of perineal wound yesterday. 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 Patient was taken to the OR for debridement. Hospitalist service was consulted for medical management. Patient was intubated for the procedure, remained intubated and admitted to ICU. Entire information was from review of records, discussion with general surgeon-Dr. Remi Fernandez. It was reported that patient had arrhythmias on telemetry while in the OR. Cardiology was consulted. At presentation patient was noted to have /81, HR 82, RR 14, temperature 97.9, saturating 96% on room air. Lab work revealed chloride 97, CO2 22, BUN 42, creatinine 1.5, anion gap 17, random glucose 128, WBC 36.9, hemoglobin 13.8, platelets 965. Lactic acid 1.6. Rapid Covid was negative. UA-not suggestive of infection. CT pelvis-consistent with the provided history of developing 4 years gangrene with extensive involvement of the subcutaneous fatty tissues, possible involvement of the inferior lateral aspect of the right gluteus mary.     PAST MEDICAL, SURGICAL, FAMILY, and SOCIAL HISTORY     Past Medical History:   Diagnosis Date    CHF (congestive heart failure) ([address]     Chronic ulcer of left leg with fat layer exposed ([address] 12/12/2016    Chronic ulcer of right leg with fat layer exposed ([address] 12/12/2016    Chronic ulcer of right leg with fat layer exposed ([address] 12/12/2016    Diabetes mellitus ([facility]     Hypertension     PVD (peripheral vascular disease) ([facility] 12/12/2016    Type 2 diabetes mellitus with diabetic peripheral angiopathy without gangrene, without long-term current use of insulin ([facility] 12/12/2016    Venous stasis ulcer of both lower extremities without varicose veins ([facility] 12/12/2016     History reviewed. No pertinent surgical history.   Family History   Problem Relation Age of Onset    Asthma Mother     Breast Cancer Mother     Cancer Mother     Diabetes Mother     High Blood Pressure Mother     Cancer Father     Early Death Brother     Arthritis Paternal Grandmother      Social History     Socioeconomic History    Marital status:      Spouse name: None    Number of children: None    Years of education: None    Highest education level: None   Occupational History    None   Social Needs    Financial resource strain: None    Food insecurity     Worry: None     Inability: None    Transportation needs     Medical: None     Non-medical: None   Tobacco Use    Smoking status: Current Every Day Smoker     Packs/day: 1.00     Years: 60.00     Pack years: 60.00     Types: Cigarettes    Smokeless tobacco: Never Used    Tobacco comment: healing    Substance and Sexual Activity    Alcohol use: Never     Frequency: Never    Drug use: No    Sexual activity: None   Lifestyle    Physical activity     Days per week: None     Minutes per session: None    Stress: None   Relationships    Social connections     Talks on phone: None     Gets together: None     Attends [employer] service: None     Active member of club or organization: None     Attends meetings of clubs or organizations: None     Relationship status: None    Intimate partner violence     Fear of current or ex partner: None     Emotionally abused: None     Physically abused: None     Forced sexual activity: None   Other Topics Concern    None   Social History Narrative    None MEDICATIONS   Medications Prior to Admission  Medications Prior to Admission: Latanoprost 0.005 % EMUL, Apply to eye daily  ibuprofen (ADVIL;MOTRIN) 600 MG tablet, Take 1 tablet by mouth every 6 hours as needed for Pain  glipiZIDE (GLUCOTROL) 5 MG tablet, Take 5 mg by mouth 2 times daily (before meals)  potassium chloride (KLOR-CON M) 20 MEQ extended release tablet, Take 20 mEq by mouth daily  ALPRAZolam (XANAX) 0.5 MG tablet, Take 0.5 mg by mouth nightly. Jonathan Santiago ipratropium-albuterol (DUONEB) 0.5-2.5 (3) MG/3ML SOLN nebulizer solution, Inhale 1 vial into the lungs every 4 hours  albuterol sulfate  (90 Base) MCG/ACT inhaler, Inhale 2 puffs into the lungs every 6 hours as needed for Wheezing  metolazone (ZAROXOLYN) 2.5 MG tablet, Take 2 tablets by mouth daily  aspirin EC 81 MG EC tablet, Take 1 tablet by mouth daily  HYDROcodone-acetaminophen (NORCO) 7.5-325 MG per tablet, Take 1 tablet by mouth 3 times daily .   metFORMIN (GLUCOPHAGE) 1000 MG tablet, Take 1,000 mg by mouth 2 times daily (with meals)  furosemide (LASIX) 80 MG tablet, Take 80 mg by mouth 2 times daily  lisinopril (PRINIVIL;ZESTRIL) 20 MG tablet, Take 40 mg by mouth daily   tamsulosin (FLOMAX) 0.4 MG capsule, Take 0.4 mg by mouth daily  finasteride (PROSCAR) 5 MG tablet, Take 5 mg by mouth daily    Current Medications  Current Facility-Administered Medications   Medication Dose Route Frequency Provider Last Rate Last Dose    clindamycin (CLEOCIN) 600 mg in dextrose 5 % 50 mL IVPB  600 mg Intravenous Q6H Melissa Singleton MD        piperacillin-tazobactam (ZOSYN) 3.375 g in dextrose 5 % 50 mL IVPB extended infusion (mini-bag)  3.375 g Intravenous Q8H Melissa Singleton MD        chlorhexidine (PERIDEX) 0.12 % solution 15 mL  15 mL Mouth/Throat BID Winifred Hampton PA-C        famotidine (PEPCID) injection 20 mg  20 mg Intravenous BID Winifred Hampton PA-C        fentaNYL (SUBLIMAZE) injection 25 mcg  25 mcg Intravenous Q1H PRN Rosealee Spruce, KENNA        fentanyl (SUBLIMAZE) 1,000 mcg in sodium chloride 0.9% 100 mL infusion  25 mcg/hr Intravenous Continuous Winifred Hampton PA-C        polyvinyl alcohol (LIQUIFILM TEARS) 1.4 % ophthalmic solution 1 drop  1 drop Both Eyes Q4H Winifred Hampton PA-C        And    lubrifresh P.M. (artificial tears) ophthalmic ointment   Both Eyes Q4H Winifred Hampton PA-C        piperacillin-tazobactam (ZOSYN) 3.375 g in dextrose 5 % 50 mL IVPB (mini-bag)  3.375 g Intravenous Q6H Winifred Hampton PA-C        influenza quadrivalent split vaccine (FLUZONE;FLUARIX;FLULAVAL;AFLURIA) injection 0.5 mL  0.5 mL Intramuscular Prior to discharge Rylie Johnson MD        glucose (GLUTOSE) 40 % oral gel 15 g  15 g Oral PRN Kai Pittman MD        dextrose 50 % IV solution  12.5 g Intravenous PRN Kai Pittman MD        glucagon (rDNA) injection 1 mg  1 mg Intramuscular PRN Kai Pittman MD        dextrose 5 % solution  100 mL/hr Intravenous PRN Kai Pittman MD        lactated ringers infusion   Intravenous Continuous Rylie Johnson MD        albuterol sulfate  (90 Base) MCG/ACT inhaler 2 puff  2 puff Inhalation Q6H PRN Winifred Hampton PA-C        [START ON 11/25/2020] aspirin EC tablet 81 mg  81 mg Oral Daily Winifred Hampton PA-C        amiodarone (CORDARONE) 150 mg in dextrose 5 % 100 mL bolus  150 mg Intravenous Once Kary Chapman MD             Allergies  No Known Allergies    REVIEW OF SYSTEMS   Not be obtained as patient is intubated and sedated. PHYSICAL EXAM     Wt Readings from Last 3 Encounters:   11/24/20 253 lb 8.5 oz (115 kg)   10/13/20 253 lb (114.8 kg)   10/04/20 270 lb (122.5 kg)       Blood pressure (!) 116/53, pulse 106, temperature 99.1 °F (37.3 °C), temperature source Temporal, resp. rate 16, height 5' 9\" (1.753 m), weight 253 lb 8.5 oz (115 kg), SpO2 100 %.     General -intubated and sedated  Eyes -pupils equal and reactive to light   ENT -MMM   Heart -tachycardia lung - Adequate air entry b/l, No crackes/wheezes appreciated  GI - Soft. No guarding/rigidity. No hepatosplenomegaly/ascites. BS+   - No CVA/suprapubic tenderness, Pepper catheter in place   skin - Intact. No rash/petechiae/ecchymosis. Warm extremities  MSK -edematous left lower extremity, dressing intact, venous stasis changes in right lower extremity. LABS AND IMAGING     Results for Dereck Schwartz (MRN [id number]) as of 11/25/2020 03:49   Ref.  Range 11/24/2020 15:25   Sodium Latest Ref Range: 135 - 145 MMOL/L 136   Potassium Latest Ref Range: 3.5 - 5.1 MMOL/L 4.0   Chloride Latest Ref Range: 99 - 110 mMol/L 97 (L)   CO2 Latest Ref Range: 21 - 32 MMOL/L 22   BUN Latest Ref Range: 6 - 23 MG/DL 42 (H)   Creatinine Latest Ref Range: 0.9 - 1.3 MG/DL 1.5 (H)   Anion Gap Latest Ref Range: 4 - 16  17 (H)   GFR Non- Latest Ref Range: >60 mL/min/1.73m2 47 (L)   GFR  Latest Ref Range: >60 mL/min/1.73m2 57 (L)   Glucose Latest Ref Range: 70 - 99 MG/ (H)   Calcium Latest Ref Range: 8.3 - 10.6 MG/DL 8.5   Total Protein Latest Ref Range: 6.4 - 8.2 GM/DL 6.1 (L)   Albumin Latest Ref Range: 3.4 - 5.0 GM/DL 3.1 (L)   Alk Phos Latest Ref Range: 40 - 129 IU/L 127   ALT Latest Ref Range: 10 - 40 U/L 23   AST Latest Ref Range: 15 - 37 IU/L 24   Bilirubin Latest Ref Range: 0.0 - 1.0 MG/DL 0.9   WBC Latest Ref Range: 4.0 - 10.5 K/CU MM 36.9 (HH)   RBC Latest Ref Range: 4.6 - 6.2 M/CU MM 4.55 (L)   Hemoglobin Quant Latest Ref Range: 13.5 - 18.0 GM/DL 13.8   Hematocrit Latest Ref Range: 42 - 52 % 44.4   MCV Latest Ref Range: 78 - 100 FL 97.6   MCH Latest Ref Range: 27 - 31 PG 30.3   MCHC Latest Ref Range: 32.0 - 36.0 % 31.1 (L)   MPV Latest Ref Range: 7.5 - 11.1 FL 11.5 (H)   RDW Latest Ref Range: 11.7 - 14.9 % 13.9   Platelet Count Latest Ref Range: 140 - 440 K/CU    Lymphocyte % Latest Ref Range: 24 - 44 % 7.0 (L)   Monocytes % Latest Ref Range: 0 - 4 % 5.0 (H)   Lymphocytes Absolute Latest Units: K/CU MM 2.6   Monocytes Absolute Latest Units: K/CU MM 1.8   Differential Type Unknown MANUAL DIFFERENTIAL   Segs Relative Latest Ref Range: 36 - 66 % 84.0 (H)   Segs Absolute Latest Units: K/CU MM 31.0   Bands Relative Latest Ref Range: 5 - 11 % 3 (L)   Bands Absolute Latest Units: K/CU MM 1.11   Myelocytes Absolute Latest Units: K/CU MM 0.37   Myelocyte Percent Latest Ref Range: 0.0 % 1 (H)   RBC Morphology Unknown RBC MORPHOLOGY NORMAL     Results for Lucretia Flores (MRN [id number]) as of 11/25/2020 03:49   Ref. Range 11/24/2020 14:29   SARS-CoV-2, NAAT Unknown NOT DETECTED     Recent Imaging    XR CHEST PORTABLE [[id number]]  Collected: 11/24/20 2136        Order Status: Completed  Updated: 11/24/20 2321       Narrative:         EXAMINATION:   ONE X-RAY VIEW OF THE CHEST     11/24/2020 9:15 pm     COMPARISON:   September 9, 2020     HISTORY:   ORDERING SYSTEM PROVIDED HISTORY:  S/P central line placement   TECHNOLOGIST PROVIDED HISTORY:   Reason for exam:   S/P central line placement   Reason for Exam:  S/P central line placement   Acuity: Unknown   Type of Exam: Initial   Additional signs and symptoms: unknown   Relevant Medical/Surgical History: CHF     FINDINGS:   Endotracheal tube tip terminates approximately 6.1 cm above the sabrina.  The   left internal jugular central venous catheter terminates along the midline,   likely within the brachiocephalic vein.  Cardiomediastinal silhouette is   enlarged.  The lungs are lower in volume.  Perihilar opacities are seen. More focal opacities are seen in the lung bases.  Small left effusion.  No   pneumothorax. No acute osseous abnormality.       Impression:         1. Endotracheal tube tip terminates approximately 6.1 cm above the sabrina. 2. Left internal jugular central venous catheter tip likely terminates in the   brachiocephalic vein. 3. Cardiomegaly with perihilar vascular prominence.    4. Consolidative opacities in the lung bases with left pleural effusion. The findings were sent to the Radiology Results [address] at 9:38   pm on 11/24/2020to be communicated to a licensed caregiver.       CT PELVIS W CONTRAST Additional Contrast? None [[id number]]  Collected: 11/24/20 1704       Order Status: Completed  Updated: 11/24/20 1731       Narrative:         EXAMINATION:   CT OF THE PELVIS WITH CONTRAST 11/24/2020 1:44 pm     TECHNIQUE:   CT of the pelvis was performed with the administration of intravenous   contrast. Multiplanar reformatted images are provided for review. Dose   modulation, iterative reconstruction, and/or weight based adjustment of the   mA/kV was utilized to reduce the radiation dose to as low as reasonably   achievable. COMPARISON:   None. HISTORY:   ORDERING SYSTEM PROVIDED HISTORY: cellulitis, developing fourneirs gangrene   TECHNOLOGIST PROVIDED HISTORY:   Reason for exam:->cellulitis, developing fourneirs gangrene   Additional Contrast?->None   Reason for Exam: cellulitis, developing fourneirs gangrene   Acuity: Unknown   Type of Exam: Unknown     FINDINGS:   Visualized portions of the liver, kidneys, ureters, bladder and reproductive   organs are normal.  The gallbladder is partially visualized and contains   several tiny gravel gallstones in the dependent portion with no suggestion of   cholecystitis.  Bowel is unremarkable. No peritoneal free air, free fluid,   inflammatory change or adenopathy is identified.  Vascular structures are   noted for mild/moderate atherosclerotic changes but no stenosis or aneurysm. Osseous structures are normal for age with expected mild lower lumbar   degenerative changes. Soft tissues deep to the superficial fascial layer appear normal with 1   exception. There is increased density involving the lateral inferior aspect   of the gluteus mary suggesting possible involvement.  There is   subcutaneous inflammatory change and gas.  This is centered in the perianal region on the right extending superiorly within the right buttock   subcutaneous fatty tissues to near the superior aspect of the  cleft   involving nearly the entire right buttock. This process extends anteriorly on   the right through the perineum involving the scrotum but not scrotal   contents.  At the inferior scrotum, it symmetrically crosses the midline with   marked thickening of the scrotal skin and possibly fluid accumulation. The   margin of the right inguinal canal is thickened however involvement with in   the inguinal canal is questionable.       Impression:         Findings consistent with the provided history of developing Jarek's   gangrene with extensive involvement of the subcutaneous fatty tissues as   described and possible involvement of the inferolateral aspect of the right   gluteus mary which shows only increased density but no evidence of fluid   or gas.           Relevant labs and imaging reviewed    ASSESSMENT AND PLAN     #. 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 #.  Acute hypoxic respiratory failure requiring mechanical ventilation:  -ABG-pH 7.1, PCO2 66, PO2 88, HCO3 26.4.  -Vent management as per protocol.  -Pulmonology consulted. #.  A. fib with RVR:  -Patient was briefly in A. fib with RVR after transfer from OR-heart rate ranging from 150-160.  -Patient was given amiodarone bolus as recommended by cardiology.  -Currently patient is in sinus tachycardia-heart rate 100-110  -Monitor closely  -Cardiology on board. #.  YANCI:  -Creatinine 1.5, baseline 0.8-0.9  -Continue IV fluids and repeat BMP    #. Anion gap metabolic acidosis: Secondary to sepsis    #.  Hypertension  -Patient currently hypotensive requiring pressors.  -Resume home medications when appropriate. He is on lisinopril 40 mg daily    #. diabetes mellitus type 2, not on insulin  -Is on metformin 1000 mg twice daily, glipizide 5 mg twice daily  -HbA1c 5.2-7/20/2020  -Accu-Cheks every 4 hours. Hypoglycemia protocol in place    #. systolic congestive heart failure  -Echo-1/19/2017-EF 30 to 40%, global hypokinesis, right ventricular systolic pressure 43, mild MR, TR. Repeat echo ordered.  -Is on metolazone 5 mg daily, Lasix 80 mg twice daily  -Monitor closely for fluid overload. #.  COPD:  -Patient is on Breo Ellipta 100-25, albuterol HFA as needed    #. peripheral vascular disease(left SFA-atherectomy and balloon PTA-1/2017)    #. chronic venous insufficiency with venous stasis ulcers on the left lower extremity   -Follows up with wound care    #. BPH: On tamsulosin, finasteride    #. Anxiety disorder: As per the medication list patient is on Xanax 0.5 mg nightly    # Moderate Obesity    DVT prophylaxis-hold chemical prophylaxis due to being post surgery  GI prophylaxis: Pepcid  CODE STATUS: FULL.       Viki Beckwith MD  Hospitalist, Internal Medicine  11/24/2020 at 7:54 PM

## 2020-11-25 NOTE — PLAN OF CARE
Problem: Infection:  Goal: Will remain free from infection  Description: Will remain free from infection  Outcome: Ongoing     Problem: Safety:  Goal: Free from accidental physical injury  Description: Free from accidental physical injury  Outcome: Ongoing  Goal: Free from intentional harm  Description: Free from intentional harm  Outcome: Ongoing     Problem: Daily Care:  Goal: Daily care needs are met  Description: Daily care needs are met  Outcome: Ongoing     Problem: Pain:  Goal: Patient's pain/discomfort is manageable  Description: Patient's pain/discomfort is manageable  Outcome: Ongoing     Problem: Skin Integrity:  Goal: Skin integrity will stabilize  Description: Skin integrity will stabilize  Outcome: Ongoing     Problem: Discharge Planning:  Goal: Patients continuum of care needs are met  Description: Patients continuum of care needs are met  Outcome: Ongoing     Problem: Falls - Risk of:  Goal: Will remain free from falls  Description: Will remain free from falls  Outcome: Ongoing  Goal: Absence of physical injury  Description: Absence of physical injury  Outcome: Ongoing     Problem: Skin Integrity:  Goal: Will show no infection signs and symptoms  Description: Will show no infection signs and symptoms  Outcome: Ongoing  Goal: Absence of new skin breakdown  Description: Absence of new skin breakdown  Outcome: Ongoing     Problem: Restraint Use - Nonviolent/Non-Self-Destructive Behavior:  Goal: Absence of restraint indications  Description: Absence of restraint indications  Outcome: Ongoing  Goal: Absence of restraint-related injury  Description: Absence of restraint-related injury  Outcome: Ongoing

## 2020-11-25 NOTE — PROGRESS NOTES
PTA-1/2017)     Chronic venous insufficiency with venous stasis ulcers on the left lower extremity   -Follows up with wound care     BPH: On tamsulosin, finasteride     Anxiety disorder: As per the medication list patient is on Xanax 0.5 mg nightly     Moderate Obesity    Diet Diet NPO Effective Now   DVT Prophylaxis [] Lovenox, []  Heparin, [] SCDs, [] Ambulation   GI Prophylaxis [] PPI,  [] H2 Blocker,  [] Carafate,  [] Diet/Tube Feeds   Code Status Prior   Disposition Patient requires continued admission due to shock, post-op respiratory failure   MDM [] Low, [] Moderate,[x]  High  Patient's risk as above due to      History of Present Illness: Had run of afib after surgery; central line was placed overnight for pressures. No other events overnight. Tmax 99.1    Objective: Intake/Output Summary (Last 24 hours) at 11/25/2020 1046  Last data filed at 11/25/2020 1030  Gross per 24 hour   Intake 6421.26 ml   Output 1650 ml   Net 4771.26 ml      Vitals:   Vitals:    11/25/20 1035   BP: (!) 99/50   Pulse: 68   Resp: 18   Temp:    SpO2: 100%     Physical Exam:   GEN Intubated and sedated  EYES Pupils are pinpoint  HENT ET in place  NECK Supple, no apparent thyromegaly or masses. RESP  breath sounds bilaterally  CARDIO/VASC S1/S2 auscultated. Regular rate without appreciable murmurs, rubs, or gallops.    GI Abdomen is soft without grimace to palpation   Pepper in place  SKIN Surgical wound with dressings c/d/i  PSYCH Intubated and sedated on vent    Medications:   Medications:    clindamycin (CLEOCIN) IV  600 mg Intravenous Q6H    insulin lispro  0-12 Units Subcutaneous TID WC    insulin lispro  0-6 Units Subcutaneous Nightly    piperacillin-tazobactam  3.375 g Intravenous Q8H    chlorhexidine  15 mL Mouth/Throat BID    famotidine (PEPCID) injection  20 mg Intravenous BID    polyvinyl alcohol  1 drop Both Eyes Q4H    And    artificial tears   Both Eyes Q4H    [START ON 11/26/2020] influenza virus vaccine  0.5 mL Intramuscular Prior to discharge    aspirin EC  81 mg Oral Daily    vancomycin  1,750 mg Intravenous Q24H      Infusions:    dextrose      fentanyl 50 mcg/hr (11/25/20 1023)    lactated ringers 125 mL/hr (11/25/20 0735)    propofol 25 mcg/kg/min (11/25/20 1024)    norepinephrine 12 mcg/min (11/25/20 1037)     PRN Meds: glucose, 15 g, PRN  dextrose, 12.5 g, PRN  glucagon (rDNA), 1 mg, PRN  dextrose, 100 mL/hr, PRN  fentanNYL, 25 mcg, Q1H PRN  albuterol sulfate HFA, 2 puff, Q6H PRN          Electronically signed by Sandip Leone MD on 11/25/2020 at 10:46 AM

## 2020-11-25 NOTE — PROGRESS NOTES
1911 - patient transferred to room 2116 accompanied by RN and CRNA; bedside handoff provided by Mikcey Orozco and Gabriel Puentes CRNA; patient on monitor, alarms on and verified, patient placed on ventilator upon arrival to room by RT  1945 - Phase one care complete, patient to remain on ventilator; bedside report provided to [facility]

## 2020-11-25 NOTE — CONSULTS
[address] [place] [address], [facility]                                  CONSULTATION    PATIENT NAME: Haja Flynn                      :        1955  MED REC NO:   [id number]                          ROOM:       2116  ACCOUNT NO:   [de-identified]                           ADMIT DATE: 2020  PROVIDER:     Oscar Cartagena MD    CONSULT DATE:  2020    INDICATION:  Atrial fibrillation. HISTORY OF PRESENT ILLNESS:  This is a 42-year-old male patient, a  patient of our practice. I have seen him in the past also. I did a  peripheral intervention on the patient. 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 The patient was seen by Dr. Markus Mosquera last night. I spoke to him. The patient is back in sinus rhythm  with PACs noted. He is on the vent and intubated. The patient possibly  has septic shock present from his infection. The patient it looks like _____ his wife, he had a boil on the buttock. She debrided it or she lanced it, then he developed infection, then it  caused the Jarek's gangrene and had to take into OR last night and  had debridement done. The patient is on pressors right now. His blood  pressure remains stable. PAST MEDICAL HISTORY:  Peripheral vascular disease present, status post  left leg intervention done. Echo was done today. Echo shows LV  function was preserved. RV is dilated. Pulmonary hypertension is  present. The patient has a history of peripheral vascular disease present and is  status post left leg intervention, left SFA atherectomy was performed  and atherectomy followed by a drug-coated balloon was done in 2017.   The  patient has a history of having heart failure, peripheral vascular  disease, hypertension, hyperlipidemia, obesity present, and diabetes  present. The patient had a stress test done in 2016, negative for ischemia. His  ABIs were abnormal, left was 0.48 and right was 0.8. PAST SURGICAL HISTORY:  Left leg intervention done, Jarek's gangrene,  and rectal surgery done. SOCIAL HISTORY:  History of smoking. No alcohol use. ALLERGIES:  NKDA. MEDICATIONS AT HOME:  Prior to hospital, he was on Glucotrol, potassium,  Xanax, Zaroxolyn, aspirin, Lasix, lisinopril, and Flomax. PHYSICAL EXAMINATION:  GENERAL:  The patient is on the vent, sedated. VITAL SIGNS:  Temperature is afebrile, pulse is 71, sinus rhythm with  the PACs noted, blood pressure is 99/50 with a mean of 55 present. HEENT:  Head is normocephalic and atraumatic. LUNGS:  Clear to auscultation. Decreased breath sounds present. HEART:  Regular rhythm. ABDOMEN:  Soft and nontender. Bowel sounds are present. EXTREMITIES:  He has a dressing present in the groin region. He has  chronic swelling in the leg and stasis changes noted. DIAGNOSTIC STUDIES:  His EKG from last night shows AFib. Right now, he  is in sinus rhythm present. LABORATORY DATA:  BUN is 38, creatinine 1.3. His BNP is 6100. Troponins are negative. LFTs are normal.  White count is elevated,  platelet count is 612,147. The patient had a chest x-ray done yesterday. The patient had a CT done  with contrast.  He was found to have findings consistent with  development of Jarek's gangrene with extensive involvement of  subcutaneous fat tissue. IMPRESSION AND PLAN:  This is a 69-year-old male patient who came in  with a septic shock. He is intubated on the vent, and has a Jarek's  gangrene present, status post surgery for that. From a cardiac stand, he has AFib, but he is in sinus rhythm at this  time. I think at this time, I will continue medical treatment.   I would  recommend getting a CT of the chest also as he has a pulmonary  hypertension noted.  Rule out PE, right-sided pressure is elevated. We  will start him on beta-blockers once he is more stable and once the  blood pressure tolerates. We will follow the patient along with you.         Katrin Vieira MD    D: 11/25/2020 12:02:45       T: 11/25/2020 14:41:24     [email or web address]  Job#: [id number]     Doc#: [id number]    CC:

## 2020-11-25 NOTE — OP NOTE
Operative Note      Patient: Mariposa Tavares  YOB: 1955  MRN: [id number]    Date of Procedure: 11/24/2020    Pre-Op Diagnosis: Jarek's Gangrene    Post-Op Diagnosis: Same       Procedure(s):  Incision drainage and debridement of Jarek's gangrene    Surgeon(s):  Joce Willis MD    Assistant:   * No surgical staff found *    Anesthesia: General    Estimated Blood Loss (mL): 811DA    Complications: None    Specimens:   ID Type Source Tests Collected by Time Destination   1 : deep tissue wound culture Tissue Tissue CULTURE, FUNGUS, CULTURE, TISSUE, CULTURE WITH SMEAR, ACID FAST Valeria Tamayo MD 11/24/2020 6216        Implants:  * No implants in log *      Drains:   Urethral Catheter Coude 14 fr (Active)       [REMOVED] NG/OG/NJ/NE Tube Orogastric 16 fr (Removed)       Findings: extensive necrotic tissue and dishwater drainage extending from the right scrotum down and around the right side of the anus    Detailed Description of Procedure:   Mariposa Tavares is a 72 y.o. male who presented with 1 week of pain and swelling in his perineum. He had skin necrosis in the area and CT demonstrated air tracking throughout his perineum towards his scrotum and his right buttock. He was consented for operative debridement. Prior to beginning the procedure, informed consent was obtained and consent was documented in the medical record. The patient was brought to the operating room and positioned supine on the operating table. Anesthesia was initiated, the patient was placed in lithotomy position and a time out was performed in which all were in agreement. Appropriate perioperative antibiotics were administered and the patient was prepped and draped in the usual sterile fashion. The perineum was inspected and appeared markedly inflamed, indurated and foul smelling with several areas of black, necrotic skin and dishwater colored drainage.  Excisional debridement was performed including epidermis, dermis, subcutaneous tissue and muscle/fascia. Using electrocautery, necrotic tissue was debrided to healthy appearing tissue. A deep tissue specimen was sent for culture. The wound was quite extensive extending up into the right scrotum, down across the perineum and along the right buttock lateral to the anus. The area debrided was approximately 40 x 10 cm. Once all necrotic tissue was removed and hemostasis achieved with electrocautery, the wound was irrigated several times with betadine then rinsed and packed with betadine tinged dampened kerlex (2 rolls). A chux pad and mesh panties were used to hold the dressing in place. Counts were reported correct at the end of the case. The patient was left intubated due to the severity of the case and EKG abnormalities encountered during the case.   Troponins were checked intraop and were normal.    Electronically signed by Thomas Harper MD on 11/24/2020 at 7:27 PM

## 2020-11-25 NOTE — CONSULTS
Chart reviewed  Full note to follow                      Name:  Gbariel Tavares /Age/Sex: 1955  (72 y.o. male)   MRN & CSN:  [id number] & [id number] Admission Date/Time: 2020 11:33 AM   Location:  -A PCP: [address] Day: 1          Referring physician:  Trina Gentile MD         Reason for consultation:  A fib        Thanks for referral.    Information source: patient    CC;  perinal infection      HPI:   Thank you for involving me in taking  care of Oscar Tavares who  is a 72 y. o.year  Old male  Presents with  H/o DM, PVD,   Low EF admitted  with  of an infection into the perineum. Patient states that he has had some pain and drainage in this area, he states that his wife drained a \"boil\" with one of his diabetic lancets . Has developed debbie gangrene. Had oncision and debridement, now in ICU on vent on pressors intubated in sepsis, was in a fib with RVR , in SR now. Past medical history:    has a past medical history of CHF (congestive heart failure) ([address] Chronic ulcer of left leg with fat layer exposed ([address] Chronic ulcer of right leg with fat layer exposed ([address] Chronic ulcer of right leg with fat layer exposed ([address] Diabetes mellitus ([address] Hypertension, PVD (peripheral vascular disease) ([address] Type 2 diabetes mellitus with diabetic peripheral angiopathy without gangrene, without long-term current use of insulin ([address] and Venous stasis ulcer of both lower extremities without varicose veins ([address] Past surgical history:   has no past surgical history on file. Social History:   reports that he has been smoking cigarettes. He has a 60.00 pack-year smoking history. He has never used smokeless tobacco. He reports that he does not drink alcohol or use drugs.   Family history:  family history includes Arthritis in his paternal grandmother; Asthma in his mother; Breast Cancer in his mother; Cancer in his father and mother; Diabetes in his mother; Early Death in his brother; High Blood Pressure in his mother.     No Known Allergies    clindamycin (CLEOCIN) 600 mg in dextrose 5 % 50 mL IVPB, Q6H  piperacillin-tazobactam (ZOSYN) 3.375 g in dextrose 5 % 50 mL IVPB extended infusion (mini-bag), Q8H  chlorhexidine (PERIDEX) 0.12 % solution 15 mL, BID  famotidine (PEPCID) injection 20 mg, BID  fentaNYL (SUBLIMAZE) injection 25 mcg, Q1H PRN  fentanyl (SUBLIMAZE) 1,000 mcg in sodium chloride 0.9% 100 mL infusion, Continuous  polyvinyl alcohol (LIQUIFILM TEARS) 1.4 % ophthalmic solution 1 drop, Q4H    And  lubrifresh P.M. (artificial tears) ophthalmic ointment, Q4H  [START ON 11/26/2020] influenza quadrivalent split vaccine (FLUZONE;FLUARIX;FLULAVAL;AFLURIA) injection 0.5 mL, Prior to discharge  glucose (GLUTOSE) 40 % oral gel 15 g, PRN  dextrose 50 % IV solution, PRN  glucagon (rDNA) injection 1 mg, PRN  dextrose 5 % solution, PRN  lactated ringers infusion, Continuous  albuterol sulfate  (90 Base) MCG/ACT inhaler 2 puff, Q6H PRN  [START ON 11/25/2020] aspirin EC tablet 81 mg, Daily  amiodarone (CORDARONE) 150 mg in dextrose 5 % 100 mL bolus, Once  propofol injection, Titrated      Current Facility-Administered Medications   Medication Dose Route Frequency Provider Last Rate Last Dose    clindamycin (CLEOCIN) 600 mg in dextrose 5 % 50 mL IVPB  600 mg Intravenous Q6H Yolanda Adams MD        piperacillin-tazobactam (ZOSYN) 3.375 g in dextrose 5 % 50 mL IVPB extended infusion (mini-bag)  3.375 g Intravenous Q8H Yolanda Adams MD        chlorhexidine (PERIDEX) 0.12 % solution 15 mL  15 mL Mouth/Throat BID Winifred Hampton PA-C        famotidine (PEPCID) injection 20 mg  20 mg Intravenous BID Winifred Hampton PA-C        fentaNYL (SUBLIMAZE) injection 25 mcg  25 mcg Intravenous Q1H PRN Winifred Hampton PA-C        fentanyl (SUBLIMAZE) 1,000 mcg in sodium chloride 0.9% 100 mL infusion  25 mcg/hr Intravenous Continuous Anam Summers PA-C 2.5 mL/hr at 11/24/20 2007 25 mcg/hr at 11/24/20 2007    polyvinyl alcohol (LIQUIFILM TEARS) 1.4 % ophthalmic solution 1 drop  1 drop Both Eyes Q4H Winifred Hampton PA-C        And    lubrifresh P.M. (artificial tears) ophthalmic ointment   Both Eyes Q4H Winifred Hampton PA-C        [START ON 11/26/2020] influenza quadrivalent split vaccine (FLUZONE;FLUARIX;FLULAVAL;AFLURIA) injection 0.5 mL  0.5 mL Intramuscular Prior to discharge Ann-Marie Torres MD        glucose (GLUTOSE) 40 % oral gel 15 g  15 g Oral PRN Zulma Marking, MD        dextrose 50 % IV solution  12.5 g Intravenous PRN Zulma Jyotsna, MD        glucagon (rDNA) injection 1 mg  1 mg Intramuscular PRN Zulma Jyotsna, MD        dextrose 5 % solution  100 mL/hr Intravenous PRN Zulma Goyal, MD        lactated ringers infusion   Intravenous Continuous Ann-Marie Torres MD        albuterol sulfate  (90 Base) MCG/ACT inhaler 2 puff  2 puff Inhalation Q6H PRN Vicki Phipps PA-C        [START ON 11/25/2020] aspirin EC tablet 81 mg  81 mg Oral Daily Winifred Hampton PA-C        amiodarone (CORDARONE) 150 mg in dextrose 5 % 100 mL bolus  150 mg Intravenous Once Judge Ameena MD        propofol injection  10 mcg/kg/min Intravenous Titrated Vicki Phipps PA-C 3.5 mL/hr at 11/24/20 2007 5 mcg/kg/min at 11/24/20 2007     Review of Systems:  intubated    Physical Examination:    /65   Pulse 162   Temp 99.1 °F (37.3 °C) (Temporal)   Resp 16   Ht 5' 9\" (1.753 m)   Wt 253 lb 8.5 oz (115 kg)   SpO2 98%   BMI 37.44 kg/m²      Wt Readings from Last 3 Encounters:   11/24/20 253 lb 8.5 oz (115 kg)   10/13/20 253 lb (114.8 kg)   10/04/20 270 lb (122.5 kg)     Body mass index is 37.44 kg/m².       General Appearance:  fair  Head: normocephalic     Eyes: normal, noninjected conjunctiva    ENT: normal mucosa, noninjected throat, normal     NECK: No JVP  No thyromegaly        Cardiovascular: No thrills palpated   Auscultation:

## 2020-11-25 NOTE — PROGRESS NOTES
Dr. Kemar Gonzalez at pt. Bedside. Verbal orders to hold midodrine at this time. Due to pressure 168/42 map 70. Will continue to monitor.

## 2020-11-25 NOTE — CONSULTS
Comprehensive Nutrition Assessment    Type and Reason for Visit:  Initial, Consult(TF order/mgt)    Nutrition Recommendations/Plan:   · If unable to extubate timely, begin EN via NGT with low kelsi high protein formula (Vital High Protein) progressed as tolerated to a goal of 60 ml/hr to provide 1440 kcal (1986 total with propofol) and 126 gm protein     Nutrition Assessment:  Pt intubated with septic shock s/p I &D and of Jarek Gangrene. Hoping to extubate soon and return home noted. MAP unstable on pressor today. Will continue to follow closely for POC. Malnutrition Assessment:  Malnutrition Status:  No malnutrition    Context:  Acute Illness       Estimated Daily Nutrient Needs:  Energy (kcal):  2129 (PSU 2010); Weight Used for Energy Requirements:  Current     Protein (g):  145+ (2+ g/kg IBW); Weight Used for Protein Requirements:  Ideal        Fluid (ml/day):  2100 (1 ml/kcal);  Method Used for Fluid Requirements:  1 ml/kcal      Nutrition Related Findings:  Last A1C down to 5.2      Wounds:  Surgical Incision       Current Nutrition Therapies:    Diet NPO Effective Now    Anthropometric Measures:  · Height: 5' 9\" (175.3 cm)  · Current Body Weight: 261 lb 3.9 oz (118.5 kg)   · Admission Body Weight: 253 lb 8.5 oz (115 kg)    · Usual Body Weight: 273 lb (123.8 kg)(9/9/20)     · Ideal Body Weight: 160 lbs; % Ideal Body Weight 163.3 %   · BMI: 38.6  · BMI Categories: Obese Class 2 (BMI 35.0 -39.9)       Nutrition Diagnosis:   · Inadequate oral intake related to increase demand for energy/nutrients as evidenced by NPO or clear liquid status due to medical condition, wounds, intubation    Nutrition Interventions:   Food and/or Nutrient Delivery:  Continue NPO  Nutrition Education/Counseling:  No recommendation at this time   Coordination of Nutrition Care:  Continue to monitor while inpatient    Goals:  Pt will tolerate a source of nutrition within 24-48 hr       Nutrition Monitoring and Evaluation: Behavioral-Environmental Outcomes:  None Identified   Food/Nutrient Intake Outcomes:  Diet Advancement/Tolerance, Enteral Nutrition Intake/Tolerance  Physical Signs/Symptoms Outcomes:  Biochemical Data, Hemodynamic Status, Skin, Weight     Discharge Planning:     Too soon to determine     Electronically signed by Graciela Aldrich RD, LD on 11/25/20 at 1:54 PM EST    Contact: [phone]

## 2020-11-25 NOTE — PROGRESS NOTES
Procedure Note - Central Line:  Emergent consent was obtained for the procedure, patient on ventilator and unstable. Linda Tavares was prepped and draped in standard bedside fashion in the left IJ area. Physical landmarks with ultrasound guidance was used to locate the central vein. Seldinger technique was used for placement of the CVC in a non-pulsatile vasculature. All ports shari and flushed. The patient tolerated the procedure well and no acute complications occurred. KENNA Echavarria Dr. was present at all times during procedure and was the supervising attending.

## 2020-11-25 NOTE — PROGRESS NOTES
Dr. Brigette Navas at bedside inserting art. Line. Dr. Brigette Navas verified NG placement and advised to advance NG tube 5cm.

## 2020-11-25 NOTE — CONSULTS
Michelle [facility] Wound Ostomy Continence Nurse  Consult Note       Angelique Tavares  AGE: 72 y.o.    GENDER: male  : 1955  TODAY'S DATE:  2020    Subjective:     Reason for  Evaluation and Assessment: wound assessement      Angelique Tavares is a 72 y.o. male referred by:   [x] Physician  [] Nursing  [] Other:     Wound Identification:  Wound Type: non-healing surgical and traumatic  Contributing Factors: diabetes, chronic pressure and decreased mobility        PAST MEDICAL HISTORY        Diagnosis Date    CHF (congestive heart failure) (HCC)     Chronic ulcer of left leg with fat layer exposed ([address] 2016    Chronic ulcer of right leg with fat layer exposed ([address] 2016    Chronic ulcer of right leg with fat layer exposed ([address] 2016    Diabetes mellitus ([address]     Hypertension     PVD (peripheral vascular disease) ([address] 2016    Type 2 diabetes mellitus with diabetic peripheral angiopathy without gangrene, without long-term current use of insulin ([address] 2016    Venous stasis ulcer of both lower extremities without varicose veins ([address] 2016       PAST SURGICAL HISTORY    Past Surgical History:   Procedure Laterality Date    RECTAL SURGERY N/A 2020    RECTAL PERIRECTAL INCISION AND DRAINAGE performed by Thomas Harper MD at [address] [place] [address] History   Problem Relation Age of Onset    Asthma Mother     Breast Cancer Mother     Cancer Mother     Diabetes Mother     High Blood Pressure Mother     Cancer Father     Early Death Brother     Arthritis Paternal Grandmother        SOCIAL HISTORY    Social History     Tobacco Use    Smoking status: Current Every Day Smoker     Packs/day: 1.00     Years: 60.00     Pack years: 60.00     Types: Cigarettes    Smokeless tobacco: Never Used    Tobacco comment: healing    Substance Use Topics    Alcohol use: Never     Frequency: Never    Drug use: No       ALLERGIES    No Known Allergies    MEDICATIONS    No current facility-administered medications on file prior to encounter. Current Outpatient Medications on File Prior to Encounter   Medication Sig Dispense Refill    Latanoprost 0.005 % EMUL Apply to eye daily      ibuprofen (ADVIL;MOTRIN) 600 MG tablet Take 1 tablet by mouth every 6 hours as needed for Pain 30 tablet 0    glipiZIDE (GLUCOTROL) 5 MG tablet Take 5 mg by mouth 2 times daily (before meals)      potassium chloride (KLOR-CON M) 20 MEQ extended release tablet Take 20 mEq by mouth daily      ALPRAZolam (XANAX) 0.5 MG tablet Take 0.5 mg by mouth nightly. Julio Majano ipratropium-albuterol (DUONEB) 0.5-2.5 (3) MG/3ML SOLN nebulizer solution Inhale 1 vial into the lungs every 4 hours      albuterol sulfate  (90 Base) MCG/ACT inhaler Inhale 2 puffs into the lungs every 6 hours as needed for Wheezing      metolazone (ZAROXOLYN) 2.5 MG tablet Take 2 tablets by mouth daily 60 tablet 0    aspirin EC 81 MG EC tablet Take 1 tablet by mouth daily 30 tablet 3    HYDROcodone-acetaminophen (NORCO) 7.5-325 MG per tablet Take 1 tablet by mouth 3 times daily .       metFORMIN (GLUCOPHAGE) 1000 MG tablet Take 1,000 mg by mouth 2 times daily (with meals)      furosemide (LASIX) 80 MG tablet Take 80 mg by mouth 2 times daily      lisinopril (PRINIVIL;ZESTRIL) 20 MG tablet Take 40 mg by mouth daily       tamsulosin (FLOMAX) 0.4 MG capsule Take 0.4 mg by mouth daily      finasteride (PROSCAR) 5 MG tablet Take 5 mg by mouth daily           Objective:      BP (!) 99/50   Pulse 71   Temp 98.4 °F (36.9 °C) (Axillary)   Resp 20   Ht 5' 9\" (1.753 m)   Wt 261 lb 3.9 oz (118.5 kg)   SpO2 100%   BMI 38.58 kg/m²   South Risk Score: South Scale Score: 9    LABS    CBC:   Lab Results   Component Value Date    WBC 49.2 11/25/2020    RBC 4.25 11/25/2020    HGB 13.4 11/25/2020    HCT 44.0 11/25/2020    .5 11/25/2020    MCH 31.5 11/25/2020    MCHC 30.5 11/25/2020    RDW 14.2 Change in Wound Size % (l*w) 67.6 11/25/20 1100   Wound Volume (cm^3) 2.51 cm^3 11/25/20 1100   Wound Healing % 3 11/25/20 1100   Post-Procedure Length (cm) 0.6 cm 11/23/20 0824   Post-Procedure Width (cm) 0.5 cm 11/23/20 0824   Post-Procedure Depth (cm) 0.2 cm 11/23/20 0824   Post-Procedure Surface Area (cm^2) 0.3 cm^2 11/23/20 0824   Post-Procedure Volume (cm^3) 0.06 cm^3 11/23/20 0824   Distance Tunneling (cm) 0 cm 11/25/20 1100   Tunneling Position ___ O'Clock 0 11/25/20 1100   Undermining Starts ___ O'Clock 0 11/25/20 1100   Undermining Ends___ O'Clock 0 11/25/20 1100   Undermining Maxium Distance (cm) 0 11/25/20 1100   Wound Assessment Pink/red;Granulation tissue 11/25/20 1100   Drainage Amount Scant 11/25/20 1100   Drainage Description Serous 11/25/20 1100   Odor None 11/25/20 1100   Merary-wound Assessment Intact 11/25/20 1100   Margins Defined edges 11/25/20 1100   Wound Thickness Description not for Pressure Injury Full thickness 11/25/20 1100   Number of days: 40       Wound 11/25/20 Scrotum perineum (Active)   Wound Etiology Non-Healing Surgical 11/25/20 1100   Dressing Status New dressing applied 11/25/20 1100   Wound Cleansed Cleansed with saline 11/25/20 1100   Wound Length (cm) 25 cm 11/25/20 1100   Wound Width (cm) 5 cm 11/25/20 1100   Wound Depth (cm) 5.3 cm 11/25/20 1100   Wound Surface Area (cm^2) 125 cm^2 11/25/20 1100   Wound Volume (cm^3) 662.5 cm^3 11/25/20 1100   Distance Tunneling (cm) 0 cm 11/25/20 1100   Tunneling Position ___ O'Clock 0 11/25/20 1100   Undermining Starts ___ O'Clock 12 11/25/20 1100   Undermining Ends___ O'Clock 2 11/25/20 1100   Undermining Maxium Distance (cm) 7.5 11/25/20 1100   Wound Assessment Purple/maroon;Pink/red;Subcutaneous 11/25/20 1100   Drainage Amount Moderate 11/25/20 1100   Drainage Description Yellow;Serosanguinous 11/25/20 1100   Odor Moderate 11/25/20 1100   Margins Defined edges 11/25/20 1100   Wound Thickness Description not for Pressure Injury Full thickness 11/25/20 1100   Number of days: 0       Response to treatment:  Well tolerated by patient. Pain Assessment:  Severity:  None intubated  Quality of pain: na  Wound Pain Timing/Severity: na  Premedicated: yes    Plan:     Plan of Care: Wound 11/25/20 Scrotum perineum-Dressing/Treatment: (NS moist kerlix, abds, paper tape)  Wound 10/16/20 Pretibial Left #1 Left Lateral Lower Leg-Dressing/Treatment: Collagen, Silicone border     Pt in bed. Intubated. Surgery per Dr. Corinna Banda on perineum on 11/24/20. To evaluate for appropriateness for NPWT in a few days. Pt active with outpatient wound clinic for traumatic wound to left leg. Notes reviewed. Heels and sacrum intact. Packing removed from perineum. Strong odor noted. Cleansed with NS. All wounds pictured and measured. Treatment applied to left leg as above. Purple area noted to rupert wound to buttock and undermining noted as well. Skin prep applied to rupert wound. NS moist Kerlix filled into wound. Covered with abds. Secured with paper tape and depends. Pt is at high risk for skin breakdown AEB South. Follow South orders. Will plan to reevaluate Friday depending on surgeon recommendations. Specialty Bed Required : yes  [x] Low Air Loss   [x] Pressure Redistribution  [] Fluid Immersion  [] Bariatric  [] Total Pressure Relief  [] Other:     Discharge Plan:  Placement for patient upon discharge: tbd  Hospice Care: no  Patient appropriate for Outpatient [address]: already active    Patient/Caregiver Teaching:  Level of patient/caregiver understanding able to:   Not able to voice understanding.         Electronically signed by Cecile Figueroa RN,  on 11/25/2020 at 12:42 PM

## 2020-11-25 NOTE — PROGRESS NOTES
GENERAL SURGERY PROGRESS NOTE    Tyler Tavares is a 72 y.o. 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 Subjective:  Remains intubated and sedated. On 10mcg of levophed for blood pressure support. Tmax 99.1. Remains tachycardic. On vent 100% O2. Objective:    Vitals: VITALS:  /61   Pulse 112   Temp 97.7 °F (36.5 °C) (Axillary)   Resp 16   Ht 5' 9\" (1.753 m)   Wt 261 lb 3.9 oz (118.5 kg)   SpO2 100%   BMI 38.58 kg/m²     I/O: 11/24 0701 - 11/25 0700  In: 1100 [I.V.:1100]  Out: 600 [Urine:500]    Labs/Imaging Results:   Lab Results   Component Value Date     11/24/2020    K 4.0 11/24/2020    CL 97 11/24/2020    CO2 21 11/24/2020    BUN 39 11/24/2020    CREATININE 1.5 11/24/2020    GLUCOSE 164 11/24/2020    CALCIUM 7.7 11/24/2020      Lab Results   Component Value Date    WBC 40.4 (HH) 11/24/2020    HGB 13.2 (L) 11/24/2020    HCT 40.2 (L) 11/24/2020    MCV 96.9 11/24/2020     11/24/2020       IV Fluids: fentanyl Last Rate: 25 mcg/hr (11/24/20 2007)    dextrose    lactated ringers Last Rate: 125 mL/hr at 11/25/20 0020    propofol Last Rate: 30 mcg/kg/min (11/25/20 0355)    norepinephrine Last Rate: 10 mcg/min (11/25/20 0257)    Scheduled Meds:   clindamycin (CLEOCIN) IV, 600 mg, Intravenous, Q6H    piperacillin-tazobactam, 3.375 g, Intravenous, Q8H    chlorhexidine, 15 mL, Mouth/Throat, BID    famotidine (PEPCID) injection, 20 mg, Intravenous, BID    polyvinyl alcohol, 1 drop, Both Eyes, Q4H **AND** artificial tears, , Both Eyes, Q4H    [START ON 11/26/2020] influenza virus vaccine, 0.5 mL, Intramuscular, Prior to discharge    aspirin EC, 81 mg, Oral, Daily    vancomycin, 1,750 mg, Intravenous, Q24H    Physical Exam:  General: A&O x 3, no distress. HEENT: Anicteric sclerae, MMM. Extremities: Mild edema bilat LE.  Left lateral leg with healing laceration--well granulated  Buttock: perineum wound extending from the right scrotum to the right buttock, dressing changed this morning, wound is stable since the OR, there does not seem to be progression of his infection, some skin overlying the right buttock with ischemic changes  Abdomen: Soft, nontender, nondistended. 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 On levophed for blood pressure support. Wound ok with dressing change today. Patient Active Problem List:     Venous stasis ulcer of both lower extremities without varicose veins (HCC)     WD-Ulcer of shin, left, with fat layer exposed ([address]     PVD (peripheral vascular disease) ([address]     Type 2 diabetes mellitus with diabetic peripheral angiopathy without gangrene, without long-term current use of insulin (HCC)     WD-Venous stasis of both lower extremities     WD-Lymphedema of both lower extremities     WD-Idiopathic chronic venous hypertension of left lower extremity with ulcer ([address]     WD-Traumatic open wound of left lower leg, complicated by diabetes and venous insufficiency     Gangrene of scrotum     Abscess of perineum      - ok to work towards extubation from a surgical standpoint  - bid and PRN wet to dry dressing changes for now, Wound care service consulted.   May be able to place a VAC in a couple of days  - continue IV antibiotics  - will continue to follow    Julio Crowder MD

## 2020-11-25 NOTE — CONSULTS
[address]  consulted by Dr. Flavia Ghosh for monitoring and adjustment. Indication for treatment: Perineum Abscess and Foot ulcer  Goal trough: 10-15 mcg/mL     Pertinent Laboratory Values:   Temp Readings from Last 3 Encounters:   11/24/20 99.1 °F (37.3 °C) (Temporal)   11/24/20 99.5 °F (37.5 °C)   11/23/20 97.6 °F (36.4 °C) (Temporal)     Recent Labs     11/24/20  1525 11/24/20  1527   WBC 36.9*  --    LACTATE  --  1.6     Recent Labs     11/24/20  1525   BUN 42*   CREATININE 1.5*     Estimated Creatinine Clearance: 61 mL/min (A) (based on SCr of 1.5 mg/dL (H)). Intake/Output Summary (Last 24 hours) at 11/24/2020 2144  Last data filed at 11/24/2020 1943  Gross per 24 hour   Intake 1100 ml   Output 600 ml   Net 500 ml       Pertinent Cultures:  Date    Source    Results  Pending    Vancomycin level:   TROUGH:  No results for input(s): VANCOTROUGH in the last 72 hours. RANDOM:  No results for input(s): VANCORANDOM in the last 72 hours. Assessment:  WBC and temperature: 36.9  /  99.5  SCr, BUN, and urine output: 1.5 / 42 / 500ml  Day(s) of therapy:  1 st dose       Plan:  Dosing comments: 2000mg x 1 dose then 1750mg q24 hrs  Pharmacy will continue to monitor patient and adjust therapy as indicated    REPEAT VANCOMYCIN TROUGH SCHEDULED FOR 26th @1400    Thank you for the consult.   Hayley Sanchez [place]  11/24/2020 9:44 PM
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
## 2020-11-25 NOTE — PROGRESS NOTES
Dr. Carolynn Kramer notified on positive DVT of right leg. Consult cardiology and keep pt. Intubated for tonight. Dr. Laci Lacy notified of positive DVT of right leg. No new orders at this time. Will continue to monitor. Per Dr. Laci Lacy, ASAD allison for treatment of DVT. Dr. Carolynn Kramer notified of CTA results.     Ramin Johansen RN

## 2020-11-25 NOTE — PROGRESS NOTES
[facility]  consulted by Dr. George Huston for monitoring and adjustment. Indication for treatment: Jarek's gangrene  Goal trough: 10-15 mcg/mL     Pertinent Laboratory Values:   Temp Readings from Last 3 Encounters:   11/25/20 98.4 °F (36.9 °C) (Axillary)   11/24/20 99.5 °F (37.5 °C)   11/23/20 97.6 °F (36.4 °C) (Temporal)     Recent Labs     11/24/20  1525 11/24/20  1527 11/24/20 2000 11/25/20  0435   WBC 36.9*  --  40.4* 49.2*   LACTATE  --  1.6  --   --      Recent Labs     11/24/20  1525 11/24/20 2000 11/25/20  0435   BUN 42* 39* 38*   CREATININE 1.5* 1.5* 1.3     Estimated Creatinine Clearance: 72 mL/min (based on SCr of 1.3 mg/dL). Intake/Output Summary (Last 24 hours) at 11/25/2020 1319  Last data filed at 11/25/2020 1245  Gross per 24 hour   Intake 6421.26 ml   Output 1800 ml   Net 4621.26 ml       Pertinent Cultures:  Date    Source    Results  11/24   Tissue cx   Pending   11/24   Blood    Pending     Vancomycin level:   TROUGH:  No results for input(s): VANCOTROUGH in the last 72 hours. RANDOM:  No results for input(s): VANCORANDOM in the last 72 hours. Assessment:  · WBC and temperature: significantly elevated WBC, afebrile   · SCr, BUN, and urine output: renal function improving   · Day(s) of therapy: 2  · Vancomycin level: to be collected      Plan:  · Vancomycin 2000mg x 1 followed by 1750 mg q24h  · Anticipated trough 12, AUC/MINDY 500   · Watch for accumulation 2/2 age and BMI   · Pharmacy will continue to monitor patient and adjust therapy as indicated    REPEAT Sloan Drummond SCHEDULED FOR 11/26 @1400    Thank you for the consult.   Tari Raymond RPh  11/25/2020 1:19 PM

## 2020-11-25 NOTE — PROGRESS NOTES
ADMITTED FOR STEPHANIE GANGRENE  POST SURGERY HAD A FIB   IN ST NOW ON PRESSORS  CHECK ECHO  WILL DECIDE FOR ANTICOAG ONCE STABLE  WILL FU

## 2020-11-25 NOTE — FLOWSHEET NOTE
Wound Care  Cancellation/No-show Note  Patient Name:  Angelo Tavares  :  1955   Date:  2020  Cancelled visits to date: 0  No-shows to date: 0    For today's appointment patient:  [x]  Cancelled  []  Rescheduled appointment  []  No-show     Reason given by patient:  []  Patient ill  []  Conflicting appointment  []  No transportation    []  Conflict with work  []  No reason given  [x]  Other: In hospital    Comments:      Electronically signed by:  Andi Mora, 2020, 2:53 PM

## 2020-11-25 NOTE — PROGRESS NOTES
11/24/20 1958   Vent Information   $Ventilation $Initial Day   Skin Assessment Clean, dry, & intact   Vent Type 980   Vent Mode AC/VC   Vt Ordered 500 mL   Rate Set 16 bmp   Peak Flow 44 L/min   Pressure Support 0 cmH20   FiO2  100 %   SpO2 98 %   SpO2/FiO2 ratio 98   Sensitivity 3   PEEP/CPAP 5   I Time/ I Time % 0 s   Humidification Source HME   Vent Patient Data   High Peep/I Pressure 0   Peak Inspiratory Pressure 24 cmH2O   Mean Airway Pressure 11 cmH20   Rate Measured 16 br/min   Vt Exhaled 510 mL   Minute Volume 8.09 Liters   I:E Ratio 1:2.00   Plateau Pressure 22 FCH30   Static Compliance 30 mL/cmH2O   Cough/Sputum   Sputum How Obtained Endotracheal;Suctioned   $Obtained Sample $Induced Sputum   Cough Non-productive   Sputum Amount None   Sputum Color None   Tenacity None   Spontaneous Breathing Trial (SBT) RT Doc   Pulse 156   Alarm Settings   High Pressure Alarm 40 cmH2O   Delay Alarm 20 sec(s)   Low Minute Volume Alarm 2.5 L/min   Apnea (secs) 20 secs   High Respiratory Rate 40 br/min   Low Exhaled Vt  250 mL   ETT (adult)   Placement Date/Time: 11/24/20 1720   Preoxygenation: Yes  Mask Ventilation: Mask ventilation not attempted (0)  Technique: Direct laryngoscopy  Type: Cuffed  Tube Size: 7.5 mm  Laryngoscope: Mac  Blade Size: 4  Location: Oral  Grade View: Partial view. .. Secured at 23 cm   Measured From Lips   ET Placement Right   Secured By Commercial tube torre   Site Condition Dry   Cuff Pressure   (minimal leak)   Pt received from PACU already intubated. Size 7.5 ETT, secured at 23. Bilateral breadth sounds. Will continue to monitor.

## 2020-11-26 ENCOUNTER — APPOINTMENT (OUTPATIENT)
Dept: ULTRASOUND IMAGING | Age: 65
DRG: 853 | End: 2020-11-26
Payer: MEDICARE

## 2020-11-26 ENCOUNTER — APPOINTMENT (OUTPATIENT)
Dept: GENERAL RADIOLOGY | Age: 65
DRG: 853 | End: 2020-11-26
Payer: MEDICARE

## 2020-11-26 LAB
ALBUMIN SERPL-MCNC: 2.6 GM/DL (ref 3.4–5)
ALP BLD-CCNC: 114 IU/L (ref 40–128)
ALT SERPL-CCNC: 19 U/L (ref 10–40)
ANION GAP SERPL CALCULATED.3IONS-SCNC: 10 MMOL/L (ref 4–16)
ANION GAP SERPL CALCULATED.3IONS-SCNC: 11 MMOL/L (ref 4–16)
AST SERPL-CCNC: 16 IU/L (ref 15–37)
BASE EXCESS MIXED: 0.7 (ref 0–1.2)
BASOPHILS ABSOLUTE: 0.1 K/CU MM
BASOPHILS RELATIVE PERCENT: 0.2 % (ref 0–1)
BILIRUB SERPL-MCNC: 0.4 MG/DL (ref 0–1)
BUN BLDV-MCNC: 36 MG/DL (ref 6–23)
BUN BLDV-MCNC: 36 MG/DL (ref 6–23)
CALCIUM SERPL-MCNC: 8.2 MG/DL (ref 8.3–10.6)
CALCIUM SERPL-MCNC: 8.3 MG/DL (ref 8.3–10.6)
CARBON MONOXIDE, BLOOD: 0.9 % (ref 0–5)
CHLORIDE BLD-SCNC: 104 MMOL/L (ref 99–110)
CHLORIDE BLD-SCNC: 104 MMOL/L (ref 99–110)
CO2 CONTENT: 26.6 MMOL/L (ref 19–24)
CO2: 24 MMOL/L (ref 21–32)
CO2: 25 MMOL/L (ref 21–32)
COMMENT: ABNORMAL
CREAT SERPL-MCNC: 1 MG/DL (ref 0.9–1.3)
CREAT SERPL-MCNC: 1.1 MG/DL (ref 0.9–1.3)
DIFFERENTIAL TYPE: ABNORMAL
DOSE AMOUNT: ABNORMAL
DOSE TIME: ABNORMAL
EOSINOPHILS ABSOLUTE: 0 K/CU MM
EOSINOPHILS RELATIVE PERCENT: 0.1 % (ref 0–3)
GFR AFRICAN AMERICAN: >60 ML/MIN/1.73M2
GFR AFRICAN AMERICAN: >60 ML/MIN/1.73M2
GFR NON-AFRICAN AMERICAN: >60 ML/MIN/1.73M2
GFR NON-AFRICAN AMERICAN: >60 ML/MIN/1.73M2
GLUCOSE BLD-MCNC: 186 MG/DL (ref 70–99)
GLUCOSE BLD-MCNC: 195 MG/DL (ref 70–99)
GLUCOSE BLD-MCNC: 212 MG/DL (ref 70–99)
GLUCOSE BLD-MCNC: 218 MG/DL (ref 70–99)
GLUCOSE BLD-MCNC: 222 MG/DL (ref 70–99)
GLUCOSE BLD-MCNC: 266 MG/DL (ref 70–99)
HCO3 ARTERIAL: 25.4 MMOL/L (ref 18–23)
HCT VFR BLD CALC: 36.9 % (ref 42–52)
HCT VFR BLD CALC: 38.9 % (ref 42–52)
HEMOGLOBIN: 12.1 GM/DL (ref 13.5–18)
HEMOGLOBIN: 12.2 GM/DL (ref 13.5–18)
IMMATURE NEUTROPHIL %: 1.4 % (ref 0–0.43)
LYMPHOCYTES ABSOLUTE: 0.7 K/CU MM
LYMPHOCYTES RELATIVE PERCENT: 2.5 % (ref 24–44)
MCH RBC QN AUTO: 30.3 PG (ref 27–31)
MCH RBC QN AUTO: 31.2 PG (ref 27–31)
MCHC RBC AUTO-ENTMCNC: 31.4 % (ref 32–36)
MCHC RBC AUTO-ENTMCNC: 32.8 % (ref 32–36)
MCV RBC AUTO: 95.1 FL (ref 78–100)
MCV RBC AUTO: 96.8 FL (ref 78–100)
METHEMOGLOBIN ARTERIAL: 1.2 %
MONOCYTES ABSOLUTE: 0.8 K/CU MM
MONOCYTES RELATIVE PERCENT: 3 % (ref 0–4)
NUCLEATED RBC %: 0 %
O2 SATURATION: 96.2 % (ref 96–97)
PCO2 ARTERIAL: 40 MMHG (ref 32–45)
PDW BLD-RTO: 14.2 % (ref 11.7–14.9)
PDW BLD-RTO: 14.3 % (ref 11.7–14.9)
PH BLOOD: 7.41 (ref 7.34–7.45)
PLATELET # BLD: 168 K/CU MM (ref 140–440)
PLATELET # BLD: 172 K/CU MM (ref 140–440)
PMV BLD AUTO: 11.5 FL (ref 7.5–11.1)
PMV BLD AUTO: 11.8 FL (ref 7.5–11.1)
PO2 ARTERIAL: 134 MMHG (ref 75–100)
POTASSIUM SERPL-SCNC: 3.9 MMOL/L (ref 3.5–5.1)
POTASSIUM SERPL-SCNC: 4 MMOL/L (ref 3.5–5.1)
PRO-BNP: 8277 PG/ML
RBC # BLD: 3.88 M/CU MM (ref 4.6–6.2)
RBC # BLD: 4.02 M/CU MM (ref 4.6–6.2)
SEGMENTED NEUTROPHILS ABSOLUTE COUNT: 25.7 K/CU MM
SEGMENTED NEUTROPHILS RELATIVE PERCENT: 92.8 % (ref 36–66)
SODIUM BLD-SCNC: 139 MMOL/L (ref 135–145)
SODIUM BLD-SCNC: 139 MMOL/L (ref 135–145)
T3 FREE: 1.4 PG/ML (ref 2.3–4.2)
TOTAL IMMATURE NEUTOROPHIL: 0.4 K/CU MM
TOTAL NUCLEATED RBC: 0 K/CU MM
TOTAL PROTEIN: 5.3 GM/DL (ref 6.4–8.2)
VANCOMYCIN TROUGH: 7.7 UG/ML (ref 10–20)
WBC # BLD: 27.7 K/CU MM (ref 4–10.5)
WBC # BLD: 29.9 K/CU MM (ref 4–10.5)

## 2020-11-26 PROCEDURE — 80053 COMPREHEN METABOLIC PANEL: CPT

## 2020-11-26 PROCEDURE — 99024 POSTOP FOLLOW-UP VISIT: CPT | Performed by: SURGERY

## 2020-11-26 PROCEDURE — 94750 HC PULMONARY COMPLIANCE STUDY: CPT

## 2020-11-26 PROCEDURE — 6360000002 HC RX W HCPCS: Performed by: SURGERY

## 2020-11-26 PROCEDURE — 2000000000 HC ICU R&B

## 2020-11-26 PROCEDURE — 99233 SBSQ HOSP IP/OBS HIGH 50: CPT | Performed by: INTERNAL MEDICINE

## 2020-11-26 PROCEDURE — 2580000003 HC RX 258: Performed by: SURGERY

## 2020-11-26 PROCEDURE — 89220 SPUTUM SPECIMEN COLLECTION: CPT

## 2020-11-26 PROCEDURE — 71045 X-RAY EXAM CHEST 1 VIEW: CPT

## 2020-11-26 PROCEDURE — 84481 FREE ASSAY (FT-3): CPT

## 2020-11-26 PROCEDURE — 6360000002 HC RX W HCPCS: Performed by: INTERNAL MEDICINE

## 2020-11-26 PROCEDURE — 2700000000 HC OXYGEN THERAPY PER DAY

## 2020-11-26 PROCEDURE — 76536 US EXAM OF HEAD AND NECK: CPT

## 2020-11-26 PROCEDURE — 85027 COMPLETE CBC AUTOMATED: CPT

## 2020-11-26 PROCEDURE — 83880 ASSAY OF NATRIURETIC PEPTIDE: CPT

## 2020-11-26 PROCEDURE — 6370000000 HC RX 637 (ALT 250 FOR IP): Performed by: PHYSICIAN ASSISTANT

## 2020-11-26 PROCEDURE — 86800 THYROGLOBULIN ANTIBODY: CPT

## 2020-11-26 PROCEDURE — 2580000003 HC RX 258: Performed by: INTERNAL MEDICINE

## 2020-11-26 PROCEDURE — 85025 COMPLETE CBC W/AUTO DIFF WBC: CPT

## 2020-11-26 PROCEDURE — 2500000003 HC RX 250 WO HCPCS: Performed by: PHYSICIAN ASSISTANT

## 2020-11-26 PROCEDURE — 2500000003 HC RX 250 WO HCPCS: Performed by: SURGERY

## 2020-11-26 PROCEDURE — 86376 MICROSOMAL ANTIBODY EACH: CPT

## 2020-11-26 PROCEDURE — 82803 BLOOD GASES ANY COMBINATION: CPT

## 2020-11-26 PROCEDURE — 94761 N-INVAS EAR/PLS OXIMETRY MLT: CPT

## 2020-11-26 PROCEDURE — 80048 BASIC METABOLIC PNL TOTAL CA: CPT

## 2020-11-26 PROCEDURE — 94003 VENT MGMT INPAT SUBQ DAY: CPT

## 2020-11-26 PROCEDURE — 80202 ASSAY OF VANCOMYCIN: CPT

## 2020-11-26 PROCEDURE — 82962 GLUCOSE BLOOD TEST: CPT

## 2020-11-26 PROCEDURE — 6360000002 HC RX W HCPCS: Performed by: PHYSICIAN ASSISTANT

## 2020-11-26 RX ORDER — INSULIN GLARGINE 100 [IU]/ML
20 INJECTION, SOLUTION SUBCUTANEOUS NIGHTLY
Status: DISCONTINUED | OUTPATIENT
Start: 2020-11-26 | End: 2020-12-02

## 2020-11-26 RX ORDER — ASPIRIN 300 MG/1
300 SUPPOSITORY RECTAL DAILY
Status: DISCONTINUED | OUTPATIENT
Start: 2020-11-27 | End: 2020-11-28

## 2020-11-26 RX ADMIN — CLINDAMYCIN PHOSPHATE 600 MG: 150 INJECTION, SOLUTION INTRAVENOUS at 18:00

## 2020-11-26 RX ADMIN — CHLORHEXIDINE GLUCONATE 0.12% ORAL RINSE 15 ML: 1.2 LIQUID ORAL at 08:58

## 2020-11-26 RX ADMIN — PIPERACILLIN AND TAZOBACTAM 3.38 G: 3; .375 INJECTION, POWDER, LYOPHILIZED, FOR SOLUTION INTRAVENOUS at 14:35

## 2020-11-26 RX ADMIN — MINERAL OIL AND WHITE PETROLATUM: 150; 830 OINTMENT OPHTHALMIC at 12:58

## 2020-11-26 RX ADMIN — HYDROCORTISONE SODIUM SUCCINATE 100 MG: 100 INJECTION, POWDER, FOR SOLUTION INTRAMUSCULAR; INTRAVENOUS at 21:16

## 2020-11-26 RX ADMIN — FAMOTIDINE 20 MG: 10 INJECTION INTRAVENOUS at 08:58

## 2020-11-26 RX ADMIN — POLYVINYL ALCOHOL 1 DROP: 14 SOLUTION/ DROPS OPHTHALMIC at 11:57

## 2020-11-26 RX ADMIN — HYDROCORTISONE SODIUM SUCCINATE 100 MG: 100 INJECTION, POWDER, FOR SOLUTION INTRAMUSCULAR; INTRAVENOUS at 05:08

## 2020-11-26 RX ADMIN — SODIUM CHLORIDE, POTASSIUM CHLORIDE, SODIUM LACTATE AND CALCIUM CHLORIDE: 600; 310; 30; 20 INJECTION, SOLUTION INTRAVENOUS at 13:33

## 2020-11-26 RX ADMIN — MINERAL OIL AND WHITE PETROLATUM: 150; 830 OINTMENT OPHTHALMIC at 21:16

## 2020-11-26 RX ADMIN — POLYVINYL ALCOHOL 1 DROP: 14 SOLUTION/ DROPS OPHTHALMIC at 08:58

## 2020-11-26 RX ADMIN — POLYVINYL ALCOHOL 1 DROP: 14 SOLUTION/ DROPS OPHTHALMIC at 03:40

## 2020-11-26 RX ADMIN — INSULIN LISPRO 2 UNITS: 100 INJECTION, SOLUTION INTRAVENOUS; SUBCUTANEOUS at 08:58

## 2020-11-26 RX ADMIN — CHLORHEXIDINE GLUCONATE 0.12% ORAL RINSE 15 ML: 1.2 LIQUID ORAL at 21:16

## 2020-11-26 RX ADMIN — CLINDAMYCIN PHOSPHATE 600 MG: 150 INJECTION, SOLUTION INTRAVENOUS at 05:08

## 2020-11-26 RX ADMIN — POLYVINYL ALCOHOL 1 DROP: 14 SOLUTION/ DROPS OPHTHALMIC at 16:02

## 2020-11-26 RX ADMIN — CLINDAMYCIN PHOSPHATE 600 MG: 150 INJECTION, SOLUTION INTRAVENOUS at 11:56

## 2020-11-26 RX ADMIN — VANCOMYCIN HYDROCHLORIDE 1750 MG: 5 INJECTION, POWDER, LYOPHILIZED, FOR SOLUTION INTRAVENOUS at 16:02

## 2020-11-26 RX ADMIN — PROPOFOL 25 MCG/KG/MIN: 10 INJECTION, EMULSION INTRAVENOUS at 10:14

## 2020-11-26 RX ADMIN — CLINDAMYCIN PHOSPHATE 600 MG: 150 INJECTION, SOLUTION INTRAVENOUS at 22:44

## 2020-11-26 RX ADMIN — MINERAL OIL AND WHITE PETROLATUM: 150; 830 OINTMENT OPHTHALMIC at 01:19

## 2020-11-26 RX ADMIN — PROPOFOL 30 MCG/KG/MIN: 10 INJECTION, EMULSION INTRAVENOUS at 05:05

## 2020-11-26 RX ADMIN — POLYVINYL ALCOHOL 1 DROP: 14 SOLUTION/ DROPS OPHTHALMIC at 22:45

## 2020-11-26 RX ADMIN — ENOXAPARIN SODIUM 40 MG: 100 INJECTION SUBCUTANEOUS at 12:57

## 2020-11-26 RX ADMIN — PIPERACILLIN AND TAZOBACTAM 3.38 G: 3; .375 INJECTION, POWDER, LYOPHILIZED, FOR SOLUTION INTRAVENOUS at 21:16

## 2020-11-26 RX ADMIN — ASPIRIN 81 MG: 81 TABLET, COATED ORAL at 08:59

## 2020-11-26 RX ADMIN — FAMOTIDINE 20 MG: 10 INJECTION INTRAVENOUS at 21:16

## 2020-11-26 RX ADMIN — MINERAL OIL AND WHITE PETROLATUM: 150; 830 OINTMENT OPHTHALMIC at 03:40

## 2020-11-26 RX ADMIN — Medication 50 MCG/HR: at 20:21

## 2020-11-26 RX ADMIN — MINERAL OIL AND WHITE PETROLATUM: 150; 830 OINTMENT OPHTHALMIC at 09:31

## 2020-11-26 RX ADMIN — POLYVINYL ALCOHOL 1 DROP: 14 SOLUTION/ DROPS OPHTHALMIC at 21:16

## 2020-11-26 RX ADMIN — PROPOFOL 25 MCG/KG/MIN: 10 INJECTION, EMULSION INTRAVENOUS at 15:43

## 2020-11-26 RX ADMIN — INSULIN LISPRO 4 UNITS: 100 INJECTION, SOLUTION INTRAVENOUS; SUBCUTANEOUS at 11:56

## 2020-11-26 RX ADMIN — PIPERACILLIN AND TAZOBACTAM 3.38 G: 3; .375 INJECTION, POWDER, LYOPHILIZED, FOR SOLUTION INTRAVENOUS at 05:07

## 2020-11-26 RX ADMIN — PROPOFOL 30 MCG/KG/MIN: 10 INJECTION, EMULSION INTRAVENOUS at 00:22

## 2020-11-26 RX ADMIN — HYDROCORTISONE SODIUM SUCCINATE 100 MG: 100 INJECTION, POWDER, FOR SOLUTION INTRAMUSCULAR; INTRAVENOUS at 12:57

## 2020-11-26 ASSESSMENT — PULMONARY FUNCTION TESTS
PIF_VALUE: 29
PIF_VALUE: 29
PIF_VALUE: 19
PIF_VALUE: 19
PIF_VALUE: 23
PIF_VALUE: 17
PIF_VALUE: 21
PIF_VALUE: 17
PIF_VALUE: 26
PIF_VALUE: 17
PIF_VALUE: 23
PIF_VALUE: 17
PIF_VALUE: 23
PIF_VALUE: 28
PIF_VALUE: 21
PIF_VALUE: 25
PIF_VALUE: 17
PIF_VALUE: 23
PIF_VALUE: 20
PIF_VALUE: 23
PIF_VALUE: 21
PIF_VALUE: 17
PIF_VALUE: 16
PIF_VALUE: 27
PIF_VALUE: 18

## 2020-11-26 ASSESSMENT — PAIN SCALES - GENERAL
PAINLEVEL_OUTOF10: 0

## 2020-11-26 NOTE — PROGRESS NOTES
11/26/20 0432   Vent Information   Vent Type 980   Vent Mode AC/VC   Vt Ordered 500 mL   Rate Set 16 bmp   Peak Flow 44 L/min   Pressure Support 0 cmH20   FiO2  50 %   SpO2 100 %   SpO2/FiO2 ratio 200   Sensitivity 2   PEEP/CPAP 5   I Time/ I Time % 0 s   Humidification Source HME   Vent Patient Data   High Peep/I Pressure 0   Peak Inspiratory Pressure 23 cmH2O   Mean Airway Pressure 10 cmH20   Rate Measured 20 br/min   Vt Exhaled 532 mL   Minute Volume 10.3 Liters   I:E Ratio 1:1.50   Cough/Sputum   Sputum How Obtained Endotracheal;Suctioned   $Obtained Sample $Induced Sputum   Cough Productive   Sputum Amount Small   Sputum Color White   Tenacity Thick   Spontaneous Breathing Trial (SBT) RT Doc   Pulse 59   Additional Respiratory  Assessments   Resp 20   Alarm Settings   High Pressure Alarm 40 cmH2O   Delay Alarm 20 sec(s)   Low Minute Volume Alarm 2.5 L/min   Apnea (secs) 20 secs   High Respiratory Rate 40 br/min   Low Exhaled Vt  250 mL   ETT (adult)   Placement Date/Time: 11/24/20 1720   Preoxygenation: Yes  Mask Ventilation: Mask ventilation not attempted (0)  Technique: Direct laryngoscopy  Type: Cuffed  Tube Size: 7.5 mm  Laryngoscope: Mac  Blade Size: 4  Location: Oral  Grade View: Partial view. ..    Secured at 23 cm   Measured From Lips   ET Placement Right   Secured By Commercial tube torre   Site Condition Dry   Cuff Pressure   (Minimal leak)

## 2020-11-26 NOTE — PROGRESS NOTES
GENERAL SURGERY PROGRESS NOTE    Eufemia Tavares is a 72 y.o. male POD 2 from Jarek's gangrene debridement. Subjective:  Remains intubated and sedated. Off pressors. Afebrile. Tachycardia improved. US with posterior tibial vein DVT. Gabino grimaces to pain and interacts with stimulation.     Objective:    Vitals: VITALS:  BP (!) 149/64   Pulse 60   Temp 98.8 °F (37.1 °C) (Oral)   Resp 22   Ht 5' 9\" (1.753 m)   Wt 261 lb 3.9 oz (118.5 kg)   SpO2 99%   BMI 38.58 kg/m²     I/O: 11/25 0701 - 11/26 0700  In: 6383 [I.V.:3294]  Out: 2250 [Urine:2080]    Labs/Imaging Results:   Lab Results   Component Value Date     11/26/2020    K 3.9 11/26/2020     11/26/2020    CO2 25 11/26/2020    BUN 36 11/26/2020    CREATININE 1.1 11/26/2020    GLUCOSE 212 11/26/2020    CALCIUM 8.3 11/26/2020      Lab Results   Component Value Date    WBC 27.7 (H) 11/26/2020    HGB 12.1 (L) 11/26/2020    HCT 36.9 (L) 11/26/2020    MCV 95.1 11/26/2020     11/26/2020       IV Fluids: dextrose    fentanyl Last Rate: 25 mcg/hr (11/26/20 0120)    lactated ringers Last Rate: 50 mL/hr (11/25/20 2233)    propofol Last Rate: 25 mcg/kg/min (11/26/20 1014)    norepinephrine Last Rate: 3 mcg/min (11/25/20 2358)    Scheduled Meds:   clindamycin (CLEOCIN) IV, 600 mg, Intravenous, Q6H    insulin lispro, 0-12 Units, Subcutaneous, TID WC    insulin lispro, 0-6 Units, Subcutaneous, Nightly    hydrocortisone sodium succinate PF, 100 mg, Intravenous, Q8H    [Held by provider] midodrine, 10 mg, Oral, TID WC    piperacillin-tazobactam, 3.375 g, Intravenous, Q8H    chlorhexidine, 15 mL, Mouth/Throat, BID    famotidine (PEPCID) injection, 20 mg, Intravenous, BID    polyvinyl alcohol, 1 drop, Both Eyes, Q4H **AND** artificial tears, , Both Eyes, Q4H    influenza virus vaccine, 0.5 mL, Intramuscular, Prior to discharge    aspirin EC, 81 mg, Oral, Daily    vancomycin, 1,750 mg, Intravenous, Q24H    Physical Exam:  General: A&O x 3, no distress. HEENT: Anicteric sclerae, MMM. Extremities: Mild edema bilat LE. Buttock: perineum wound extending from the right scrotum to the right buttock, dressing changed this morning, wound is stable since the OR, there does not seem to be progression of his infection, some skin overlying the right buttock with ischemic changes, still with foul odor  Abdomen: Soft, nontender, nondistended. 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 Wound ok with dressing change today. Patient Active Problem List:     Venous stasis ulcer of both lower extremities without varicose veins (HCC)     WD-Ulcer of shin, left, with fat layer exposed ([address]     PVD (peripheral vascular disease) ([address]     Type 2 diabetes mellitus with diabetic peripheral angiopathy without gangrene, without long-term current use of insulin (HCC)     WD-Venous stasis of both lower extremities     WD-Lymphedema of both lower extremities     WD-Idiopathic chronic venous hypertension of left lower extremity with ulcer ([address]     WD-Traumatic open wound of left lower leg, complicated by diabetes and venous insufficiency     Gangrene of scrotum     Abscess of perineum      - OK with anticoagulation for DVT from a surgical standpoint  - ok to work towards extubation as able  - bid and PRN wet to dry dressing changes for now, Wound care service consulted.   May be able to place a VAC in a couple of days  - continue IV antibiotics  - will continue to follow    Darryl Luciano MD

## 2020-11-26 NOTE — PROGRESS NOTES
RT at bedside after notification of frequent ventilator alarms. Upon inspection by this RN and Brianna Martin RT, it was discovered that the pt's chest was asymmetrical with the L side firmer to palpation as well as bradycardia. Pt not exibiting signs of acute distress at this time. Flakito Patterson called and stat CXR ordered. Awaiting xray tech now. Will continue to monitor.

## 2020-11-26 NOTE — PROGRESS NOTES
Hospitalist Progress Note      Name:  Tyler Tavares /Age/Sex: 1955  (72 y.o. 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 fib with RVR:  -Patient was briefly in A. fib with RVR after transfer from OR-heart rate ranging from 150-160.  -Patient was given amiodarone bolus as recommended by cardiology.  -Currently patient is in sinus with HR in 70's at bedside this AM  -Monitor closely  -Cardiology on board  -Echo completed with enlarged right heart with elevated pressures; CT PE studied pending to r/o PE     YANCI:  - 1.3 this AM  - Continue daily BMP     Anion gap metabolic acidosis  - Resolved     Hypertension  -Patient currently hypotensive requiring pressors.  -Resume home medications when appropriate. He is on lisinopril 40 mg daily     diabetes mellitus type 2, not on insulin  -Is on metformin 1000 mg twice daily, glipizide 5 mg twice daily; holding while inpatient  -HbA1c 5.2-2020  -Accu-Cheks every 4 hours. Hypoglycemia protocol in place  - SSI     History of HFrEF  -Echo-2017-EF 30 to 40%, global hypokinesis, right ventricular systolic pressure 43, mild MR, TR.   Repeat echo ordered and pending results  -Is on metolazone 5 mg daily, Lasix 80 mg twice daily; holding in light of shock requiring pressors  -Monitor closely for fluid overload.     COPD:  -Patient is on Breo Ellipta 100-25, albuterol HFA as needed     Peripheral vascular disease(left SFA-atherectomy and balloon response to verbal or painful stimuli  PSYCH Sedated on propofol.     Medications:   Medications:    enoxaparin  40 mg Subcutaneous BID    insulin lispro  0-18 Units Subcutaneous Q4H    clindamycin (CLEOCIN) IV  600 mg Intravenous Q6H    hydrocortisone sodium succinate PF  100 mg Intravenous Q8H    midodrine  10 mg Oral TID WC    piperacillin-tazobactam  3.375 g Intravenous Q8H    chlorhexidine  15 mL Mouth/Throat BID    famotidine (PEPCID) injection  20 mg Intravenous BID    polyvinyl alcohol  1 drop Both Eyes Q4H    And    artificial tears   Both Eyes Q4H    influenza virus vaccine  0.5 mL Intramuscular Prior to discharge    aspirin EC  81 mg Oral Daily    vancomycin  1,750 mg Intravenous Q24H      Infusions:    dextrose      fentanyl 25 mcg/hr (11/26/20 0120)    lactated ringers 50 mL/hr at 11/26/20 1333    propofol 25 mcg/kg/min (11/26/20 1014)    norepinephrine 3 mcg/min (11/25/20 9958)     PRN Meds: glucose, 15 g, PRN  dextrose, 12.5 g, PRN  glucagon (rDNA), 1 mg, PRN  dextrose, 100 mL/hr, PRN  HYDROmorphone, 0.5 mg, Q4H PRN  fentanNYL, 25 mcg, Q1H PRN  albuterol sulfate HFA, 2 puff, Q6H PRN          Electronically signed by Raz Cole DO on 11/26/2020 at 1:48 PM

## 2020-11-26 NOTE — PROGRESS NOTES
Daily Progress Note      Patient is awake alert  On vent  Remain in sinus rate is stable and PFIB  COPD no PE noted on CT   Pulmonary HTN -suggest long term AC but can go slow  Below knee DVT suggest ASA for now  Await extubation   Sepsis and septic shock improving  Had good urine response to lasix last night  Hx of PAD  Cr is stable   Hyperthyroid per endo   Placed on DVT prophylaxis   CVP improved also       Pt. Sedated on vent  HR stable,NSR in the 60 range, BP stable    Jarek's Gangrene    S/p Incision and drainage per Gen surgery    On ABx    On vent post op- attempting to wean per pulm    Tx. Per Gen surgery and primary    AF with RVR    Given amio    NSR now- no rate controlling meds with HOTN    On ASA currently    Per Gen surgery note ok to start AC-has below the knee DVT as well-as below the knee and NSR now would like to wait one more day before starting AC    EF preserved but severe PAH noted- no PE     Will cont. To follow    Echo-11/25/20    Summary   Left ventricular systolic function is normal.   Ejection fraction is visually estimated at 50%. Flattening of the interventricular septum due to right ventricular   volume/pressure overload. Moderately dilated left atrium. Severely enlarged and hypokinetic right ventricle cavity. Sclerotic, but non-stenotic aortic valve. Mild to moderate tricuspid regurgitation; RVSP: 57 mmHg consistent with   severe PHTN. No evidence of any pericardial effusion. Technically difficult study, due to body habitus. PAST MEDICAL HISTORY:  Peripheral vascular disease present, status post  left leg intervention done. Echo was done today. Echo shows LV  function was preserved. RV is dilated. Pulmonary hypertension is  present.     The patient has a history of peripheral vascular disease present and is  status post left leg intervention, left SFA atherectomy was performed  and atherectomy followed by a drug-coated balloon was done in 2017.   The  patient has a history of having heart failure, peripheral vascular  disease, hypertension, hyperlipidemia, obesity present, and diabetes  present.     The patient had a stress test done in 2016, negative for ischemia. His  ABIs were abnormal, left was 0.48 and right was 0.8.     PAST SURGICAL HISTORY:  Left leg intervention done, Jarek's gangrene,  and rectal surgery done.     SOCIAL HISTORY:  History of smoking. No alcohol use.     ALLERGIES:  NKDA.     MEDICATIONS AT HOME:  Prior to hospital, he was on Glucotrol, potassium,  Xanax, Zaroxolyn, aspirin, Lasix, lisinopril, and Flomax.   Objective:   BP (!) 149/64   Pulse 60   Temp 98.8 °F (37.1 °C) (Oral)   Resp 22   Ht 5' 9\" (1.753 m)   Wt 261 lb 3.9 oz (118.5 kg)   SpO2 99%   BMI 38.58 kg/m²       Intake/Output Summary (Last 24 hours) at 11/26/2020 1055  Last data filed at 11/26/2020 0555  Gross per 24 hour   Intake 3776 ml   Output 1850 ml   Net 1926 ml       Medications:   Scheduled Meds:   clindamycin (CLEOCIN) IV  600 mg Intravenous Q6H    insulin lispro  0-12 Units Subcutaneous TID WC    insulin lispro  0-6 Units Subcutaneous Nightly    hydrocortisone sodium succinate PF  100 mg Intravenous Q8H    [Held by provider] midodrine  10 mg Oral TID WC    piperacillin-tazobactam  3.375 g Intravenous Q8H    chlorhexidine  15 mL Mouth/Throat BID    famotidine (PEPCID) injection  20 mg Intravenous BID    polyvinyl alcohol  1 drop Both Eyes Q4H    And    artificial tears   Both Eyes Q4H    influenza virus vaccine  0.5 mL Intramuscular Prior to discharge    aspirin EC  81 mg Oral Daily    vancomycin  1,750 mg Intravenous Q24H      Infusions:   dextrose      fentanyl 25 mcg/hr (11/26/20 0120)    lactated ringers 50 mL/hr (11/25/20 2233)    propofol 25 mcg/kg/min (11/26/20 1014)    norepinephrine 3 mcg/min (11/25/20 8268)      PRN Meds:  glucose, dextrose, glucagon (rDNA), dextrose, HYDROmorphone, fentanNYL, albuterol sulfate HFA       Physical Desmond Emmanuel PA-C on 11/26/2020 at 10:55 AM

## 2020-11-26 NOTE — CONSULTS
Endocrinology   Consult Note  Dear Doctor    Thank You for the Consult     Pt. Was Admitted for : Perineal abscess //Jarek gangrene   Incision drainage and debridement of Jarek gangrene on 11/24/2020    patient is sedated intubated and on ventilator after surgery      Reason for Consult: Better control of blood glucose and also has elevated serum free T4 and low TSH suggestive of hyperthyroidism    History Obtained From:  EMR patient is sedated intubated and on ventilator      HISTORY OF PRESENT ILLNESS:                The patient is a 72 y.o. male with significant past medical history of diabetes mellitus, congestive heart failure, atrial fibrillation, hypertension, peripheral vascular disease at this with the balloon angioplasty of the leg, and has left foot infection was admitted to hospital with perineal abscess that turned into Jarek gangrene in the perineal area. That was incised and drained with a debridement. This was done under the done under general anesthesia and patient will remain intubated and on ventilator. Also patient noted to have elevated serum free T4 and low TSH suggestive of hyperthyroidism I was  consulted for better control of blood glucose. And review thyroid function test  Patient also has a right leg DVT      ROS:   Pt's ROS done in detail. Abnormal ROS are noted in Medical and Surgical History Section below:      Other Medical History:        Diagnosis Date    CHF (congestive heart failure) (HCC)     Chronic ulcer of left leg with fat layer exposed ([address] 12/12/2016    Chronic ulcer of right leg with fat layer exposed ([address] 12/12/2016    Chronic ulcer of right leg with fat layer exposed ([address] 12/12/2016    Diabetes mellitus ([address]     Hypertension     PVD (peripheral vascular disease) ([address] 12/12/2016    Type 2 diabetes mellitus with diabetic peripheral angiopathy without gangrene, without long-term current use of insulin ([address] 12/12/2016    Venous stasis ulcer of both lower extremities without varicose veins [facility] 12/12/2016     Surgical History:        Procedure Laterality Date    RECTAL SURGERY N/A 11/24/2020    RECTAL PERIRECTAL INCISION AND DRAINAGE performed by Scotty Pacheco MD at [address]:  Patient has no known allergies. Family History:       Problem Relation Age of Onset    Asthma Mother     Breast Cancer Mother     Cancer Mother     Diabetes Mother     High Blood Pressure Mother     Cancer Father     Early Death Brother     Arthritis Paternal Grandmother      REVIEW OF SYSTEMS:  Review of System Done as noted above     PHYSICAL EXAM:      Vitals:    /61   Pulse 56   Temp 98.8 °F (37.1 °C) (Oral)   Resp 22   Ht 5' 9\" (1.753 m)   Wt 261 lb 3.9 oz (118.5 kg)   SpO2 100%   BMI 38.58 kg/m²     CONSTITUTIONAL:   patient is sedated intubated and on ventilator    EYES:  vision intact Fundoscopic Exam not performed   ENT:Normal  NECK:  Supple, No JVD.    Thyroid Exam:Normal   LUNGS:  Has Vesicular Breath Sounds,   CARDIOVASCULAR:  Normal apical impulse, regular rate and rhythm, normal S1 and S2, no S3 or S4, and has no  murmur   ABDOMEN:  No scars, normal bowel sounds, soft, non-distended, non-tender, no masses palpated, no hepatolienomegaly  Musculoskeletal: Normal  Extremities: Normal, peripheral pulses normal, , has no edema left leg ulcer  Has dressing on the perineal area that was drained and debrided  NEUROLOGIC: patient is sedated intubated and on ventilator    DATA:    CBC:   Recent Labs     11/25/20  0435 11/26/20  0530 11/26/20  0545   WBC 49.2* 29.9* 27.7*   HGB 13.4* 12.2* 12.1*   * 168 172    CMP:  Recent Labs     11/24/20  1525 11/24/20  2000 11/25/20  0435 11/26/20  0530    136 137 139   K 4.0 4.0 4.3 3.9   CL 97* 97* 100 104   CO2 22 21 21 25   BUN 42* 39* 38* 36*   CREATININE 1.5* 1.5* 1.3 1.1   CALCIUM 8.5 7.7* 7.8* 8.3   PROT 6.1*  --  5.5* 5.3*   LABALBU 3.1*  --  2.6* 2.6*   BILITOT 0.9  --  0.7 0.4 ALKPHOS 127  --  135* 114   AST 24  --  19 16   ALT 23  --  20 19     Lipids:   Lab Results   Component Value Date    CHOL 162 02/28/2018    HDL 63 02/28/2018    TRIG 155 02/28/2018     Glucose:   Recent Labs     11/25/20  1731 11/25/20  2052 11/26/20  0750   POCGLU 146* 231* 195*     Hemoglobin A1C:   Lab Results   Component Value Date    LABA1C 5.2 07/20/2020     Free T4:   Lab Results   Component Value Date    T4FREE 1.83 11/25/2020     Free T3: No results found for: FT3  TSH High Sensitivity:   Lab Results   Component Value Date    TSHHS 0.130 11/25/2020       Xr Abdomen (kub) (single Ap View)    Result Date: 11/25/2020  EXAMINATION: ONE SUPINE XRAY VIEW(S) OF THE ABDOMEN 11/25/2020 9:23 am COMPARISON: None. HISTORY: ORDERING SYSTEM PROVIDED HISTORY: NG placement verification TECHNOLOGIST PROVIDED HISTORY: Reason for exam:->NG placement verification Reason for Exam: NG placement verification FINDINGS: Enteric tube tip projects at the gastric body with side hole projecting at the GE junction. This should be advanced by at least 3 cm. Visualized bowel is unremarkable. No free air, as seen. Enteric tube tip projects at the gastric body with side hole projecting at the GE junction. This should be advanced by at least 3 cm. Ct Pelvis W Contrast Additional Contrast? None    Result Date: 11/24/2020  EXAMINATION: CT OF THE PELVIS WITH CONTRAST 11/24/2020 1:44 pm     Findings consistent with the provided history of developing Jarek's gangrene with extensive involvement of the subcutaneous fatty tissues as described and possible involvement of the inferolateral aspect of the right gluteus mary which shows only increased density but no evidence of fluid or gas. Xr Chest Portable    Result Date: 11/26/2020  EXAMINATION: ONE XRAY VIEW OF THE CHEST 11/26/2020 2:12 am      1. Lines and tubes as described.  2. No significant interval change of cardiomegaly, dense retrocardiac opacity, and bilateral effusions. 3. Pulmonary edema. Xr Chest Portable    Result Date: 11/25/2020  EXAMINATION: ONE XRAY VIEW OF THE CHEST 11/25/2020 9:24 am    Stable small left pleural effusion with basilar atelectasis and/or consolidation. Xr Chest Portable    Result Date: 11/24/2020  EXAMINATION: ONE X-RAY VIEW OF THE CHEST 11/24/2020 9:15 pm        1. Endotracheal tube tip terminates approximately 6.1 cm above the sabrina. 2. Left internal jugular central venous catheter tip likely terminates in the brachiocephalic vein. 3. Cardiomegaly with perihilar vascular prominence. 4. Consolidative opacities in the lung bases with left pleural effusion. The findings were sent to the Radiology Results [address] at 9:38 pm on 11/24/2020to be communicated to a licensed caregiver. Vl Dup Lower Extremity Venous Bilateral    Result Date: 11/25/2020  EXAMINATION: DUPLEX VENOUS ULTRASOUND OF THE BILATERAL LOWER EXTREMITIES, 11/25/2020 5:10 pm TECHNIQUE: Duplex ultrasound and Doppler images were obtained of the bilateral lower extremities COMPARISON: None HISTORY: ORDERING SYSTEM PROVIDED HISTORY:  DVT TECHNOLOGIST PROVIDED HISTORY: Reason for exam:  DVT Reason for Exam:  SOB Acuity: Unknown Type of Exam:  Unknown FINDINGS: There is no evidence of DVT within the left lower extremity. Within the right lower extremity there is a positive DVT involving the posterior tibial vein. Positive DVT involving the right posterior tibial vein below the knee. No evidence of left lower extremity DVT. Cta Pulmonary W Contrast    Result Date: 11/25/2020  EXAMINATION: CTA OF THE CHEST 11/25/2020 1:32 pm     Pulmonary arteries are only well evaluated to the lobar level. No main or lobar pulmonary embolism. Segmental and subsegmental branch vessels are poorly evaluated due to artifact. Enlarged pulmonary arterial tree compatible with pulmonary hypertension. Cardiomegaly with four-chamber enlargement.  Multifocal nodularity and consolidation throughout the lungs favored secondary to multifocal pneumonia with reactive mediastinal lymphadenopathy. Left lower lobe collapse which may be due to mucous plugging as the left lower lobe airways are not well visualized. Support lines and tubes appear appropriate in positioning.        Scheduled Medicines   Medications:    clindamycin (CLEOCIN) IV  600 mg Intravenous Q6H    insulin lispro  0-12 Units Subcutaneous TID     insulin lispro  0-6 Units Subcutaneous Nightly    hydrocortisone sodium succinate PF  100 mg Intravenous Q8H    [Held by provider] midodrine  10 mg Oral TID     piperacillin-tazobactam  3.375 g Intravenous Q8H    chlorhexidine  15 mL Mouth/Throat BID    famotidine (PEPCID) injection  20 mg Intravenous BID    polyvinyl alcohol  1 drop Both Eyes Q4H    And    artificial tears   Both Eyes Q4H    influenza virus vaccine  0.5 mL Intramuscular Prior to discharge    aspirin EC  81 mg Oral Daily    vancomycin  1,750 mg Intravenous Q24H      Infusions:    dextrose      fentanyl 25 mcg/hr (11/26/20 0120)    lactated ringers 50 mL/hr (11/25/20 2233)    propofol 25 mcg/kg/min (11/26/20 0507)    norepinephrine 3 mcg/min (11/25/20 5608)         IMPRESSION    Patient Active Problem List   Diagnosis    Venous stasis ulcer of both lower extremities without varicose veins (HCC)    WD-Ulcer of shin, left, with fat layer exposed ([address]    PVD (peripheral vascular disease) ([address]    Type 2 diabetes mellitus with diabetic peripheral angiopathy without gangrene, without long-term current use of insulin (HCC)    WD-Venous stasis of both lower extremities    WD-Lymphedema of both lower extremities    WD-Idiopathic chronic venous hypertension of left lower extremity with ulcer ([address]    WD-Traumatic open wound of left lower leg, complicated by diabetes and venous insufficiency    Gangrene of scrotum    Abscess of perineum    Cellulitis of perineum         Hyperthyroidism possibly due to

## 2020-11-26 NOTE — PROGRESS NOTES
Dr. Yolanda Armendariz called via telephone and informed of elevated CVP of 14 and crackles in bilateral lungs. See new orders.

## 2020-11-26 NOTE — PROGRESS NOTES
Pulmonary and Critical Care  Progress Note      VITALS:  /61   Pulse 65   Temp 98.8 °F (37.1 °C) (Oral)   Resp 19   Ht 5' 9\" (1.753 m)   Wt 261 lb 3.9 oz (118.5 kg)   SpO2 97%   BMI 38.58 kg/m²     Subjective:   CHIEF COMPLAINT :Perinal pain     HPI:                The patient is on the vent and sedated. He is positive for RLE DVT. He is in mild reps distress    Objective:   PHYSICAL EXAM:    LUNGS:Bilateral basal crackles  Abd-soft, BS+,  Ext -no pedal edema  CVS-s1s2, no murmurs      DATA:    CBC:  Recent Labs     11/24/20  1525 11/24/20 2000 11/25/20  0435 11/26/20  0530 11/26/20  0545   WBC 36.9* 40.4* 49.2* 29.9* 27.7*   RBC 4.55* 4.15* 4.25* 4.02* 3.88*   HGB 13.8 13.2* 13.4* 12.2* 12.1*   HCT 44.4 40.2* 44.0 38.9* 36.9*    161 128* 168 172   MCV 97.6 96.9 103.5* 96.8 95.1   MCH 30.3 31.8* 31.5* 30.3 31.2*   MCHC 31.1* 32.8 30.5* 31.4* 32.8   RDW 13.9 14.0 14.2 14.2 14.3   SEGSPCT 84.0* 56.0  --   --  92.8*   BANDSPCT 3* 38*  --   --   --       BMP:  Recent Labs     11/24/20 2000 11/25/20  0435 11/26/20  0530    137 139   K 4.0 4.3 3.9   CL 97* 100 104   CO2 21 21 25   BUN 39* 38* 36*   CREATININE 1.5* 1.3 1.1   CALCIUM 7.7* 7.8* 8.3   GLUCOSE 164* 201* 212*      ABG:  Recent Labs     11/24/20 2000 11/25/20  0600 11/26/20  0600   PH 7.21* 7.28* 7.41   PO2ART 88 201* 134*   XMM9KEU 66.0* 50.0* 40.0   O2SAT 91.0* 95.2* 96.2     BNP  No results found for: BNP   D-Dimer:  Lab Results   Component Value Date    DDIMER 1694 (H) 11/25/2020      Radiology:   Positive DVT involving the right posterior tibial vein below the knee.         No evidence of left lower extremity DVT     . Lines and tubes as described. 2. No significant interval change of cardiomegaly, dense retrocardiac    opacity, and bilateral effusions. 3. Pulmonary edema.      Pulmonary arteries are only well evaluated to the lobar level.  No main or    lobar pulmonary embolism.  Segmental and subsegmental branch vessels are    poorly evaluated due to artifact.         Enlarged pulmonary arterial tree compatible with pulmonary hypertension.         Cardiomegaly with four-chamber enlargement.         Multifocal nodularity and consolidation throughout the lungs favored    secondary to multifocal pneumonia with reactive mediastinal lymphadenopathy.         Left lower lobe collapse which may be due to mucous plugging as the left    lower lobe airways are not well visualized.         Support lines and tubes appear appropriate in positioning     1. Assessment/Plan     Patient Active Problem List    Diagnosis Date Noted    Cellulitis of perineum     Gangrene of scrotum 11/24/2020    Abscess of perineum     WD-Traumatic open wound of left lower leg, complicated by diabetes and venous insufficiency 10/08/2020    WD-Idiopathic chronic venous hypertension of left lower extremity with ulcer ([address] 04/21/2020    WD-Venous stasis of both lower extremities 04/14/2020    WD-Lymphedema of both lower extremities 04/14/2020    Venous stasis ulcer of both lower extremities without varicose veins ([address] 12/12/2016    WD-Ulcer of shin, left, with fat layer exposed ([address] 12/12/2016    PVD (peripheral vascular disease) ([address] 12/12/2016    Type 2 diabetes mellitus with diabetic peripheral angiopathy without gangrene, without long-term current use of insulin ([address] 12/12/2016     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 JAMA  Septic shock  Hypoxia  VDRF  COPD  RLE below knee DVT       1. Abx  2. F/u C&S  3. Wean off Levophed for a MAP > 65 mmhg  4. Nebs  5. Hydrocortisone decrease  6. Decrease IV fluids  7. SAT and SBT trial  8. Since it is below knee DVT,but risk of post op and also Pulmonary HTN, would like to consider [facility] if cleared by surgery  No follow-ups on file.     Electronically signed by Gisela Arechiga MD on 11/26/2020 at 10:02 AM

## 2020-11-26 NOTE — PROGRESS NOTES
[facility]  consulted by Dr. Sherrill Myles for monitoring and adjustment. Indication for treatment: Jarek's gangrene  Goal trough: 10-15 mcg/mL     Pertinent Laboratory Values:   Temp Readings from Last 3 Encounters:   11/25/20 98.4 °F (36.9 °C) (Axillary)   11/24/20 99.5 °F (37.5 °C)   11/23/20 97.6 °F (36.4 °C) (Temporal)     Recent Labs     11/24/20  1525 11/24/20  1527 11/24/20 2000 11/25/20  0435   WBC 36.9*  --  40.4* 49.2*   LACTATE  --  1.6  --   --        Estimated Creatinine Clearance: 94 mL/min (based on SCr of 1 mg/dL). Recent Labs     11/25/20  0435 11/26/20  0530 11/26/20  0545   BUN 38* 36* 36*   CREATININE 1.3 1.1 1.0     Intake/Output Summary (Last 24 hours) at 11/25/2020 1319  Last data filed at 11/25/2020 1245  Gross per 24 hour   Intake 6421.26 ml   Output 1800 ml   Net 4621.26 ml       Pertinent Cultures:  Date    Source    Results  11/24   Tissue cx   NGTD  11/24   Blood    NGTD    VANCOMYCIN TROUGH:    Recent Labs     11/26/20  1502   VANCOTROUGH 7.7*     VANCOMYCIN RANDOM:  No results for input(s): VANCORANDOM in the last 72 hours. Assessment:  · WBC and temperature: WBC improving (receiving hydrocortisone), afebrile   · SCr, BUN, and urine output: renal function improving   · Day(s) of therapy: 3  · Vancomycin level: 7.7, low     Plan:  · Vancomycin 2000mg x 1 followed by 1750 mg q24h, trough low   · Increase dose to 2000 mg q24h   · Anticipate further accumulation 2/2 BMI   · Pharmacy will continue to monitor patient and adjust therapy as indicated    REPEAT VANCOMYCIN TROUGH SCHEDULED FOR 11/29 @ 0377     Thank you for the consult.   Neal Lopez RPh  11/25/2020 1:19 PM

## 2020-11-26 NOTE — PLAN OF CARE
Problem: Infection:  Goal: Will remain free from infection  Description: Will remain free from infection  11/25/2020 2319 by Micah Elena RN  Outcome: Ongoing  11/25/2020 1525 by Luis Antonio Echavarria RN  Outcome: Ongoing     Problem: Safety:  Goal: Free from accidental physical injury  Description: Free from accidental physical injury  11/25/2020 2319 by Micah Elena RN  Outcome: Ongoing  11/25/2020 1525 by Luis Antonio Echavarria RN  Outcome: Ongoing  Goal: Free from intentional harm  Description: Free from intentional harm  11/25/2020 2319 by Micah Elena RN  Outcome: Ongoing  11/25/2020 1525 by Luis Antonio Echavarria RN  Outcome: Ongoing     Problem: Daily Care:  Goal: Daily care needs are met  Description: Daily care needs are met  11/25/2020 2319 by Micah Elena RN  Outcome: Ongoing  11/25/2020 1525 by Luis Antonio Echavarria RN  Outcome: Ongoing     Problem: Pain:  Goal: Patient's pain/discomfort is manageable  Description: Patient's pain/discomfort is manageable  11/25/2020 2319 by Micah Elena RN  Outcome: Ongoing  11/25/2020 1525 by Luis Antonio Echavarria RN  Outcome: Ongoing     Problem: Skin Integrity:  Goal: Skin integrity will stabilize  Description: Skin integrity will stabilize  11/25/2020 2319 by Micah Elena RN  Outcome: Ongoing  11/25/2020 1525 by Luis Antonio Echavarria RN  Outcome: Ongoing     Problem: Discharge Planning:  Goal: Patients continuum of care needs are met  Description: Patients continuum of care needs are met  11/25/2020 2319 by Micah Elena RN  Outcome: Ongoing  11/25/2020 1525 by Luis Antonio Echavarria RN  Outcome: Ongoing     Problem: Falls - Risk of:  Goal: Will remain free from falls  Description: Will remain free from falls  11/25/2020 2319 by Micah Elena RN  Outcome: Ongoing  11/25/2020 1525 by Luis Antonio Echavarria RN  Outcome: Ongoing  Goal: Absence of physical injury  Description: Absence of physical injury  11/25/2020 2319 by Micah Elena RN  Outcome: Ongoing  11/25/2020 1525 by Gilford Singh, RN  Outcome: Ongoing     Problem: Skin Integrity:  Goal: Will show no infection signs and symptoms  Description: Will show no infection signs and symptoms  11/25/2020 2319 by Serena Palma RN  Outcome: Ongoing  11/25/2020 1525 by Gilford Singh, RN  Outcome: Ongoing  Goal: Absence of new skin breakdown  Description: Absence of new skin breakdown  11/25/2020 2319 by Serena Palma RN  Outcome: Ongoing  11/25/2020 1525 by Gilford Singh, RN  Outcome: Ongoing     Problem: Restraint Use - Nonviolent/Non-Self-Destructive Behavior:  Goal: Absence of restraint indications  Description: Absence of restraint indications  11/25/2020 2319 by Serena Palma RN  Outcome: Ongoing  11/25/2020 1525 by Gilford Singh, RN  Outcome: Ongoing  Goal: Absence of restraint-related injury  Description: Absence of restraint-related injury  11/25/2020 2319 by Serena Palma RN  Outcome: Ongoing  11/25/2020 1525 by Gilford Singh, RN  Outcome: Ongoing

## 2020-11-26 NOTE — PLAN OF CARE
Problem: Infection:  Goal: Will remain free from infection  Description: Will remain free from infection  11/26/2020 1104 by Danny Milan RN  Outcome: Ongoing  11/25/2020 2319 by Adrian Daley RN  Outcome: Ongoing     Problem: Safety:  Goal: Free from accidental physical injury  Description: Free from accidental physical injury  11/26/2020 1104 by Danny Milan RN  Outcome: Ongoing  11/25/2020 2319 by Adrian Daley RN  Outcome: Ongoing  Goal: Free from intentional harm  Description: Free from intentional harm  11/26/2020 1104 by Danny Milan RN  Outcome: Ongoing  11/25/2020 2319 by Adrian Daley RN  Outcome: Ongoing     Problem: Daily Care:  Goal: Daily care needs are met  Description: Daily care needs are met  11/26/2020 1104 by Danny Milan RN  Outcome: Ongoing  11/25/2020 2319 by Adrian Daley RN  Outcome: Ongoing     Problem: Pain:  Goal: Patient's pain/discomfort is manageable  Description: Patient's pain/discomfort is manageable  11/26/2020 1104 by Danny Milan RN  Outcome: Ongoing  11/25/2020 2319 by Adrian Daley RN  Outcome: Ongoing     Problem: Skin Integrity:  Goal: Skin integrity will stabilize  Description: Skin integrity will stabilize  11/26/2020 1104 by Danny Milan RN  Outcome: Ongoing  11/25/2020 2319 by Adrian Daley RN  Outcome: Ongoing     Problem: Discharge Planning:  Goal: Patients continuum of care needs are met  Description: Patients continuum of care needs are met  11/26/2020 1104 by Danny Milan RN  Outcome: Ongoing  11/25/2020 2319 by Adrian Daley RN  Outcome: Ongoing     Problem: Falls - Risk of:  Goal: Will remain free from falls  Description: Will remain free from falls  11/26/2020 1104 by Danny Milan RN  Outcome: Ongoing  11/25/2020 2319 by Adrian Daley RN  Outcome: Ongoing  Goal: Absence of physical injury  Description: Absence of physical injury  11/26/2020 1104 by Danny Milan RN  Outcome: Ongoing  11/25/2020 2319 by Adrian Daley

## 2020-11-27 LAB
ALBUMIN SERPL-MCNC: 2.6 GM/DL (ref 3.4–5)
ALP BLD-CCNC: 116 IU/L (ref 40–128)
ALT SERPL-CCNC: 17 U/L (ref 10–40)
ANION GAP SERPL CALCULATED.3IONS-SCNC: 11 MMOL/L (ref 4–16)
AST SERPL-CCNC: 15 IU/L (ref 15–37)
BASE EXCESS MIXED: 2.2 (ref 0–1.2)
BILIRUB SERPL-MCNC: 0.4 MG/DL (ref 0–1)
BUN BLDV-MCNC: 37 MG/DL (ref 6–23)
CALCIUM SERPL-MCNC: 8.4 MG/DL (ref 8.3–10.6)
CARBON MONOXIDE, BLOOD: 0.6 % (ref 0–5)
CHLORIDE BLD-SCNC: 107 MMOL/L (ref 99–110)
CO2 CONTENT: 26.3 MMOL/L (ref 19–24)
CO2: 26 MMOL/L (ref 21–32)
COMMENT: ABNORMAL
CREAT SERPL-MCNC: 1 MG/DL (ref 0.9–1.3)
GFR AFRICAN AMERICAN: >60 ML/MIN/1.73M2
GFR NON-AFRICAN AMERICAN: >60 ML/MIN/1.73M2
GLUCOSE BLD-MCNC: 114 MG/DL (ref 70–99)
GLUCOSE BLD-MCNC: 161 MG/DL (ref 70–99)
GLUCOSE BLD-MCNC: 162 MG/DL (ref 70–99)
GLUCOSE BLD-MCNC: 168 MG/DL (ref 70–99)
GLUCOSE BLD-MCNC: 170 MG/DL (ref 70–99)
GLUCOSE BLD-MCNC: 176 MG/DL (ref 70–99)
GLUCOSE BLD-MCNC: 182 MG/DL (ref 70–99)
HCO3 ARTERIAL: 25.3 MMOL/L (ref 18–23)
HCT VFR BLD CALC: 38.5 % (ref 42–52)
HEMOGLOBIN: 12.1 GM/DL (ref 13.5–18)
MCH RBC QN AUTO: 30 PG (ref 27–31)
MCHC RBC AUTO-ENTMCNC: 31.4 % (ref 32–36)
MCV RBC AUTO: 95.3 FL (ref 78–100)
METHEMOGLOBIN ARTERIAL: 1.7 %
O2 SATURATION: 95.8 % (ref 96–97)
PCO2 ARTERIAL: 34 MMHG (ref 32–45)
PDW BLD-RTO: 14.4 % (ref 11.7–14.9)
PH BLOOD: 7.48 (ref 7.34–7.45)
PLATELET # BLD: 187 K/CU MM (ref 140–440)
PMV BLD AUTO: 11.3 FL (ref 7.5–11.1)
PO2 ARTERIAL: 160 MMHG (ref 75–100)
POTASSIUM SERPL-SCNC: 3.8 MMOL/L (ref 3.5–5.1)
RBC # BLD: 4.04 M/CU MM (ref 4.6–6.2)
SODIUM BLD-SCNC: 144 MMOL/L (ref 135–145)
TOTAL PROTEIN: 5.4 GM/DL (ref 6.4–8.2)
WBC # BLD: 24.4 K/CU MM (ref 4–10.5)

## 2020-11-27 PROCEDURE — 94761 N-INVAS EAR/PLS OXIMETRY MLT: CPT

## 2020-11-27 PROCEDURE — 89220 SPUTUM SPECIMEN COLLECTION: CPT

## 2020-11-27 PROCEDURE — 99233 SBSQ HOSP IP/OBS HIGH 50: CPT | Performed by: INTERNAL MEDICINE

## 2020-11-27 PROCEDURE — 2500000003 HC RX 250 WO HCPCS: Performed by: PHYSICIAN ASSISTANT

## 2020-11-27 PROCEDURE — 94003 VENT MGMT INPAT SUBQ DAY: CPT

## 2020-11-27 PROCEDURE — 6370000000 HC RX 637 (ALT 250 FOR IP): Performed by: INTERNAL MEDICINE

## 2020-11-27 PROCEDURE — 6360000002 HC RX W HCPCS: Performed by: STUDENT IN AN ORGANIZED HEALTH CARE EDUCATION/TRAINING PROGRAM

## 2020-11-27 PROCEDURE — 82803 BLOOD GASES ANY COMBINATION: CPT

## 2020-11-27 PROCEDURE — 85027 COMPLETE CBC AUTOMATED: CPT

## 2020-11-27 PROCEDURE — 2580000003 HC RX 258: Performed by: SURGERY

## 2020-11-27 PROCEDURE — 2500000003 HC RX 250 WO HCPCS: Performed by: SURGERY

## 2020-11-27 PROCEDURE — 6360000002 HC RX W HCPCS: Performed by: SURGERY

## 2020-11-27 PROCEDURE — 6370000000 HC RX 637 (ALT 250 FOR IP): Performed by: PHYSICIAN ASSISTANT

## 2020-11-27 PROCEDURE — 6360000002 HC RX W HCPCS: Performed by: PHYSICIAN ASSISTANT

## 2020-11-27 PROCEDURE — 80053 COMPREHEN METABOLIC PANEL: CPT

## 2020-11-27 PROCEDURE — 6370000000 HC RX 637 (ALT 250 FOR IP): Performed by: STUDENT IN AN ORGANIZED HEALTH CARE EDUCATION/TRAINING PROGRAM

## 2020-11-27 PROCEDURE — 99024 POSTOP FOLLOW-UP VISIT: CPT | Performed by: SURGERY

## 2020-11-27 PROCEDURE — 94750 HC PULMONARY COMPLIANCE STUDY: CPT

## 2020-11-27 PROCEDURE — 36600 WITHDRAWAL OF ARTERIAL BLOOD: CPT

## 2020-11-27 PROCEDURE — 6360000002 HC RX W HCPCS: Performed by: INTERNAL MEDICINE

## 2020-11-27 PROCEDURE — 2580000003 HC RX 258: Performed by: STUDENT IN AN ORGANIZED HEALTH CARE EDUCATION/TRAINING PROGRAM

## 2020-11-27 PROCEDURE — 2700000000 HC OXYGEN THERAPY PER DAY

## 2020-11-27 PROCEDURE — 2000000000 HC ICU R&B

## 2020-11-27 PROCEDURE — 82962 GLUCOSE BLOOD TEST: CPT

## 2020-11-27 RX ORDER — FUROSEMIDE 10 MG/ML
40 INJECTION INTRAMUSCULAR; INTRAVENOUS ONCE
Status: COMPLETED | OUTPATIENT
Start: 2020-11-27 | End: 2020-11-27

## 2020-11-27 RX ORDER — METOPROLOL SUCCINATE 25 MG/1
25 TABLET, EXTENDED RELEASE ORAL DAILY
Status: DISCONTINUED | OUTPATIENT
Start: 2020-11-27 | End: 2020-11-28

## 2020-11-27 RX ADMIN — PROPOFOL 20 MCG/KG/MIN: 10 INJECTION, EMULSION INTRAVENOUS at 04:50

## 2020-11-27 RX ADMIN — ENOXAPARIN SODIUM 40 MG: 100 INJECTION SUBCUTANEOUS at 08:58

## 2020-11-27 RX ADMIN — POLYVINYL ALCOHOL 1 DROP: 14 SOLUTION/ DROPS OPHTHALMIC at 09:00

## 2020-11-27 RX ADMIN — PIPERACILLIN AND TAZOBACTAM 3.38 G: 3; .375 INJECTION, POWDER, LYOPHILIZED, FOR SOLUTION INTRAVENOUS at 04:50

## 2020-11-27 RX ADMIN — FUROSEMIDE 40 MG: 10 INJECTION, SOLUTION INTRAMUSCULAR; INTRAVENOUS at 11:25

## 2020-11-27 RX ADMIN — PIPERACILLIN AND TAZOBACTAM 3.38 G: 3; .375 INJECTION, POWDER, LYOPHILIZED, FOR SOLUTION INTRAVENOUS at 13:24

## 2020-11-27 RX ADMIN — PIPERACILLIN AND TAZOBACTAM 3.38 G: 3; .375 INJECTION, POWDER, LYOPHILIZED, FOR SOLUTION INTRAVENOUS at 21:40

## 2020-11-27 RX ADMIN — POLYVINYL ALCOHOL 1 DROP: 14 SOLUTION/ DROPS OPHTHALMIC at 04:51

## 2020-11-27 RX ADMIN — CLINDAMYCIN PHOSPHATE 600 MG: 150 INJECTION, SOLUTION INTRAVENOUS at 23:09

## 2020-11-27 RX ADMIN — MINERAL OIL AND WHITE PETROLATUM: 150; 830 OINTMENT OPHTHALMIC at 04:51

## 2020-11-27 RX ADMIN — PROPOFOL 20 MCG/KG/MIN: 10 INJECTION, EMULSION INTRAVENOUS at 10:34

## 2020-11-27 RX ADMIN — CLINDAMYCIN PHOSPHATE 600 MG: 150 INJECTION, SOLUTION INTRAVENOUS at 11:25

## 2020-11-27 RX ADMIN — MINERAL OIL AND WHITE PETROLATUM: 150; 830 OINTMENT OPHTHALMIC at 08:59

## 2020-11-27 RX ADMIN — HYDROCORTISONE SODIUM SUCCINATE 100 MG: 100 INJECTION, POWDER, FOR SOLUTION INTRAMUSCULAR; INTRAVENOUS at 04:50

## 2020-11-27 RX ADMIN — CLINDAMYCIN PHOSPHATE 600 MG: 150 INJECTION, SOLUTION INTRAVENOUS at 17:17

## 2020-11-27 RX ADMIN — ASPIRIN 300 MG: 300 SUPPOSITORY RECTAL at 08:59

## 2020-11-27 RX ADMIN — VANCOMYCIN HYDROCHLORIDE 2000 MG: 1 INJECTION, POWDER, LYOPHILIZED, FOR SOLUTION INTRAVENOUS at 15:54

## 2020-11-27 RX ADMIN — FAMOTIDINE 20 MG: 10 INJECTION INTRAVENOUS at 08:58

## 2020-11-27 RX ADMIN — HYDROMORPHONE HYDROCHLORIDE 0.5 MG: 1 INJECTION, SOLUTION INTRAMUSCULAR; INTRAVENOUS; SUBCUTANEOUS at 20:09

## 2020-11-27 RX ADMIN — MINERAL OIL AND WHITE PETROLATUM: 150; 830 OINTMENT OPHTHALMIC at 00:54

## 2020-11-27 RX ADMIN — CLINDAMYCIN PHOSPHATE 600 MG: 150 INJECTION, SOLUTION INTRAVENOUS at 04:50

## 2020-11-27 RX ADMIN — METOPROLOL SUCCINATE 25 MG: 25 TABLET, EXTENDED RELEASE ORAL at 13:24

## 2020-11-27 RX ADMIN — CHLORHEXIDINE GLUCONATE 0.12% ORAL RINSE 15 ML: 1.2 LIQUID ORAL at 08:59

## 2020-11-27 ASSESSMENT — PULMONARY FUNCTION TESTS
PIF_VALUE: 17
PIF_VALUE: 20
PIF_VALUE: 18
PIF_VALUE: 22
PIF_VALUE: 17
PIF_VALUE: 18
PIF_VALUE: 22
PIF_VALUE: 17
PIF_VALUE: 17
PIF_VALUE: 19
PIF_VALUE: 17
PIF_VALUE: 17
PIF_VALUE: 22

## 2020-11-27 ASSESSMENT — PAIN SCALES - GENERAL
PAINLEVEL_OUTOF10: 0

## 2020-11-27 NOTE — CARE COORDINATION
Pt extubated today. Surgery progress note today indicates that they may try to wound vac in a couple/few days. CM attempted to contact pt's wife for follow up with voicemail message left.  Cm to follow for possible discharge home with [facility]..

## 2020-11-27 NOTE — PROCEDURES
RADIAL ARTERIAL LINE PROCEDURE                                                                   Name:  [address]: [facility]  /Age/Sex: 1955  (72 y.o. male) Room/Bed: [address] [place] [address]: [id number] Admission Date/Time: 2020 11:33 AM  MRN: [id number] Attending: Sarina Morrissey DO    PRE-PROCEDURE    INDICATIONS: blood drawn and severe hypotension  LATERALITY: right  : Sarina Morrissey DO  CONSENT: Unable to be obtained due to patient's condition. MICHEAL'S TEST: Normal    PROCEDURE    The skin over the artery was prepped with chlorhexidine and draped in a sterile fashion. Local anesthesia was obtained by infiltration using 1.5 cc of 1% Lidocaine without epinephrine. A catheter-over-wire arterial line Arrow device was used to pass a 20 gauge arterial line catheter into the vessel over a needle. Return of pulsatile arterial blood was observed. The transducer set was attached and securely fastened; the arterial line was then secured to the skin with sutures. The site was then sterilely dressed using an antimicrobial Bio-patch and Tegaderm adhesive bandage. POST-PROCEDURE    The patient tolerated the procedure well. Distal perfusion was unchanged. Wrist splint was placed by nursing staff.      WAVEFORM: Adequate  COMPLICATIONS: None    Sarina Morrissey DO 2020 12:43 PM

## 2020-11-27 NOTE — PROGRESS NOTES
98.5 °F (36.9 °C) (Axillary)   Resp 19   Ht 5' 9\" (1.753 m)   Wt 261 lb 3.9 oz (118.5 kg)   SpO2 97%   BMI 38.58 kg/m²       Intake/Output Summary (Last 24 hours) at 11/27/2020 1131  Last data filed at 11/27/2020 0400  Gross per 24 hour   Intake 1960 ml   Output 2435 ml   Net -475 ml       Medications:   Scheduled Meds:   enoxaparin  40 mg Subcutaneous Daily    aspirin  300 mg Rectal Daily    vancomycin  2,000 mg Intravenous Q24H    insulin glargine  20 Units Subcutaneous Nightly    insulin lispro  0-24 Units Subcutaneous Q4H    clindamycin (CLEOCIN) IV  600 mg Intravenous Q6H    midodrine  10 mg Oral TID WC    piperacillin-tazobactam  3.375 g Intravenous Q8H    chlorhexidine  15 mL Mouth/Throat BID    famotidine (PEPCID) injection  20 mg Intravenous BID    polyvinyl alcohol  1 drop Both Eyes Q4H    And    artificial tears   Both Eyes Q4H    influenza virus vaccine  0.5 mL Intramuscular Prior to discharge      Infusions:   dextrose      fentanyl Stopped (11/27/20 1030)    lactated ringers 50 mL/hr at 11/26/20 1333    propofol Stopped (11/27/20 1040)    norepinephrine 3 mcg/min (11/25/20 8318)      PRN Meds:  glucose, dextrose, glucagon (rDNA), dextrose, HYDROmorphone, fentanNYL, albuterol sulfate HFA       Physical Exam:  Vitals:    11/27/20 1000   BP: (!) 138/51   Pulse: 54   Resp: 19   Temp:    SpO2: 97%        General: on vent sedated but awake  Chest: Nontender  Cardiac: sinus   Lungs:Clear to auscultation and percussion. Abdomen: Soft, NT, ND, +BS  Extremities: 2+ edema chronic venous stasis changes   Vascular:  Equal 2+ peripheral pulses.         Lab Data:  CBC:   Recent Labs     11/26/20 0530 11/26/20 0545 11/27/20  0630   WBC 29.9* 27.7* 24.4*   HGB 12.2* 12.1* 12.1*   HCT 38.9* 36.9* 38.5*   MCV 96.8 95.1 95.3    172 187     BMP:   Recent Labs     11/26/20 0530 11/26/20  0545 11/27/20  0630    139 144   K 3.9 4.0 3.8    104 107   CO2 25 24 26   BUN 36* 36* 37* CREATININE 1.1 1.0 1.0     LIVER PROFILE:   Recent Labs     11/25/20  0435 11/26/20  0530 11/27/20  0630   AST 19 16 15   ALT 20 19 17   BILITOT 0.7 0.4 0.4   ALKPHOS 135* 114 116     PT/INR:   Recent Labs     11/25/20  1433   PROTIME 13.8   INR 1.14     APTT:   Recent Labs     11/25/20  1433   APTT 29.7     BNP:  No results for input(s): BNP in the last 72 hours.       Assessment:  Patient Active Problem List    Diagnosis Date Noted    Venous stasis ulcer of both lower extremities without varicose veins ([facility] [address] 12/12/2016     Priority: Low    WD-Ulcer of shin, left, with fat layer exposed ([facility] 12/12/2016     Priority: Low    PVD (peripheral vascular disease) ([facility] [address] 12/12/2016     Priority: Low    Type 2 diabetes mellitus with diabetic peripheral angiopathy without gangrene, without long-term current use of insulin ([address] 12/12/2016     Priority: Low    Cellulitis of perineum     Gangrene of scrotum 11/24/2020    Abscess of perineum     WD-Traumatic open wound of left lower leg, complicated by diabetes and venous insufficiency 10/08/2020    WD-Idiopathic chronic venous hypertension of left lower extremity with ulcer ([facility] [address] 04/21/2020    WD-Venous stasis of both lower extremities 04/14/2020    WD-Lymphedema of both lower extremities 04/14/2020       Beth Cueva MD 11/27/2020 11:31 AM

## 2020-11-27 NOTE — PROGRESS NOTES
Pt extubated er Dr. Ramya Pike. Pt tolerated well. Respiratory vitals prior to extubation were  RR 26 RSBI 37 NIF -43. Pt placed on 4LNC maintaining saturations in the high 90's. I will continue to monitor.

## 2020-11-27 NOTE — PROGRESS NOTES
PTA-1/2017)     Chronic venous insufficiency with venous stasis ulcers on the left lower extremity   -Follows up with wound care     BPH: On tamsulosin, finasteride     Anxiety disorder: As per the medication list patient is on Xanax 0.5 mg nightly     Moderate Obesity    More forward with extubation as he has passed his SBT    Diet Diet NPO Effective Now   DVT Prophylaxis [] Lovenox, []  Heparin, [] SCDs, [] Ambulation   GI Prophylaxis [] PPI,  [] H2 Blocker,  [] Carafate,  [] Diet/Tube Feeds   Code Status Prior   Disposition Patient requires continued admission due to shock, post-op respiratory failure   MDM [] Low, [] Moderate,[x]  High  Patient's risk as above due to      History of Present Illness:     Pt S&E    Able to lift head off the pillow  Can follow directions  No lateralizing symptoms     Shakes head no for 10-14 point ROS unless noted above     Objective: Intake/Output Summary (Last 24 hours) at 11/27/2020 1039  Last data filed at 11/27/2020 0400  Gross per 24 hour   Intake 1960 ml   Output 2435 ml   Net -475 ml      Vitals:   Vitals:    11/27/20 1000   BP: (!) 138/51   Pulse: 54   Resp: 19   Temp:    SpO2: 97%     Physical Exam:   GEN sedated male, laying in bed in no apparent distress. EYES Pupils are equally round. No scleral erythema, discharge, or conjunctivitis. HENT Mucous membranes are moist. +ETT   NECK No apparent thyromegaly or masses. RESP +rales or rhonchi. Symmetric chest movement while on room air. CARDIO/VASC S1/S2 auscultated. Regular rate without appreciable murmurs, rubs, or gallops. Peripheral pulses equal bilaterally and palpable. +1 pitting edema in extremities   GI Abdomen is soft without significant tenderness, masses, or guarding. Bowel sounds are normoactive. Rectal exam deferred.  Pepper catheter is present. MSK No gross joint deformities. No spontaneous movement noted  SKIN Normal coloration, warm, dry.   NEURO Follows directions and nods/shakes head appropriately.      Medications:   Medications:    enoxaparin  40 mg Subcutaneous Daily    aspirin  300 mg Rectal Daily    vancomycin  2,000 mg Intravenous Q24H    insulin glargine  20 Units Subcutaneous Nightly    insulin lispro  0-24 Units Subcutaneous Q4H    clindamycin (CLEOCIN) IV  600 mg Intravenous Q6H    midodrine  10 mg Oral TID WC    piperacillin-tazobactam  3.375 g Intravenous Q8H    chlorhexidine  15 mL Mouth/Throat BID    famotidine (PEPCID) injection  20 mg Intravenous BID    polyvinyl alcohol  1 drop Both Eyes Q4H    And    artificial tears   Both Eyes Q4H    influenza virus vaccine  0.5 mL Intramuscular Prior to discharge      Infusions:    dextrose      fentanyl 25 mcg/hr (11/27/20 0233)    lactated ringers 50 mL/hr at 11/26/20 1333    propofol 20 mcg/kg/min (11/27/20 1034)    norepinephrine 3 mcg/min (11/25/20 7864)     PRN Meds: glucose, 15 g, PRN  dextrose, 12.5 g, PRN  glucagon (rDNA), 1 mg, PRN  dextrose, 100 mL/hr, PRN  HYDROmorphone, 0.5 mg, Q4H PRN  fentanNYL, 25 mcg, Q1H PRN  albuterol sulfate HFA, 2 puff, Q6H PRN          Electronically signed by Arslan Chu DO on 11/27/2020 at 10:39 AM

## 2020-11-27 NOTE — PROGRESS NOTES
Comprehensive Nutrition Assessment    Type and Reason for Visit:  Reassess    Nutrition Recommendations/Plan:   Advance diet when medically appropriate  Will closely monitor for ability to initiate nutrition by day 5 LOS  Will monitor nutrition status, poc    Nutrition Assessment:  POD #3 fourniers gangrene debridement, pt extubated today, NPO x 3 days, pt is at high nutrition risk    Malnutrition Assessment:  Malnutrition Status:  No malnutrition    Context:  Acute Illness       Estimated Daily Nutrient Needs:  Energy (kcal):  6870-9743([place] St. Jeor w/ stress factor 1.0-1.2); Weight Used for Energy Requirements:  Current     Protein (g):  (1.2-1.4 g/kg); Weight Used for Protein Requirements:  Ideal        Fluid (ml/day):  2100 (1 ml/kcal);  Method Used for Fluid Requirements:  1 ml/kcal      Nutrition Related Findings:  Glucose 176, WBC 24.4      Wounds:  Venous Stasis(plan for wound vac noted)       Current Nutrition Therapies:    Diet NPO Effective Now    Anthropometric Measures:  · Height: 5' 9.02\" (175.3 cm)  · Current Body Weight: 261 lb 3.9 oz (118.5 kg)(unknown weight source)   · Admission Body Weight: 253 lb 8.5 oz (115 kg)    · Usual Body Weight: 273 lb (123.8 kg)(9/9/20)     · Ideal Body Weight: 160 lbs; % Ideal Body Weight 163.3 %   · BMI: 38.6  · BMI Categories: Obese Class 2 (BMI 35.0 -39.9)       Nutrition Diagnosis:   · Inadequate oral intake related to acute injury/trauma as evidenced by NPO or clear liquid status due to medical condition    Nutrition Interventions:   Food and/or Nutrient Delivery:  (advance diet when medically appropriate)  Nutrition Education/Counseling:  No recommendation at this time   Coordination of Nutrition Care:  Continue to monitor while inpatient    Goals:  pt will have nutrition source by day 5 LOS       Nutrition Monitoring and Evaluation:   Behavioral-Environmental Outcomes:  None Identified   Food/Nutrient Intake Outcomes:  Diet

## 2020-11-27 NOTE — PROGRESS NOTES
Pulmonary and Critical Care  Progress Note      VITALS:  BP (!) 138/51   Pulse 54   Temp 98.5 °F (36.9 °C) (Axillary)   Resp 19   Ht 5' 9\" (1.753 m)   Wt 261 lb 3.9 oz (118.5 kg)   SpO2 97%   BMI 38.58 kg/m²     Subjective:   CHIEF COMPLAINT :Perineal pain     HPI:                The patient is on the vent and not sedated. He is weaning well on the vent. He is following simple commands. Objective:   PHYSICAL EXAM:    LUNGS:Occasional basal crackles  Abd-soft, BS+,no enlargement of the perineal wound  Ext -no pedal edema  CVS-s1s2, no murmurs      DATA:    CBC:  Recent Labs     11/24/20  1525 11/24/20 2000 11/26/20  0530 11/26/20  0545 11/27/20  0630   WBC 36.9* 40.4*   < > 29.9* 27.7* 24.4*   RBC 4.55* 4.15*   < > 4.02* 3.88* 4.04*   HGB 13.8 13.2*   < > 12.2* 12.1* 12.1*   HCT 44.4 40.2*   < > 38.9* 36.9* 38.5*    161   < > 168 172 187   MCV 97.6 96.9   < > 96.8 95.1 95.3   MCH 30.3 31.8*   < > 30.3 31.2* 30.0   MCHC 31.1* 32.8   < > 31.4* 32.8 31.4*   RDW 13.9 14.0   < > 14.2 14.3 14.4   SEGSPCT 84.0* 56.0  --   --  92.8*  --    BANDSPCT 3* 38*  --   --   --   --     < > = values in this interval not displayed. BMP:  Recent Labs     11/26/20  0530 11/26/20  0545 11/27/20  0630    139 144   K 3.9 4.0 3.8    104 107   CO2 25 24 26   BUN 36* 36* 37*   CREATININE 1.1 1.0 1.0   CALCIUM 8.3 8.2* 8.4   GLUCOSE 212* 218* 176*      ABG:  Recent Labs     11/25/20  0600 11/26/20  0600 11/27/20  0600   PH 7.28* 7.41 7.48*   PO2ART 201* 134* 160*   GBA2RDC 50.0* 40.0 34.0   O2SAT 95.2* 96.2 95.8*     BNP  No results found for: BNP   D-Dimer:  Lab Results   Component Value Date    DDIMER 1694 (H) 11/25/2020      1.  Radiology: None      Assessment/Plan     Patient Active Problem List    Diagnosis Date Noted    Cellulitis of perineum     Gangrene of scrotum 11/24/2020    Abscess of perineum     WD-Traumatic open wound of left lower leg, complicated by diabetes and venous insufficiency

## 2020-11-27 NOTE — PLAN OF CARE
Problem: Infection:  Goal: Will remain free from infection  Description: Will remain free from infection  11/26/2020 2310 by Genia Walters RN  Outcome: Ongoing  11/26/2020 1104 by Kemi Fernández RN  Outcome: Ongoing     Problem: Safety:  Goal: Free from accidental physical injury  Description: Free from accidental physical injury  11/26/2020 2310 by Genia Walters RN  Outcome: Ongoing  11/26/2020 1104 by Kemi Fernández RN  Outcome: Ongoing  Goal: Free from intentional harm  Description: Free from intentional harm  11/26/2020 2310 by Genia Walters RN  Outcome: Ongoing  11/26/2020 1104 by Kemi Fernández RN  Outcome: Ongoing     Problem: Daily Care:  Goal: Daily care needs are met  Description: Daily care needs are met  11/26/2020 2310 by Genia Walters RN  Outcome: Ongoing  11/26/2020 1104 by Kemi Fernández RN  Outcome: Ongoing     Problem: Pain:  Goal: Patient's pain/discomfort is manageable  Description: Patient's pain/discomfort is manageable  11/26/2020 2310 by Genia Walters RN  Outcome: Ongoing  11/26/2020 1104 by Kemi Fernández RN  Outcome: Ongoing     Problem: Skin Integrity:  Goal: Skin integrity will stabilize  Description: Skin integrity will stabilize  11/26/2020 2310 by Genia Walters RN  Outcome: Ongoing  11/26/2020 1104 by Kemi Fernández RN  Outcome: Ongoing     Problem: Discharge Planning:  Goal: Patients continuum of care needs are met  Description: Patients continuum of care needs are met  11/26/2020 2310 by Genia Walters RN  Outcome: Ongoing  11/26/2020 1104 by Kemi Fernández RN  Outcome: Ongoing     Problem: Falls - Risk of:  Goal: Will remain free from falls  Description: Will remain free from falls  11/26/2020 2310 by Genia Walters RN  Outcome: Ongoing  11/26/2020 1104 by Kemi Fernández RN  Outcome: Ongoing  Goal: Absence of physical injury  Description: Absence of physical injury  11/26/2020 2310 by Genia Walters RN  Outcome: Ongoing  11/26/2020 1104 by Kemi Fernández RN  Outcome: Ongoing     Problem: Skin Integrity:  Goal: Will show no infection signs and symptoms  Description: Will show no infection signs and symptoms  11/26/2020 2310 by Seferino Hu RN  Outcome: Ongoing  11/26/2020 1104 by Akshat Hernandez RN  Outcome: Ongoing  Goal: Absence of new skin breakdown  Description: Absence of new skin breakdown  11/26/2020 2310 by Seferino Hu RN  Outcome: Ongoing  11/26/2020 1104 by Akshat Hernandez RN  Outcome: Ongoing     Problem: Restraint Use - Nonviolent/Non-Self-Destructive Behavior:  Goal: Absence of restraint indications  Description: Absence of restraint indications  11/26/2020 2310 by Seferino Hu RN  Outcome: Ongoing  11/26/2020 1104 by Akshat Hernandez RN  Outcome: Ongoing  Goal: Absence of restraint-related injury  Description: Absence of restraint-related injury  11/26/2020 2310 by Seferino Hu RN  Outcome: Ongoing  11/26/2020 1104 by Akshat Hernandez RN  Outcome: Ongoing

## 2020-11-27 NOTE — PROGRESS NOTES
GENERAL SURGERY PROGRESS NOTE    Ita Tavares is a 72 y.o. male POD 3 from Jarek's gangrene debridement. Subjective:  Remains intubated and sedated. Off pressors. Afebrile. Objective:    Vitals: VITALS:  BP (!) 132/53   Pulse 56   Temp 98.8 °F (37.1 °C) (Axillary)   Resp 18   Ht 5' 9\" (1.753 m)   Wt 261 lb 3.9 oz (118.5 kg)   SpO2 97%   BMI 38.58 kg/m²     I/O: 11/26 0701 - 11/27 0700  In: 1960 [I.V.:1185]  Out: 2435 [Urine:1675]    Labs/Imaging Results:   Lab Results   Component Value Date     11/26/2020    K 4.0 11/26/2020     11/26/2020    CO2 24 11/26/2020    BUN 36 11/26/2020    CREATININE 1.0 11/26/2020    GLUCOSE 218 11/26/2020    CALCIUM 8.2 11/26/2020      Lab Results   Component Value Date    WBC 24.4 (H) 11/27/2020    HGB 12.1 (L) 11/27/2020    HCT 38.5 (L) 11/27/2020    MCV 95.3 11/27/2020     11/27/2020       IV Fluids: dextrose    fentanyl Last Rate: 25 mcg/hr (11/27/20 0233)    lactated ringers Last Rate: 50 mL/hr at 11/26/20 1333    propofol Last Rate: 20 mcg/kg/min (11/27/20 0450)    norepinephrine Last Rate: 3 mcg/min (11/25/20 5793)    Scheduled Meds:   enoxaparin, 40 mg, Subcutaneous, Daily    aspirin, 300 mg, Rectal, Daily    vancomycin, 2,000 mg, Intravenous, Q24H    insulin glargine, 20 Units, Subcutaneous, Nightly    insulin lispro, 0-24 Units, Subcutaneous, Q4H    clindamycin (CLEOCIN) IV, 600 mg, Intravenous, Q6H    midodrine, 10 mg, Oral, TID WC    piperacillin-tazobactam, 3.375 g, Intravenous, Q8H    chlorhexidine, 15 mL, Mouth/Throat, BID    famotidine (PEPCID) injection, 20 mg, Intravenous, BID    polyvinyl alcohol, 1 drop, Both Eyes, Q4H **AND** artificial tears, , Both Eyes, Q4H    influenza virus vaccine, 0.5 mL, Intramuscular, Prior to discharge    Physical Exam:  General: A&O x 3, no distress. HEENT: Anicteric sclerae, MMM. Extremities: Mild edema bilat LE.    Buttock: dressings in place, clean and dry  Abdomen: Soft, nontender, nondistended. 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 Dressing just changed per nursing with serous drainage, no purulence. Patient Active Problem List:     Venous stasis ulcer of both lower extremities without varicose veins (HCC)     WD-Ulcer of shin, left, with fat layer exposed ([address]     PVD (peripheral vascular disease) ([address]     Type 2 diabetes mellitus with diabetic peripheral angiopathy without gangrene, without long-term current use of insulin (HCC)     WD-Venous stasis of both lower extremities     WD-Lymphedema of both lower extremities     WD-Idiopathic chronic venous hypertension of left lower extremity with ulcer ([address]     WD-Traumatic open wound of left lower leg, complicated by diabetes and venous insufficiency     Gangrene of scrotum     Abscess of perineum      - OK with anticoagulation for DVT from a surgical standpoint  - ok to work towards extubation as able  - bid and PRN wet to dry dressing changes, Wound care service consulted.   May be able to place a VAC in a couple of days  - continue IV antibiotics  - will continue to follow    Eliezer Braden MD

## 2020-11-27 NOTE — PROGRESS NOTES
Pulled telemetry wires, pulse ox and oxygen off. Reapplied telemetry wires, pulse ox and oxygen. Patient confused to time, situation and place. Reorients for short periods. Jarett wrist restraints remain secured. This nurse will continue to monitor.   Huseyin Garcia RN

## 2020-11-28 LAB
ALBUMIN SERPL-MCNC: 2.9 GM/DL (ref 3.4–5)
ALP BLD-CCNC: 111 IU/L (ref 40–128)
ALT SERPL-CCNC: 19 U/L (ref 10–40)
ANION GAP SERPL CALCULATED.3IONS-SCNC: 9 MMOL/L (ref 4–16)
ANTITHYROGLOBULIN AB: <0.9 IU/ML (ref 0–4)
ANTITHYROID MICORSOMAL: 0.3 IU/ML (ref 0–9)
AST SERPL-CCNC: 19 IU/L (ref 15–37)
BILIRUB SERPL-MCNC: 0.7 MG/DL (ref 0–1)
BUN BLDV-MCNC: 26 MG/DL (ref 6–23)
CALCIUM SERPL-MCNC: 8.7 MG/DL (ref 8.3–10.6)
CHLORIDE BLD-SCNC: 108 MMOL/L (ref 99–110)
CO2: 29 MMOL/L (ref 21–32)
CREAT SERPL-MCNC: 0.8 MG/DL (ref 0.9–1.3)
GFR AFRICAN AMERICAN: >60 ML/MIN/1.73M2
GFR NON-AFRICAN AMERICAN: >60 ML/MIN/1.73M2
GLUCOSE BLD-MCNC: 130 MG/DL (ref 70–99)
GLUCOSE BLD-MCNC: 144 MG/DL (ref 70–99)
GLUCOSE BLD-MCNC: 146 MG/DL (ref 70–99)
GLUCOSE BLD-MCNC: 167 MG/DL (ref 70–99)
GLUCOSE BLD-MCNC: 169 MG/DL (ref 70–99)
GLUCOSE BLD-MCNC: 176 MG/DL (ref 70–99)
GLUCOSE BLD-MCNC: 192 MG/DL (ref 70–99)
HCT VFR BLD CALC: 42.7 % (ref 42–52)
HEMOGLOBIN: 13.3 GM/DL (ref 13.5–18)
MAGNESIUM: 1.9 MG/DL (ref 1.8–2.4)
MCH RBC QN AUTO: 29.5 PG (ref 27–31)
MCHC RBC AUTO-ENTMCNC: 31.1 % (ref 32–36)
MCV RBC AUTO: 94.7 FL (ref 78–100)
PDW BLD-RTO: 14.3 % (ref 11.7–14.9)
PLATELET # BLD: 214 K/CU MM (ref 140–440)
PMV BLD AUTO: 11.1 FL (ref 7.5–11.1)
POTASSIUM SERPL-SCNC: 3.1 MMOL/L (ref 3.5–5.1)
RBC # BLD: 4.51 M/CU MM (ref 4.6–6.2)
SODIUM BLD-SCNC: 146 MMOL/L (ref 135–145)
THYROTROPIN BINDING INHIBITORY IMMUNOGLOBULIN: <0.9 IU/L
TOTAL PROTEIN: 5.8 GM/DL (ref 6.4–8.2)
WBC # BLD: 23.4 K/CU MM (ref 4–10.5)

## 2020-11-28 PROCEDURE — 2500000003 HC RX 250 WO HCPCS: Performed by: SURGERY

## 2020-11-28 PROCEDURE — 80053 COMPREHEN METABOLIC PANEL: CPT

## 2020-11-28 PROCEDURE — 6370000000 HC RX 637 (ALT 250 FOR IP): Performed by: STUDENT IN AN ORGANIZED HEALTH CARE EDUCATION/TRAINING PROGRAM

## 2020-11-28 PROCEDURE — 2580000003 HC RX 258: Performed by: STUDENT IN AN ORGANIZED HEALTH CARE EDUCATION/TRAINING PROGRAM

## 2020-11-28 PROCEDURE — 99024 POSTOP FOLLOW-UP VISIT: CPT | Performed by: SURGERY

## 2020-11-28 PROCEDURE — 6360000002 HC RX W HCPCS: Performed by: INTERNAL MEDICINE

## 2020-11-28 PROCEDURE — 2000000000 HC ICU R&B

## 2020-11-28 PROCEDURE — 6370000000 HC RX 637 (ALT 250 FOR IP): Performed by: PHYSICIAN ASSISTANT

## 2020-11-28 PROCEDURE — 2580000003 HC RX 258: Performed by: INTERNAL MEDICINE

## 2020-11-28 PROCEDURE — 2580000003 HC RX 258: Performed by: SURGERY

## 2020-11-28 PROCEDURE — 6360000002 HC RX W HCPCS: Performed by: STUDENT IN AN ORGANIZED HEALTH CARE EDUCATION/TRAINING PROGRAM

## 2020-11-28 PROCEDURE — 94761 N-INVAS EAR/PLS OXIMETRY MLT: CPT

## 2020-11-28 PROCEDURE — 82962 GLUCOSE BLOOD TEST: CPT

## 2020-11-28 PROCEDURE — 6360000002 HC RX W HCPCS

## 2020-11-28 PROCEDURE — 85027 COMPLETE CBC AUTOMATED: CPT

## 2020-11-28 PROCEDURE — 83735 ASSAY OF MAGNESIUM: CPT

## 2020-11-28 PROCEDURE — 2700000000 HC OXYGEN THERAPY PER DAY

## 2020-11-28 PROCEDURE — 6370000000 HC RX 637 (ALT 250 FOR IP): Performed by: INTERNAL MEDICINE

## 2020-11-28 PROCEDURE — 99233 SBSQ HOSP IP/OBS HIGH 50: CPT | Performed by: INTERNAL MEDICINE

## 2020-11-28 PROCEDURE — 6360000002 HC RX W HCPCS: Performed by: SURGERY

## 2020-11-28 PROCEDURE — 36592 COLLECT BLOOD FROM PICC: CPT

## 2020-11-28 RX ORDER — LISINOPRIL 5 MG/1
5 TABLET ORAL ONCE
Status: COMPLETED | OUTPATIENT
Start: 2020-11-28 | End: 2020-11-28

## 2020-11-28 RX ORDER — LISINOPRIL 10 MG/1
10 TABLET ORAL DAILY
Status: DISCONTINUED | OUTPATIENT
Start: 2020-11-29 | End: 2020-11-29

## 2020-11-28 RX ORDER — POTASSIUM CHLORIDE 20 MEQ/1
10 TABLET, EXTENDED RELEASE ORAL 2 TIMES DAILY
Status: DISCONTINUED | OUTPATIENT
Start: 2020-11-28 | End: 2020-12-02

## 2020-11-28 RX ORDER — METOPROLOL SUCCINATE 25 MG/1
25 TABLET, EXTENDED RELEASE ORAL 2 TIMES DAILY
Status: DISCONTINUED | OUTPATIENT
Start: 2020-11-28 | End: 2020-12-06 | Stop reason: HOSPADM

## 2020-11-28 RX ORDER — ASPIRIN 81 MG/1
81 TABLET, CHEWABLE ORAL DAILY
Status: DISCONTINUED | OUTPATIENT
Start: 2020-11-28 | End: 2020-12-06 | Stop reason: HOSPADM

## 2020-11-28 RX ORDER — LORAZEPAM 2 MG/ML
INJECTION INTRAMUSCULAR
Status: COMPLETED
Start: 2020-11-28 | End: 2020-11-28

## 2020-11-28 RX ORDER — LORAZEPAM 2 MG/ML
0.5 INJECTION INTRAMUSCULAR ONCE
Status: COMPLETED | OUTPATIENT
Start: 2020-11-28 | End: 2020-11-28

## 2020-11-28 RX ORDER — LISINOPRIL 5 MG/1
5 TABLET ORAL DAILY
Status: DISCONTINUED | OUTPATIENT
Start: 2020-11-28 | End: 2020-11-28

## 2020-11-28 RX ADMIN — CLINDAMYCIN PHOSPHATE 600 MG: 150 INJECTION, SOLUTION INTRAVENOUS at 05:15

## 2020-11-28 RX ADMIN — RIVAROXABAN 15 MG: 15 TABLET, FILM COATED ORAL at 16:40

## 2020-11-28 RX ADMIN — INSULIN LISPRO 2 UNITS: 100 INJECTION, SOLUTION INTRAVENOUS; SUBCUTANEOUS at 20:58

## 2020-11-28 RX ADMIN — LISINOPRIL 5 MG: 5 TABLET ORAL at 08:41

## 2020-11-28 RX ADMIN — ENOXAPARIN SODIUM 40 MG: 100 INJECTION SUBCUTANEOUS at 08:38

## 2020-11-28 RX ADMIN — ASPIRIN 300 MG: 300 SUPPOSITORY RECTAL at 08:38

## 2020-11-28 RX ADMIN — LORAZEPAM 0.5 MG: 2 INJECTION INTRAMUSCULAR at 23:22

## 2020-11-28 RX ADMIN — PIPERACILLIN AND TAZOBACTAM 3.38 G: 3; .375 INJECTION, POWDER, LYOPHILIZED, FOR SOLUTION INTRAVENOUS at 13:49

## 2020-11-28 RX ADMIN — ASPIRIN 81 MG CHEWABLE TABLET 81 MG: 81 TABLET CHEWABLE at 13:49

## 2020-11-28 RX ADMIN — SODIUM CHLORIDE, POTASSIUM CHLORIDE, SODIUM LACTATE AND CALCIUM CHLORIDE: 600; 310; 30; 20 INJECTION, SOLUTION INTRAVENOUS at 23:23

## 2020-11-28 RX ADMIN — SODIUM CHLORIDE, POTASSIUM CHLORIDE, SODIUM LACTATE AND CALCIUM CHLORIDE: 600; 310; 30; 20 INJECTION, SOLUTION INTRAVENOUS at 05:15

## 2020-11-28 RX ADMIN — HYDROMORPHONE HYDROCHLORIDE 0.5 MG: 1 INJECTION, SOLUTION INTRAMUSCULAR; INTRAVENOUS; SUBCUTANEOUS at 20:46

## 2020-11-28 RX ADMIN — HYDROMORPHONE HYDROCHLORIDE 0.5 MG: 1 INJECTION, SOLUTION INTRAMUSCULAR; INTRAVENOUS; SUBCUTANEOUS at 05:01

## 2020-11-28 RX ADMIN — HYDROMORPHONE HYDROCHLORIDE 0.5 MG: 1 INJECTION, SOLUTION INTRAMUSCULAR; INTRAVENOUS; SUBCUTANEOUS at 10:04

## 2020-11-28 RX ADMIN — PIPERACILLIN AND TAZOBACTAM 3.38 G: 3; .375 INJECTION, POWDER, LYOPHILIZED, FOR SOLUTION INTRAVENOUS at 20:47

## 2020-11-28 RX ADMIN — VANCOMYCIN HYDROCHLORIDE 2000 MG: 1 INJECTION, POWDER, LYOPHILIZED, FOR SOLUTION INTRAVENOUS at 18:01

## 2020-11-28 RX ADMIN — CLINDAMYCIN PHOSPHATE 600 MG: 150 INJECTION, SOLUTION INTRAVENOUS at 11:02

## 2020-11-28 RX ADMIN — LISINOPRIL 5 MG: 5 TABLET ORAL at 16:40

## 2020-11-28 RX ADMIN — HYDROMORPHONE HYDROCHLORIDE 0.5 MG: 1 INJECTION, SOLUTION INTRAMUSCULAR; INTRAVENOUS; SUBCUTANEOUS at 16:39

## 2020-11-28 RX ADMIN — LORAZEPAM 0.5 MG: 2 INJECTION INTRAMUSCULAR; INTRAVENOUS at 13:43

## 2020-11-28 RX ADMIN — METOPROLOL SUCCINATE 25 MG: 25 TABLET, EXTENDED RELEASE ORAL at 20:46

## 2020-11-28 RX ADMIN — POTASSIUM CHLORIDE 10 MEQ: 1500 TABLET, EXTENDED RELEASE ORAL at 20:47

## 2020-11-28 RX ADMIN — PIPERACILLIN AND TAZOBACTAM 3.38 G: 3; .375 INJECTION, POWDER, LYOPHILIZED, FOR SOLUTION INTRAVENOUS at 05:15

## 2020-11-28 RX ADMIN — LORAZEPAM 0.5 MG: 2 INJECTION INTRAMUSCULAR; INTRAVENOUS at 23:22

## 2020-11-28 RX ADMIN — CLINDAMYCIN PHOSPHATE 600 MG: 150 INJECTION, SOLUTION INTRAVENOUS at 18:01

## 2020-11-28 RX ADMIN — POTASSIUM CHLORIDE 10 MEQ: 1500 TABLET, EXTENDED RELEASE ORAL at 08:50

## 2020-11-28 RX ADMIN — METOPROLOL SUCCINATE 25 MG: 25 TABLET, EXTENDED RELEASE ORAL at 08:38

## 2020-11-28 RX ADMIN — HYDROMORPHONE HYDROCHLORIDE 0.5 MG: 1 INJECTION, SOLUTION INTRAMUSCULAR; INTRAVENOUS; SUBCUTANEOUS at 00:51

## 2020-11-28 RX ADMIN — CLINDAMYCIN PHOSPHATE 600 MG: 150 INJECTION, SOLUTION INTRAVENOUS at 22:58

## 2020-11-28 ASSESSMENT — PAIN SCALES - GENERAL
PAINLEVEL_OUTOF10: 8
PAINLEVEL_OUTOF10: 7
PAINLEVEL_OUTOF10: 8
PAINLEVEL_OUTOF10: 8
PAINLEVEL_OUTOF10: 0
PAINLEVEL_OUTOF10: 2
PAINLEVEL_OUTOF10: 0

## 2020-11-28 ASSESSMENT — PAIN DESCRIPTION - PAIN TYPE
TYPE: ACUTE PAIN;SURGICAL PAIN
TYPE: ACUTE PAIN;SURGICAL PAIN

## 2020-11-28 ASSESSMENT — PAIN DESCRIPTION - LOCATION
LOCATION: BUTTOCKS;PERINEUM
LOCATION: BUTTOCKS;PERINEUM

## 2020-11-28 NOTE — PROGRESS NOTES
Progress Note( Dr. Rl Phillips)  11/28/2020  Subjective:   Admit Date: 11/24/2020  PCP: FAITH Rand CNP    Admitted For :Perineal abscess //Jarek gangrene   Incision drainage and debridement of Jarek gangrene on 11/24/2020    Consulted For:  better control of blood glucose    Interval History: Patient is a more lert but still on intubated, and on ventilator  On is also hypothyroid with high serum free T4 and low TSH    Intake/Output Summary (Last 24 hours) at 11/28/2020 1829  Last data filed at 11/28/2020 1813  Gross per 24 hour   Intake 2172.73 ml   Output 2325 ml   Net -152.27 ml       DATA    CBC:   Recent Labs     11/26/20  0545 11/27/20  0630 11/28/20  0500   WBC 27.7* 24.4* 23.4*   HGB 12.1* 12.1* 13.3*    187 214    CMP:  Recent Labs     11/26/20  0530 11/26/20  0545 11/27/20  0630 11/28/20  0500    139 144 146*   K 3.9 4.0 3.8 3.1*    104 107 108   CO2 25 24 26 29   BUN 36* 36* 37* 26*   CREATININE 1.1 1.0 1.0 0.8*   CALCIUM 8.3 8.2* 8.4 8.7   PROT 5.3*  --  5.4* 5.8*   LABALBU 2.6*  --  2.6* 2.9*   BILITOT 0.4  --  0.4 0.7   ALKPHOS 114  --  116 111   AST 16  --  15 19   ALT 19  --  17 19     Lipids:   Lab Results   Component Value Date    CHOL 162 02/28/2018    HDL 63 02/28/2018    TRIG 155 02/28/2018     Glucose:  Recent Labs     11/28/20  0504 11/28/20  0849 11/28/20  1347   POCGLU 130* 169* 146*     QoxipyoljtG8S:  Lab Results   Component Value Date    LABA1C 5.2 07/20/2020     High Sensitivity TSH:   Lab Results   Component Value Date    TSHHS 0.130 11/25/2020     Free T3:   Lab Results   Component Value Date    FT3 1.4 11/26/2020     Free T4:  Lab Results   Component Value Date    T4FREE 1.83 11/25/2020       Xr Abdomen (kub) (single Ap View)    Result Date: 11/25/2020  EXAMINATION: ONE SUPINE XRAY VIEW(S) OF THE ABDOMEN 11/25/2020 9:23 am COMPARISON: None.  HISTORY: ORDERING SYSTEM PROVIDED HISTORY: NG placement verification TECHNOLOGIST PROVIDED HISTORY: Reason for exam:->NG placement verification Reason for Exam: NG placement verification FINDINGS: Enteric tube tip projects at the gastric body with side hole projecting at the GE junction. This should be advanced by at least 3 cm. Visualized bowel is unremarkable. No free air, as seen. Enteric tube tip projects at the gastric body with side hole projecting at the GE junction. This should be advanced by at least 3 cm. Ct Pelvis W Contrast Additional Contrast? 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 Us Head Neck Soft Tissue Thyroid    Result Date: 11/27/2020  EXAMINATION: THYROID ULTRASOUND 11/26/2020 COMPARISON: None. HISTORY: ORDERING SYSTEM PROVIDED HISTORY: hyperthyroidism TECHNOLOGIST PROVIDED HISTORY: Reason for exam:->hyperthyroidism Reason for Exam: hyperthyroidism Exam was overall technically limited due to patient being intubated, motion artifact and difficulty in positioning. FINDINGS: Right thyroid lobe:  4.0 x 3.1 x 2.0 cm Left thyroid lobe:  4.6 x 2.4 x 2.0 cm Isthmus:  Not measured Thyroid Gland:  Thyroid gland demonstrates heterogeneous echotexture and normal vascularity. Nodules: No obvious thyroid nodules are present. Cervical lymphadenopathy: No abnormal lymph nodes in the imaged portions of the neck. Limited exam due to patient being intubated, suboptimal positioning as well as motion artifact. Heterogeneous thyroid gland without dominant thyroid nodule. Xr Chest Portable    Result Date: 11/26/2020  EXAMINATION: ONE XRAY VIEW OF THE CHEST 11/26/2020 2:12 am COMPARISON: November 25, 2020     1. Lines and tubes as described.  2. influenza virus vaccine  0.5 mL Intramuscular Prior to discharge      Infusions:    dextrose      lactated ringers 50 mL/hr at 11/28/20 0515         Objective:   Vitals: BP (!) 170/72   Pulse 84   Temp 97.9 °F (36.6 °C) (Oral)   Resp 22   Ht 5' 9.02\" (1.753 m)   Wt 258 lb 9.6 oz (117.3 kg)   SpO2 98%   BMI 38.17 kg/m²   General appearance: patient is sedated intubated on ventilator   Neck: no JVD or bruit  Thyroid : Normal lobes   Lungs: Has Vesicular Breath sounds   Heart:  regular rate and rhythm  Abdomen: soft, non-tender; bowel sounds normal; no masses,  no organomegaly  Has dressing perineal left thigh area has had incision and drainage done and debridement of the area also done  Musculoskeletal: Normal  Extremities: extremities normal, , no edema  Neurologic:  Awake, patient is sedated intubated on ventilator . Assessment:     Patient Active Problem List:     Venous stasis ulcer of both lower extremities without varicose veins (HCC)     WD-Ulcer of shin, left, with fat layer exposed ([address]     PVD (peripheral vascular disease) ([address]     Type 2 diabetes mellitus with diabetic peripheral angiopathy without gangrene, without long-term current use of insulin (HCC)     WD-Venous stasis of both lower extremities     WD-Lymphedema of both lower extremities     WD-Idiopathic chronic venous hypertension of left lower extremity with ulcer ([address]     WD-Traumatic open wound of left lower leg, complicated by diabetes and venous insufficiency     Gangrene of scrotum     Abscess of perineum     Cellulitis of perinium./  Incision drainage debridement done  Patient is mildly hypothyroid possibly due to thyroiditis    Assessment and plan   1. Labs and X ray reviewed   2. reviewed Current Medicines   3. On  Correction bolus Humalog/ Basal Lantus Insulin regime   4. Monitor Blood glucose frequently   5. Modified  the dose of Insulin/ other medicines as needed  6.  Patient is mildly hyper pain thyroid possibly due to thyroiditis I would not prefer  7. I prefer not to change antithyroid drugs  8. Except symptomatically tachycardic with a beta-blocker  9. We will review ultrasound //other thyroid function tests  10. Will follow     .      Fan Felder MD

## 2020-11-28 NOTE — PROGRESS NOTES
GENERAL SURGERY PROGRESS NOTE    Darlin Tavares is a 72 y.o. male POD 4 from Jarek's gangrene debridement. Subjective:  Extubated, Afebrile. Pain well controlled. Awake and alert. Having bowel function. Objective:    Vitals: VITALS:  BP (!) 176/77   Pulse 82   Temp 98.1 °F (36.7 °C) (Oral)   Resp 24   Ht 5' 9.02\" (1.753 m)   Wt 258 lb 9.6 oz (117.3 kg)   SpO2 (!) 88%   BMI 38.17 kg/m²     I/O: 11/27 0701 - 11/28 0700  In: 2502.8 [I.V.:1752.8]  Out: 3125 [Urine:3125]    Labs/Imaging Results:   Lab Results   Component Value Date     11/28/2020    K 3.1 11/28/2020     11/28/2020    CO2 29 11/28/2020    BUN 26 11/28/2020    CREATININE 0.8 11/28/2020    GLUCOSE 144 11/28/2020    CALCIUM 8.7 11/28/2020      Lab Results   Component Value Date    WBC 23.4 (H) 11/28/2020    HGB 13.3 (L) 11/28/2020    HCT 42.7 11/28/2020    MCV 94.7 11/28/2020     11/28/2020       IV Fluids: dextrose    lactated ringers Last Rate: 50 mL/hr at 11/28/20 0515    Scheduled Meds:   potassium chloride, 10 mEq, Oral, BID    lisinopril, 5 mg, Oral, Daily    metoprolol succinate, 25 mg, Oral, Daily    enoxaparin, 40 mg, Subcutaneous, Daily    aspirin, 300 mg, Rectal, Daily    vancomycin, 2,000 mg, Intravenous, Q24H    insulin glargine, 20 Units, Subcutaneous, Nightly    insulin lispro, 0-24 Units, Subcutaneous, Q4H    clindamycin (CLEOCIN) IV, 600 mg, Intravenous, Q6H    piperacillin-tazobactam, 3.375 g, Intravenous, Q8H    influenza virus vaccine, 0.5 mL, Intramuscular, Prior to discharge    Physical Exam:  General: A&O x 3, no distress. HEENT: Anicteric sclerae, MMM. Extremities: Mild edema bilat LE. Buttock: Dressings changed today. Wound with small amount of necrotic tissue, posterior aspect with some odor and drainage, no extension of infection, necrotic skin noted over the posterior aspect of the wound--more well defined now. Abdomen: Soft, nontender, nondistended. 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 Wound is pretty stable, some additional necrotic skin posteriorly that I will try to debride with dressing change tomorrow. Patient Active Problem List:     Venous stasis ulcer of both lower extremities without varicose veins (HCC)     WD-Ulcer of shin, left, with fat layer exposed ([address]     PVD (peripheral vascular disease) ([address]     Type 2 diabetes mellitus with diabetic peripheral angiopathy without gangrene, without long-term current use of insulin (HCC)     WD-Venous stasis of both lower extremities     WD-Lymphedema of both lower extremities     WD-Idiopathic chronic venous hypertension of left lower extremity with ulcer ([address]     WD-Traumatic open wound of left lower leg, complicated by diabetes and venous insufficiency     Gangrene of scrotum     Abscess of perineum      - OK with anticoagulation for DVT from a surgical standpoint  - bid and PRN wet to dry dressing changes, Wound care service consulted.   Will try to place [facility] Monday  - continue IV antibiotics  - will continue to follow    Julio Crowder MD

## 2020-11-28 NOTE — PROGRESS NOTES
Daily Progress Note    Extubated doing ok  Remain in sinus and PAC and PVC   Heart rate in 80s  PAFIB will start on oral AC   Sepsis continue per surgery  HTN--correct K     Pt. Awake, and doing ok, slightly confused  HR stable,NSR in the 80 range with frequent ectopy, BP elevated     Jarek's Gangrene    S/p Incision and drainage per Gen surgery    On ABx    Off vent now    Tx. Per Gen surgery and primary     AF with RVR    Given amio    NSR now- on BB    On Lovenox    Would need to be on long term AC on D/C    EF preserved but severe PAH noted- no PE      BP elevated-primary started ACE today-will check BP after given and adjust as necessary  Will cont.  To follow     Echo-11/25/20    Summary   Left ventricular systolic function is normal.   Ejection fraction is visually estimated at 50%.   Flattening of the interventricular septum due to right ventricular   volume/pressure overload.   Moderately dilated left atrium.   Severely enlarged and hypokinetic right ventricle cavity.   Sclerotic, but non-stenotic aortic valve.   Mild to moderate tricuspid regurgitation; RVSP: 57 mmHg consistent with   severe PHTN.   No evidence of any pericardial effusion.   Technically difficult study, due to body habitus.     PAST MEDICAL HISTORY:  Peripheral vascular disease present, status post  left leg intervention done.  Echo was done today.  Echo shows LV  function was preserved.  RV is dilated.  Pulmonary hypertension is  present.     The patient has a history of peripheral vascular disease present and is  status post left leg intervention, left SFA atherectomy was performed  and atherectomy followed by a drug-coated balloon was done in 2017.  The  patient has a history of having heart failure, peripheral vascular  disease, hypertension, hyperlipidemia, obesity present, and diabetes  present.     The patient had a stress test done in 2016, negative for ischemia.  His  ABIs were abnormal, left was 0.48 and right was 0.8.     PAST SURGICAL HISTORY:  Left leg intervention done, Jarek's gangrene,  and rectal surgery done.     SOCIAL HISTORY:  History of smoking.  No alcohol use.     ALLERGIES:  NKDA.     MEDICATIONS AT HOME:  Prior to hospital, he was on Glucotrol, potassium,  Xanax, Zaroxolyn, aspirin, Lasix, lisinopril, and Flomax. Objective:   BP (!) 163/64   Pulse 82   Temp 98.1 °F (36.7 °C) (Oral)   Resp 23   Ht 5' 9.02\" (1.753 m)   Wt 258 lb 9.6 oz (117.3 kg)   SpO2 96%   BMI 38.17 kg/m²       Intake/Output Summary (Last 24 hours) at 11/28/2020 0850  Last data filed at 11/28/2020 0600  Gross per 24 hour   Intake 2502.81 ml   Output 3125 ml   Net -622.19 ml       Medications:   Scheduled Meds:   potassium chloride  10 mEq Oral BID    lisinopril  5 mg Oral Daily    metoprolol succinate  25 mg Oral Daily    enoxaparin  40 mg Subcutaneous Daily    aspirin  300 mg Rectal Daily    vancomycin  2,000 mg Intravenous Q24H    insulin glargine  20 Units Subcutaneous Nightly    insulin lispro  0-24 Units Subcutaneous Q4H    clindamycin (CLEOCIN) IV  600 mg Intravenous Q6H    piperacillin-tazobactam  3.375 g Intravenous Q8H    influenza virus vaccine  0.5 mL Intramuscular Prior to discharge      Infusions:   dextrose      lactated ringers 50 mL/hr at 11/28/20 0515      PRN Meds:  glucose, dextrose, glucagon (rDNA), dextrose, HYDROmorphone, albuterol sulfate HFA       Physical Exam:  Vitals:    11/28/20 0838   BP:    Pulse: 82   Resp:    Temp:    SpO2:         General: AAO, NAD  Chest: Nontender  Cardiac: First and Second Heart Sounds are Normal, No Murmurs or Gallops noted  Lungs:Clear to auscultation and percussion. Abdomen: Soft, NT, ND, +BS  Extremities: No clubbing, no edema  Vascular:  Equal 2+ peripheral pulses.         Lab Data:  CBC:   Recent Labs     11/26/20  0545 11/27/20  0630 11/28/20  0500   WBC 27.7* 24.4* 23.4*   HGB 12.1* 12.1* 13.3*   HCT 36.9* 38.5* 42.7   MCV 95.1 95.3 94.7    187 214

## 2020-11-28 NOTE — PROGRESS NOTES
Hospitalist Progress Note      Name:  Carmina Tavares /Age/Sex: 1955  (72 y.o. male)   MRN & CSN:  [id number] & [id number] Admission Date/Time: 2020 11:33 AM   Location:  -A PCP: Christian Moseley Day: 5    Hospital Course:   Carmina Tavares is a 72 y.o. male who presented with pain in his perineal area. 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 Successful extubation on . Continued surgical debridement intermittently. 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  Chronic respiratory failure with continued chronic tobacco abuse   Morbid obesity with BMI of 40      Extubation today  Wound vac Monday  Considering apnea link    Diet DIET CARDIAC; Carb Control: 3 carb choices (45 gms)/meal   DVT Prophylaxis [] Lovenox, []  Heparin, [] SCDs, []No VTE prophylaxis, patient ambulating   GI Prophylaxis [] PPI, [] H2 Blocker, [] No GI prophylaxis, patient is receiving diet/Tube Feeds   Code Status Prior   Disposition Patient requires continued admission due to iv medications and continued aggressive wound care   MDM [] Low, [x] Moderate,[]  High  Patient's risk as above due to     [x] One or more chronic illnesses with mild exacerbation progression    [] Two or more stable chronic illnesses    [] Undiagnosed new problem with uncertain prognosis    [] Elective major surgery    []Prescription drug management     Subjective:     Pt S&E. Laying in bed alert and oriented but appears jittery and unsettled. Denies a drinking problem or a drug addiction. No cp sob or ha  Admits to pain on his bottom     10-14 point ROS reviewed negative, unless as noted above    Objective:        Intake/Output Summary (Last 24 hours) at 2020 1621  Last data filed at 2020 1107  Gross per 24 hour   Intake 995.63 ml   Output 3125 ml   Net -2129.37 ml      Vitals:   Vitals:    11/28/20 1400   BP: (!) 171/80   Pulse: 82   Resp: 30   Temp:    SpO2: 94%     Physical Exam:    GEN Awake male, laying in bed in no apparent distress. Appears given age. EYES Pupils are equally round. No scleral discharge  HENT Atraumatic and symmetric head  NECK No apparent thyromegaly  RESP Symmetric chest movement while on room air. CARDIO/VASC Peripheral pulses equal bilaterally and palpable. No peripheral edema. GI Abdomen is not distended. Rectal exam deferred. Central obesity     Pepper catheter is not present. HEME/LYMPH No petechiae or ecchymoses. MSK Spontaneous movement of BL upper extremities  SKIN Normal coloration, warm, dry. NEURO Cranial nerves appear grossly intact  PSYCH Awake, alert.  Oriented x4    Medications:   Medications:    potassium chloride  10 mEq Oral BID    metoprolol succinate  25 mg Oral BID    aspirin  81 mg Oral Daily    rivaroxaban  15 mg Oral Daily    insulin lispro  0-18 Units Subcutaneous TID WC    insulin lispro  0-9 Units Subcutaneous Nightly    lisinopril  5 mg Oral Once    [START ON 11/29/2020] lisinopril  10 mg Oral Daily    vancomycin  2,000 mg Intravenous Q24H    insulin glargine  20 Units Subcutaneous Nightly    clindamycin (CLEOCIN) IV  600 mg Intravenous Q6H    piperacillin-tazobactam  3.375 g Intravenous Q8H    influenza virus vaccine  0.5 mL Intramuscular Prior to discharge      Infusions:    dextrose      lactated ringers 50 mL/hr at 11/28/20 0515     PRN Meds: glucose, 15 g, PRN  dextrose, 12.5 g, PRN  glucagon (rDNA), 1 mg, PRN  dextrose, 100 mL/hr, PRN  HYDROmorphone, 0.5 mg, Q4H PRN  albuterol sulfate HFA, 2 puff, Q6H PRN          Electronically signed by Vane Webber DO on 11/28/2020 at 4:21 PM

## 2020-11-28 NOTE — PROGRESS NOTES
Pulmonary and Critical Care  Progress Note      VITALS:  BP (!) 177/66   Pulse 83   Temp 97.9 °F (36.6 °C) (Oral)   Resp 23   Ht 5' 9.02\" (1.753 m)   Wt 258 lb 9.6 oz (117.3 kg)   SpO2 94%   BMI 38.17 kg/m²     Subjective:   CHIEF COMPLAINT :Perineal pain     HPI:                The patient is lying in the bed. He has been extubated and doing well. He has little cough, little phlegm. Objective:   PHYSICAL EXAM:    LUNGS:Occasional basal crackles  Abd-soft, BS+,no enlargement of the perineal wound  Ext -no pedal edema  CVS-s1s2, no murmurs      DATA:    CBC:  Recent Labs     11/26/20  0545 11/27/20  0630 11/28/20  0500   WBC 27.7* 24.4* 23.4*   RBC 3.88* 4.04* 4.51*   HGB 12.1* 12.1* 13.3*   HCT 36.9* 38.5* 42.7    187 214   MCV 95.1 95.3 94.7   MCH 31.2* 30.0 29.5   MCHC 32.8 31.4* 31.1*   RDW 14.3 14.4 14.3   SEGSPCT 92.8*  --   --       BMP:  Recent Labs     11/26/20  0545 11/27/20  0630 11/28/20  0500    144 146*   K 4.0 3.8 3.1*    107 108   CO2 24 26 29   BUN 36* 37* 26*   CREATININE 1.0 1.0 0.8*   CALCIUM 8.2* 8.4 8.7   GLUCOSE 218* 176* 144*      ABG:  Recent Labs     11/26/20  0600 11/27/20  0600   PH 7.41 7.48*   PO2ART 134* 160*   WXK2PFD 40.0 34.0   O2SAT 96.2 95.8*     BNP  No results found for: BNP   D-Dimer:  Lab Results   Component Value Date    DDIMER 1694 (H) 11/25/2020      1.  Radiology: None      Assessment/Plan     Patient Active Problem List    Diagnosis Date Noted    Cellulitis of perineum     Gangrene of scrotum 11/24/2020    Abscess of perineum     WD-Traumatic open wound of left lower leg, complicated by diabetes and venous insufficiency 10/08/2020    WD-Idiopathic chronic venous hypertension of left lower extremity with ulcer ([facility] 04/21/2020    WD-Venous stasis of both lower extremities 04/14/2020    WD-Lymphedema of both lower extremities 04/14/2020    Venous stasis ulcer of both lower extremities without varicose veins ([facility] 12/12/2016    WD-Ulcer of shin, left, with fat layer exposed ([facility] 12/12/2016    PVD (peripheral vascular disease) ([facility] 12/12/2016    Type 2 diabetes mellitus with diabetic peripheral angiopathy without gangrene, without long-term current use of insulin ([facility] 12/12/2016     Hypokalemia  Hypomagnesemia  Leukocytosis  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 JAMA  Septic shock- resolved  Hypoxia  VDRF s/p extubation  COPD  RLE below knee DVT  Anxiety       1. kcl  2. Magnesium sulfate  3. ICS  4. BIPAP Qhs and PRN  5. Keep sats > 92%  6. Oral feeding per surgery  7. Inhalers  8. C/w present management  9. Ativan 0.5 mg IV x 1  No follow-ups on file.     Electronically signed by Amanda Huang MD on 11/28/2020 at 12:20 PM

## 2020-11-29 ENCOUNTER — APPOINTMENT (OUTPATIENT)
Dept: CT IMAGING | Age: 65
DRG: 853 | End: 2020-11-29
Payer: MEDICARE

## 2020-11-29 LAB
ALBUMIN SERPL-MCNC: 2.8 GM/DL (ref 3.4–5)
ALP BLD-CCNC: 98 IU/L (ref 40–128)
ALT SERPL-CCNC: 20 U/L (ref 10–40)
ANION GAP SERPL CALCULATED.3IONS-SCNC: 11 MMOL/L (ref 4–16)
AST SERPL-CCNC: 19 IU/L (ref 15–37)
BILIRUB SERPL-MCNC: 0.7 MG/DL (ref 0–1)
BUN BLDV-MCNC: 16 MG/DL (ref 6–23)
CALCIUM SERPL-MCNC: 8.6 MG/DL (ref 8.3–10.6)
CHLORIDE BLD-SCNC: 109 MMOL/L (ref 99–110)
CO2: 29 MMOL/L (ref 21–32)
CREAT SERPL-MCNC: 0.7 MG/DL (ref 0.9–1.3)
CULTURE: ABNORMAL
CULTURE: NORMAL
CULTURE: NORMAL
DOSE AMOUNT: ABNORMAL
DOSE TIME: ABNORMAL
GFR AFRICAN AMERICAN: >60 ML/MIN/1.73M2
GFR NON-AFRICAN AMERICAN: >60 ML/MIN/1.73M2
GLUCOSE BLD-MCNC: 158 MG/DL (ref 70–99)
GLUCOSE BLD-MCNC: 162 MG/DL (ref 70–99)
GLUCOSE BLD-MCNC: 165 MG/DL (ref 70–99)
GLUCOSE BLD-MCNC: 166 MG/DL (ref 70–99)
GLUCOSE BLD-MCNC: 191 MG/DL (ref 70–99)
GRAM SMEAR: ABNORMAL
HCT VFR BLD CALC: 40.9 % (ref 42–52)
HEMOGLOBIN: 13.3 GM/DL (ref 13.5–18)
Lab: ABNORMAL
Lab: ABNORMAL
Lab: NORMAL
Lab: NORMAL
MAGNESIUM: 2 MG/DL (ref 1.8–2.4)
MCH RBC QN AUTO: 31 PG (ref 27–31)
MCHC RBC AUTO-ENTMCNC: 32.5 % (ref 32–36)
MCV RBC AUTO: 95.3 FL (ref 78–100)
PDW BLD-RTO: 14.4 % (ref 11.7–14.9)
PLATELET # BLD: 204 K/CU MM (ref 140–440)
PMV BLD AUTO: 11.3 FL (ref 7.5–11.1)
POTASSIUM SERPL-SCNC: 3.5 MMOL/L (ref 3.5–5.1)
RBC # BLD: 4.29 M/CU MM (ref 4.6–6.2)
SODIUM BLD-SCNC: 149 MMOL/L (ref 135–145)
SPECIMEN: ABNORMAL
SPECIMEN: ABNORMAL
SPECIMEN: NORMAL
SPECIMEN: NORMAL
TOTAL PROTEIN: 5.7 GM/DL (ref 6.4–8.2)
VANCOMYCIN TROUGH: 8.5 UG/ML (ref 10–20)
WBC # BLD: 22.2 K/CU MM (ref 4–10.5)

## 2020-11-29 PROCEDURE — 6360000002 HC RX W HCPCS: Performed by: STUDENT IN AN ORGANIZED HEALTH CARE EDUCATION/TRAINING PROGRAM

## 2020-11-29 PROCEDURE — 87185 SC STD ENZYME DETCJ PER NZM: CPT

## 2020-11-29 PROCEDURE — 87205 SMEAR GRAM STAIN: CPT

## 2020-11-29 PROCEDURE — 6360000002 HC RX W HCPCS: Performed by: INTERNAL MEDICINE

## 2020-11-29 PROCEDURE — 6370000000 HC RX 637 (ALT 250 FOR IP): Performed by: INTERNAL MEDICINE

## 2020-11-29 PROCEDURE — 2580000003 HC RX 258: Performed by: INTERNAL MEDICINE

## 2020-11-29 PROCEDURE — 36592 COLLECT BLOOD FROM PICC: CPT

## 2020-11-29 PROCEDURE — 80202 ASSAY OF VANCOMYCIN: CPT

## 2020-11-29 PROCEDURE — 87075 CULTR BACTERIA EXCEPT BLOOD: CPT

## 2020-11-29 PROCEDURE — 80053 COMPREHEN METABOLIC PANEL: CPT

## 2020-11-29 PROCEDURE — 2580000003 HC RX 258: Performed by: SURGERY

## 2020-11-29 PROCEDURE — 83735 ASSAY OF MAGNESIUM: CPT

## 2020-11-29 PROCEDURE — 2700000000 HC OXYGEN THERAPY PER DAY

## 2020-11-29 PROCEDURE — 87076 CULTURE ANAEROBE IDENT EACH: CPT

## 2020-11-29 PROCEDURE — 2000000000 HC ICU R&B

## 2020-11-29 PROCEDURE — 87070 CULTURE OTHR SPECIMN AEROBIC: CPT

## 2020-11-29 PROCEDURE — 6360000002 HC RX W HCPCS: Performed by: SURGERY

## 2020-11-29 PROCEDURE — 2500000003 HC RX 250 WO HCPCS: Performed by: SURGERY

## 2020-11-29 PROCEDURE — 99024 POSTOP FOLLOW-UP VISIT: CPT | Performed by: SURGERY

## 2020-11-29 PROCEDURE — 94761 N-INVAS EAR/PLS OXIMETRY MLT: CPT

## 2020-11-29 PROCEDURE — 85027 COMPLETE CBC AUTOMATED: CPT

## 2020-11-29 PROCEDURE — 6370000000 HC RX 637 (ALT 250 FOR IP): Performed by: PHYSICIAN ASSISTANT

## 2020-11-29 PROCEDURE — 2580000003 HC RX 258: Performed by: STUDENT IN AN ORGANIZED HEALTH CARE EDUCATION/TRAINING PROGRAM

## 2020-11-29 PROCEDURE — 87186 SC STD MICRODIL/AGAR DIL: CPT

## 2020-11-29 PROCEDURE — 87077 CULTURE AEROBIC IDENTIFY: CPT

## 2020-11-29 RX ORDER — LISINOPRIL 20 MG/1
20 TABLET ORAL DAILY
Status: DISCONTINUED | OUTPATIENT
Start: 2020-11-30 | End: 2020-12-01

## 2020-11-29 RX ORDER — AMLODIPINE BESYLATE 5 MG/1
5 TABLET ORAL DAILY
Status: DISCONTINUED | OUTPATIENT
Start: 2020-11-29 | End: 2020-12-02

## 2020-11-29 RX ORDER — LORAZEPAM 2 MG/ML
1 INJECTION INTRAMUSCULAR ONCE
Status: COMPLETED | OUTPATIENT
Start: 2020-11-29 | End: 2020-11-30

## 2020-11-29 RX ADMIN — CLINDAMYCIN PHOSPHATE 600 MG: 150 INJECTION, SOLUTION INTRAVENOUS at 11:41

## 2020-11-29 RX ADMIN — CLINDAMYCIN PHOSPHATE 600 MG: 150 INJECTION, SOLUTION INTRAVENOUS at 22:32

## 2020-11-29 RX ADMIN — HYDROMORPHONE HYDROCHLORIDE 1 MG: 1 INJECTION, SOLUTION INTRAMUSCULAR; INTRAVENOUS; SUBCUTANEOUS at 09:05

## 2020-11-29 RX ADMIN — CLINDAMYCIN PHOSPHATE 600 MG: 150 INJECTION, SOLUTION INTRAVENOUS at 05:21

## 2020-11-29 RX ADMIN — METOPROLOL SUCCINATE 25 MG: 25 TABLET, EXTENDED RELEASE ORAL at 22:21

## 2020-11-29 RX ADMIN — SODIUM CHLORIDE, POTASSIUM CHLORIDE, SODIUM LACTATE AND CALCIUM CHLORIDE: 600; 310; 30; 20 INJECTION, SOLUTION INTRAVENOUS at 17:29

## 2020-11-29 RX ADMIN — HYDROMORPHONE HYDROCHLORIDE 0.5 MG: 1 INJECTION, SOLUTION INTRAMUSCULAR; INTRAVENOUS; SUBCUTANEOUS at 14:05

## 2020-11-29 RX ADMIN — POTASSIUM CHLORIDE 10 MEQ: 1500 TABLET, EXTENDED RELEASE ORAL at 21:00

## 2020-11-29 RX ADMIN — VANCOMYCIN HYDROCHLORIDE 2000 MG: 1 INJECTION, POWDER, LYOPHILIZED, FOR SOLUTION INTRAVENOUS at 16:30

## 2020-11-29 RX ADMIN — HYDROMORPHONE HYDROCHLORIDE 0.5 MG: 1 INJECTION, SOLUTION INTRAMUSCULAR; INTRAVENOUS; SUBCUTANEOUS at 06:34

## 2020-11-29 RX ADMIN — PIPERACILLIN AND TAZOBACTAM 3.38 G: 3; .375 INJECTION, POWDER, LYOPHILIZED, FOR SOLUTION INTRAVENOUS at 05:21

## 2020-11-29 RX ADMIN — POTASSIUM CHLORIDE 10 MEQ: 1500 TABLET, EXTENDED RELEASE ORAL at 08:33

## 2020-11-29 RX ADMIN — METOPROLOL SUCCINATE 25 MG: 25 TABLET, EXTENDED RELEASE ORAL at 08:32

## 2020-11-29 RX ADMIN — INSULIN LISPRO 2 UNITS: 100 INJECTION, SOLUTION INTRAVENOUS; SUBCUTANEOUS at 22:17

## 2020-11-29 RX ADMIN — PIPERACILLIN AND TAZOBACTAM 3.38 G: 3; .375 INJECTION, POWDER, LYOPHILIZED, FOR SOLUTION INTRAVENOUS at 22:20

## 2020-11-29 RX ADMIN — RIVAROXABAN 15 MG: 15 TABLET, FILM COATED ORAL at 17:29

## 2020-11-29 RX ADMIN — HYDROMORPHONE HYDROCHLORIDE 0.5 MG: 1 INJECTION, SOLUTION INTRAMUSCULAR; INTRAVENOUS; SUBCUTANEOUS at 02:00

## 2020-11-29 RX ADMIN — AMLODIPINE BESYLATE 5 MG: 5 TABLET ORAL at 09:06

## 2020-11-29 RX ADMIN — ASPIRIN 81 MG CHEWABLE TABLET 81 MG: 81 TABLET CHEWABLE at 08:32

## 2020-11-29 RX ADMIN — HYDROMORPHONE HYDROCHLORIDE 0.5 MG: 1 INJECTION, SOLUTION INTRAMUSCULAR; INTRAVENOUS; SUBCUTANEOUS at 22:34

## 2020-11-29 RX ADMIN — PIPERACILLIN AND TAZOBACTAM 3.38 G: 3; .375 INJECTION, POWDER, LYOPHILIZED, FOR SOLUTION INTRAVENOUS at 14:05

## 2020-11-29 RX ADMIN — LISINOPRIL 10 MG: 10 TABLET ORAL at 08:32

## 2020-11-29 RX ADMIN — CLINDAMYCIN PHOSPHATE 600 MG: 150 INJECTION, SOLUTION INTRAVENOUS at 17:53

## 2020-11-29 ASSESSMENT — PAIN SCALES - GENERAL
PAINLEVEL_OUTOF10: 7
PAINLEVEL_OUTOF10: 0
PAINLEVEL_OUTOF10: 7
PAINLEVEL_OUTOF10: 5
PAINLEVEL_OUTOF10: 7
PAINLEVEL_OUTOF10: 7
PAINLEVEL_OUTOF10: 0
PAINLEVEL_OUTOF10: 0

## 2020-11-29 ASSESSMENT — PAIN DESCRIPTION - LOCATION
LOCATION: BUTTOCKS;PERINEUM
LOCATION: BUTTOCKS;PERINEUM

## 2020-11-29 ASSESSMENT — PAIN DESCRIPTION - PAIN TYPE
TYPE: SURGICAL PAIN
TYPE: SURGICAL PAIN

## 2020-11-29 ASSESSMENT — PAIN DESCRIPTION - DESCRIPTORS: DESCRIPTORS: DISCOMFORT

## 2020-11-29 NOTE — PROGRESS NOTES
Agitated and confused. Unable to reorient. Kicking legs and lifting butt off bed. Trying to climb out of bed. Pulled at sandoval, now draining bloody urine. Pulling at rodney wrist restraints. Repositioned in bed. Emotional support given. This nurse will notify physician and continue to monitor.   Esme Fernandez RN

## 2020-11-29 NOTE — PROGRESS NOTES
[facility]  consulted by Dr. Sana Dunaway for monitoring and adjustment. Indication for treatment: Jarek's gangrene  Goal trough: 10-15 mcg/mL     Pertinent Laboratory Values:   Temp Readings from Last 3 Encounters:   11/25/20 98.4 °F (36.9 °C) (Axillary)   11/24/20 99.5 °F (37.5 °C)   11/23/20 97.6 °F (36.4 °C) (Temporal)     Recent Labs     11/24/20  1525 11/24/20  1527 11/24/20 2000 11/25/20  0435   WBC 36.9*  --  40.4* 49.2*   LACTATE  --  1.6  --   --        Estimated Creatinine Clearance: 135 mL/min (A) (based on SCr of 0.7 mg/dL (L)). Recent Labs     11/27/20  0630 11/28/20  0500 11/29/20  0520   BUN 37* 26* 16   CREATININE 1.0 0.8* 0.7*     Intake/Output Summary (Last 24 hours) at 11/25/2020 1319  Last data filed at 11/25/2020 1245  Gross per 24 hour   Intake 6421.26 ml   Output 1800 ml   Net 4621.26 ml     Pertinent Cultures:  Date    Source    Results  11/24   Tissue cx   Staph Hominis  11/24   Blood    Negative    VANCOMYCIN TROUGH:    Recent Labs     11/29/20  1530   VANCOTROUGH 8.5*     VANCOMYCIN RANDOM:  No results for input(s): VANCORANDOM in the last 72 hours. Assessment:  · WBC and temperature: WBC trending down @ 22.2; patient remains afebrile   · SCr, BUN, and urine output: renal function improving   · Day(s) of therapy: 5  · Vancomycin level:   · 11/26:  7.7, low   · 11/29:  8.5, OK to re-dose    Plan:  · Continue vancomycin 2000 mg q24h   · Anticipate further accumulation 2/2 BMI   · Pharmacy will continue to monitor patient and adjust therapy as indicated    REPEAT VANCOMYCIN TROUGH SCHEDULED FOR 11/29 @ 1530     Thank you for the consult.   Alessio Loving [place]  11/29/2020  5:41 PM

## 2020-11-29 NOTE — PROGRESS NOTES
Daily Progress Note    Continue current treatment  Increase activity   HTN and PAFIB/PAD and COPD and JAMA   Now with infection     Pt. Awake, and doing ok, slightly confused  HR stable, NSR in the 80 range, BP elevated this am  Denies CP, SOB     Jarek's Gangrene    S/p Incision and drainage per Gen surgery    On ABx, for wound vac tomorrow per notes    Off vent now    WBC decreasing slowly    Tx. Per Gen surgery and primary     AF with RVR    Given amio    NSR now- on BB    On AC-will need long term    EF preserved but severe PAH noted- no PE      BP elevated-Increase dose of ACE, added Norvasc today  Will cont.  To follow     Echo-11/25/20    Summary   Left ventricular systolic function is normal.   Ejection fraction is visually estimated at 50%.   Flattening of the interventricular septum due to right ventricular   volume/pressure overload.   Moderately dilated left atrium.   Severely enlarged and hypokinetic right ventricle cavity.   Sclerotic, but non-stenotic aortic valve.   Mild to moderate tricuspid regurgitation; RVSP: 57 mmHg consistent with   severe PHTN.   No evidence of any pericardial effusion.   Technically difficult study, due to body habitus.     PAST MEDICAL HISTORY:  Peripheral vascular disease present, status post  left leg intervention done.  Echo was done today.  Echo shows LV  function was preserved.  RV is dilated.  Pulmonary hypertension is  present.     The patient has a history of peripheral vascular disease present and is  status post left leg intervention, left SFA atherectomy was performed  and atherectomy followed by a drug-coated balloon was done in 2017.  The  patient has a history of having heart failure, peripheral vascular  disease, hypertension, hyperlipidemia, obesity present, and diabetes  present.     The patient had a stress test done in 2016, negative for ischemia.  His  ABIs were abnormal, left was 0.48 and right was 0.8.     PAST SURGICAL HISTORY:  Left leg intervention done, Jarek's gangrene,  and rectal surgery done.     SOCIAL HISTORY:  History of smoking.  No alcohol use.     ALLERGIES:  NKDA.     MEDICATIONS AT HOME:  Prior to hospital, he was on Glucotrol, potassium,  Xanax, Zaroxolyn, aspirin, Lasix, lisinopril, and Flomax. Objective:   BP (!) 172/71   Pulse 76   Temp 98.6 °F (37 °C) (Temporal)   Resp 20   Ht 5' 9.02\" (1.753 m)   Wt 265 lb 10.5 oz (120.5 kg)   SpO2 96%   BMI 39.21 kg/m²       Intake/Output Summary (Last 24 hours) at 11/29/2020 0836  Last data filed at 11/29/2020 0521  Gross per 24 hour   Intake 2644.44 ml   Output 2275 ml   Net 369.44 ml       Medications:   Scheduled Meds:   amLODIPine  5 mg Oral Daily    [START ON 11/30/2020] lisinopril  20 mg Oral Daily    potassium chloride  10 mEq Oral BID    metoprolol succinate  25 mg Oral BID    aspirin  81 mg Oral Daily    rivaroxaban  15 mg Oral Daily    insulin lispro  0-18 Units Subcutaneous TID     insulin lispro  0-9 Units Subcutaneous Nightly    vancomycin  2,000 mg Intravenous Q24H    insulin glargine  20 Units Subcutaneous Nightly    clindamycin (CLEOCIN) IV  600 mg Intravenous Q6H    piperacillin-tazobactam  3.375 g Intravenous Q8H    influenza virus vaccine  0.5 mL Intramuscular Prior to discharge      Infusions:   dextrose      lactated ringers 50 mL/hr at 11/28/20 2323      PRN Meds:  glucose, dextrose, glucagon (rDNA), dextrose, HYDROmorphone, albuterol sulfate HFA       Physical Exam:  Vitals:    11/29/20 0800   BP: (!) 172/71   Pulse: 76   Resp: 20   Temp: 98.6 °F (37 °C)   SpO2: 96%        General: AAO, NAD  Chest: Nontender  Cardiac: First and Second Heart Sounds are Normal, No Murmurs or Gallops noted  Lungs:Clear to auscultation and percussion. Abdomen: Soft, NT, ND, +BS  Extremities: No clubbing, no edema  Vascular:  Equal 2+ peripheral pulses.         Lab Data:  CBC:   Recent Labs     11/27/20  0630 11/28/20  0500 11/29/20  0520   WBC 24.4* 23.4* 22.2*   HGB 12.1* 13.3* 13.3*   HCT 38.5* 42.7 40.9*   MCV 95.3 94.7 95.3    214 204     BMP:   Recent Labs     11/27/20  0630 11/28/20  0500    146*   K 3.8 3.1*    108   CO2 26 29   BUN 37* 26*   CREATININE 1.0 0.8*     LIVER PROFILE:   Recent Labs     11/27/20  0630 11/28/20  0500   AST 15 19   ALT 17 19   BILITOT 0.4 0.7   ALKPHOS 116 111     PT/INR: No results for input(s): PROTIME, INR in the last 72 hours. APTT: No results for input(s): APTT in the last 72 hours. BNP:  No results for input(s): BNP in the last 72 hours.       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/24/2020    Abscess of perineum     WD-Traumatic open wound of left lower leg, complicated by diabetes and venous insufficiency 10/08/2020    WD-Idiopathic chronic venous hypertension of left lower extremity with ulcer ([address] 04/21/2020    WD-Venous stasis of both lower extremities 04/14/2020    WD-Lymphedema of both lower extremities 04/14/2020    Venous stasis ulcer of both lower extremities without varicose veins ([address] 12/12/2016    WD-Ulcer of shin, left, with fat layer exposed ([address] 12/12/2016    PVD (peripheral vascular disease) ([address] 12/12/2016    Type 2 diabetes mellitus with diabetic peripheral angiopathy without gangrene, without long-term current use of insulin ([address] 12/12/2016       Electronically signed by Cally Schneider PA-C on 11/29/2020 at 8:36 AM

## 2020-11-29 NOTE — PROGRESS NOTES
GENERAL SURGERY PROGRESS NOTE    Sammy Tavares is a 72 y.o. male POD 5 from Jarek's gangrene debridement. Subjective:  Remains afebrile. Pain well controlled. Awake and alert but confused. Having bowel function. Objective:    Vitals: VITALS:  BP (!) 179/107   Pulse 76   Temp 98.6 °F (37 °C) (Temporal)   Resp 20   Ht 5' 9.02\" (1.753 m)   Wt 265 lb 10.5 oz (120.5 kg)   SpO2 96%   BMI 39.21 kg/m²     I/O: 11/28 0701 - 11/29 0700  In: 2644.4 [P.O.:640; I.V.:1154.4]  Out: 2275 [Urine:2275]    Labs/Imaging Results:   Lab Results   Component Value Date     11/29/2020    K 3.5 11/29/2020     11/29/2020    CO2 29 11/29/2020    BUN 16 11/29/2020    CREATININE 0.7 11/29/2020    GLUCOSE 158 11/29/2020    CALCIUM 8.6 11/29/2020      Lab Results   Component Value Date    WBC 22.2 (H) 11/29/2020    HGB 13.3 (L) 11/29/2020    HCT 40.9 (L) 11/29/2020    MCV 95.3 11/29/2020     11/29/2020       IV Fluids: dextrose    lactated ringers Last Rate: 50 mL/hr at 11/28/20 2323    Scheduled Meds:   amLODIPine, 5 mg, Oral, Daily    [START ON 11/30/2020] lisinopril, 20 mg, Oral, Daily    potassium chloride, 10 mEq, Oral, BID    metoprolol succinate, 25 mg, Oral, BID    aspirin, 81 mg, Oral, Daily    rivaroxaban, 15 mg, Oral, Daily    insulin lispro, 0-18 Units, Subcutaneous, TID WC    insulin lispro, 0-9 Units, Subcutaneous, Nightly    vancomycin, 2,000 mg, Intravenous, Q24H    insulin glargine, 20 Units, Subcutaneous, Nightly    clindamycin (CLEOCIN) IV, 600 mg, Intravenous, Q6H    piperacillin-tazobactam, 3.375 g, Intravenous, Q8H    influenza virus vaccine, 0.5 mL, Intramuscular, Prior to discharge    Physical Exam:  General: A&O x 3, no distress. HEENT: Anicteric sclerae, MMM. Extremities: Mild edema bilat LE. Buttock: Dressings changed today. Additional necrotic tissue trimmed from the buttock area. Abdomen: Soft, nontender, nondistended.        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 Wound stable with moderate drainage from the posterior aspect. Agree with CT to rule out extension of infection due to continued elevation in WBC. Patient Active Problem List:     Venous stasis ulcer of both lower extremities without varicose veins (HCC)     WD-Ulcer of shin, left, with fat layer exposed ([address]     PVD (peripheral vascular disease) ([address]     Type 2 diabetes mellitus with diabetic peripheral angiopathy without gangrene, without long-term current use of insulin (HCC)     WD-Venous stasis of both lower extremities     WD-Lymphedema of both lower extremities     WD-Idiopathic chronic venous hypertension of left lower extremity with ulcer ([address]     WD-Traumatic open wound of left lower leg, complicated by diabetes and venous insufficiency     Gangrene of scrotum     Abscess of perineum    - will follow CT results  - OK with anticoagulation for DVT from a surgical standpoint  - bid and PRN wet to dry dressing changes, Wound care service consulted.   Will try to place [facility] Monday  - continue IV antibiotics  - will continue to follow    Rai Joseph MD

## 2020-11-29 NOTE — PLAN OF CARE
Problem: Infection:  Goal: Will remain free from infection  Description: Will remain free from infection  11/28/2020 2235 by Anjana Kaminski RN  Outcome: Ongoing  11/28/2020 1907 by Jaime Dahl RN  Outcome: Ongoing     Problem: Safety:  Goal: Free from accidental physical injury  Description: Free from accidental physical injury  11/28/2020 2235 by Anjana Kaminski RN  Outcome: Ongoing  11/28/2020 1907 by Jaime Dahl RN  Outcome: Ongoing  Goal: Free from intentional harm  Description: Free from intentional harm  11/28/2020 2235 by Anjana Kaminski RN  Outcome: Ongoing  11/28/2020 1907 by Jaime Dahl RN  Outcome: Ongoing     Problem: Daily Care:  Goal: Daily care needs are met  Description: Daily care needs are met  11/28/2020 2235 by Anjana Kaminski RN  Outcome: Ongoing  11/28/2020 1907 by Jaime Dahl RN  Outcome: Ongoing     Problem: Pain:  Description: Pain management should include both nonpharmacologic and pharmacologic interventions.   Goal: Patient's pain/discomfort is manageable  Description: Patient's pain/discomfort is manageable  11/28/2020 2235 by Anjana Kaminski RN  Outcome: Ongoing  11/28/2020 1907 by Jaime Dahl RN  Outcome: Ongoing  Goal: Pain level will decrease  Description: Pain level will decrease  11/28/2020 2235 by Anjana Kaminski RN  Outcome: Ongoing  11/28/2020 1907 by Jaime Dahl RN  Outcome: Ongoing  Goal: Control of acute pain  Description: Control of acute pain  11/28/2020 2235 by Anjana Kaminski RN  Outcome: Ongoing  11/28/2020 1907 by Jaime Dahl RN  Outcome: Ongoing  Goal: Control of chronic pain  Description: Control of chronic pain  11/28/2020 2235 by Anjana Kaminski RN  Outcome: Ongoing  11/28/2020 1907 by Jaime Dahl RN  Outcome: Ongoing     Problem: Skin Integrity:  Goal: Skin integrity will stabilize  Description: Skin integrity will stabilize  11/28/2020 2235 by Anjana Kaminski RN  Outcome: Ongoing  11/28/2020 1907 by Margie Naylor RN  Outcome: Ongoing     Problem: Discharge Planning:  Goal: Patients continuum of care needs are met  Description: Patients continuum of care needs are met  11/28/2020 2235 by Tracy Welch RN  Outcome: Ongoing  11/28/2020 1907 by Margie Naylor RN  Outcome: Ongoing     Problem: Falls - Risk of:  Goal: Will remain free from falls  Description: Will remain free from falls  11/28/2020 2235 by Tracy Welch RN  Outcome: Ongoing  11/28/2020 1907 by Margie Naylor RN  Outcome: Ongoing  Goal: Absence of physical injury  Description: Absence of physical injury  11/28/2020 2235 by Tracy Welch RN  Outcome: Ongoing  11/28/2020 1907 by Margie Naylor RN  Outcome: Ongoing     Problem: Skin Integrity:  Goal: Will show no infection signs and symptoms  Description: Will show no infection signs and symptoms  11/28/2020 2235 by Tracy Welch RN  Outcome: Ongoing  11/28/2020 1907 by Margie Naylor RN  Outcome: Ongoing  Goal: Absence of new skin breakdown  Description: Absence of new skin breakdown  11/28/2020 2235 by Tracy Welch RN  Outcome: Ongoing  11/28/2020 1907 by Margie Naylor RN  Outcome: Ongoing     Problem: Restraint Use - Nonviolent/Non-Self-Destructive Behavior:  Goal: Absence of restraint indications  Description: Absence of restraint indications  11/28/2020 2235 by Tracy Welch RN  Outcome: Ongoing  11/28/2020 1907 by Margie Naylor RN  Outcome: Ongoing  Goal: Absence of restraint-related injury  Description: Absence of restraint-related injury  11/28/2020 2235 by Tracy Welch RN  Outcome: Ongoing  11/28/2020 1907 by Margie Naylor RN  Outcome: Ongoing

## 2020-11-30 ENCOUNTER — APPOINTMENT (OUTPATIENT)
Dept: ULTRASOUND IMAGING | Age: 65
DRG: 853 | End: 2020-11-30
Payer: MEDICARE

## 2020-11-30 ENCOUNTER — APPOINTMENT (OUTPATIENT)
Dept: CT IMAGING | Age: 65
DRG: 853 | End: 2020-11-30
Payer: MEDICARE

## 2020-11-30 ENCOUNTER — HOSPITAL ENCOUNTER (OUTPATIENT)
Dept: WOUND CARE | Age: 65
Discharge: HOME | End: 2020-11-30

## 2020-11-30 LAB
ALBUMIN SERPL-MCNC: 2.7 GM/DL (ref 3.4–5)
ALP BLD-CCNC: 74 IU/L (ref 40–128)
ALT SERPL-CCNC: 22 U/L (ref 10–40)
ANION GAP SERPL CALCULATED.3IONS-SCNC: 8 MMOL/L (ref 4–16)
AST SERPL-CCNC: 19 IU/L (ref 15–37)
BILIRUB SERPL-MCNC: 0.7 MG/DL (ref 0–1)
BUN BLDV-MCNC: 13 MG/DL (ref 6–23)
CALCIUM SERPL-MCNC: 8.2 MG/DL (ref 8.3–10.6)
CHLORIDE BLD-SCNC: 104 MMOL/L (ref 99–110)
CO2: 31 MMOL/L (ref 21–32)
CREAT SERPL-MCNC: 0.6 MG/DL (ref 0.9–1.3)
GFR AFRICAN AMERICAN: >60 ML/MIN/1.73M2
GFR NON-AFRICAN AMERICAN: >60 ML/MIN/1.73M2
GLUCOSE BLD-MCNC: 140 MG/DL (ref 70–99)
GLUCOSE BLD-MCNC: 160 MG/DL (ref 70–99)
GLUCOSE BLD-MCNC: 163 MG/DL (ref 70–99)
GLUCOSE BLD-MCNC: 165 MG/DL (ref 70–99)
GLUCOSE BLD-MCNC: 175 MG/DL (ref 70–99)
HCT VFR BLD CALC: 39.6 % (ref 42–52)
HEMOGLOBIN: 12.2 GM/DL (ref 13.5–18)
MAGNESIUM: 2.1 MG/DL (ref 1.8–2.4)
MCH RBC QN AUTO: 29.9 PG (ref 27–31)
MCHC RBC AUTO-ENTMCNC: 30.8 % (ref 32–36)
MCV RBC AUTO: 97.1 FL (ref 78–100)
PDW BLD-RTO: 14.4 % (ref 11.7–14.9)
PLATELET # BLD: 205 K/CU MM (ref 140–440)
PMV BLD AUTO: 11.1 FL (ref 7.5–11.1)
POTASSIUM SERPL-SCNC: 3.3 MMOL/L (ref 3.5–5.1)
RBC # BLD: 4.08 M/CU MM (ref 4.6–6.2)
SODIUM BLD-SCNC: 143 MMOL/L (ref 135–145)
TOTAL PROTEIN: 5.6 GM/DL (ref 6.4–8.2)
WBC # BLD: 23.3 K/CU MM (ref 4–10.5)

## 2020-11-30 PROCEDURE — 76536 US EXAM OF HEAD AND NECK: CPT

## 2020-11-30 PROCEDURE — 76937 US GUIDE VASCULAR ACCESS: CPT

## 2020-11-30 PROCEDURE — 6370000000 HC RX 637 (ALT 250 FOR IP): Performed by: INTERNAL MEDICINE

## 2020-11-30 PROCEDURE — 6360000004 HC RX CONTRAST MEDICATION: Performed by: SURGERY

## 2020-11-30 PROCEDURE — 1200000000 HC SEMI PRIVATE

## 2020-11-30 PROCEDURE — 2500000003 HC RX 250 WO HCPCS: Performed by: SURGERY

## 2020-11-30 PROCEDURE — 85027 COMPLETE CBC AUTOMATED: CPT

## 2020-11-30 PROCEDURE — C1751 CATH, INF, PER/CENT/MIDLINE: HCPCS

## 2020-11-30 PROCEDURE — 2500000003 HC RX 250 WO HCPCS: Performed by: STUDENT IN AN ORGANIZED HEALTH CARE EDUCATION/TRAINING PROGRAM

## 2020-11-30 PROCEDURE — 6360000002 HC RX W HCPCS: Performed by: STUDENT IN AN ORGANIZED HEALTH CARE EDUCATION/TRAINING PROGRAM

## 2020-11-30 PROCEDURE — 6360000002 HC RX W HCPCS: Performed by: SURGERY

## 2020-11-30 PROCEDURE — 97606 NEG PRS WND THER DME>50 SQCM: CPT

## 2020-11-30 PROCEDURE — 99232 SBSQ HOSP IP/OBS MODERATE 35: CPT | Performed by: SURGERY

## 2020-11-30 PROCEDURE — 74177 CT ABD & PELVIS W/CONTRAST: CPT

## 2020-11-30 PROCEDURE — 2580000003 HC RX 258: Performed by: SURGERY

## 2020-11-30 PROCEDURE — 82962 GLUCOSE BLOOD TEST: CPT

## 2020-11-30 PROCEDURE — 83735 ASSAY OF MAGNESIUM: CPT

## 2020-11-30 PROCEDURE — 2580000003 HC RX 258: Performed by: STUDENT IN AN ORGANIZED HEALTH CARE EDUCATION/TRAINING PROGRAM

## 2020-11-30 PROCEDURE — 6360000002 HC RX W HCPCS: Performed by: INTERNAL MEDICINE

## 2020-11-30 PROCEDURE — 36569 INSJ PICC 5 YR+ W/O IMAGING: CPT

## 2020-11-30 PROCEDURE — 05HY33Z INSERTION OF INFUSION DEVICE INTO UPPER VEIN, PERCUTANEOUS APPROACH: ICD-10-PCS | Performed by: STUDENT IN AN ORGANIZED HEALTH CARE EDUCATION/TRAINING PROGRAM

## 2020-11-30 PROCEDURE — 99232 SBSQ HOSP IP/OBS MODERATE 35: CPT | Performed by: INTERNAL MEDICINE

## 2020-11-30 PROCEDURE — 80053 COMPREHEN METABOLIC PANEL: CPT

## 2020-11-30 PROCEDURE — 6370000000 HC RX 637 (ALT 250 FOR IP): Performed by: PHYSICIAN ASSISTANT

## 2020-11-30 RX ORDER — SODIUM CHLORIDE 0.9 % (FLUSH) 0.9 %
10 SYRINGE (ML) INJECTION PRN
Status: DISCONTINUED | OUTPATIENT
Start: 2020-11-30 | End: 2020-12-06 | Stop reason: HOSPADM

## 2020-11-30 RX ORDER — LIDOCAINE HYDROCHLORIDE 10 MG/ML
5 INJECTION, SOLUTION EPIDURAL; INFILTRATION; INTRACAUDAL; PERINEURAL ONCE
Status: COMPLETED | OUTPATIENT
Start: 2020-11-30 | End: 2020-11-30

## 2020-11-30 RX ORDER — SODIUM CHLORIDE 0.9 % (FLUSH) 0.9 %
10 SYRINGE (ML) INJECTION 2 TIMES DAILY
Status: DISCONTINUED | OUTPATIENT
Start: 2020-11-30 | End: 2020-12-06 | Stop reason: HOSPADM

## 2020-11-30 RX ORDER — CIPROFLOXACIN 2 MG/ML
400 INJECTION, SOLUTION INTRAVENOUS EVERY 12 HOURS
Status: DISCONTINUED | OUTPATIENT
Start: 2020-11-30 | End: 2020-11-30

## 2020-11-30 RX ORDER — SODIUM CHLORIDE 0.9 % (FLUSH) 0.9 %
10 SYRINGE (ML) INJECTION EVERY 12 HOURS SCHEDULED
Status: DISCONTINUED | OUTPATIENT
Start: 2020-11-30 | End: 2020-12-06 | Stop reason: HOSPADM

## 2020-11-30 RX ADMIN — SODIUM CHLORIDE, PRESERVATIVE FREE 10 ML: 5 INJECTION INTRAVENOUS at 00:30

## 2020-11-30 RX ADMIN — INSULIN LISPRO 2 UNITS: 100 INJECTION, SOLUTION INTRAVENOUS; SUBCUTANEOUS at 22:01

## 2020-11-30 RX ADMIN — DEXTROSE MONOHYDRATE 900 MG: 50 INJECTION, SOLUTION INTRAVENOUS at 13:55

## 2020-11-30 RX ADMIN — DEXTROSE MONOHYDRATE 900 MG: 50 INJECTION, SOLUTION INTRAVENOUS at 22:00

## 2020-11-30 RX ADMIN — PIPERACILLIN AND TAZOBACTAM 3.38 G: 3; .375 INJECTION, POWDER, FOR SOLUTION INTRAVENOUS at 13:54

## 2020-11-30 RX ADMIN — METOPROLOL SUCCINATE 25 MG: 25 TABLET, EXTENDED RELEASE ORAL at 22:01

## 2020-11-30 RX ADMIN — LISINOPRIL 20 MG: 20 TABLET ORAL at 08:30

## 2020-11-30 RX ADMIN — AMLODIPINE BESYLATE 5 MG: 5 TABLET ORAL at 08:30

## 2020-11-30 RX ADMIN — PIPERACILLIN AND TAZOBACTAM 3.38 G: 3; .375 INJECTION, POWDER, FOR SOLUTION INTRAVENOUS at 22:00

## 2020-11-30 RX ADMIN — METOPROLOL SUCCINATE 25 MG: 25 TABLET, EXTENDED RELEASE ORAL at 08:30

## 2020-11-30 RX ADMIN — CLINDAMYCIN PHOSPHATE 600 MG: 150 INJECTION, SOLUTION INTRAVENOUS at 05:08

## 2020-11-30 RX ADMIN — LORAZEPAM 1 MG: 2 INJECTION INTRAMUSCULAR; INTRAVENOUS at 00:04

## 2020-11-30 RX ADMIN — PIPERACILLIN AND TAZOBACTAM 3.38 G: 3; .375 INJECTION, POWDER, LYOPHILIZED, FOR SOLUTION INTRAVENOUS at 05:42

## 2020-11-30 RX ADMIN — HYDROMORPHONE HYDROCHLORIDE 0.5 MG: 1 INJECTION, SOLUTION INTRAMUSCULAR; INTRAVENOUS; SUBCUTANEOUS at 13:53

## 2020-11-30 RX ADMIN — POTASSIUM CHLORIDE 10 MEQ: 1500 TABLET, EXTENDED RELEASE ORAL at 08:30

## 2020-11-30 RX ADMIN — ASPIRIN 81 MG CHEWABLE TABLET 81 MG: 81 TABLET CHEWABLE at 08:30

## 2020-11-30 RX ADMIN — SODIUM CHLORIDE, PRESERVATIVE FREE 10 ML: 5 INJECTION INTRAVENOUS at 08:30

## 2020-11-30 RX ADMIN — VANCOMYCIN HYDROCHLORIDE 2000 MG: 1 INJECTION, POWDER, LYOPHILIZED, FOR SOLUTION INTRAVENOUS at 15:32

## 2020-11-30 RX ADMIN — RIVAROXABAN 15 MG: 15 TABLET, FILM COATED ORAL at 18:51

## 2020-11-30 RX ADMIN — LIDOCAINE HYDROCHLORIDE ANHYDROUS 5 ML: 10 INJECTION, SOLUTION INFILTRATION at 18:02

## 2020-11-30 RX ADMIN — IOPAMIDOL 75 ML: 755 INJECTION, SOLUTION INTRAVENOUS at 00:37

## 2020-11-30 RX ADMIN — HYDROMORPHONE HYDROCHLORIDE 0.5 MG: 1 INJECTION, SOLUTION INTRAMUSCULAR; INTRAVENOUS; SUBCUTANEOUS at 04:59

## 2020-11-30 RX ADMIN — INSULIN GLARGINE 20 UNITS: 100 INJECTION, SOLUTION SUBCUTANEOUS at 22:01

## 2020-11-30 RX ADMIN — POTASSIUM CHLORIDE 10 MEQ: 1500 TABLET, EXTENDED RELEASE ORAL at 22:00

## 2020-11-30 ASSESSMENT — PAIN DESCRIPTION - PAIN TYPE
TYPE: SURGICAL PAIN

## 2020-11-30 ASSESSMENT — PAIN DESCRIPTION - DESCRIPTORS
DESCRIPTORS: SORE
DESCRIPTORS: DISCOMFORT
DESCRIPTORS: ACHING;DISCOMFORT

## 2020-11-30 ASSESSMENT — PAIN DESCRIPTION - FREQUENCY
FREQUENCY: INTERMITTENT
FREQUENCY: INTERMITTENT

## 2020-11-30 ASSESSMENT — PAIN DESCRIPTION - LOCATION
LOCATION: PERINEUM
LOCATION: PERINEUM
LOCATION: BUTTOCKS;PERINEUM

## 2020-11-30 ASSESSMENT — PAIN SCALES - GENERAL
PAINLEVEL_OUTOF10: 5
PAINLEVEL_OUTOF10: 7
PAINLEVEL_OUTOF10: 5
PAINLEVEL_OUTOF10: 0
PAINLEVEL_OUTOF10: 3
PAINLEVEL_OUTOF10: 3
PAINLEVEL_OUTOF10: 0

## 2020-11-30 ASSESSMENT — PAIN DESCRIPTION - PROGRESSION
CLINICAL_PROGRESSION: GRADUALLY WORSENING
CLINICAL_PROGRESSION: GRADUALLY IMPROVING

## 2020-11-30 ASSESSMENT — PAIN - FUNCTIONAL ASSESSMENT
PAIN_FUNCTIONAL_ASSESSMENT: ACTIVITIES ARE NOT PREVENTED
PAIN_FUNCTIONAL_ASSESSMENT: ACTIVITIES ARE NOT PREVENTED

## 2020-11-30 ASSESSMENT — PAIN DESCRIPTION - ORIENTATION
ORIENTATION: LOWER;MID
ORIENTATION: MID;LOWER

## 2020-11-30 ASSESSMENT — PAIN DESCRIPTION - ONSET
ONSET: GRADUAL
ONSET: GRADUAL

## 2020-11-30 NOTE — PROGRESS NOTES
Hospitalist Progress Note      Name:  Eufemia Tavares /Age/Sex: 1955  (72 y.o. male)   MRN & CSN:  [id number] & [id number] Admission Date/Time: 2020 11:33 AM   Location:  -A PCP: Christian Moseley Day: 6    Hospital Course:   Eufemia Tavares is a 72 y.o. male who presented with pain in his perineal area. 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 Successful extubation on . Continued surgical debridement intermittently. 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  Chronic respiratory failure with continued chronic tobacco abuse   Morbid obesity with BMI of 40      Wound vac Monday  ? repeat wound cultures as his leukocytosis is still high. Am labs  cta a/p today  Considering mri brain    Diet DIET CARDIAC; Carb Control: 3 carb choices (45 gms)/meal   DVT Prophylaxis [] Lovenox, []  Heparin, [] SCDs, []No VTE prophylaxis, patient ambulating   GI Prophylaxis [] PPI, [] H2 Blocker, [] No GI prophylaxis, patient is receiving diet/Tube Feeds   Code Status Prior   Disposition Patient requires continued admission due to iv medications and continued aggressive wound care   MDM [] Low, [x] Moderate,[]  High  Patient's risk as above due to     [x] One or more chronic illnesses with mild exacerbation progression    [] Two or more stable chronic illnesses    [] Undiagnosed new problem with uncertain prognosis    [] Elective major surgery    []Prescription drug management     Subjective:     Pt S&E. Continues to be oriented but appears jittery. Redirectable. Denies cp sob or ha     10-14 point ROS reviewed negative, unless as noted above    Objective:        Intake/Output Summary (Last 24 hours) at 2020 194  Last data filed at 2020 1800  Gross per 24 hour   Intake 2925.44 ml Output 2675 ml   Net 250.44 ml      Vitals:   Vitals:    11/29/20 1816   BP: (!) 153/93   Pulse: 97   Resp: 19   Temp:    SpO2: 93%     Physical Exam:    GEN Awake male, laying in bed in no apparent distress. Appears given age. EYES Pupils are equally round. No scleral discharge  HENT Atraumatic and symmetric head  NECK No apparent thyromegaly  RESP Symmetric chest movement while on room air. Diminished bases  CARDIO/VASC Peripheral pulses equal bilaterally and palpable. No peripheral edema. GI Abdomen is not distended. Rectal exam deferred. Central obesity    Pepper catheter is not present. HEME/LYMPH No petechiae or ecchymoses. MSK Spontaneous movement of BL upper extremities  SKIN Normal coloration, warm, dry. NEURO Cranial nerves appear grossly intact  PSYCH Awake, alert.  Oriented x4    Medications:   Medications:    amLODIPine  5 mg Oral Daily    [START ON 11/30/2020] lisinopril  20 mg Oral Daily    potassium chloride  10 mEq Oral BID    metoprolol succinate  25 mg Oral BID    aspirin  81 mg Oral Daily    rivaroxaban  15 mg Oral Daily    insulin lispro  0-18 Units Subcutaneous TID WC    insulin lispro  0-9 Units Subcutaneous Nightly    vancomycin  2,000 mg Intravenous Q24H    insulin glargine  20 Units Subcutaneous Nightly    clindamycin (CLEOCIN) IV  600 mg Intravenous Q6H    piperacillin-tazobactam  3.375 g Intravenous Q8H    influenza virus vaccine  0.5 mL Intramuscular Prior to discharge      Infusions:    dextrose      lactated ringers 50 mL/hr at 11/29/20 1729     PRN Meds: glucose, 15 g, PRN  dextrose, 12.5 g, PRN  glucagon (rDNA), 1 mg, PRN  dextrose, 100 mL/hr, PRN  HYDROmorphone, 0.5 mg, Q4H PRN  albuterol sulfate HFA, 2 puff, Q6H PRN          Electronically signed by Zaid Lunsford DO on 11/29/2020 at 7:46 PM

## 2020-11-30 NOTE — PROGRESS NOTES
[facility]  consulted by Dr. Radha Henriquez for monitoring and adjustment. Indication for treatment: Jarek's gangrene  Goal trough: 10-15 mcg/mL, AUC/MINDY: 400-600     Pertinent Laboratory Values:   Temp Readings from Last 3 Encounters:   11/25/20 98.4 °F (36.9 °C) (Axillary)   11/24/20 99.5 °F (37.5 °C)   11/23/20 97.6 °F (36.4 °C) (Temporal)     Recent Labs     11/24/20  1525 11/24/20  1527 11/24/20 2000 11/25/20  0435   WBC 36.9*  --  40.4* 49.2*   LACTATE  --  1.6  --   --        Estimated Creatinine Clearance: 156 mL/min (A) (based on SCr of 0.6 mg/dL (L)). Recent Labs     11/28/20  0500 11/29/20  0520 11/30/20  0630   BUN 26* 16 13   CREATININE 0.8* 0.7* 0.6*     Intake/Output Summary (Last 24 hours) at 11/25/2020 1319  Last data filed at 11/25/2020 1245  Gross per 24 hour   Intake 6421.26 ml   Output 1800 ml   Net 4621.26 ml     Pertinent Cultures:  Date    Source    Results  11/24   Tissue cx   Staph Hominis  11/24   Blood    Negative  11/25   Respiratory   Pseudomonas, Proteus  11/29   Surgical   Pending    VANCOMYCIN TROUGH:    Recent Labs     11/29/20  1530   VANCOTROUGH 8.5*     VANCOMYCIN RANDOM:  No results for input(s): VANCORANDOM in the last 72 hours. Assessment:  · WBC and temperature: WBC elevated @ 23.3;  Afebrile  · SCr, BUN, and urine output:   · Previously YANCI  · Stable, WNL  · Day(s) of therapy: 6  · Vancomycin level:   · 11/26:  7.7, low   · 11/29:  8.5, 21.5h post-dose, sub-therapeutic    Plan:  · Patient initiated on vancomycin 2000 mg IVPB q24h while in YANCI  · Most recent trough level was sub-therapeutic  · Extrapolated 24h trough level low @ 7.1  · Calculated AUC/MINDY low @ 408  · Plan to increase vancomycin dosing to 2000 mg IVPB q18h following improvement in renal trends  · Predicted AUC/MINDY: 545  · Predicted trough: 12.4  · Continue to monitor renal trends closely and repeat levels to monitor for accumulation  · Pharmacy will continue to monitor patient and adjust therapy as indicated    VANCOMYCIN TROUGH SCHEDULED FOR 12/3 @ 8512     Thank you for the consult.   Maldonado Wylie, PharmD, h

## 2020-11-30 NOTE — PROGRESS NOTES
Progress Note( Dr. Corona Perez)  11/29/2020  Subjective:   Admit Date: 11/24/2020  PCP: FAITH Malone CNP    Admitted For :Perineal abscess //Jarek gangrene   Incision drainage and debridement of Jarek gangrene on 11/24/2020    Consulted For:  better control of blood glucose    Interval History: Patient i is extubated and off ventilator awake and alert    On is also hyperthyroid with high serum free T4 and low TSH    Intake/Output Summary (Last 24 hours) at 11/29/2020 2250  Last data filed at 11/29/2020 1800  Gross per 24 hour   Intake 2635.44 ml   Output 2675 ml   Net -39.56 ml       DATA    CBC:   Recent Labs     11/27/20  0630 11/28/20  0500 11/29/20  0520   WBC 24.4* 23.4* 22.2*   HGB 12.1* 13.3* 13.3*    214 204    CMP:  Recent Labs     11/27/20  0630 11/28/20  0500 11/29/20  0520    146* 149*   K 3.8 3.1* 3.5    108 109   CO2 26 29 29   BUN 37* 26* 16   CREATININE 1.0 0.8* 0.7*   CALCIUM 8.4 8.7 8.6   PROT 5.4* 5.8* 5.7*   LABALBU 2.6* 2.9* 2.8*   BILITOT 0.4 0.7 0.7   ALKPHOS 116 111 98   AST 15 19 19   ALT 17 19 20     Lipids:   Lab Results   Component Value Date    CHOL 162 02/28/2018    HDL 63 02/28/2018    TRIG 155 02/28/2018     Glucose:  Recent Labs     11/29/20  1151 11/29/20  1652 11/29/20  2016   POCGLU 166* 191* 162*     [id number]:  Lab Results   Component Value Date    LABA1C 5.2 07/20/2020     High Sensitivity TSH:   Lab Results   Component Value Date    TSHHS 0.130 11/25/2020     Free T3:   Lab Results   Component Value Date    FT3 1.4 11/26/2020     Free T4:  Lab Results   Component Value Date    T4FREE 1.83 11/25/2020       Xr Abdomen (kub) (single Ap View)    Result Date: 11/25/2020  EXAMINATION: ONE SUPINE XRAY VIEW(S) OF THE ABDOMEN 11/25/2020 9:23 am COMPARISON: None.  HISTORY: ORDERING SYSTEM PROVIDED HISTORY: NG placement verification TECHNOLOGIST PROVIDED HISTORY: Reason for exam:->NG placement verification Reason for Exam: NG placement verification FINDINGS: Enteric tube tip projects at the gastric body with side hole projecting at the GE junction. This should be advanced by at least 3 cm. Visualized bowel is unremarkable. No free air, as seen. Enteric tube tip projects at the gastric body with side hole projecting at the GE junction. This should be advanced by at least 3 cm. Ct Pelvis W Contrast Additional Contrast? 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 Us Head Neck Soft Tissue Thyroid    Result Date: 11/27/2020  EXAMINATION: THYROID ULTRASOUND 11/26/2020 COMPARISON: None. HISTORY: ORDERING SYSTEM PROVIDED HISTORY: hyperthyroidism TECHNOLOGIST PROVIDED HISTORY: Reason for exam:->hyperthyroidism Reason for Exam: hyperthyroidism Exam was overall technically limited due to patient being intubated, motion artifact and difficulty in positioning. FINDINGS: Right thyroid lobe:  4.0 x 3.1 x 2.0 cm Left thyroid lobe:  4.6 x 2.4 x 2.0 cm Isthmus:  Not measured Thyroid Gland:  Thyroid gland demonstrates heterogeneous echotexture and normal vascularity. Nodules: No obvious thyroid nodules are present. Cervical lymphadenopathy: No abnormal lymph nodes in the imaged portions of the neck. Limited exam due to patient being intubated, suboptimal positioning as well as motion artifact. Heterogeneous thyroid gland without dominant thyroid nodule. Xr Chest Portable    Result Date: 11/26/2020  EXAMINATION: ONE XRAY VIEW OF THE CHEST 11/26/2020 2:12 am COMPARISON: November 25, 2020     1. Lines and tubes as described.  2. No significant interval change of cardiomegaly, dense retrocardiac opacity, and bilateral effusions. 3. Pulmonary edema. Xr Chest Portable    Result Date: 11/25/2020  EXAMINATION: ONE XRAY VIEW OF THE CHEST 11/25/2020 9:24 am COMPARISON: 11/24/2020     Stable small left pleural effusion with basilar atelectasis and/or consolidation. Vl Dup Lower Extremity Venous Bilateral    Result Date: 11/25/2020  EXAMINATION: DUPLEX VENOUS ULTRASOUND OF THE BILATERAL LOWER EXTREMITIES, 11/25/2020 5:10 pm     Positive DVT involving the right posterior tibial vein below the knee. No evidence of left lower extremity DVT. Cta Pulmonary W Contrast    Result Date: 11/25/2020  EXAMINATION: CTA OF THE CHEST 11/25/2020 1:32 pm    Pulmonary arteries are only well evaluated to the lobar level. No main or lobar pulmonary embolism. Segmental and subsegmental branch vessels are poorly evaluated due to artifact. Enlarged pulmonary arterial tree compatible with pulmonary hypertension. Cardiomegaly with four-chamber enlargement. Multifocal nodularity and consolidation throughout the lungs favored secondary to multifocal pneumonia with reactive mediastinal lymphadenopathy. Left lower lobe collapse which may be due to mucous plugging as the left lower lobe airways are not well visualized. Support lines and tubes appear appropriate in positioning. Scheduled Medicines required.   With hypoglycemia continues to be present medications:    amLODIPine  5 mg Oral Daily    [START ON 11/30/2020] lisinopril  20 mg Oral Daily    LORazepam  1 mg Intravenous Once    potassium chloride  10 mEq Oral BID    metoprolol succinate  25 mg Oral BID    aspirin  81 mg Oral Daily    rivaroxaban  15 mg Oral Daily    insulin lispro  0-18 Units Subcutaneous TID     insulin lispro  0-9 Units Subcutaneous Nightly    vancomycin  2,000 mg Intravenous Q24H    insulin glargine  20 Units Subcutaneous Nightly    clindamycin (CLEOCIN) IV  600 mg Intravenous Q6H    piperacillin-tazobactam  3.375 g Intravenous Q8H    hyper pain thyroid possibly due to thyroiditis I would not prefer  7. I prefer not to change antithyroid drugs  8. Except symptomatically tachycardic with a beta-blocker  9. We will review ultrasound //other thyroid function tests  10. Will follow     .      Trung Paris MD

## 2020-11-30 NOTE — PROGRESS NOTES
Dr. Rajni De at patient bedside updated on patient blood pressure 173/71 (100), sinus arrhythmia with PVC's heart rate 71. New verbal order to discontinue IVF, CVP monitoring and order PT/OT get patient up and moving. Orders placed in computer.

## 2020-11-30 NOTE — PROGRESS NOTES
Pulmonary and Critical Care  Progress Note      VITALS:  BP (!) 153/98   Pulse 72   Temp 97.8 °F (36.6 °C) (Oral)   Resp 20   Ht 5' 9.02\" (1.753 m)   Wt 262 lb 5.6 oz (119 kg)   SpO2 98%   BMI 38.72 kg/m²     Subjective:   CHIEF COMPLAINT :Perineal pain     HPI:                The patient is lying in the bed. He is not in acute resp distress. He has minimal pain in the perineal area. Objective:   PHYSICAL EXAM:    LUNGS:Occasional basal crackles  Abd-soft, BS+,no enlargement of the perineal wound  Ext -no pedal edema  CVS-s1s2, no murmurs      DATA:    CBC:  Recent Labs     11/28/20  0500 11/29/20  0520 11/30/20  0630   WBC 23.4* 22.2* 23.3*   RBC 4.51* 4.29* 4.08*   HGB 13.3* 13.3* 12.2*   HCT 42.7 40.9* 39.6*    204 205   MCV 94.7 95.3 97.1   MCH 29.5 31.0 29.9   MCHC 31.1* 32.5 30.8*   RDW 14.3 14.4 14.4      BMP:  Recent Labs     11/28/20  0500 11/29/20  0520 11/30/20  0630   * 149* 143   K 3.1* 3.5 3.3*    109 104   CO2 29 29 31   BUN 26* 16 13   CREATININE 0.8* 0.7* 0.6*   CALCIUM 8.7 8.6 8.2*   GLUCOSE 144* 158* 165*      ABG:  No results for input(s): PH, PO2ART, YKY7BDL, HCO3, BEART, O2SAT in the last 72 hours. BNP  No results found for: BNP   D-Dimer:  Lab Results   Component Value Date    DDIMER 1694 (H) 11/25/2020      1.  Radiology: None      Assessment/Plan     Patient Active Problem List    Diagnosis Date Noted    Cellulitis of perineum     Gangrene of scrotum 11/24/2020    Abscess of perineum     WD-Traumatic open wound of left lower leg, complicated by diabetes and venous insufficiency 10/08/2020    WD-Idiopathic chronic venous hypertension of left lower extremity with ulcer ([address] 04/21/2020    WD-Venous stasis of both lower extremities 04/14/2020    WD-Lymphedema of both lower extremities 04/14/2020    Venous stasis ulcer of both lower extremities without varicose veins ([address] 12/12/2016    WD-Ulcer of shin, left, with fat layer exposed ([address] 12/12/2016    PVD

## 2020-11-30 NOTE — PROGRESS NOTES
Hospitalist Progress Note      Name:  Angelo Tavares /Age/Sex: 1955  (72 y.o. male)   MRN & CSN:  [id number] & [id number] Admission Date/Time: 2020 11:33 AM   Location:  -A PCP: Christian Moseley Day: 7    Hospital Course:   Angelo Tavares is a 72 y.o. male who presented with pain in his perineal area. 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 Successful extubation on . Continued surgical debridement intermittently. Antibiotics changed on  for better coverage as his leukocytosis and encephalopathy persists. 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  Chronic respiratory failure with continued chronic tobacco abuse   Morbid obesity with BMI of 40      Wound vac today - will need HHC if he is strong enough to go home. Not following directions enough to participate with pt/ot in a significant way    Increase clinda dose and added cipro. Repeat labs  Wound cultures repeated yesterday     Diet DIET CARDIAC; Carb Control: 3 carb choices (45 gms)/meal   DVT Prophylaxis [] Lovenox, []  Heparin, [] SCDs, []No VTE prophylaxis, patient ambulating   GI Prophylaxis [] PPI, [] H2 Blocker, [] No GI prophylaxis, patient is receiving diet/Tube Feeds   Code Status Prior   Disposition Patient requires continued admission due to wound vac, iv dilaudid    MDM [] Low, [] Moderate,[x]  High  Patient's risk as above due to     [] One or more chronic illnesses with severe exacerbation or progression    [] Acute or chronic illnesses or injuries that pose a threat to life or bodily function    [] An abrupt change in neurological status    [] Decision not to resuscitate     [x] Drug therapy requiring intensive monitoring for toxicity      Subjective:     Pt S&E.      Still thinks shweta is president but is more oriented and focused than yesterday. Slow improvement. RN reports he is still pulling at lines     10-14 point ROS reviewed negative, unless as noted above    Objective: Intake/Output Summary (Last 24 hours) at 11/30/2020 0953  Last data filed at 11/30/2020 0934  Gross per 24 hour   Intake 2736 ml   Output 2700 ml   Net 36 ml      Vitals:   Vitals:    11/30/20 0900   BP: (!) 172/85   Pulse: 66   Resp: 20   Temp:    SpO2: 99%     Physical Exam:    GEN Awake male, laying in bed in no apparent distress. Appears given age. EYES Pupils are equally round. No scleral discharge  HENT Atraumatic and symmetric head. [place]   NECK No apparent thyromegaly  RESP Symmetric chest movement while on room air. Diminished bases  CARDIO/VASC Peripheral pulses equal bilaterally and palpable. No peripheral edema. GI Abdomen is not distended. Rectal exam deferred. Central obesity    Pepper catheter is not present. HEME/LYMPH No petechiae or ecchymoses. MSK Spontaneous movement of BL upper extremities  SKIN Normal coloration, warm, dry. NEURO Cranial nerves appear grossly intact  PSYCH Awake, alert.  Oriented to self and year    Medications:   Medications:    sodium chloride flush  10 mL Intravenous BID    amLODIPine  5 mg Oral Daily    lisinopril  20 mg Oral Daily    potassium chloride  10 mEq Oral BID    metoprolol succinate  25 mg Oral BID    aspirin  81 mg Oral Daily    rivaroxaban  15 mg Oral Daily    insulin lispro  0-18 Units Subcutaneous TID WC    insulin lispro  0-9 Units Subcutaneous Nightly    vancomycin  2,000 mg Intravenous Q24H    insulin glargine  20 Units Subcutaneous Nightly    clindamycin (CLEOCIN) IV  600 mg Intravenous Q6H    piperacillin-tazobactam  3.375 g Intravenous Q8H    influenza virus vaccine  0.5 mL Intramuscular Prior to discharge      Infusions:    dextrose       PRN Meds: glucose, 15 g, PRN  dextrose, 12.5 g, PRN  glucagon (rDNA), 1 mg, PRN  dextrose, 100 mL/hr, PRN  HYDROmorphone, 0.5 mg, Q4H PRN  albuterol sulfate HFA, 2 puff, Q6H PRN          Electronically signed by Sarina Morrissey DO on 11/30/2020 at 9:53 AM

## 2020-11-30 NOTE — CONSULTS
[facility] Wound Ostomy Continence Nurse  Consult Note       Sammy Tavares  AGE: 72 y.o. GENDER: male  : 1955  TODAY'S DATE:  2020    Subjective:     Reason for  Evaluation and Assessment: wound care eval for vac placement to scrotum/perineum. Sammy Tavares is a 72 y.o. male referred by:   [x] Physician  [] Nursing  [] Other:     Wound Identification:  Wound Type: diabetic and non-healing surgical  Contributing Factors: edema, diabetes, obesity and incontinence of stool        PAST MEDICAL HISTORY        Diagnosis Date    CHF (congestive heart failure) (HCC)     Chronic ulcer of left leg with fat layer exposed ([address] 2016    Chronic ulcer of right leg with fat layer exposed ([address] 2016    Chronic ulcer of right leg with fat layer exposed ([address] 2016    Diabetes mellitus ([address]     Hypertension     PVD (peripheral vascular disease) ([address] 2016    Type 2 diabetes mellitus with diabetic peripheral angiopathy without gangrene, without long-term current use of insulin ([address] 2016    Venous stasis ulcer of both lower extremities without varicose veins ([address] 2016       PAST SURGICAL HISTORY    Past Surgical History:   Procedure Laterality Date    RECTAL SURGERY N/A 2020    RECTAL PERIRECTAL INCISION AND DRAINAGE performed by Marbin Ratliff MD at [address] [place] [address] History   Problem Relation Age of Onset    Asthma Mother     Breast Cancer Mother     Cancer Mother     Diabetes Mother     High Blood Pressure Mother     Cancer Father     Early Death Brother     Arthritis Paternal Grandmother        SOCIAL HISTORY    Social History     Tobacco Use    Smoking status: Current Every Day Smoker     Packs/day: 1.00     Years: 60.00     Pack years: 60.00     Types: Cigarettes    Smokeless tobacco: Never Used    Tobacco comment: healing    Substance Use Topics    Alcohol use: Never     Frequency: Never    Drug use:  No ALLERGIES    No Known Allergies    MEDICATIONS    No current facility-administered medications on file prior to encounter. Current Outpatient Medications on File Prior to Encounter   Medication Sig Dispense Refill    Latanoprost 0.005 % EMUL Apply to eye daily      ibuprofen (ADVIL;MOTRIN) 600 MG tablet Take 1 tablet by mouth every 6 hours as needed for Pain 30 tablet 0    glipiZIDE (GLUCOTROL) 5 MG tablet Take 5 mg by mouth 2 times daily (before meals)      potassium chloride (KLOR-CON M) 20 MEQ extended release tablet Take 20 mEq by mouth daily      ALPRAZolam (XANAX) 0.5 MG tablet Take 0.5 mg by mouth nightly. Elder Rued ipratropium-albuterol (DUONEB) 0.5-2.5 (3) MG/3ML SOLN nebulizer solution Inhale 1 vial into the lungs every 4 hours      albuterol sulfate  (90 Base) MCG/ACT inhaler Inhale 2 puffs into the lungs every 6 hours as needed for Wheezing      metolazone (ZAROXOLYN) 2.5 MG tablet Take 2 tablets by mouth daily 60 tablet 0    aspirin EC 81 MG EC tablet Take 1 tablet by mouth daily 30 tablet 3    HYDROcodone-acetaminophen (NORCO) 7.5-325 MG per tablet Take 1 tablet by mouth 3 times daily .       metFORMIN (GLUCOPHAGE) 1000 MG tablet Take 1,000 mg by mouth 2 times daily (with meals)      furosemide (LASIX) 80 MG tablet Take 80 mg by mouth 2 times daily      lisinopril (PRINIVIL;ZESTRIL) 20 MG tablet Take 40 mg by mouth daily       tamsulosin (FLOMAX) 0.4 MG capsule Take 0.4 mg by mouth daily      finasteride (PROSCAR) 5 MG tablet Take 5 mg by mouth daily           Objective:      BP (!) 133/40   Pulse 72   Temp 98.1 °F (36.7 °C) (Oral)   Resp 16   Ht 5' 9.02\" (1.753 m)   Wt 262 lb 5.6 oz (119 kg)   SpO2 97%   BMI 38.72 kg/m²   South Risk Score: South Scale Score: 17    LABS    CBC:   Lab Results   Component Value Date    WBC 23.3 11/30/2020    RBC 4.08 11/30/2020    HGB 12.2 11/30/2020    HCT 39.6 11/30/2020    MCV 97.1 11/30/2020    MCH 29.9 11/30/2020    MCHC 30.8 11/30/2020    RDW 14.4 11/30/2020     11/30/2020    MPV 11.1 11/30/2020     CMP:    Lab Results   Component Value Date     11/30/2020    K 3.3 11/30/2020     11/30/2020    CO2 31 11/30/2020    BUN 13 11/30/2020    CREATININE 0.6 11/30/2020    GFRAA >60 11/30/2020    LABGLOM >60 11/30/2020    GLUCOSE 165 11/30/2020    PROT 5.6 11/30/2020    LABALBU 2.7 11/30/2020    CALCIUM 8.2 11/30/2020    BILITOT 0.7 11/30/2020    ALKPHOS 74 11/30/2020    AST 19 11/30/2020    ALT 22 11/30/2020     Albumin:    Lab Results   Component Value Date    LABALBU 2.7 11/30/2020     PT/INR:    Lab Results   Component Value Date    PROTIME 13.8 11/25/2020    INR 1.14 11/25/2020     HgBA1c:    Lab Results   Component Value Date    LABA1C 5.2 07/20/2020         Assessment:     Patient Active Problem List   Diagnosis    Venous stasis ulcer of both lower extremities without varicose veins (HCC)    WD-Ulcer of shin, left, with fat layer exposed ([address]    PVD (peripheral vascular disease) ([address]    Type 2 diabetes mellitus with diabetic peripheral angiopathy without gangrene, without long-term current use of insulin (HCC)    WD-Venous stasis of both lower extremities    WD-Lymphedema of both lower extremities    WD-Idiopathic chronic venous hypertension of left lower extremity with ulcer ([address]    WD-Traumatic open wound of left lower leg, complicated by diabetes and venous insufficiency    Gangrene of scrotum    Abscess of perineum    Cellulitis of perineum       Measurements:  Wound 10/16/20 Pretibial Left #1 Left Lateral Lower Leg (Active)   Wound Image   10/30/20 0929   Wound Etiology Venous 11/29/20 0336   Dressing Status Clean;Dry; Intact 11/30/20 1231   Wound Cleansed Cleansed with saline 11/28/20 0525   Dressing/Treatment Other (comment) 11/30/20 1231   Wound Length (cm) 3.8 cm 11/25/20 1100   Wound Width (cm) 2.2 cm 11/25/20 1100   Wound Depth (cm) 0.3 cm 11/25/20 1100   Wound Surface Area (cm^2) 8.36 cm^2 11/25/20 1100   Change in Wound Size % (l*w) 67.6 11/25/20 1100   Wound Volume (cm^3) 2.51 cm^3 11/25/20 1100   Wound Healing % 3 11/25/20 1100   Post-Procedure Length (cm) 0.6 cm 11/23/20 0824   Post-Procedure Width (cm) 0.5 cm 11/23/20 0824   Post-Procedure Depth (cm) 0.2 cm 11/23/20 0824   Post-Procedure Surface Area (cm^2) 0.3 cm^2 11/23/20 0824   Post-Procedure Volume (cm^3) 0.06 cm^3 11/23/20 0824   Distance Tunneling (cm) 0 cm 11/25/20 1100   Tunneling Position ___ O'Clock 0 11/25/20 1100   Undermining Starts ___ O'Clock 0 11/25/20 1100   Undermining Ends___ O'Clock 0 11/25/20 1100   Undermining Maxium Distance (cm) 0 11/25/20 1100   Wound Assessment Other (Comment) 11/30/20 1231   Drainage Amount None 11/30/20 1231   Drainage Description Serosanguinous; Thin 11/28/20 0525   Odor None 11/28/20 0525   Merary-wound Assessment Other (Comment) 11/30/20 1231   Margins Defined edges 11/28/20 0525   Wound Thickness Description not for Pressure Injury Full thickness 11/28/20 0525   Number of days: 45       Wound 11/25/20 Scrotum perineum (Active)   Wound Etiology Non-Healing Surgical 11/30/20 1400   Dressing Status New dressing applied 11/30/20 1400   Wound Cleansed Cleansed with saline 11/30/20 1400   Dressing/Treatment Negative pressure wound therapy 11/30/20 1400   Wound Length (cm) 25 cm 11/25/20 1100   Wound Width (cm) 5 cm 11/25/20 1100   Wound Depth (cm) 5.3 cm 11/25/20 1100   Wound Surface Area (cm^2) 125 cm^2 11/25/20 1100   Wound Volume (cm^3) 662.5 cm^3 11/25/20 1100   Distance Tunneling (cm) 0 cm 11/30/20 1400   Tunneling Position ___ O'Clock 0 11/30/20 1400   Undermining Starts ___ O'Clock 12 11/30/20 1400   Undermining Ends___ O'Clock 2 11/30/20 1400   Undermining Maxium Distance (cm) 5 11/30/20 1400   Wound Assessment Pink/red 11/30/20 1400   Drainage Amount Moderate 11/30/20 1400   Drainage Description Serosanguinous; Yellow 11/30/20 1400   Odor None 11/30/20 1400   Merary-wound Assessment Intact 11/30/20 1400   Margins Defined edges 11/30/20 1400   Wound Thickness Description not for Pressure Injury Full thickness 11/30/20 1400   Number of days: 5       Response to treatment:  Well tolerated by patient. Pain Assessment:  Severity:  none  Quality of pain: none  Wound Pain Timing/Severity:   Premedicated: yes    Plan:     Plan of Care: Wound 11/25/20 Scrotum perineum-Dressing/Treatment: Negative pressure wound therapy(mastisol duoderm stoma ring near rectum black foam x 2 )  Wound 10/16/20 Pretibial Left #1 Left Lateral Lower Leg-Dressing/Treatment: Other (comment)(OPTIFOAM)     Pt in bed agreeable to wound care eval. Wound care to eval for possible vac placement to scrotum/perineum. Dressing removed and was dirty with stool from pt having a bowel movement. Washed with soap and water rinsed with NS. Wound with some necrotic tissue. Vac should be appropriate for the wound may have some difficulty due to it being  near the rectum. Applied mastisol and duoderm to periwound stoma ring near rectum to help seal the dressing then black foam x 2 pieces drape adequate seal obtained @ 125mmhg continuous. Pt tolerated well. Wound care to change dressing on wed 12/2/20. Pt is at mild risk for skin breakdown AEB joyce score. Observe joyce orders. Specialty Bed Required :  yes  [x] Low Air Loss   [] Pressure Redistribution  [] Fluid Immersion  [] Bariatric  [] Total Pressure Relief  [] Other:     Discharge Plan:  Placement for patient upon discharge: tbd  Hospice Care: no  Patient appropriate for Outpatient [address]:  yes    Patient/Caregiver Teaching:  Level of patient/caregiver understanding able to:  Cares explained as given. Electronically signed by Lashay Fuentes RN,  on 11/30/2020 at 4:16 PM

## 2020-11-30 NOTE — PROGRESS NOTES
Patient sitting up in bed eating breakfast, wrist restraints off, preforming range of motion with nurse at patient bedside. Will reapply bilateral wrist restraints once patient is done eating. Patient still confused pulling at nasal canula and cvc, patient reoriented and not to pull off nasal canula and cvc.

## 2020-11-30 NOTE — PROGRESS NOTES
Daily Progress Note    Doing ok  Has some confusion   Afib/sinus and PAC and PVC  Keep on betablockers   Started on [facility] for PAFIB  Sepsis per surgery  Hx of PAD  Medical treatment for now  Increase activity   Correct K  HTN meds adjusted   Stop IVF and use diuretics as needed       Pt. Awake, and doing ok, slightly confused  HR stable, NSR in the 70 range, BP elevated this am  Denies CP, SOB     Jarek's Gangrene    S/p Incision and drainage per Gen surgery    On ABx, for wound vac today per RN    Off vent now    WBC decreasing slowly    Tx. Per Gen surgery and primary     AF with RVR    Given amio    NSR now- on BB    On AC-will need long term    EF preserved but severe PAH noted- no PE      BP elevated-Adjusted meds today, off IVF  Replace K  Will cont.  To follow     Echo-11/25/20    Summary   Left ventricular systolic function is normal.   Ejection fraction is visually estimated at 50%.   Flattening of the interventricular septum due to right ventricular   volume/pressure overload.   Moderately dilated left atrium.   Severely enlarged and hypokinetic right ventricle cavity.   Sclerotic, but non-stenotic aortic valve.   Mild to moderate tricuspid regurgitation; RVSP: 57 mmHg consistent with   severe PHTN.   No evidence of any pericardial effusion.   Technically difficult study, due to body habitus.     PAST MEDICAL HISTORY:  Peripheral vascular disease present, status post  left leg intervention done.  Echo was done today.  Echo shows LV  function was preserved.  RV is dilated.  Pulmonary hypertension is  present.     The patient has a history of peripheral vascular disease present and is  status post left leg intervention, left SFA atherectomy was performed  and atherectomy followed by a drug-coated balloon was done in 2017.  The  patient has a history of having heart failure, peripheral vascular  disease, hypertension, hyperlipidemia, obesity present, and diabetes  present.     The patient had a stress test Labs     11/28/20  0500 11/29/20  0520 11/30/20  0630   WBC 23.4* 22.2* 23.3*   HGB 13.3* 13.3* 12.2*   HCT 42.7 40.9* 39.6*   MCV 94.7 95.3 97.1    204 205     BMP:   Recent Labs     11/28/20  0500 11/29/20  0520 11/30/20  0630   * 149* 143   K 3.1* 3.5 3.3*    109 104   CO2 29 29 31   BUN 26* 16 13   CREATININE 0.8* 0.7* 0.6*     LIVER PROFILE:   Recent Labs     11/28/20  0500 11/29/20  0520 11/30/20  0630   AST 19 19 19   ALT 19 20 22   BILITOT 0.7 0.7 0.7   ALKPHOS 111 98 74     PT/INR: No results for input(s): PROTIME, INR in the last 72 hours. APTT: No results for input(s): APTT in the last 72 hours. BNP:  No results for input(s): BNP in the last 72 hours.       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/24/2020    Abscess of perineum     WD-Traumatic open wound of left lower leg, complicated by diabetes and venous insufficiency 10/08/2020    WD-Idiopathic chronic venous hypertension of left lower extremity with ulcer ([address] 04/21/2020    WD-Venous stasis of both lower extremities 04/14/2020    WD-Lymphedema of both lower extremities 04/14/2020    Venous stasis ulcer of both lower extremities without varicose veins ([address] 12/12/2016    WD-Ulcer of shin, left, with fat layer exposed ([address] 12/12/2016    PVD (peripheral vascular disease) ([address] 12/12/2016    Type 2 diabetes mellitus with diabetic peripheral angiopathy without gangrene, without long-term current use of insulin ([address] 12/12/2016       Electronically signed by Nivia Arevalo PA-C on 11/30/2020 at 10:14 AM

## 2020-11-30 NOTE — PROGRESS NOTES
Patient had bed alarm sounding, went to check on patient, patient sitting on side of bed, pulled put left CVC, had bowel movement on floor. This nurse pressed  button for more nursing staff. CVC held, bleeding stopped, sutures removed, patient bathed and complete linen change. Patient placed back to bed, bed in lowest position, bed alarm on and call light in patient lap for patient safety. Hemant served Dr. Kurtis Salazar.

## 2020-12-01 LAB
ALBUMIN SERPL-MCNC: 2.8 GM/DL (ref 3.4–5)
ALP BLD-CCNC: 70 IU/L (ref 40–128)
ALT SERPL-CCNC: 24 U/L (ref 10–40)
ANION GAP SERPL CALCULATED.3IONS-SCNC: 7 MMOL/L (ref 4–16)
AST SERPL-CCNC: 22 IU/L (ref 15–37)
BILIRUB SERPL-MCNC: 0.7 MG/DL (ref 0–1)
BUN BLDV-MCNC: 12 MG/DL (ref 6–23)
CALCIUM SERPL-MCNC: 8.2 MG/DL (ref 8.3–10.6)
CHLORIDE BLD-SCNC: 103 MMOL/L (ref 99–110)
CO2: 31 MMOL/L (ref 21–32)
CREAT SERPL-MCNC: 0.6 MG/DL (ref 0.9–1.3)
GFR AFRICAN AMERICAN: >60 ML/MIN/1.73M2
GFR NON-AFRICAN AMERICAN: >60 ML/MIN/1.73M2
GLUCOSE BLD-MCNC: 102 MG/DL (ref 70–99)
GLUCOSE BLD-MCNC: 105 MG/DL (ref 70–99)
GLUCOSE BLD-MCNC: 110 MG/DL (ref 70–99)
GLUCOSE BLD-MCNC: 124 MG/DL (ref 70–99)
GLUCOSE BLD-MCNC: 127 MG/DL (ref 70–99)
HCT VFR BLD CALC: 38.4 % (ref 42–52)
HEMOGLOBIN: 12.4 GM/DL (ref 13.5–18)
HIGH SENSITIVE C-REACTIVE PROTEIN: 46.5 MG/L
MAGNESIUM: 2.1 MG/DL (ref 1.8–2.4)
MCH RBC QN AUTO: 30.5 PG (ref 27–31)
MCHC RBC AUTO-ENTMCNC: 32.3 % (ref 32–36)
MCV RBC AUTO: 94.3 FL (ref 78–100)
PDW BLD-RTO: 14.6 % (ref 11.7–14.9)
PLATELET # BLD: 210 K/CU MM (ref 140–440)
PMV BLD AUTO: 10.9 FL (ref 7.5–11.1)
POTASSIUM SERPL-SCNC: 3.4 MMOL/L (ref 3.5–5.1)
PROCALCITONIN: 0.07
RBC # BLD: 4.07 M/CU MM (ref 4.6–6.2)
SODIUM BLD-SCNC: 141 MMOL/L (ref 135–145)
TOTAL PROTEIN: 5.7 GM/DL (ref 6.4–8.2)
WBC # BLD: 19.3 K/CU MM (ref 4–10.5)

## 2020-12-01 PROCEDURE — 80053 COMPREHEN METABOLIC PANEL: CPT

## 2020-12-01 PROCEDURE — 6360000002 HC RX W HCPCS: Performed by: NURSE PRACTITIONER

## 2020-12-01 PROCEDURE — 6360000002 HC RX W HCPCS: Performed by: STUDENT IN AN ORGANIZED HEALTH CARE EDUCATION/TRAINING PROGRAM

## 2020-12-01 PROCEDURE — 6370000000 HC RX 637 (ALT 250 FOR IP): Performed by: INTERNAL MEDICINE

## 2020-12-01 PROCEDURE — 83735 ASSAY OF MAGNESIUM: CPT

## 2020-12-01 PROCEDURE — 2580000003 HC RX 258: Performed by: STUDENT IN AN ORGANIZED HEALTH CARE EDUCATION/TRAINING PROGRAM

## 2020-12-01 PROCEDURE — 99024 POSTOP FOLLOW-UP VISIT: CPT | Performed by: SURGERY

## 2020-12-01 PROCEDURE — 2500000003 HC RX 250 WO HCPCS: Performed by: NURSE PRACTITIONER

## 2020-12-01 PROCEDURE — 1200000000 HC SEMI PRIVATE

## 2020-12-01 PROCEDURE — 6370000000 HC RX 637 (ALT 250 FOR IP): Performed by: PHYSICIAN ASSISTANT

## 2020-12-01 PROCEDURE — 2580000003 HC RX 258: Performed by: SURGERY

## 2020-12-01 PROCEDURE — 99232 SBSQ HOSP IP/OBS MODERATE 35: CPT | Performed by: INTERNAL MEDICINE

## 2020-12-01 PROCEDURE — 99223 1ST HOSP IP/OBS HIGH 75: CPT | Performed by: INTERNAL MEDICINE

## 2020-12-01 PROCEDURE — 6360000002 HC RX W HCPCS: Performed by: SURGERY

## 2020-12-01 PROCEDURE — 2500000003 HC RX 250 WO HCPCS: Performed by: STUDENT IN AN ORGANIZED HEALTH CARE EDUCATION/TRAINING PROGRAM

## 2020-12-01 PROCEDURE — 82962 GLUCOSE BLOOD TEST: CPT

## 2020-12-01 PROCEDURE — 84145 PROCALCITONIN (PCT): CPT

## 2020-12-01 PROCEDURE — APPSS60 APP SPLIT SHARED TIME 46-60 MINUTES: Performed by: NURSE PRACTITIONER

## 2020-12-01 PROCEDURE — 2580000003 HC RX 258: Performed by: NURSE PRACTITIONER

## 2020-12-01 PROCEDURE — 86141 C-REACTIVE PROTEIN HS: CPT

## 2020-12-01 PROCEDURE — 85027 COMPLETE CBC AUTOMATED: CPT

## 2020-12-01 RX ORDER — HYDROCODONE BITARTRATE AND ACETAMINOPHEN 5; 325 MG/1; MG/1
1 TABLET ORAL EVERY 6 HOURS PRN
Status: DISCONTINUED | OUTPATIENT
Start: 2020-12-01 | End: 2020-12-06 | Stop reason: HOSPADM

## 2020-12-01 RX ORDER — LISINOPRIL 20 MG/1
40 TABLET ORAL DAILY
Status: DISCONTINUED | OUTPATIENT
Start: 2020-12-01 | End: 2020-12-06 | Stop reason: HOSPADM

## 2020-12-01 RX ADMIN — SODIUM CHLORIDE, PRESERVATIVE FREE 10 ML: 5 INJECTION INTRAVENOUS at 03:56

## 2020-12-01 RX ADMIN — POTASSIUM CHLORIDE 10 MEQ: 1500 TABLET, EXTENDED RELEASE ORAL at 22:50

## 2020-12-01 RX ADMIN — ASPIRIN 81 MG CHEWABLE TABLET 81 MG: 81 TABLET CHEWABLE at 09:20

## 2020-12-01 RX ADMIN — PIPERACILLIN AND TAZOBACTAM 3.38 G: 3; .375 INJECTION, POWDER, FOR SOLUTION INTRAVENOUS at 12:41

## 2020-12-01 RX ADMIN — AMLODIPINE BESYLATE 5 MG: 5 TABLET ORAL at 09:19

## 2020-12-01 RX ADMIN — HYDROCODONE BITARTRATE AND ACETAMINOPHEN 1 TABLET: 5; 325 TABLET ORAL at 18:24

## 2020-12-01 RX ADMIN — METRONIDAZOLE 500 MG: 500 INJECTION, SOLUTION INTRAVENOUS at 18:23

## 2020-12-01 RX ADMIN — CEFEPIME 2 G: 2 INJECTION, POWDER, FOR SOLUTION INTRAVENOUS at 18:23

## 2020-12-01 RX ADMIN — RIVAROXABAN 15 MG: 15 TABLET, FILM COATED ORAL at 18:24

## 2020-12-01 RX ADMIN — DEXTROSE MONOHYDRATE 900 MG: 50 INJECTION, SOLUTION INTRAVENOUS at 05:35

## 2020-12-01 RX ADMIN — POTASSIUM CHLORIDE 10 MEQ: 1500 TABLET, EXTENDED RELEASE ORAL at 09:19

## 2020-12-01 RX ADMIN — DEXTROSE MONOHYDRATE 900 MG: 50 INJECTION, SOLUTION INTRAVENOUS at 12:42

## 2020-12-01 RX ADMIN — PIPERACILLIN AND TAZOBACTAM 3.38 G: 3; .375 INJECTION, POWDER, FOR SOLUTION INTRAVENOUS at 05:07

## 2020-12-01 RX ADMIN — HYDROCODONE BITARTRATE AND ACETAMINOPHEN 1 TABLET: 5; 325 TABLET ORAL at 09:41

## 2020-12-01 RX ADMIN — SODIUM CHLORIDE, PRESERVATIVE FREE 10 ML: 5 INJECTION INTRAVENOUS at 09:21

## 2020-12-01 RX ADMIN — LISINOPRIL 40 MG: 20 TABLET ORAL at 09:20

## 2020-12-01 RX ADMIN — VANCOMYCIN HYDROCHLORIDE 2000 MG: 1 INJECTION, POWDER, LYOPHILIZED, FOR SOLUTION INTRAVENOUS at 09:22

## 2020-12-01 RX ADMIN — METOPROLOL SUCCINATE 25 MG: 25 TABLET, EXTENDED RELEASE ORAL at 09:20

## 2020-12-01 RX ADMIN — METOPROLOL SUCCINATE 25 MG: 25 TABLET, EXTENDED RELEASE ORAL at 22:50

## 2020-12-01 RX ADMIN — SODIUM CHLORIDE, PRESERVATIVE FREE 10 ML: 5 INJECTION INTRAVENOUS at 09:20

## 2020-12-01 ASSESSMENT — PAIN DESCRIPTION - ONSET
ONSET: GRADUAL

## 2020-12-01 ASSESSMENT — PAIN DESCRIPTION - PAIN TYPE
TYPE: SURGICAL PAIN

## 2020-12-01 ASSESSMENT — PAIN SCALES - GENERAL
PAINLEVEL_OUTOF10: 0
PAINLEVEL_OUTOF10: 7
PAINLEVEL_OUTOF10: 5
PAINLEVEL_OUTOF10: 4
PAINLEVEL_OUTOF10: 8
PAINLEVEL_OUTOF10: 0

## 2020-12-01 ASSESSMENT — PAIN DESCRIPTION - DESCRIPTORS
DESCRIPTORS: ACHING;DISCOMFORT
DESCRIPTORS: ACHING;DISCOMFORT
DESCRIPTORS: SORE
DESCRIPTORS: SORE

## 2020-12-01 ASSESSMENT — PAIN DESCRIPTION - PROGRESSION
CLINICAL_PROGRESSION: GRADUALLY IMPROVING
CLINICAL_PROGRESSION: GRADUALLY IMPROVING
CLINICAL_PROGRESSION: GRADUALLY WORSENING
CLINICAL_PROGRESSION: GRADUALLY WORSENING

## 2020-12-01 ASSESSMENT — PAIN DESCRIPTION - FREQUENCY
FREQUENCY: INTERMITTENT

## 2020-12-01 ASSESSMENT — PAIN DESCRIPTION - LOCATION
LOCATION: PERINEUM

## 2020-12-01 ASSESSMENT — PAIN - FUNCTIONAL ASSESSMENT
PAIN_FUNCTIONAL_ASSESSMENT: ACTIVITIES ARE NOT PREVENTED

## 2020-12-01 ASSESSMENT — PAIN DESCRIPTION - ORIENTATION
ORIENTATION: LOWER;MID
ORIENTATION: LOWER;MID
ORIENTATION: MID;LOWER
ORIENTATION: LOWER;MID

## 2020-12-01 NOTE — PROGRESS NOTES
Daily Progress Note    Awake but has some moments of confusion   Remain in sinus and PAFIB  S/p abdominal surgery slowly improving  afib rate control and AC  PAD medical treatment  HTN stable  increase activity      Pt. Awake, and doing ok, slightly confused  HR stable, NSR in the 60 range, BP elevated but improved  Denies CP, SOB     Jarek's Gangrene    S/p Incision and drainage per Gen surgery    On ABx, with wound vac    Off vent now    WBC decreasing slowly    Tx. Per Gen surgery and primary     AF with RVR    Given amio    NSR now- on BB    On AC-will need long term    EF preserved but severe PAH noted- no PE      BP elevated-Adjusted meds  Replace K  Will cont.  To follow     Echo-11/25/20    Summary   Left ventricular systolic function is normal.   Ejection fraction is visually estimated at 50%.   Flattening of the interventricular septum due to right ventricular   volume/pressure overload.   Moderately dilated left atrium.   Severely enlarged and hypokinetic right ventricle cavity.   Sclerotic, but non-stenotic aortic valve.   Mild to moderate tricuspid regurgitation; RVSP: 57 mmHg consistent with   severe PHTN.   No evidence of any pericardial effusion.   Technically difficult study, due to body habitus.     PAST MEDICAL HISTORY:  Peripheral vascular disease present, status post  left leg intervention done.  Echo was done today.  Echo shows LV  function was preserved.  RV is dilated.  Pulmonary hypertension is  present.     The patient has a history of peripheral vascular disease present and is  status post left leg intervention, left SFA atherectomy was performed  and atherectomy followed by a drug-coated balloon was done in 2017.  The  patient has a history of having heart failure, peripheral vascular  disease, hypertension, hyperlipidemia, obesity present, and diabetes  present.     The patient had a stress test done in 2016, negative for ischemia.  His  ABIs were abnormal, left was 0.48 and right was 0.8.     PAST SURGICAL HISTORY:  Left leg intervention done, Jarek's gangrene,  and rectal surgery done.     SOCIAL HISTORY:  History of smoking.  No alcohol use.     ALLERGIES:  NKDA.     MEDICATIONS AT HOME:  Prior to hospital, he was on Glucotrol, potassium,  Xanax, Zaroxolyn, aspirin, Lasix, lisinopril, and Flomax. Objective:   BP (!) 164/66   Pulse 62   Temp 98.4 °F (36.9 °C) (Oral)   Resp 21   Ht 5' 9.02\" (1.753 m)   Wt 262 lb 5.6 oz (119 kg)   SpO2 96%   BMI 38.72 kg/m²       Intake/Output Summary (Last 24 hours) at 12/1/2020 0836  Last data filed at 12/1/2020 3752  Gross per 24 hour   Intake 920 ml   Output 1125 ml   Net -205 ml       Medications:   Scheduled Meds:   sodium chloride flush  10 mL Intravenous BID    clindamycin (CLEOCIN) IV  900 mg Intravenous Q8H    piperacillin-tazobactam  3.375 g Intravenous Q8H    vancomycin  2,000 mg Intravenous Q18H    sodium chloride flush  10 mL Intravenous 2 times per day    amLODIPine  5 mg Oral Daily    lisinopril  20 mg Oral Daily    potassium chloride  10 mEq Oral BID    metoprolol succinate  25 mg Oral BID    aspirin  81 mg Oral Daily    rivaroxaban  15 mg Oral Daily    insulin lispro  0-18 Units Subcutaneous TID WC    insulin lispro  0-9 Units Subcutaneous Nightly    insulin glargine  20 Units Subcutaneous Nightly    influenza virus vaccine  0.5 mL Intramuscular Prior to discharge      Infusions:   dextrose        PRN Meds:  sodium chloride flush, glucose, dextrose, glucagon (rDNA), dextrose, HYDROmorphone, albuterol sulfate HFA       Physical Exam:  Vitals:    12/01/20 0603   BP: (!) 164/66   Pulse: 62   Resp: 21   Temp:    SpO2: 96%        General: AAO, NAD  Chest: Nontender  Cardiac: First and Second Heart Sounds are Normal, No Murmurs or Gallops noted  Lungs:Clear to auscultation and percussion. Abdomen: Soft, NT, ND, +BS  Extremities: No clubbing, no edema  Vascular:  Equal 2+ peripheral pulses.         Lab Data:  CBC: Recent Labs     11/29/20 0520 11/30/20 0630 12/01/20  0520   WBC 22.2* 23.3* 19.3*   HGB 13.3* 12.2* 12.4*   HCT 40.9* 39.6* 38.4*   MCV 95.3 97.1 94.3    205 210     BMP:   Recent Labs     11/29/20 0520 11/30/20 0630 12/01/20  0520   * 143 141   K 3.5 3.3* 3.4*    104 103   CO2 29 31 31   BUN 16 13 12   CREATININE 0.7* 0.6* 0.6*     LIVER PROFILE:   Recent Labs     11/29/20 0520 11/30/20 0630 12/01/20  0520   AST 19 19 22   ALT 20 22 24   BILITOT 0.7 0.7 0.7   ALKPHOS 98 74 70     PT/INR: No results for input(s): PROTIME, INR in the last 72 hours. APTT: No results for input(s): APTT in the last 72 hours. BNP:  No results for input(s): BNP in the last 72 hours.       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/24/2020    Abscess of perineum     WD-Traumatic open wound of left lower leg, complicated by diabetes and venous insufficiency 10/08/2020    WD-Idiopathic chronic venous hypertension of left lower extremity with ulcer ([address] 04/21/2020    WD-Venous stasis of both lower extremities 04/14/2020    WD-Lymphedema of both lower extremities 04/14/2020    Venous stasis ulcer of both lower extremities without varicose veins ([address] 12/12/2016    WD-Ulcer of shin, left, with fat layer exposed ([address] 12/12/2016    PVD (peripheral vascular disease) ([address] 12/12/2016    Type 2 diabetes mellitus with diabetic peripheral angiopathy without gangrene, without long-term current use of insulin ([address] 12/12/2016       Electronically signed by Kris Quach PA-C on 12/1/2020 at 8:36 AM

## 2020-12-01 NOTE — PROGRESS NOTES
Hospitalist Progress Note      Name:  Ita Tavares /Age/Sex: 1955  (72 y.o. 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  - WBC 19.3  - Recent surgical cultures sensitive to Cipro, will continue; resistant to Zosyn will d/c  - Clinda dosing increased yesterday; remains on vanc  - ID consulted for assistance with abx duration  - General Surgery following    Acute respiratory failure with hypoxia requiring intubation  - Extubated successfully  - Currently on room air doing well    Pulmonary HTN; 2 or 3  Afib with RVR  - Continue beta-blocker; was given Amio  - No PE noted on PE study  - Continue Xarelto  - Will continue to monitor    HTN  - Continue Amlodipine    DM  - SSI      Diet DIET CARDIAC; Carb Control: 5 carb choices (75 gms)/meal  Dietary Nutrition Supplements: Wound Healing Oral Supplement   DVT Prophylaxis [] Lovenox, []  Heparin, [] SCDs, [] Ambulation   GI Prophylaxis [] PPI,  [] H2 Blocker,  [] Carafate,  [] Diet/Tube Feeds   Code Status Prior   Disposition Patient requires continued admission due to gangrene   MDM [] Low, [x] Moderate,[]  High  Patient's risk as above due to      History of Present Illness:     Doing better this AM.  No complaints of pain or nausea/vomiting. Had BM's in the wound area requiring vac to be taken off overnight. Objective: Intake/Output Summary (Last 24 hours) at 2020 1312  Last data filed at 2020 1038  Gross per 24 hour   Intake 1280 ml   Output 1050 ml   Net 230 ml      Vitals:   Vitals:    20 1146   BP: (!) 159/83   Pulse: 60   Resp: 18   Temp:    SpO2: 97%     Physical Exam:   GEN Awake male, sitting upright in bed in no apparent distress. Appears given age. EYES Pupils are equally round.   No scleral erythema, discharge, or conjunctivitis. HENT Mucous membranes are moist.   NECK Supple, no apparent thyromegaly or masses. RESP Clear to auscultation, no wheezes, rales or rhonchi. Symmetric chest movement while on room air. CARDIO/VASC S1/S2 auscultated. Regular rate without appreciable murmurs, rubs, or gallops. GI Abdomen is soft without significant tenderness, masses, or guarding.  Pepper in place  HEME/LYMPH  No petechiae or ecchymoses. MSK No gross joint deformities. SKIN Normal coloration, warm, dry, incision area packed with wet to dry dressings  NEURO Cranial nerves appear grossly intact, normal speech  PSYCH Awake, alert, oriented x 4. Affect appropriate.     Medications:   Medications:    lisinopril  40 mg Oral Daily    sodium chloride flush  10 mL Intravenous BID    clindamycin (CLEOCIN) IV  900 mg Intravenous Q8H    piperacillin-tazobactam  3.375 g Intravenous Q8H    vancomycin  2,000 mg Intravenous Q18H    sodium chloride flush  10 mL Intravenous 2 times per day    amLODIPine  5 mg Oral Daily    potassium chloride  10 mEq Oral BID    metoprolol succinate  25 mg Oral BID    aspirin  81 mg Oral Daily    rivaroxaban  15 mg Oral Daily    insulin lispro  0-18 Units Subcutaneous TID WC    insulin lispro  0-9 Units Subcutaneous Nightly    insulin glargine  20 Units Subcutaneous Nightly    influenza virus vaccine  0.5 mL Intramuscular Prior to discharge      Infusions:    dextrose       PRN Meds: HYDROcodone 5 mg - acetaminophen, 1 tablet, Q6H PRN  sodium chloride flush, 10 mL, PRN  glucose, 15 g, PRN  dextrose, 12.5 g, PRN  glucagon (rDNA), 1 mg, PRN  dextrose, 100 mL/hr, PRN  HYDROmorphone, 0.5 mg, Q4H PRN  albuterol sulfate HFA, 2 puff, Q6H PRN          Electronically signed by Lauren Izquierdo MD on 12/1/2020 at 1:12 PM

## 2020-12-01 NOTE — CONSULTS
Infectious Disease Consult Note  2020   Patient Name: Severo Peek Coffee : 1955   Impression   Septic Shock secondary to Jarek's Gangrene:  · Low grade temps to afebrile  · Leukocytosis max 49.2 on , trended down to 19.3 on   · Blood cultures -0/2-NGTD  · -S/p per Dr. Tanya Peck, I&D of Jarek's gangrene  · -Surgical culture of buttocks: E.Coli  · Severe hypotension, Levophed onboard, weaned off      DMII:  Dr. Nick Gowers onboard, [facility] 20-5.2    · Morbid Obesity: BMI 38.72    · PHTN    · Tobacco Abuse    · Acute Encephalopathy    · AF:  Dr. Kalie Akbar onboard    · Acute Hypoxic Respiratory Failure:  · -respiratory culture Pseudomonas aeringinosa, Proteus mirabilis  · -RDP Negative  · Intubated , Extubated   · Dr. Jewell Palmer onboard    · PVD     Multi-morbidity: per PMHx:  CHF, BLE chronic ulcers, DMII, HTN, PVD, venous stasis BLE, morbid obesity  Plan:   DC clindamycin and Zosyn    Continue vancomycin  · Start cefepime 2 gm IV q8h  · Start Flagyl 500 mg IV q8h  · Trend Pct and CRP    Thank you for allowing me to consult in the care of this patient.  ------------------------  REASON FOR CONSULT: Infective syndrome     Requested by: Dr. Keyla Muro is a 72 y.o.  male who was admitted 2020 for further evaluation and management of perineal abscess. He has been followed by the wound center for chronic BLE ulcerations. He noticed pain and drainage with a boil in his perineal area, he used one of his diabetic lancets to open the area 3 days prior to admission. He noticed black tissue in the area. The abscess \"spread\" from his scrotal area to his perineal area, no testicular pain, no fever/chills, nausea, or vomiting. He had severe leukocytosis of 49 noted after admission, developed hypotension requiring vasopressor therapy.   His CT A&P showed an impression of Jarek's gangrene, he was then taken to the OR per  Kelli for an I&D, and then developed acute respiratory failure with hypoxia requiring intubation. He was extubated on 11/28. ID was consulted on 12/1/20.   ? Infectious diseases service was consulted to evaluate the pt, and recommend further investigative and therapeutic measures. ROS: Other systems reviewed Including eyes, ENT, respiratory, cardiovascular, GI, , dermatologic, neurologic, psych, hem/lymphatic, musculoskeletal and endocrine were negative other than what is mentioned above.      Patient Active Problem List    Diagnosis Date Noted    Cellulitis of perineum     Gangrene of scrotum 11/24/2020    Abscess of perineum     WD-Traumatic open wound of left lower leg, complicated by diabetes and venous insufficiency 10/08/2020    WD-Idiopathic chronic venous hypertension of left lower extremity with ulcer ([address] 04/21/2020    WD-Venous stasis of both lower extremities 04/14/2020    WD-Lymphedema of both lower extremities 04/14/2020    Venous stasis ulcer of both lower extremities without varicose veins ([address] 12/12/2016    WD-Ulcer of shin, left, with fat layer exposed ([address] 12/12/2016    PVD (peripheral vascular disease) ([address] 12/12/2016    Type 2 diabetes mellitus with diabetic peripheral angiopathy without gangrene, without long-term current use of insulin ([address] 12/12/2016     Past Medical History:   Diagnosis Date    CHF (congestive heart failure) (HCC)     Chronic ulcer of left leg with fat layer exposed ([address] 12/12/2016    Chronic ulcer of right leg with fat layer exposed ([address] 12/12/2016    Chronic ulcer of right leg with fat layer exposed ([address] 12/12/2016    Diabetes mellitus ([address]     Hypertension     PVD (peripheral vascular disease) ([address] 12/12/2016    Type 2 diabetes mellitus with diabetic peripheral angiopathy without gangrene, without long-term current use of insulin ([address] 12/12/2016    Venous stasis ulcer of both lower extremities without varicose veins ([address] 12/12/2016      Past Surgical History:   Procedure Laterality Date    RECTAL SURGERY N/A 11/24/2020    RECTAL PERIRECTAL INCISION AND DRAINAGE performed by Ashley Andre MD at [place] [id number] History   Problem Relation Age of Onset    Asthma Mother     Breast Cancer Mother     Cancer Mother     Diabetes Mother     High Blood Pressure Mother     Cancer Father     Early Death Brother     Arthritis Paternal Grandmother       Infectious disease related family history - not contibutory. SOCIAL HISTORY  Social History     Tobacco Use    Smoking status: Current Every Day Smoker     Packs/day: 1.00     Years: 60.00     Pack years: 60.00     Types: Cigarettes    Smokeless tobacco: Never Used    Tobacco comment: healing    Substance Use Topics    Alcohol use: Never     Frequency: Never       Born:[place], [place]   Lives:[place], [place] with wife    Occupation:Retired   No recent travel of significance.  No recent unusual exposures.  Dogs and cats for pets  ? ALLERGIES  No Known Allergies   MEDICATIONS  Reviewed and are per the chart/EMR. ? Antibiotics:   Flagyl 12/1-  Cefepime 12/1-  Vancomycin 11/25-    Past:  Cipro 11/30  Clindamycin 11/24-12/1  Piperacillin-tazobactam 11/24-12/1?  -------------------------------------------------------------------------------------------------------------------    Vital Signs:  Vitals:    12/01/20 0800   BP: (!) 151/57   Pulse: 73   Resp: 15   Temp: 98.2 °F (36.8 °C)   SpO2: 95%       Exam:    VS: noted; wt 262 lb (119 kg) 5'9\" Height  Gen: alert and oriented X3, no distress  Skin: no stigmata of endocarditis  Wounds: C/D/I buttock/perineum  HEMT: AT/NC Oropharynx pink, moist, and without lesions or exudates, edentulous  Eyes: PERRLA, EOMI, conjunctiva pink, sclera anicteric. Neck: Supple. Trachea midline. No LAD. Chest: no distress and CTA. Good air movement. Heart: RRR and no MRG. Abd: soft, non-distended, no tenderness, no hepatomegaly. Normoactive bowel sounds. Ext: no clubbing, cyanosis, with non-pitting BLE edema  Catheter Site: without erythema or tenderness draining darren urine  PICC:  Right arm without erythema or edema  Neuro: Mental status intact. CN 2-12 intact and no focal sensory or motor deficits    ? Diagnostic Studies: reviewed  11/24/20 CT Pelviw W Contrast:  Impression    Findings consistent with the provided history of developing Jarek's    gangrene with extensive involvement of the subcutaneous fatty tissues as    described and possible involvement of the inferolateral aspect of the right    gluteus mary which shows only increased density but no evidence of fluid    or gas. 11/24/20 XR Chest Portable:  Impression    1. Endotracheal tube tip terminates approximately 6.1 cm above the sabrina. 2. Left internal jugular central venous catheter tip likely terminates in the    brachiocephalic vein. 3. Cardiomegaly with perihilar vascular prominence. 4. Consolidative opacities in the lung bases with left pleural effusion. The findings were sent to the Radiology Results [address] at 9:38    pm on 11/24/2020to be communicated to a licensed caregiver. 11/25/20 XR Chest Portable:  Impression    Stable small left pleural effusion with basilar atelectasis and/or    consolidation.       11/25/20 XR Abdomen (KUB):  Impression    Enteric tube tip projects at the gastric body with side hole projecting at    the GE junction.  This should be advanced by at least 3 cm.      11/25/20 CTA Pulmonary W Contrast:  Impression    Pulmonary arteries are only well evaluated to the lobar level.  No main or    lobar pulmonary embolism.  Segmental and subsegmental branch vessels are    poorly evaluated due to artifact.         Enlarged pulmonary arterial tree compatible with pulmonary hypertension.         Cardiomegaly with four-chamber enlargement.         Multifocal nodularity and consolidation throughout the lungs favored secondary to multifocal pneumonia with reactive mediastinal lymphadenopathy.         Left lower lobe collapse which may be due to mucous plugging as the left    lower lobe airways are not well visualized.         Support lines and tubes appear appropriate in positioning. 11/25/20 VL Dup Lower Extremity Venous Bilateral:  Impression    Positive DVT involving the right posterior tibial vein below the knee.         No evidence of left lower extremity DVT. 11/26/20 XR Chest Portable:  Impression    1. Lines and tubes as described. 2. No significant interval change of cardiomegaly, dense retrocardiac    opacity, and bilateral effusions. 3. Pulmonary edema. 11/26/20 US Head Neck Soft Tissue Thyroid:  Impression    Limited exam due to patient being intubated, suboptimal positioning as well    as motion artifact.         Heterogeneous thyroid gland without dominant thyroid nodule     11/30/20 CT Abdomen Pelvis W IV Contrast:  Impression    1. Note: Study significantly limited by patient motion related artifact. 2. Moderate bilateral layering posterior pleural effusions. 3. Mild pulmonary interstitial edema within the visualized lower lungs. 4. Hepatic steatosis. 5. Cholelithiasis, as described above. 11/30/20 US Head Neck Soft TIssue Thyroid:  Impression    Bilateral thyroidal enlargement.         Small spongiform lesion in the right thyroid lobe measuring 3.9 x 5.8 x 3.9    mm.  It is of very low suspicion for thyroid malignancy.  No fine needle    aspiration is needed.         1 benign colloid cyst in the right thyroid lobe and 2 benign colloid cysts in    the left thyroid lobe. ??  I have examined this patient and available medical records on this date and have made the above observations, conclusions and recommendations. Electronically signed by: Electronically signed by FAITH Zaragoza CNP on 12/1/2020 at 9:32 AM

## 2020-12-01 NOTE — PLAN OF CARE
Problem: Infection:  Goal: Will remain free from infection  Description: Will remain free from infection  12/1/2020 1038 by Mendy Brown. Reina Laguna RN  Outcome: Ongoing  12/1/2020 0004 by Laura Ragland RN  Outcome: Ongoing     Problem: Safety:  Goal: Free from accidental physical injury  Description: Free from accidental physical injury  12/1/2020 1038 by Mendy Laguna RN  Outcome: Ongoing  12/1/2020 0004 by Laura Ragland RN  Outcome: Ongoing  Goal: Free from intentional harm  Description: Free from intentional harm  12/1/2020 1038 by Mendy Laguna RN  Outcome: Ongoing  12/1/2020 0004 by Laura Ragland RN  Outcome: Ongoing     Problem: Daily Care:  Goal: Daily care needs are met  Description: Daily care needs are met  12/1/2020 1038 by Mendy Laguna RN  Outcome: Ongoing  12/1/2020 0004 by Laura Ragland RN  Outcome: Ongoing     Problem: Pain:  Goal: Patient's pain/discomfort is manageable  Description: Patient's pain/discomfort is manageable  12/1/2020 1038 by Mendy Brown. Reina Laguna RN  Outcome: Ongoing  12/1/2020 0004 by Laura Ragland RN  Outcome: Ongoing  Goal: Pain level will decrease  Description: Pain level will decrease  12/1/2020 1038 by Mendy Brown. Reina Laguna RN  Outcome: Ongoing  12/1/2020 0004 by Laura Ragland RN  Outcome: Ongoing  Goal: Control of acute pain  Description: Control of acute pain  12/1/2020 1038 by Mendy Laguna RN  Outcome: Ongoing  12/1/2020 0004 by Laura Ragland RN  Outcome: Ongoing  Goal: Control of chronic pain  Description: Control of chronic pain  12/1/2020 1038 by Mendy Laguna RN  Outcome: Ongoing  12/1/2020 0004 by Laura Ragland RN  Outcome: Ongoing     Problem: Skin Integrity:  Goal: Skin integrity will stabilize  Description: Skin integrity will stabilize  12/1/2020 1038 by Mendy Laguna RN  Outcome: Ongoing  12/1/2020 0004 by Laura Ragland RN  Outcome: Ongoing     Problem: Discharge Planning:  Goal: Patients continuum of care needs are met  Description: Patients continuum of care needs are met  12/1/2020 1038 by Heron Jaquez. Sharri Burciaga RN  Outcome: Ongoing  12/1/2020 0004 by Marc Blizzard, RN  Outcome: Ongoing     Problem: Falls - Risk of:  Goal: Will remain free from falls  Description: Will remain free from falls  12/1/2020 1038 by Heron Jaquez. Sharri Burciaga RN  Outcome: Ongoing  12/1/2020 0004 by Marc Blizzard, RN  Outcome: Ongoing  Goal: Absence of physical injury  Description: Absence of physical injury  12/1/2020 1038 by Heron Jaquez. Sharri Burciaga RN  Outcome: Ongoing  12/1/2020 0004 by Marc Blizzard, RN  Outcome: Ongoing     Problem: Skin Integrity:  Goal: Will show no infection signs and symptoms  Description: Will show no infection signs and symptoms  12/1/2020 1038 by Heron Jaquez. Sharri Burciaga RN  Outcome: Ongoing  12/1/2020 0004 by Marc Blizzard, RN  Outcome: Ongoing  Goal: Absence of new skin breakdown  Description: Absence of new skin breakdown  12/1/2020 1038 by Heron Jaquez.  Sharri Burciaga RN  Outcome: Ongoing  12/1/2020 0004 by Marc Blizzard, RN  Outcome: Ongoing

## 2020-12-01 NOTE — PROGRESS NOTES
[address] [facility]  consulted by Dr. Sana Dunaway for monitoring and adjustment. Indication for treatment: Jarek's gangrene  Goal trough: 10-15 mcg/mL, AUC/MINDY: 400-600     Pertinent Laboratory Values:   Temp Readings from Last 3 Encounters:   20 98.4 °F (36.9 °C) (Axillary)   20 99.5 °F (37.5 °C)   20 97.6 °F (36.4 °C) (Temporal)     Recent Labs     20  1525 20  1527 20  0435   WBC 36.9*  --  40.4* 49.2*   LACTATE  --  1.6  --   --        Estimated Creatinine Clearance: 156 mL/min (A) (based on SCr of 0.6 mg/dL (L)). Recent Labs     20  0520 20  0630 20  0520   BUN 16 13 12   CREATININE 0.7* 0.6* 0.6*     Intake/Output Summary (Last 24 hours) at 2020 1319  Last data filed at 2020 1245  Gross per 24 hour   Intake 6421.26 ml   Output 1800 ml   Net 4621.26 ml     Pertinent Cultures:  Date    Source    Results     Tissue cx   Staph Hominis     Blood    Negative     Respiratory   Pseudomonas, Proteus     Surgical   Ecoli, MDRO    VANCOMYCIN TROUGH:    Recent Labs     20  1530   VANCOTROUGH 8.5*     VANCOMYCIN RANDOM:  No results for input(s): VANCORANDOM in the last 72 hours. Assessment:  · WBC and temperature: WBC elevated @ 23.3;  Afebrile  · SCr, BUN, and urine output:   · Previously YANCI  · Stable, WNL  · Day(s) of therapy: 6  · Vancomycin level:   · :  7.7, low   · :  8.5, 21.5h post-dose, sub-therapeutic (2000 mg q24)  · Extrapolated trough @ 7.1  · AUC/MINDY @ 408    Plan:  · Continue increased vancomycin dosin mg IVPB q18h following improvement in renal trends and sub-therapeutic trough on q24h dosing  · Predicted AUC/MINDY: 545  · Predicted trough: 12.4  · Continue to monitor renal trends closely and repeat levels to monitor for accumulation  · Pharmacy will continue to monitor patient and adjust therapy as indicated    VANCOMYCIN TROUGH SCHEDULED FOR 12/3 @

## 2020-12-01 NOTE — PLAN OF CARE
Problem: Infection:  Goal: Will remain free from infection  Description: Will remain free from infection  Outcome: Ongoing     Problem: Safety:  Goal: Free from accidental physical injury  Description: Free from accidental physical injury  Outcome: Ongoing  Goal: Free from intentional harm  Description: Free from intentional harm  Outcome: Ongoing     Problem: Daily Care:  Goal: Daily care needs are met  Description: Daily care needs are met  Outcome: Ongoing     Problem: Pain:  Goal: Patient's pain/discomfort is manageable  Description: Patient's pain/discomfort is manageable  Outcome: Ongoing  Goal: Pain level will decrease  Description: Pain level will decrease  Outcome: Ongoing  Goal: Control of acute pain  Description: Control of acute pain  Outcome: Ongoing  Goal: Control of chronic pain  Description: Control of chronic pain  Outcome: Ongoing     Problem: Skin Integrity:  Goal: Skin integrity will stabilize  Description: Skin integrity will stabilize  Outcome: Ongoing     Problem: Discharge Planning:  Goal: Patients continuum of care needs are met  Description: Patients continuum of care needs are met  Outcome: Ongoing     Problem: Falls - Risk of:  Goal: Will remain free from falls  Description: Will remain free from falls  Outcome: Ongoing  Goal: Absence of physical injury  Description: Absence of physical injury  Outcome: Ongoing     Problem: Skin Integrity:  Goal: Will show no infection signs and symptoms  Description: Will show no infection signs and symptoms  Outcome: Ongoing  Goal: Absence of new skin breakdown  Description: Absence of new skin breakdown  Outcome: Ongoing     Problem: Restraint Use - Nonviolent/Non-Self-Destructive Behavior:  Goal: Absence of restraint indications  Description: Absence of restraint indications  Outcome: Ongoing  Goal: Absence of restraint-related injury  Description: Absence of restraint-related injury  Outcome: Ongoing

## 2020-12-01 NOTE — PROGRESS NOTES
Progress Note( Dr. Joey Ni)  11/30/2020  Subjective:   Admit Date: 11/24/2020  PCP: FAITH Guillen - CNP    Admitted For :Perineal abscess //Jarek gangrene   Incision drainage and debridement of Jarek gangrene on 11/24/2020    Consulted For:  better control of blood glucose    Interval History: Patient i is extubated and off ventilator awake and alert    On is also hyperthyroid with high serum free T4 and low TSH    Intake/Output Summary (Last 24 hours) at 11/30/2020 2246  Last data filed at 11/30/2020 1724  Gross per 24 hour   Intake 1525 ml   Output 1750 ml   Net -225 ml       DATA    CBC:   Recent Labs     11/28/20  0500 11/29/20  0520 11/30/20  0630   WBC 23.4* 22.2* 23.3*   HGB 13.3* 13.3* 12.2*    204 205    CMP:  Recent Labs     11/28/20  0500 11/29/20  0520 11/30/20  0630   * 149* 143   K 3.1* 3.5 3.3*    109 104   CO2 29 29 31   BUN 26* 16 13   CREATININE 0.8* 0.7* 0.6*   CALCIUM 8.7 8.6 8.2*   PROT 5.8* 5.7* 5.6*   LABALBU 2.9* 2.8* 2.7*   BILITOT 0.7 0.7 0.7   ALKPHOS 111 98 74   AST 19 19 19   ALT 19 20 22     Lipids:   Lab Results   Component Value Date    CHOL 162 02/28/2018    HDL 63 02/28/2018    TRIG 155 02/28/2018     Glucose:  Recent Labs     11/30/20  1225 11/30/20  1722 11/30/20  2039   POCGLU 175* 160* 140*     [id number]:  Lab Results   Component Value Date    LABA1C 5.2 07/20/2020     High Sensitivity TSH:   Lab Results   Component Value Date    TSHHS 0.130 11/25/2020     Free T3:   Lab Results   Component Value Date    FT3 1.4 11/26/2020     Free T4:  Lab Results   Component Value Date    T4FREE 1.83 11/25/2020       Xr Abdomen (kub) (single Ap View)    Result Date: 11/25/2020  EXAMINATION: ONE SUPINE XRAY VIEW(S) OF THE ABDOMEN 11/25/2020 9:23 am COMPARISON: None.  HISTORY: ORDERING SYSTEM PROVIDED HISTORY: NG placement verification TECHNOLOGIST PROVIDED HISTORY: Reason for exam:->NG placement verification Reason for Exam: NG placement verification FINDINGS: Enteric tube tip projects at the gastric body with side hole projecting at the GE junction. This should be advanced by at least 3 cm. Visualized bowel is unremarkable. No free air, as seen. Enteric tube tip projects at the gastric body with side hole projecting at the GE junction. This should be advanced by at least 3 cm. Ct Pelvis W Contrast Additional Contrast? 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 Us Head Neck Soft Tissue Thyroid    Result Date: 11/27/2020  EXAMINATION: THYROID ULTRASOUND 11/26/2020 COMPARISON: None. HISTORY: ORDERING SYSTEM PROVIDED HISTORY: hyperthyroidism TECHNOLOGIST PROVIDED HISTORY: Reason for exam:->hyperthyroidism Reason for Exam: hyperthyroidism Exam was overall technically limited due to patient being intubated, motion artifact and difficulty in positioning. FINDINGS: Right thyroid lobe:  4.0 x 3.1 x 2.0 cm Left thyroid lobe:  4.6 x 2.4 x 2.0 cm Isthmus:  Not measured Thyroid Gland:  Thyroid gland demonstrates heterogeneous echotexture and normal vascularity. Nodules: No obvious thyroid nodules are present. Cervical lymphadenopathy: No abnormal lymph nodes in the imaged portions of the neck. Limited exam due to patient being intubated, suboptimal positioning as well as motion artifact. Heterogeneous thyroid gland without dominant thyroid nodule. Xr Chest Portable    Result Date: 11/26/2020  EXAMINATION: ONE XRAY VIEW OF THE CHEST 11/26/2020 2:12 am COMPARISON: November 25, 2020     1. Lines and tubes as described.  2. No significant interval change of cardiomegaly, dense retrocardiac opacity, and bilateral effusions. 3. Pulmonary edema. Xr Chest Portable    Result Date: 11/25/2020  EXAMINATION: ONE XRAY VIEW OF THE CHEST 11/25/2020 9:24 am COMPARISON: 11/24/2020     Stable small left pleural effusion with basilar atelectasis and/or consolidation. Vl Dup Lower Extremity Venous Bilateral    Result Date: 11/25/2020  EXAMINATION: DUPLEX VENOUS ULTRASOUND OF THE BILATERAL LOWER EXTREMITIES, 11/25/2020 5:10 pm     Positive DVT involving the right posterior tibial vein below the knee. No evidence of left lower extremity DVT. Cta Pulmonary W Contrast    Result Date: 11/25/2020  EXAMINATION: CTA OF THE CHEST 11/25/2020 1:32 pm    Pulmonary arteries are only well evaluated to the lobar level. No main or lobar pulmonary embolism. Segmental and subsegmental branch vessels are poorly evaluated due to artifact. Enlarged pulmonary arterial tree compatible with pulmonary hypertension. Cardiomegaly with four-chamber enlargement. Multifocal nodularity and consolidation throughout the lungs favored secondary to multifocal pneumonia with reactive mediastinal lymphadenopathy. Left lower lobe collapse which may be due to mucous plugging as the left lower lobe airways are not well visualized. Support lines and tubes appear appropriate in positioning. Scheduled Medicines required.   With hypoglycemia continues to be present medications:    sodium chloride flush  10 mL Intravenous BID    clindamycin (CLEOCIN) IV  900 mg Intravenous Q8H    piperacillin-tazobactam  3.375 g Intravenous Q8H    [START ON 12/1/2020] vancomycin  2,000 mg Intravenous Q18H    sodium chloride flush  10 mL Intravenous 2 times per day    amLODIPine  5 mg Oral Daily    lisinopril  20 mg Oral Daily    potassium chloride  10 mEq Oral BID    metoprolol succinate  25 mg Oral BID    aspirin  81 mg Oral Daily    rivaroxaban  15 mg Oral Daily    insulin lispro  0-18 Units Subcutaneous TID     insulin lispro  0-9 Units needed  6. Patient is mildly hyper pain thyroid possibly due to thyroiditis I would not prefer  7. I prefer not to change antithyroid drugs  8. Except symptomatically tachycardic with a beta-blocker  9. We will review ultrasound //other thyroid function tests  10. Will follow     .      Erna Mcmahon MD

## 2020-12-01 NOTE — PROGRESS NOTES
Pulmonary and Critical Care  Progress Note      VITALS:  BP (!) 159/83   Pulse 60   Temp 98.2 °F (36.8 °C) (Oral)   Resp 18   Ht 5' 9.02\" (1.753 m)   Wt 262 lb 5.6 oz (119 kg)   SpO2 97%   BMI 38.72 kg/m²     Subjective:   CHIEF COMPLAINT :Perineal pain     HPI:                The patient is lying in the bed. He is not in acute resp distress. He has no new compliants    Objective:   PHYSICAL EXAM:    LUNGS: Occasional basal crackles  Abd-soft, BS+,no enlargement of the perineal wound  Ext -no pedal edema  CVS-s1s2, no murmurs      DATA:    CBC:  Recent Labs     11/29/20 0520 11/30/20 0630 12/01/20 0520   WBC 22.2* 23.3* 19.3*   RBC 4.29* 4.08* 4.07*   HGB 13.3* 12.2* 12.4*   HCT 40.9* 39.6* 38.4*    205 210   MCV 95.3 97.1 94.3   MCH 31.0 29.9 30.5   MCHC 32.5 30.8* 32.3   RDW 14.4 14.4 14.6      BMP:  Recent Labs     11/29/20 0520 11/30/20 0630 12/01/20  0520   * 143 141   K 3.5 3.3* 3.4*    104 103   CO2 29 31 31   BUN 16 13 12   CREATININE 0.7* 0.6* 0.6*   CALCIUM 8.6 8.2* 8.2*   GLUCOSE 158* 165* 105*      ABG:  No results for input(s): PH, PO2ART, FLC5WQJ, HCO3, BEART, O2SAT in the last 72 hours. BNP  No results found for: BNP   D-Dimer:  Lab Results   Component Value Date    DDIMER 1694 (H) 11/25/2020      1.  Radiology: None      Assessment/Plan     Patient Active Problem List    Diagnosis Date Noted    Cellulitis of perineum     Gangrene of scrotum 11/24/2020    Abscess of perineum     WD-Traumatic open wound of left lower leg, complicated by diabetes and venous insufficiency 10/08/2020    WD-Idiopathic chronic venous hypertension of left lower extremity with ulcer ([facility] [address] 04/21/2020    WD-Venous stasis of both lower extremities 04/14/2020    WD-Lymphedema of both lower extremities 04/14/2020    Venous stasis ulcer of both lower extremities without varicose veins ([address] 12/12/2016    WD-Ulcer of shin, left, with fat layer exposed ([address] 12/12/2016    PVD (peripheral vascular disease) ([facility] 12/12/2016    Type 2 diabetes mellitus with diabetic peripheral angiopathy without gangrene, without long-term current use of insulin ([facility] 12/12/2016   Leukocytosis- improving  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 JAMA  Septic shock- resolved  Hypoxia  VDRF s/p extubation  COPD  RLE below knee DVT  Anxiety-better  Hypokalemia       1. Kcl  2. Abx  3. F/u C&S  4. ICS  5. Wound care  6. OOB  7. BIPAP PRN  8. Inhalers  9. C/w present management  No follow-ups on file.     Electronically signed by Vinicio Skaggs MD on 12/1/2020 at 12:35 PM

## 2020-12-01 NOTE — PROGRESS NOTES
Occupational Therapy      Attempted to see pt today. Pt still in restraints and per nursing conversation pt pulled out central line last night. Pt is currently not appropriate for therapy.  Will re-attempt later when pt is appropriate    Jai Montez OTR/L [id number]  8:44 AM,12/1/2020

## 2020-12-01 NOTE — PROGRESS NOTES
Comprehensive Nutrition Assessment    Type and Reason for Visit:  Reassess    Nutrition Recommendations/Plan:   Add 5 carb choice modifier to better meet estimated kcal needs  Add wound healing ONS BID  Encourage po intake as able  Will continue to monitor po intake, nutrition status, poc    Nutrition Assessment:  POD #6 debbie's gangrene debridement, pt on cardiac/carb-control diet consuming ~% of meals per documentation, +BM today, pt at moderate nutrition risk    Malnutrition Assessment:  Malnutrition Status:  No malnutrition    Context:  Acute Illness       Estimated Daily Nutrient Needs:  Energy (kcal):  1591-0808(APEXTER St. Jeor w/ stress factor 1.0-1.2); Weight Used for Energy Requirements:  Current     Protein (g):  (1.25-1.5 g/kg);  Weight Used for Protein Requirements:  Ideal        Fluid (ml/day):  2000; Method Used for Fluid Requirements:  1 ml/kcal      Nutrition Related Findings:  WBC 19.3      Wounds:  Multiple, Surgical Incision(non-healing wound noted)       Current Nutrition Therapies:    DIET CARDIAC; Carb Control: 3 carb choices (45 gms)/meal    Anthropometric Measures:  · Height: 5' 9.02\" (175.3 cm)  · Current Body Weight: 262 lb 5.6 oz (119 kg)   · Admission Body Weight: 253 lb 8.5 oz (115 kg)    · Usual Body Weight: 273 lb (123.8 kg)(9/9/20)     · Ideal Body Weight: 160 lbs; % Ideal Body Weight 164 %   · BMI: 38.7  · BMI Categories: Obese Class 2 (BMI 35.0 -39.9)       Nutrition Interventions:   Food and/or Nutrient Delivery:  Modify Current Diet, Start Oral Nutrition Supplement  Nutrition Education/Counseling:  No recommendation at this time   Coordination of Nutrition Care:  Continue to monitor while inpatient    Goals:  pt will consistently consume greater than 75% of meals and supplement       Nutrition Monitoring and Evaluation:   Behavioral-Environmental Outcomes:  None Identified   Food/Nutrient Intake Outcomes:  Food and Nutrient Intake, Supplement Intake  Physical Signs/Symptoms Outcomes:  Biochemical Data, Weight, Skin, GI Status, Hemodynamic Status     Discharge Planning:     Too soon to determine     Electronically signed by Ashley Peralta MS, RD, LD on 12/1/20 at 10:17 AM EST    Contact: [phone]

## 2020-12-01 NOTE — PROGRESS NOTES
GENERAL SURGERY PROGRESS NOTE    Michael Tavares is a 72 y.o. male POD 7 from Jarek's gangrene debridement. Subjective:  Remains afebrile. Pain well controlled. Denies complaints. VAC no longer in place. Reports having stools. Objective:    Vitals: VITALS:  BP (!) 140/75   Pulse 62   Temp 98.2 °F (36.8 °C) (Oral)   Resp 19   Ht 5' 9.02\" (1.753 m)   Wt 262 lb 5.6 oz (119 kg)   SpO2 95%   BMI 38.72 kg/m²     I/O: 11/30 0701 - 12/01 0700  In: 920 [P.O.:720]  Out: 1375 [Urine:1375]    Labs/Imaging Results:   Lab Results   Component Value Date     12/01/2020    K 3.4 12/01/2020     12/01/2020    CO2 31 12/01/2020    BUN 12 12/01/2020    CREATININE 0.6 12/01/2020    GLUCOSE 105 12/01/2020    CALCIUM 8.2 12/01/2020      Lab Results   Component Value Date    WBC 19.3 (H) 12/01/2020    HGB 12.4 (L) 12/01/2020    HCT 38.4 (L) 12/01/2020    MCV 94.3 12/01/2020     12/01/2020       IV Fluids: dextrose    Scheduled Meds:   lisinopril, 40 mg, Oral, Daily    sodium chloride flush, 10 mL, Intravenous, BID    clindamycin (CLEOCIN) IV, 900 mg, Intravenous, Q8H    vancomycin, 2,000 mg, Intravenous, Q18H    sodium chloride flush, 10 mL, Intravenous, 2 times per day    amLODIPine, 5 mg, Oral, Daily    potassium chloride, 10 mEq, Oral, BID    metoprolol succinate, 25 mg, Oral, BID    aspirin, 81 mg, Oral, Daily    rivaroxaban, 15 mg, Oral, Daily    insulin lispro, 0-18 Units, Subcutaneous, TID WC    insulin lispro, 0-9 Units, Subcutaneous, Nightly    insulin glargine, 20 Units, Subcutaneous, Nightly    influenza virus vaccine, 0.5 mL, Intramuscular, Prior to discharge    Physical Exam:  General: A&O mildly disoriented, no distress. HEENT: Anicteric sclerae, MMM. Extremities: Mild edema bilat LE. Buttock: Dressings changed today at bedside. Wound is pink for the most part, small amount of fibrinous material posteriorly. Abdomen: Soft, nontender, nondistended. 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 Wound stable.      Patient Active Problem List:     Venous stasis ulcer of both lower extremities without varicose veins (HCC)     WD-Ulcer of shin, left, with fat layer exposed ([address]     PVD (peripheral vascular disease) ([address]     Type 2 diabetes mellitus with diabetic peripheral angiopathy without gangrene, without long-term current use of insulin (HCC)     WD-Venous stasis of both lower extremities     WD-Lymphedema of both lower extremities     WD-Idiopathic chronic venous hypertension of left lower extremity with ulcer ([address]     WD-Traumatic open wound of left lower leg, complicated by diabetes and venous insufficiency     Gangrene of scrotum     Abscess of perineum    - OK with anticoagulation for DVT from a surgical standpoint(started on Xarelto)  - bid and PRN wet to dry dressing changes, can dry to replace VAC tomorrow  - will continue to follow    Juan R Holguin MD  12/1/2020  4:08 PM

## 2020-12-01 NOTE — PROGRESS NOTES
GENERAL SURGERY PROGRESS NOTE    Miladis Tavares is a 72 y.o. male POD 6 from Jarek's gangrene debridement. Subjective:  Remains afebrile. Pain well controlled. Awake and alert, more oriented today. Denies complaints. Objective:    Vitals: VITALS:  BP (!) 160/67   Pulse 63   Temp 98.1 °F (36.7 °C) (Oral)   Resp 21   Ht 5' 9.02\" (1.753 m)   Wt 262 lb 5.6 oz (119 kg)   SpO2 99%   BMI 38.72 kg/m²     I/O: 11/29 0701 - 11/30 0700  In: 2736 [P.O.:840; I.V.:1216]  Out: 2350 [Urine:2350]    Labs/Imaging Results:   Lab Results   Component Value Date     11/30/2020    K 3.3 11/30/2020     11/30/2020    CO2 31 11/30/2020    BUN 13 11/30/2020    CREATININE 0.6 11/30/2020    GLUCOSE 165 11/30/2020    CALCIUM 8.2 11/30/2020      Lab Results   Component Value Date    WBC 23.3 (H) 11/30/2020    HGB 12.2 (L) 11/30/2020    HCT 39.6 (L) 11/30/2020    MCV 97.1 11/30/2020     11/30/2020       IV Fluids: dextrose    Scheduled Meds:   sodium chloride flush, 10 mL, Intravenous, BID    clindamycin (CLEOCIN) IV, 900 mg, Intravenous, Q8H    piperacillin-tazobactam, 3.375 g, Intravenous, Q8H    [START ON 12/1/2020] vancomycin, 2,000 mg, Intravenous, Q18H    sodium chloride flush, 10 mL, Intravenous, 2 times per day    amLODIPine, 5 mg, Oral, Daily    lisinopril, 20 mg, Oral, Daily    potassium chloride, 10 mEq, Oral, BID    metoprolol succinate, 25 mg, Oral, BID    aspirin, 81 mg, Oral, Daily    rivaroxaban, 15 mg, Oral, Daily    insulin lispro, 0-18 Units, Subcutaneous, TID WC    insulin lispro, 0-9 Units, Subcutaneous, Nightly    insulin glargine, 20 Units, Subcutaneous, Nightly    influenza virus vaccine, 0.5 mL, Intramuscular, Prior to discharge    Physical Exam:  General: A&O mildly disoriented, no distress. HEENT: Anicteric sclerae, MMM. Extremities: Mild edema bilat LE. Buttock: Dressings changed by wound care today. Abdomen: Soft, nontender, nondistended. 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 Wound stable. Appreciate wound care assistance with vac today. Patient Active Problem List:     Venous stasis ulcer of both lower extremities without varicose veins (HCC)     WD-Ulcer of shin, left, with fat layer exposed ([address]     PVD (peripheral vascular disease) ([address]     Type 2 diabetes mellitus with diabetic peripheral angiopathy without gangrene, without long-term current use of insulin (HCC)     WD-Venous stasis of both lower extremities     WD-Lymphedema of both lower extremities     WD-Idiopathic chronic venous hypertension of left lower extremity with ulcer ([address]     WD-Traumatic open wound of left lower leg, complicated by diabetes and venous insufficiency     Gangrene of scrotum     Abscess of perineum    - OK with anticoagulation for DVT from a surgical standpoint(started on Xarelto)  - bid and PRN wet to dry dressing changes, Wound care service consulted.   Will try to place [facility] Monday  - continue IV antibiotics  - will continue to follow    Courtney Hope MD

## 2020-12-02 LAB
ALBUMIN SERPL-MCNC: 2 GM/DL (ref 3.4–5)
ALP BLD-CCNC: 48 IU/L (ref 40–129)
ALT SERPL-CCNC: 17 U/L (ref 10–40)
ANION GAP SERPL CALCULATED.3IONS-SCNC: 7 MMOL/L (ref 4–16)
AST SERPL-CCNC: 17 IU/L (ref 15–37)
BILIRUB SERPL-MCNC: 0.4 MG/DL (ref 0–1)
BUN BLDV-MCNC: 8 MG/DL (ref 6–23)
CALCIUM SERPL-MCNC: 5.8 MG/DL (ref 8.3–10.6)
CHLORIDE BLD-SCNC: 108 MMOL/L (ref 99–110)
CO2: 22 MMOL/L (ref 21–32)
CREAT SERPL-MCNC: 0.4 MG/DL (ref 0.9–1.3)
GFR AFRICAN AMERICAN: >60 ML/MIN/1.73M2
GFR NON-AFRICAN AMERICAN: >60 ML/MIN/1.73M2
GLUCOSE BLD-MCNC: 105 MG/DL (ref 70–99)
GLUCOSE BLD-MCNC: 110 MG/DL (ref 70–99)
GLUCOSE BLD-MCNC: 110 MG/DL (ref 70–99)
GLUCOSE BLD-MCNC: 115 MG/DL (ref 70–99)
GLUCOSE BLD-MCNC: 153 MG/DL (ref 70–99)
HCT VFR BLD CALC: 39.6 % (ref 42–52)
HEMOGLOBIN: 12.3 GM/DL (ref 13.5–18)
HIGH SENSITIVE C-REACTIVE PROTEIN: 28.7 MG/L
MAGNESIUM: 1.5 MG/DL (ref 1.8–2.4)
MCH RBC QN AUTO: 30.8 PG (ref 27–31)
MCHC RBC AUTO-ENTMCNC: 31.1 % (ref 32–36)
MCV RBC AUTO: 99 FL (ref 78–100)
PDW BLD-RTO: 14.4 % (ref 11.7–14.9)
PLATELET # BLD: 200 K/CU MM (ref 140–440)
PMV BLD AUTO: 11 FL (ref 7.5–11.1)
POTASSIUM SERPL-SCNC: 2.8 MMOL/L (ref 3.5–5.1)
PROCALCITONIN: 0.04
RBC # BLD: 4 M/CU MM (ref 4.6–6.2)
SODIUM BLD-SCNC: 137 MMOL/L (ref 135–145)
TOTAL PROTEIN: 4.1 GM/DL (ref 6.4–8.2)
WBC # BLD: 18.4 K/CU MM (ref 4–10.5)

## 2020-12-02 PROCEDURE — 84145 PROCALCITONIN (PCT): CPT

## 2020-12-02 PROCEDURE — 83735 ASSAY OF MAGNESIUM: CPT

## 2020-12-02 PROCEDURE — 99233 SBSQ HOSP IP/OBS HIGH 50: CPT | Performed by: NURSE PRACTITIONER

## 2020-12-02 PROCEDURE — 2580000003 HC RX 258: Performed by: SURGERY

## 2020-12-02 PROCEDURE — 6360000002 HC RX W HCPCS: Performed by: SURGERY

## 2020-12-02 PROCEDURE — 97606 NEG PRS WND THER DME>50 SQCM: CPT

## 2020-12-02 PROCEDURE — 6370000000 HC RX 637 (ALT 250 FOR IP): Performed by: PHYSICIAN ASSISTANT

## 2020-12-02 PROCEDURE — 99024 POSTOP FOLLOW-UP VISIT: CPT | Performed by: SURGERY

## 2020-12-02 PROCEDURE — 97162 PT EVAL MOD COMPLEX 30 MIN: CPT

## 2020-12-02 PROCEDURE — 2580000003 HC RX 258: Performed by: INTERNAL MEDICINE

## 2020-12-02 PROCEDURE — 97535 SELF CARE MNGMENT TRAINING: CPT

## 2020-12-02 PROCEDURE — 1200000000 HC SEMI PRIVATE

## 2020-12-02 PROCEDURE — 82962 GLUCOSE BLOOD TEST: CPT

## 2020-12-02 PROCEDURE — 2500000003 HC RX 250 WO HCPCS: Performed by: NURSE PRACTITIONER

## 2020-12-02 PROCEDURE — 6360000002 HC RX W HCPCS: Performed by: INTERNAL MEDICINE

## 2020-12-02 PROCEDURE — 6360000002 HC RX W HCPCS: Performed by: NURSE PRACTITIONER

## 2020-12-02 PROCEDURE — 80053 COMPREHEN METABOLIC PANEL: CPT

## 2020-12-02 PROCEDURE — 97530 THERAPEUTIC ACTIVITIES: CPT

## 2020-12-02 PROCEDURE — 99232 SBSQ HOSP IP/OBS MODERATE 35: CPT | Performed by: INTERNAL MEDICINE

## 2020-12-02 PROCEDURE — 97165 OT EVAL LOW COMPLEX 30 MIN: CPT

## 2020-12-02 PROCEDURE — 2580000003 HC RX 258: Performed by: NURSE PRACTITIONER

## 2020-12-02 PROCEDURE — 86141 C-REACTIVE PROTEIN HS: CPT

## 2020-12-02 PROCEDURE — 85027 COMPLETE CBC AUTOMATED: CPT

## 2020-12-02 PROCEDURE — 6370000000 HC RX 637 (ALT 250 FOR IP): Performed by: SURGERY

## 2020-12-02 PROCEDURE — 6370000000 HC RX 637 (ALT 250 FOR IP): Performed by: INTERNAL MEDICINE

## 2020-12-02 PROCEDURE — 2580000003 HC RX 258: Performed by: STUDENT IN AN ORGANIZED HEALTH CARE EDUCATION/TRAINING PROGRAM

## 2020-12-02 PROCEDURE — 97116 GAIT TRAINING THERAPY: CPT

## 2020-12-02 RX ORDER — POTASSIUM CHLORIDE 20 MEQ/1
20 TABLET, EXTENDED RELEASE ORAL 2 TIMES DAILY
Status: DISCONTINUED | OUTPATIENT
Start: 2020-12-02 | End: 2020-12-06 | Stop reason: HOSPADM

## 2020-12-02 RX ORDER — POTASSIUM CHLORIDE 29.8 MG/ML
20 INJECTION INTRAVENOUS PRN
Status: DISCONTINUED | OUTPATIENT
Start: 2020-12-02 | End: 2020-12-06 | Stop reason: HOSPADM

## 2020-12-02 RX ORDER — MAGNESIUM SULFATE IN WATER 40 MG/ML
2 INJECTION, SOLUTION INTRAVENOUS ONCE
Status: COMPLETED | OUTPATIENT
Start: 2020-12-02 | End: 2020-12-02

## 2020-12-02 RX ORDER — MAGNESIUM OXIDE 400 MG/1
400 TABLET ORAL DAILY
Status: DISCONTINUED | OUTPATIENT
Start: 2020-12-02 | End: 2020-12-06 | Stop reason: HOSPADM

## 2020-12-02 RX ORDER — INSULIN GLARGINE 100 [IU]/ML
10 INJECTION, SOLUTION SUBCUTANEOUS NIGHTLY
Status: DISCONTINUED | OUTPATIENT
Start: 2020-12-02 | End: 2020-12-06 | Stop reason: HOSPADM

## 2020-12-02 RX ORDER — POTASSIUM CHLORIDE 7.45 MG/ML
10 INJECTION INTRAVENOUS PRN
Status: DISCONTINUED | OUTPATIENT
Start: 2020-12-02 | End: 2020-12-06 | Stop reason: HOSPADM

## 2020-12-02 RX ORDER — AMLODIPINE BESYLATE 10 MG/1
10 TABLET ORAL NIGHTLY
Status: DISCONTINUED | OUTPATIENT
Start: 2020-12-02 | End: 2020-12-06 | Stop reason: HOSPADM

## 2020-12-02 RX ORDER — POTASSIUM CHLORIDE 20 MEQ/1
40 TABLET, EXTENDED RELEASE ORAL PRN
Status: DISCONTINUED | OUTPATIENT
Start: 2020-12-02 | End: 2020-12-06 | Stop reason: HOSPADM

## 2020-12-02 RX ADMIN — MAGNESIUM SULFATE HEPTAHYDRATE 2 G: 40 INJECTION, SOLUTION INTRAVENOUS at 09:03

## 2020-12-02 RX ADMIN — RIVAROXABAN 15 MG: 15 TABLET, FILM COATED ORAL at 17:09

## 2020-12-02 RX ADMIN — METRONIDAZOLE 500 MG: 500 INJECTION, SOLUTION INTRAVENOUS at 02:14

## 2020-12-02 RX ADMIN — SODIUM CHLORIDE, PRESERVATIVE FREE 10 ML: 5 INJECTION INTRAVENOUS at 09:06

## 2020-12-02 RX ADMIN — METRONIDAZOLE 500 MG: 500 INJECTION, SOLUTION INTRAVENOUS at 09:06

## 2020-12-02 RX ADMIN — METOPROLOL SUCCINATE 25 MG: 25 TABLET, EXTENDED RELEASE ORAL at 09:06

## 2020-12-02 RX ADMIN — POTASSIUM CHLORIDE 20 MEQ: 1500 TABLET, EXTENDED RELEASE ORAL at 09:06

## 2020-12-02 RX ADMIN — METRONIDAZOLE 500 MG: 500 INJECTION, SOLUTION INTRAVENOUS at 17:09

## 2020-12-02 RX ADMIN — METOPROLOL SUCCINATE 25 MG: 25 TABLET, EXTENDED RELEASE ORAL at 21:06

## 2020-12-02 RX ADMIN — CEFEPIME 2 G: 2 INJECTION, POWDER, FOR SOLUTION INTRAVENOUS at 09:06

## 2020-12-02 RX ADMIN — COLLAGENASE SANTYL: 250 OINTMENT TOPICAL at 21:07

## 2020-12-02 RX ADMIN — CEFEPIME 2 G: 2 INJECTION, POWDER, FOR SOLUTION INTRAVENOUS at 17:09

## 2020-12-02 RX ADMIN — VANCOMYCIN HYDROCHLORIDE 2000 MG: 1 INJECTION, POWDER, LYOPHILIZED, FOR SOLUTION INTRAVENOUS at 21:06

## 2020-12-02 RX ADMIN — SODIUM CHLORIDE, PRESERVATIVE FREE 10 ML: 5 INJECTION INTRAVENOUS at 09:07

## 2020-12-02 RX ADMIN — ASPIRIN 81 MG CHEWABLE TABLET 81 MG: 81 TABLET CHEWABLE at 09:06

## 2020-12-02 RX ADMIN — AMLODIPINE BESYLATE 10 MG: 10 TABLET ORAL at 21:05

## 2020-12-02 RX ADMIN — CEFEPIME 2 G: 2 INJECTION, POWDER, FOR SOLUTION INTRAVENOUS at 02:14

## 2020-12-02 RX ADMIN — MAGNESIUM OXIDE 400 MG (241.3 MG MAGNESIUM) TABLET 400 MG: TABLET at 12:06

## 2020-12-02 RX ADMIN — CALCIUM GLUCONATE 2 G: 98 INJECTION, SOLUTION INTRAVENOUS at 09:01

## 2020-12-02 RX ADMIN — LISINOPRIL 40 MG: 20 TABLET ORAL at 09:06

## 2020-12-02 RX ADMIN — POTASSIUM CHLORIDE 20 MEQ: 1500 TABLET, EXTENDED RELEASE ORAL at 21:05

## 2020-12-02 RX ADMIN — VANCOMYCIN HYDROCHLORIDE 2000 MG: 1 INJECTION, POWDER, LYOPHILIZED, FOR SOLUTION INTRAVENOUS at 02:14

## 2020-12-02 ASSESSMENT — PAIN SCALES - GENERAL
PAINLEVEL_OUTOF10: 0

## 2020-12-02 NOTE — PROGRESS NOTES
[address] [facility]  consulted by Dr. George Huston for monitoring and adjustment. Indication for treatment: Jarek's gangrene  Goal trough: 10 -15 mcg/mL (AUC/MINDY <500)      Pertinent Laboratory Values:   Temp Readings from Last 3 Encounters:   12/02/20 98.4 °F (36.9 °C) (Oral)   11/24/20 99.5 °F (37.5 °C)   11/23/20 97.6 °F (36.4 °C) (Temporal)     Recent Labs     11/30/20  0630 12/01/20  0520 12/02/20  0500   WBC 23.3* 19.3* 18.4*     Recent Labs     11/30/20  0630 12/01/20  0520 12/02/20  0500   BUN 13 12 8   CREATININE 0.6* 0.6* 0.4*     Estimated Creatinine Clearance: 234 mL/min (A) (based on SCr of 0.4 mg/dL (L)). Intake/Output Summary (Last 24 hours) at 12/2/2020 1438  Last data filed at 12/2/2020 1304  Gross per 24 hour   Intake 645 ml   Output 500 ml   Net 145 ml       Pertinent Cultures:  Date                             Source                                     Results  11/24                           Tissue cx                                 Staph Hominis  11/24                           Blood                                       Negative  11/25                           Respiratory                              Pseudomonas, Proteus  11/29                           Surgical                                   Ecoli, MDRO    Vancomycin level:   TROUGH:    Recent Labs     11/29/20  1530   VANCOTROUGH 8.5*     RANDOM:  No results for input(s): VANCORANDOM in the last 72 hours. Assessment:  · WBC and temperature: WBC improving, afebrile   · SCr, BUN, and urine output: returned to baseline   · Day(s) of therapy: 9   · Vancomycin level: to be collected    Plan:  · Vancomycin 2000mg q24h resulted in sub-therapeutic trough as renal function improved  · Dose increased to 2000mg q18h   ? Predicted AUC/MINDY: 545  ?  Predicted trough: 12.4  · Continue to monitor renal trends closely and repeat levels to monitor for accumulation  · Pharmacy will continue to monitor patient and adjust therapy as indicated    REPEAT Linda HALL 12/3 @2649    Thank you for the consult.   Mariaelena Lugo, PharmD, BCPS   12/2/2020 2:38 PM

## 2020-12-02 NOTE — PROGRESS NOTES
Enteric tube tip projects at the gastric body with side hole projecting at the GE junction. This should be advanced by at least 3 cm. Visualized bowel is unremarkable. No free air, as seen. Enteric tube tip projects at the gastric body with side hole projecting at the GE junction. This should be advanced by at least 3 cm. Ct Pelvis W Contrast Additional Contrast? 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 Us Head Neck Soft Tissue Thyroid    Result Date: 11/27/2020  EXAMINATION: THYROID ULTRASOUND 11/26/2020 COMPARISON: None. HISTORY: ORDERING SYSTEM PROVIDED HISTORY: hyperthyroidism TECHNOLOGIST PROVIDED HISTORY: Reason for exam:->hyperthyroidism Reason for Exam: hyperthyroidism Exam was overall technically limited due to patient being intubated, motion artifact and difficulty in positioning. FINDINGS: Right thyroid lobe:  4.0 x 3.1 x 2.0 cm Left thyroid lobe:  4.6 x 2.4 x 2.0 cm Isthmus:  Not measured Thyroid Gland:  Thyroid gland demonstrates heterogeneous echotexture and normal vascularity. Nodules: No obvious thyroid nodules are present. Cervical lymphadenopathy: No abnormal lymph nodes in the imaged portions of the neck. Limited exam due to patient being intubated, suboptimal positioning as well as motion artifact. Heterogeneous thyroid gland without dominant thyroid nodule. Xr Chest Portable    Result Date: 11/26/2020  EXAMINATION: ONE XRAY VIEW OF THE CHEST 11/26/2020 2:12 am COMPARISON: November 25, 2020     1. Lines and tubes as described.  2. No significant interval change of cardiomegaly, dense retrocardiac opacity, and bilateral effusions. 3. Pulmonary edema. Xr Chest Portable    Result Date: 11/25/2020  EXAMINATION: ONE XRAY VIEW OF THE CHEST 11/25/2020 9:24 am COMPARISON: 11/24/2020     Stable small left pleural effusion with basilar atelectasis and/or consolidation. Vl Dup Lower Extremity Venous Bilateral    Result Date: 11/25/2020  EXAMINATION: DUPLEX VENOUS ULTRASOUND OF THE BILATERAL LOWER EXTREMITIES, 11/25/2020 5:10 pm     Positive DVT involving the right posterior tibial vein below the knee. No evidence of left lower extremity DVT. Cta Pulmonary W Contrast    Result Date: 11/25/2020  EXAMINATION: CTA OF THE CHEST 11/25/2020 1:32 pm    Pulmonary arteries are only well evaluated to the lobar level. No main or lobar pulmonary embolism. Segmental and subsegmental branch vessels are poorly evaluated due to artifact. Enlarged pulmonary arterial tree compatible with pulmonary hypertension. Cardiomegaly with four-chamber enlargement. Multifocal nodularity and consolidation throughout the lungs favored secondary to multifocal pneumonia with reactive mediastinal lymphadenopathy. Left lower lobe collapse which may be due to mucous plugging as the left lower lobe airways are not well visualized. Support lines and tubes appear appropriate in positioning. Scheduled Medicines required.   With hypoglycemia continues to be present medications:    lisinopril  40 mg Oral Daily    cefepime  2 g Intravenous Q8H    metroNIDAZOLE  500 mg Intravenous Q8H    sodium chloride flush  10 mL Intravenous BID    vancomycin  2,000 mg Intravenous Q18H    sodium chloride flush  10 mL Intravenous 2 times per day    amLODIPine  5 mg Oral Daily    potassium chloride  10 mEq Oral BID    metoprolol succinate  25 mg Oral BID    aspirin  81 mg Oral Daily    rivaroxaban  15 mg Oral Daily    insulin lispro  0-18 Units Subcutaneous TID     insulin lispro  0-9 Units Subcutaneous Nightly    insulin glargine  20 Units Subcutaneous thyroiditis I would not prefer  7. I prefer not to change antithyroid drugs  8. Except symptomatically tachycardic with a beta-blocker  9. We will review ultrasound //other thyroid function tests  10. Will follow     .      Nilton Santacruz MD

## 2020-12-02 NOTE — PROGRESS NOTES
Physical Therapy  [facility] ACUTE CARE PHYSICAL THERAPY EVALUATION  Florentina Gowers Coffee, 1955, 2116/2116-A, 12/2/2020    History  [place]:  The encounter diagnosis was Cellulitis of perineum. Patient  has a past medical history of CHF (congestive heart failure) ([address] Chronic ulcer of left leg with fat layer exposed ([address] Chronic ulcer of right leg with fat layer exposed ([address] Chronic ulcer of right leg with fat layer exposed ([address] Diabetes mellitus ([address] Hypertension, PVD (peripheral vascular disease) ([address] Type 2 diabetes mellitus with diabetic peripheral angiopathy without gangrene, without long-term current use of insulin ([address] and Venous stasis ulcer of both lower extremities without varicose veins ([address] Patient  has a past surgical history that includes Rectal surgery (N/A, 11/24/2020). Subjective:  Patient states:  \"I need to use the bathroom\"     Pain:  Denies   Communication with other providers:  Handoff to RN, co-eval with Audrey ANTOINE   Restrictions: portable tele, pulse ox, BP cuff, 5L O2, IV, sandoval, general precautions, falls     Home Setup/Prior level of function  Social/Functional History  Lives With: Spouse(home all the time)  Type of Home: House  Home Layout: One level  Home Access: Stairs to enter without rails  Entrance Stairs - Number of Steps: 1  Bathroom Shower/Tub: Walk-in shower  Home Equipment: [employer], Oxygen(3 wheel walker, hover round.  States he does not normally wear O2 but has it if he needs it)  ADL Assistance: Independent  Homemaking Assistance: Independent  Ambulation Assistance: Independent(PRN use of cane or walker)  Transfer Assistance: Independent  Active : Yes  Occupation: Retired  Type of occupation: [employer] [place]    Examination of body systems (includes body structures/functions, activity/participation limitations):  · Observation:  Supine in bed upon arrival. Cooperative to work with therapy   · Vision:  [facility] [employer]  · Hearing:  [facility]   · Cardiopulmonary:  5L O2 SPO2 remained >90%. Pt trialed on room air with hx of \"not using O2\" and SPO2 dipped to mid 80s. Musculoskeletal  · ROM R/L:  WFL BLEs  · Strength R/L:  BLEs grossly 4+/5  · Neuro:  Hx of diabetic neuropathy     Mobility/treatment:   · Rolling L/R:  NT   · Supine to sit:  CGA for safety with use of bed rail, HOB only slightly elevated   · Transfers:   · Sit to stand: CGA for safety from EOB (3x), toilet (1x). Minimal cues for safe hand sequencing from bed/grab bar vs pulling from RW    · Stand to sit: CGA for safety to recliner and toilet. · Step pivot: CGA for safety with RW (2x)  · Sitting balance:  SBA at EOB and toilet with static sitting, no UE support   · Standing balance:  Alma without UE support and dynamic activity, CGA with at least single UE support. · Gait: ~3ft without AD Alma with significant LOBs. ~10ft +100ft with RW CGA for safety. Much improved stability with use of AD. [facility] [employer] [facility] pace though decreased with SOB/fatigue. 1 standing rest break on longer distance required. Slightly fwd posture with reciprocal gait and fair foot clearance. · Educated pt on POC, role of PT, DME use, discharge. Cues for sequencing and UE placement to inc safety and indep with mobility     [facility] 6 Clicks Inpatient Mobility:  AM-PAC Inpatient Mobility Raw Score : 18    Safety: patient left in chair, call light within reach, RN notified, gait belt used. Assessment:  Pt is a 72year old male admitted with diabetic wound on scrotum and right buttock. He is s/p incision drainage and debridement of debbie's gangrene on 11/24. Diagnosed with ARF with hypoxia requiring intubation from 11/24-11/27. Recommend home with initial 24/7 supervision, consistent use of RW, and HH PT once medically stable. At baseline he is indep with gross mobility and ADLs. He performed well this date though below his baseline.  He would benefit from continued therapy to address his current deficits, dec potential fall risk, and restore function. Complexity: Moderate  Prognosis: Good, no significant barriers to participation at this time.    Plan Times per week: 3+/week, 1 week  Discharge Recommendations: 24 hour supervision or assist, Home with Home health PT, S Level 1, consistent use of walker   Equipment: RW     Goals:  Short term goals  Time Frame for Short term goals: 1 week  Short term goal 1: Pt will perform sit><supine Ambika  Short term goal 2: Pt will transfer to all surfaces Ambika  Short term goal 3: Pt will ambulate 150ft with LRAD Ambika  Short term goal 4: Pt will ascend/descend 1 step, single rail SBA       Treatment plan:  Bed mobility, transfers, balance, gait, TA, TX, stairs     Recommendations for NURSING mobility: ambulate with RW     Time:   Time in: 1010  Time out: 1050  Timed treatment minutes: 25  Total time: 40    Electronically signed by:    Manav Patel [id number]  12/2/2020, 12:27 PM

## 2020-12-02 NOTE — PROGRESS NOTES
Per Wound care nurse, if the wound vac falls out of the wound (and their is a high likely of it due to location and pt going to the toilet), then the wound vac can remain out and the wound be packed with wet to dry dressing again (as before).

## 2020-12-02 NOTE — CONSULTS
[facility] Wound Ostomy Continence Nurse  Consult Note       Leo Tavares  AGE: 72 y.o.    GENDER: male  : 1955  TODAY'S DATE:  2020    Subjective:     Reason for   Evaluation and Assessment:  Wound care evalDanny Tavares is a 72 y.o. male referred by:   [x] Physician  [] Nursing  [] Other:     Wound Identification:  Wound Type: venous, diabetic and non-healing surgical  Contributing Factors: venous stasis, diabetes and obesity        PAST MEDICAL HISTORY        Diagnosis Date    CHF (congestive heart failure) (HCC)     Chronic ulcer of left leg with fat layer exposed ([address] 2016    Chronic ulcer of right leg with fat layer exposed ([address] 2016    Chronic ulcer of right leg with fat layer exposed ([address] 2016    Diabetes mellitus ([address]     Hypertension     PVD (peripheral vascular disease) ([address] 2016    Type 2 diabetes mellitus with diabetic peripheral angiopathy without gangrene, without long-term current use of insulin ([address] 2016    Venous stasis ulcer of both lower extremities without varicose veins ([address] 2016       PAST SURGICAL HISTORY    Past Surgical History:   Procedure Laterality Date    RECTAL SURGERY N/A 2020    RECTAL PERIRECTAL INCISION AND DRAINAGE performed by Arash Moon MD at [address] [place] [address] History   Problem Relation Age of Onset    Asthma Mother     Breast Cancer Mother     Cancer Mother     Diabetes Mother     High Blood Pressure Mother     Cancer Father     Early Death Brother     Arthritis Paternal Grandmother        SOCIAL HISTORY    Social History     Tobacco Use    Smoking status: Current Every Day Smoker     Packs/day: 1.00     Years: 60.00     Pack years: 60.00     Types: Cigarettes    Smokeless tobacco: Never Used    Tobacco comment: healing    Substance Use Topics    Alcohol use: Never     Frequency: Never    Drug use: No       ALLERGIES    No Known Allergies    MEDICATIONS    No current facility-administered medications on file prior to encounter. Current Outpatient Medications on File Prior to Encounter   Medication Sig Dispense Refill    Latanoprost 0.005 % EMUL Apply to eye daily      ibuprofen (ADVIL;MOTRIN) 600 MG tablet Take 1 tablet by mouth every 6 hours as needed for Pain 30 tablet 0    glipiZIDE (GLUCOTROL) 5 MG tablet Take 5 mg by mouth 2 times daily (before meals)      potassium chloride (KLOR-CON M) 20 MEQ extended release tablet Take 20 mEq by mouth daily      ALPRAZolam (XANAX) 0.5 MG tablet Take 0.5 mg by mouth nightly. [place] Ambrosia ipratropium-albuterol (DUONEB) 0.5-2.5 (3) MG/3ML SOLN nebulizer solution Inhale 1 vial into the lungs every 4 hours      albuterol sulfate  (90 Base) MCG/ACT inhaler Inhale 2 puffs into the lungs every 6 hours as needed for Wheezing      aspirin EC 81 MG EC tablet Take 1 tablet by mouth daily 30 tablet 3    HYDROcodone-acetaminophen (NORCO) 7.5-325 MG per tablet Take 1 tablet by mouth 3 times daily .       metFORMIN (GLUCOPHAGE) 1000 MG tablet Take 1,000 mg by mouth 2 times daily (with meals)      furosemide (LASIX) 80 MG tablet Take 80 mg by mouth 2 times daily      lisinopril (PRINIVIL;ZESTRIL) 20 MG tablet Take 40 mg by mouth daily       tamsulosin (FLOMAX) 0.4 MG capsule Take 0.4 mg by mouth daily      finasteride (PROSCAR) 5 MG tablet Take 5 mg by mouth daily      metolazone (ZAROXOLYN) 2.5 MG tablet Take 2 tablets by mouth daily 60 tablet 0         Objective:      BP (!) 172/91   Pulse 67   Temp 98.4 °F (36.9 °C) (Oral)   Resp 20   Ht 5' 9.02\" (1.753 m)   Wt 262 lb 5.6 oz (119 kg)   SpO2 97%   BMI 38.72 kg/m²   South Risk Score: South Scale Score: 20    LABS    CBC:   Lab Results   Component Value Date    WBC 18.4 12/02/2020    RBC 4.00 12/02/2020    HGB 12.3 12/02/2020    HCT 39.6 12/02/2020    MCV 99.0 12/02/2020    MCH 30.8 12/02/2020    MCHC 31.1 12/02/2020    RDW 14.4 12/02/2020     12/02/2020    MPV 11.0 12/02/2020     CMP:    Lab Results   Component Value Date     12/02/2020    K 2.8 12/02/2020     12/02/2020    CO2 22 12/02/2020    BUN 8 12/02/2020    CREATININE 0.4 12/02/2020    GFRAA >60 12/02/2020    LABGLOM >60 12/02/2020    GLUCOSE 115 12/02/2020    PROT 4.1 12/02/2020    LABALBU 2.0 12/02/2020    CALCIUM 5.8 12/02/2020    BILITOT 0.4 12/02/2020    ALKPHOS 48 12/02/2020    AST 17 12/02/2020    ALT 17 12/02/2020     Albumin:    Lab Results   Component Value Date    LABALBU 2.0 12/02/2020     PT/INR:    Lab Results   Component Value Date    PROTIME 13.8 11/25/2020    INR 1.14 11/25/2020     HgBA1c:    Lab Results   Component Value Date    LABA1C 5.2 07/20/2020         Assessment:     Patient Active Problem List   Diagnosis    Venous stasis ulcer of both lower extremities without varicose veins (HCC)    WD-Ulcer of shin, left, with fat layer exposed ([address]    PVD (peripheral vascular disease) ([address]    Type 2 diabetes mellitus with diabetic peripheral angiopathy without gangrene, without long-term current use of insulin (HCC)    WD-Venous stasis of both lower extremities    WD-Lymphedema of both lower extremities    WD-Idiopathic chronic venous hypertension of left lower extremity with ulcer ([address]    WD-Traumatic open wound of left lower leg, complicated by diabetes and venous insufficiency    Gangrene of scrotum    Abscess of perineum    Cellulitis of perineum       Measurements:  Negative Pressure Wound Therapy Perineum Left (Active)   Wound Type Surgical 12/02/20 1400   Unit Type kci 12/02/20 1400   Dressing Type Black foam 12/02/20 1400   Cycle Continuous 12/02/20 1400   Target Pressure (mmHg) 125 12/02/20 1400   Canister changed?  No 12/02/20 1400   Dressing Status Changed 12/02/20 1400   Dressing Changed Changed/New 12/02/20 1400   Drainage Amount Large 12/02/20 1400   Drainage Description Serosanguinous 12/02/20 1400   Dressing Change Due 12/04/20 12/02/20 1400   Wound Assessment Pink;Red 12/02/20 1400   Odor None 12/02/20 1400   Number of days: 0       Wound 10/16/20 Pretibial Left #1 Left Lateral Lower Leg (Active)   Wound Image   10/30/20 0929   Wound Etiology Venous 12/02/20 1400   Dressing Status New dressing applied 12/02/20 1400   Wound Cleansed Cleansed with saline 12/02/20 1400   Dressing/Treatment Other (comment) 12/01/20 1355   Wound Length (cm) 3.5 cm 12/02/20 1400   Wound Width (cm) 2 cm 12/02/20 1400   Wound Depth (cm) 0.2 cm 12/02/20 1400   Wound Surface Area (cm^2) 7 cm^2 12/02/20 1400   Change in Wound Size % (l*w) 72.87 12/02/20 1400   Wound Volume (cm^3) 1.4 cm^3 12/02/20 1400   Wound Healing % 46 12/02/20 1400   Post-Procedure Length (cm) 0.6 cm 11/23/20 0824   Post-Procedure Width (cm) 0.5 cm 11/23/20 0824   Post-Procedure Depth (cm) 0.2 cm 11/23/20 0824   Post-Procedure Surface Area (cm^2) 0.3 cm^2 11/23/20 0824   Post-Procedure Volume (cm^3) 0.06 cm^3 11/23/20 0824   Distance Tunneling (cm) 0 cm 12/02/20 1400   Tunneling Position ___ O'Clock 0 12/02/20 1400   Undermining Starts ___ O'Clock 0 12/02/20 1400   Undermining Ends___ O'Clock 0 12/02/20 1400   Undermining Maxium Distance (cm) 0 12/02/20 1400   Wound Assessment Other (Comment) 12/01/20 1355   Drainage Amount Small 12/02/20 1400   Drainage Description Serosanguinous 12/02/20 1400   Odor None 12/02/20 1400   Merary-wound Assessment Intact 12/02/20 1400   Margins Defined edges 12/02/20 1400   Wound Thickness Description not for Pressure Injury Full thickness 12/02/20 1400   Number of days: 47       Wound 11/25/20 Scrotum perineum (Active)   Wound Etiology Non-Healing Surgical 12/02/20 1400   Dressing Status New dressing applied 12/02/20 1400   Wound Cleansed Cleansed with saline 12/02/20 1400   Dressing/Treatment Negative pressure wound therapy 12/02/20 1400   Wound Length (cm) 32.5 cm 12/02/20 1400   Wound Width (cm) 6.5 cm 12/02/20 1400   Wound Depth (cm) 6 cm 12/02/20 1400   Wound Surface Area (cm^2) 211.25 cm^2 12/02/20 1400   Change in Wound Size % (l*w) -69 12/02/20 1400   Wound Volume (cm^3) 1267.5 cm^3 12/02/20 1400   Wound Healing % -91 12/02/20 1400   Distance Tunneling (cm) 0 cm 12/02/20 1400   Tunneling Position ___ O'Clock 0 12/02/20 1400   Undermining Starts ___ O'Clock 10 12/02/20 1400   Undermining Ends___ O'Clock 2 12/02/20 1400   Undermining Maxium Distance (cm) 3 12/02/20 1400   Wound Assessment Pink/red 12/02/20 1400   Drainage Amount Moderate 12/02/20 1400   Drainage Description Serosanguinous 12/02/20 1400   Odor None 12/02/20 1400   Merary-wound Assessment Intact 12/01/20 0732   Margins Defined edges 12/02/20 1400   Wound Thickness Description not for Pressure Injury Full thickness 12/02/20 1400   Number of days: 7       Response to treatment:  With complaints of pain. Pain Assessment:  Severity:  mild  Quality of pain: sore  Wound Pain Timing/Severity: dressing change   Premedicated:  yes    Plan:     Plan of Care: Wound 11/25/20 Scrotum perineum-Dressing/Treatment: Negative pressure wound therapy(mastisol duoderm aquacel ag black foam x 3 )  Wound 10/16/20 Pretibial Left #1 Left Lateral Lower Leg-Dressing/Treatment: (puracol optifoam border)     Pt in bed agreeable to wound care for vac dressing change to perineum/scrotum. Vac was placed by wound care Monday however due to placement of wound near rectum pt has had multiple bowel movements and dressing did not hold. kerlix Dressing removed from wound cleansed with NS measured and pictured. Area of wound is very moist hard to get drape to seal down at first used mastisol and skin prep. Applied new wound vac dressing as above. Pt tolerated well. There was a blockage alert had to replace trac pad then it was an adequate seal @ 125mmhg continuous. Dressing changed to rt leg. Cleansed with NS measured and pictured recommended santyl. Pt is at mild risk for skin breakdown AEB joyce score. Observe joyce orders.      Specialty Bed Required :  yes  [x] Low Air Loss   [] Pressure Redistribution  [] Fluid Immersion  [] Bariatric  [] Total Pressure Relief  [] Other:     Discharge Plan:  Placement for patient upon discharge:  tbd  Hospice Care: no  Patient appropriate for Outpatient [address]:  Yes     Patient/Caregiver Teaching:  Level of patient/caregiver understanding able to:  Cares explained as given. Electronically signed by Annika Fernandez.  BHUMI Fuentes, on 12/2/2020 at 4:52 PM

## 2020-12-02 NOTE — PROGRESS NOTES
Pulmonary and Critical Care  Progress Note      VITALS:  BP (!) 172/91   Pulse 67   Temp 98.4 °F (36.9 °C) (Oral)   Resp 20   Ht 5' 9.02\" (1.753 m)   Wt 262 lb 5.6 oz (119 kg)   SpO2 97%   BMI 38.72 kg/m²     Subjective:   CHIEF COMPLAINT :Perineal pain     HPI:                The patient is lying in the bed. He is not in acute resp distress. He has no new compliants    Objective:   PHYSICAL EXAM:    LUNGS:Occasional basal crackles  Abd-soft, BS+,no enlargement of the perineal wound  Ext -no pedal edema  CVS-s1s2, no murmurs      DATA:    CBC:  Recent Labs     11/30/20  0630 12/01/20  0520 12/02/20  0500   WBC 23.3* 19.3* 18.4*   RBC 4.08* 4.07* 4.00*   HGB 12.2* 12.4* 12.3*   HCT 39.6* 38.4* 39.6*    210 200   MCV 97.1 94.3 99.0   MCH 29.9 30.5 30.8   MCHC 30.8* 32.3 31.1*   RDW 14.4 14.6 14.4      BMP:  Recent Labs     11/30/20  0630 12/01/20  0520 12/02/20  0500    141 137   K 3.3* 3.4* 2.8*    103 108   CO2 31 31 22   BUN 13 12 8   CREATININE 0.6* 0.6* 0.4*   CALCIUM 8.2* 8.2* 5.8*   GLUCOSE 165* 105* 115*      ABG:  No results for input(s): PH, PO2ART, BPN4ZDH, HCO3, BEART, O2SAT in the last 72 hours. BNP  No results found for: BNP   D-Dimer:  Lab Results   Component Value Date    DDIMER 1694 (H) 11/25/2020      1.  Radiology: None      Assessment/Plan     Patient Active Problem List    Diagnosis Date Noted    Cellulitis of perineum     Gangrene of scrotum 11/24/2020    Abscess of perineum     WD-Traumatic open wound of left lower leg, complicated by diabetes and venous insufficiency 10/08/2020    WD-Idiopathic chronic venous hypertension of left lower extremity with ulcer ([facility] 04/21/2020    WD-Venous stasis of both lower extremities 04/14/2020    WD-Lymphedema of both lower extremities 04/14/2020    Venous stasis ulcer of both lower extremities without varicose veins ([address] 12/12/2016    WD-Ulcer of shin, left, with fat layer exposed ([address] 12/12/2016    PVD (peripheral

## 2020-12-02 NOTE — PROGRESS NOTES
GENERAL SURGERY PROGRESS NOTE    Kati Tavares is a 72 y.o. 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 Subjective:  Remains afebrile. Pain well controlled. Denies complaints. VAC no longer in place    Objective:    Vitals: VITALS:  BP (!) 140/56   Pulse 61   Temp 98.4 °F (36.9 °C) (Oral)   Resp 20   Ht 5' 9.02\" (1.753 m)   Wt 262 lb 5.6 oz (119 kg)   SpO2 97%   BMI 38.72 kg/m²     I/O: 12/01 0701 - 12/02 0700  In: 600 [P.O.:600]  Out: 925 [Urine:925]    Labs/Imaging Results:   Lab Results   Component Value Date     12/02/2020    K 2.8 12/02/2020     12/02/2020    CO2 22 12/02/2020    BUN 8 12/02/2020    CREATININE 0.4 12/02/2020    GLUCOSE 115 12/02/2020    CALCIUM 5.8 12/02/2020      Lab Results   Component Value Date    WBC 18.4 (H) 12/02/2020    HGB 12.3 (L) 12/02/2020    HCT 39.6 (L) 12/02/2020    MCV 99.0 12/02/2020     12/02/2020       IV Fluids: dextrose    Scheduled Meds: calcium gluconate IVPB, 2 g, Intravenous, Once    potassium chloride, 20 mEq, Oral, BID    magnesium sulfate, 2 g, Intravenous, Once    insulin glargine, 10 Units, Subcutaneous, Nightly    insulin lispro, 0-12 Units, Subcutaneous, TID WC    insulin lispro, 0-6 Units, Subcutaneous, 2 times per day    lisinopril, 40 mg, Oral, Daily    cefepime, 2 g, Intravenous, Q8H    metroNIDAZOLE, 500 mg, Intravenous, Q8H    sodium chloride flush, 10 mL, Intravenous, BID    vancomycin, 2,000 mg, Intravenous, Q18H    sodium chloride flush, 10 mL, Intravenous, 2 times per day    amLODIPine, 5 mg, Oral, Daily    metoprolol succinate, 25 mg, Oral, BID    aspirin, 81 mg, Oral, Daily    rivaroxaban, 15 mg, Oral, Daily    influenza virus vaccine, 0.5 mL, Intramuscular, Prior to discharge    Physical Exam:  General: A&O mildly disoriented, no distress. HEENT: Anicteric sclerae, MMM. Extremities: Mild edema bilat LE. Buttock: Wound assessed by myself yesterday afternoon.  Dressing left in place this morning. Abdomen: Soft, nontender, nondistended. 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 Patient Active Problem List:     Venous stasis ulcer of both lower extremities without varicose veins (HCC)     WD-Ulcer of shin, left, with fat layer exposed ([address]     PVD (peripheral vascular disease) ([address]     Type 2 diabetes mellitus with diabetic peripheral angiopathy without gangrene, without long-term current use of insulin (HCC)     WD-Venous stasis of both lower extremities     WD-Lymphedema of both lower extremities     WD-Idiopathic chronic venous hypertension of left lower extremity with ulcer ([address]     WD-Traumatic open wound of left lower leg, complicated by diabetes and venous insufficiency     Gangrene of scrotum     Abscess of perineum    -  on Xarelto for DVT  - bid and PRN wet to dry dressing changes, can dry to replace VAC today. If there continues to be difficulty keeping the VAC in place we can continue gauze dressings. -OK from a surgical standpoint for transfer to the floor and work on disposition. May need placement post hospitalization to accomplish wound cares.   - will continue to follow    Eliezer Braden MD  12/2/2020  8:15 AM

## 2020-12-02 NOTE — PROGRESS NOTES
Occupational Therapy    [facility] ACUTE CARE OCCUPATIONAL THERAPY EVALUATION  Darlin Tavares, 1955, 2116/2116-A, 12/2/2020    History  [place]:  The encounter diagnosis was Cellulitis of perineum. Patient  has a past medical history of CHF (congestive heart failure) ([address] Chronic ulcer of left leg with fat layer exposed ([address] Chronic ulcer of right leg with fat layer exposed ([address] Chronic ulcer of right leg with fat layer exposed ([address] Diabetes mellitus ([address] Hypertension, PVD (peripheral vascular disease) ([address] Type 2 diabetes mellitus with diabetic peripheral angiopathy without gangrene, without long-term current use of insulin ([address] and Venous stasis ulcer of both lower extremities without varicose veins ([address] Patient  has a past surgical history that includes Rectal surgery (N/A, 11/24/2020). Subjective:  Patient states:  \"I need to go to the bathroom\". Pain:  Some discomfort in buttocks but no pain.     Communication with other providers:  Handoff to RN, co-eval with PT  Restrictions: Packing in buttocks/groin where wound was located, General Precautions, Fall Risk    Home Setup/Prior level of function  Social/Functional History  Lives With: Spouse(home all the time)  Type of Home: House  Home Layout: One level  Home Access: Stairs to enter without rails  Entrance Stairs - Number of Steps: 1  Bathroom Shower/Tub: Walk-in shower  Home Equipment: Cane(3 wheel walker, hover round)  ADL Assistance: Independent  Homemaking Assistance: Independent  Ambulation Assistance: Independent(PRN use of cane or walker)  Transfer Assistance: Independent  Active : Yes  Occupation: Retired  Type of occupation: [employer], [employer]    Examination of body systems (includes body structures/functions, activity/participation limitations):  · Observation:  Supine in bed upon arrival, agreeable to therapy  · Vision:  [facility] [place]  · Hearing:  [facility]  · Cardiopulmonary:  5L of 02      Body Systems and functions:  · ROM R/L: WFL.    · Strength R/L:  4/5,   · Sensation: Numbness/tingling in bilateral fingers  · Tone: Normal  · Coordination: WFL  · Perception: WNL    Activities of Daily Living (ADLs):  · Feeding: Ambika (dentures)  · Grooming: SBA (washing face/hands w/ warm washcloth)  · UB bathing: Independent  · LB bathing: maxA (due to extensive wound on buttocks pt required assistance to wash rupert area/buttocks after toileting on standard commode, however pt is able to reach buttocks and does not present w/ greatly decreased dynamic standing balance)  · UB dressing: Independent  · LB dressing: maxA (to doff soiled depends and don fresh depends, doffing socks)  · Toileting: maxA (see LB bathing/dressing for details)    Cognitive and Psychosocial Functioning:  · Overall cognitive status: WNL  · Affect: Normal, Pleasant, Motivated       Mobility:  · Supine to sit:  Supervision  · Transfers: SBA due to some dizziness up to RW  · Sitting balance:  Supervision. · Standing balance:  SBA w/ RW   · Functional Mobility: SBA w/ RW ~150 feet w/ 023  · Toilet Transfers: SBA w/ use of grab bars             AM-PAC Daily Activity Inpatient   How much help for putting on and taking off regular lower body clothing?: Total  How much help for Bathing?: A Lot  How much help for Toileting?: Total  How much help for putting on and taking off regular upper body clothing?: None  How much help for taking care of personal grooming?: A Little  How much help for eating meals?: None  AM-[facility] Inpatient Daily Activity Raw Score: 15  AM-PAC Inpatient ADL T-Scale Score : 34.69  ADL Inpatient CMS 0-100% Score: 56.46  ADL Inpatient CMS G-Code Modifier : CK    Treatment:  Self Care Training:   Cues were given for safety, sequence, UE/LE placement, visual cues, and balance.     Activities performed today included LB bathing/dressing, grooming, toileting, toilet transfer    Safety: patient left in chair with chair alarm, call light within reach, RN notified, gait belt used.    Assessment:  Pt is a 73 yo male admitted from home for gangrene of scrotum. Pt at baseline is Independent for ADLs Independent for high level IADLs and Independent for functional transfers/mobility w/ cane PRN. Pt currently presents w/ deficits in ADL and high level IADL independence, functional activity tolerance, dynamic sitting and standing balance and tolerance and functional transfers and is most limited by wound on groin/buttocks. Once wound heals pt's functional independence should improve in relation to LB ADLs. Pt would benefit from continued acute care OT services w/ discharge to home w/ home health OT S1 w/ assist PRN  Complexity: Low  Prognosis: Good, no significant barriers to participation at this time.    Plan  Times per week: 2x+  Times per day: Daily  Current Treatment Recommendations: Strengthening, Endurance Training, Patient/Caregiver Education & Training, Equipment Evaluation, Education, & procurement, Self-Care / ADL, Balance Training, Pain Management, Home Management Training, Functional Mobility Training, Safety Education & Training, Positioning     Equipment: defer    Goals:  Pt goal: go home  Time Frame for STGs: discharge  Goal 1: Pt will perform UE ADLs Independent  Goal 2: Pt will perform LE ADLs Judy  Goal 3: Pt will perform toileting Judy  Goal 4: Pt will perform functional transfer w/ AD Judy  Goal 5: Pt will perform functional mobility w/ AD Judy  Goal 6: Pt will perform therex/theract in order to increase functional activity tolerance and dynamic standing balance    Treatment plan:  Pt will perform functional task in stand reaching in all 3 planes in order to increase dynamic standing balance and functional activity tolerance    Recommendations for NURSING activity: Up to chair for all 3 meals and up to standard commode for all toileting needs    Time:   Time in: 1010  Time out: 1048  Timed treatment minutes: 23 minutes  Total time: 38 minutes    Electronically signed by: Leatha Epley OTR/L [id number]  11:14 AM,12/2/2020

## 2020-12-02 NOTE — PROGRESS NOTES
Daily Progress Note    Doing ok  Remain in sinus with PAFIB rate control  Surgery note reviewed  Increase activity and ok to move out of ICU  Confusion stable   Correct k and mag   HTN on meds    Pt. Awake, and doing ok, slightly confused  HR stable, NSR with PACs  in the 60 range, BP elevated but improved  Denies CP, SOB     Jarek's Gangrene    S/p Incision and drainage per Gen surgery    On ABx, with wound vac    Off vent now     WBC decreasing slowly    Tx. Per Gen surgery and primary     AF with RVR    Given amio    NSR now- on BB    On AC-will need long term    EF preserved but severe PAH noted- no PE      BP elevated-Adjusted meds  Replace K, and mag, Ca  Will cont.  To follow     Echo-11/25/20    Summary   Left ventricular systolic function is normal.   Ejection fraction is visually estimated at 50%.   Flattening of the interventricular septum due to right ventricular   volume/pressure overload.   Moderately dilated left atrium.   Severely enlarged and hypokinetic right ventricle cavity.   Sclerotic, but non-stenotic aortic valve.   Mild to moderate tricuspid regurgitation; RVSP: 57 mmHg consistent with   severe PHTN.   No evidence of any pericardial effusion.   Technically difficult study, due to body habitus.     PAST MEDICAL HISTORY:  Peripheral vascular disease present, status post  left leg intervention done.  Echo was done today.  Echo shows LV  function was preserved.  RV is dilated.  Pulmonary hypertension is  present.     The patient has a history of peripheral vascular disease present and is  status post left leg intervention, left SFA atherectomy was performed  and atherectomy followed by a drug-coated balloon was done in 2017.  The  patient has a history of having heart failure, peripheral vascular  disease, hypertension, hyperlipidemia, obesity present, and diabetes  present.     The patient had a stress test done in 2016, negative for ischemia.  His  ABIs were abnormal, left was 0.48 and right was 0.8.     PAST SURGICAL HISTORY:  Left leg intervention done, Jarek's gangrene,  and rectal surgery done.     SOCIAL HISTORY:  History of smoking.  No alcohol use.     ALLERGIES:  NKDA.     MEDICATIONS AT HOME:  Prior to hospital, he was on Glucotrol, potassium,  Xanax, Zaroxolyn, aspirin, Lasix, lisinopril, and Flomax.       Objective:   BP (!) 140/56   Pulse 61   Temp 98.4 °F (36.9 °C) (Oral)   Resp 20   Ht 5' 9.02\" (1.753 m)   Wt 262 lb 5.6 oz (119 kg)   SpO2 97%   BMI 38.72 kg/m²       Intake/Output Summary (Last 24 hours) at 12/2/2020 0857  Last data filed at 12/1/2020 1833  Gross per 24 hour   Intake 600 ml   Output 775 ml   Net -175 ml       Medications:   Scheduled Meds:   calcium gluconate IVPB  2 g Intravenous Once    potassium chloride  20 mEq Oral BID    magnesium sulfate  2 g Intravenous Once    insulin glargine  10 Units Subcutaneous Nightly    insulin lispro  0-12 Units Subcutaneous TID WC    insulin lispro  0-6 Units Subcutaneous 2 times per day    lisinopril  40 mg Oral Daily    cefepime  2 g Intravenous Q8H    metroNIDAZOLE  500 mg Intravenous Q8H    sodium chloride flush  10 mL Intravenous BID    vancomycin  2,000 mg Intravenous Q18H    sodium chloride flush  10 mL Intravenous 2 times per day    amLODIPine  5 mg Oral Daily    metoprolol succinate  25 mg Oral BID    aspirin  81 mg Oral Daily    rivaroxaban  15 mg Oral Daily    influenza virus vaccine  0.5 mL Intramuscular Prior to discharge      Infusions:   dextrose        PRN Meds:  potassium chloride, potassium chloride **OR** potassium alternative oral replacement **OR** potassium chloride, HYDROcodone 5 mg - acetaminophen, sodium chloride flush, glucose, dextrose, glucagon (rDNA), dextrose, HYDROmorphone, albuterol sulfate HFA       Physical Exam:  Vitals:    12/02/20 0738   BP: (!) 140/56   Pulse: 61   Resp:    Temp: 98.4 °F (36.9 °C)   SpO2:         General: AAO, NAD  Chest: Nontender  Cardiac: First and Second Heart Sounds are Normal, No Murmurs or Gallops noted  Lungs:Clear to auscultation and percussion. Abdomen: Soft, NT, ND, +BS  Extremities: No clubbing, no edema  Vascular:  Equal 2+ peripheral pulses. Lab Data:  CBC:   Recent Labs     11/30/20  0630 12/01/20  0520 12/02/20  0500   WBC 23.3* 19.3* 18.4*   HGB 12.2* 12.4* 12.3*   HCT 39.6* 38.4* 39.6*   MCV 97.1 94.3 99.0    210 200     BMP:   Recent Labs     11/30/20  0630 12/01/20  0520 12/02/20  0500    141 137   K 3.3* 3.4* 2.8*    103 108   CO2 31 31 22   BUN 13 12 8   CREATININE 0.6* 0.6* 0.4*     LIVER PROFILE:   Recent Labs     11/30/20 0630 12/01/20  0520 12/02/20  0500   AST 19 22 17   ALT 22 24 17   BILITOT 0.7 0.7 0.4   ALKPHOS 74 70 48     PT/INR: No results for input(s): PROTIME, INR in the last 72 hours. APTT: No results for input(s): APTT in the last 72 hours. BNP:  No results for input(s): BNP in the last 72 hours.       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/24/2020    Abscess of perineum     WD-Traumatic open wound of left lower leg, complicated by diabetes and venous insufficiency 10/08/2020    WD-Idiopathic chronic venous hypertension of left lower extremity with ulcer ([facility] 04/21/2020    WD-Venous stasis of both lower extremities 04/14/2020    WD-Lymphedema of both lower extremities 04/14/2020    Venous stasis ulcer of both lower extremities without varicose veins ([facility] 12/12/2016    WD-Ulcer of shin, left, with fat layer exposed ([facility] 12/12/2016    PVD (peripheral vascular disease) ([facility] 12/12/2016    Type 2 diabetes mellitus with diabetic peripheral angiopathy without gangrene, without long-term current use of insulin ([facility] 12/12/2016       Electronically signed by Azra Gilliland PA-C on 12/2/2020 at 8:57 AM No

## 2020-12-02 NOTE — CARE COORDINATION
CM contacted pt's wife to continue discussion re; discharge planning. Cm discussed with wife that pt will need wound care and possible IVAB at discharge. They have attempted to [facility] the wound and it was unsuccessful but they will be trying a 2nd time. CM also expressed concern about how weak pt may be after being in bed for 8+days. PT/OT are evaluating pt today. Pt's wife is also concerned stating that she has some health issues herself. Cm discussed with wife possible need for LTACH or SNF at discharge. CM explained LTACH and wife is agreeable to eval for LTACH if MD in agreement. Cm sent perfect serve to Dr Riley Soler who \"absolutely\" agreed with [facility], [facility] referral. CM discussed locations with wife and she prefers to start with referral to [facility]. CM contacted [facility] in Admissions at [phone] [facility] [employer] and requested that case be reviewed for possible admission. [facility] has no beds available at this time but will review case and let CM know when they may have a bed available. CM to follow and pending needs at discharge VAC vs BID Wet to Dry drsg changes and what IVAB may be needed will plan for home with [facility] vs likely need for LTACH.

## 2020-12-02 NOTE — PROGRESS NOTES
Hospitalist Progress Note      Name:  Mayda Hsieh Coffee /Age/Sex: 1955  (66 y.o. 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  - WBC 19.3  - Continue Cefepime, Flagyl and Vancomycin  - ID consulted for assistance with abx duration, appreciate recs  - General Surgery following; plan to replace wound vac today  - Spoke with CM today; will attempt to place in [facility] for discharge     Acute respiratory failure with hypoxia requiring intubation  - Extubated successfully  - Currently on room air doing well     Pulmonary HTN; 2 or 3  Afib with RVR  - Continue beta-blocker; was given Amio  - No PE noted on PE study  - Continue Xarelto  - Will continue to monitor     HTN  - Continue Amlodipine     DM  - SSI    Diet DIET CARDIAC; Carb Control: 5 carb choices (75 gms)/meal  Dietary Nutrition Supplements: Wound Healing Oral Supplement   DVT Prophylaxis [] Lovenox, []  Heparin, [] SCDs, [] Ambulation   GI Prophylaxis [] PPI,  [] H2 Blocker,  [] Carafate,  [] Diet/Tube Feeds   Code Status Prior   Disposition Patient requires continued admission due to post-op care   MDM [] Low, [x] Moderate,[]  High  Patient's risk as above due to      History of Present Illness:     Doing well this AM with pain controlled. Dressings c/d/i, wound vac off this AM.  No nausea or vomiting, no fevers or chills. Objective: Intake/Output Summary (Last 24 hours) at 2020 1125  Last data filed at 2020 0917  Gross per 24 hour   Intake 170 ml   Output 700 ml   Net -530 ml      Vitals:   Vitals:    20 0906   BP: (!) 172/91   Pulse: 67   Resp:    Temp:    SpO2:      Physical Exam:     GEN    Awake male, sitting upright in bed in no apparent distress. Appears given age. EYES   Pupils are equally round.   No scleral erythema, discharge, or conjunctivitis. HENT  Mucous membranes are moist.   NECK  Supple, no apparent thyromegaly or masses. RESP  Clear to auscultation, no wheezes, rales or rhonchi. Symmetric chest movement while on room air. CARDIO/VASC           S1/S2 auscultated. Regular rate without appreciable murmurs, rubs, or gallops. GI        Abdomen is soft without significant tenderness, masses, or guarding.        Pepper in place  HEME/LYMPH             No petechiae or ecchymoses. MSK    No gross joint deformities. SKIN    Normal coloration, warm, dry, incision area packed with wet to dry dressings  NEURO           Cranial nerves appear grossly intact, normal speech  PSYCH            Awake, alert, oriented x 4. Affect appropriate.     Medications:   Medications:    potassium chloride  20 mEq Oral BID    insulin glargine  10 Units Subcutaneous Nightly    insulin lispro  0-12 Units Subcutaneous TID WC    insulin lispro  0-6 Units Subcutaneous 2 times per day    amLODIPine  10 mg Oral Nightly    magnesium oxide  400 mg Oral Daily    lisinopril  40 mg Oral Daily    cefepime  2 g Intravenous Q8H    metroNIDAZOLE  500 mg Intravenous Q8H    sodium chloride flush  10 mL Intravenous BID    vancomycin  2,000 mg Intravenous Q18H    sodium chloride flush  10 mL Intravenous 2 times per day    metoprolol succinate  25 mg Oral BID    aspirin  81 mg Oral Daily    rivaroxaban  15 mg Oral Daily    influenza virus vaccine  0.5 mL Intramuscular Prior to discharge      Infusions:    dextrose       PRN Meds: potassium chloride, 20 mEq, PRN  potassium chloride, 40 mEq, PRN    Or  potassium alternative oral replacement, 40 mEq, PRN    Or  potassium chloride, 10 mEq, PRN  HYDROcodone 5 mg - acetaminophen, 1 tablet, Q6H PRN  sodium chloride flush, 10 mL, PRN  glucose, 15 g, PRN  dextrose, 12.5 g, PRN  glucagon (rDNA), 1 mg, PRN  dextrose, 100 mL/hr, PRN  HYDROmorphone, 0.5 mg, Q4H PRN  albuterol sulfate HFA, 2 puff, Q6H PRN          Electronically signed by Anand Diaz MD on 12/2/2020 at 11:25 AM

## 2020-12-02 NOTE — PROGRESS NOTES
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 Corbin Schafer, I&D of Jarek's gangrene  § -Surgical culture of buttocks: E.Coli  § Severe hypotension, Levophed onboard, weaned off      · DMII:  Dr. Dorie Nugent onboard, Last [facility] 20-5. 2     ? Morbid Obesity: BMI 38.72     ? PHTN     ? Tobacco Abuse     ? Acute Encephalopathy     ? AF:  Dr. Breanne Heaton onboard     ? Acute Hypoxic Respiratory Failure:  § -respiratory culture Pseudomonas aeringinosa, Proteus mirabilis  § -RDP Negative  § Intubated , Extubated   § Dr. Mary Kay Wiley onboard     ? PVD     · Multi-morbidity: per PMHx:  CHF, BLE chronic ulcers, DMII, HTN, PVD, venous stasis BLE, morbid obesity  Plan:   · Continue vancomycin IV  ? Continue cefepime 2 gm IV q8h  ? Continue Flagyl 500 mg IV q8h  ? Trend Pct and CRP, trending down  ? Patient continuing to improve, afebrile, WBC trending down slowly, CRP trending down. Ongoing Antimicrobial Therapy  Flagyl -  Cefepime -  Vancomycin -  ? Completed Antimicrobial Therapy  Cipro   Clindamycin -  Piperacillin-tazobactam -?  ? History:? Interval history noted. 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 Denies n/v/d/f or untoward effects of antibiotics. Resting quietly. Physical Exam:  Gen: alert and oriented X3, no distress  Skin: no stigmata of endocarditis  Wounds: C/D/I buttock/perineum with wound vac intact. HEMT: AT/NC Oropharynx pink, moist, and without lesions or exudates, edentulous  Eyes: PERRLA, EOMI, conjunctiva pink, sclera anicteric. Neck: Supple. Trachea midline. No LAD. Chest: no distress and CTA. Good air movement. Heart: RRR and no MRG.    Abd: soft, non-distended, no tenderness, no hepatomegaly. Normoactive bowel sounds. Ext: no clubbing, cyanosis, with non-pitting BLE edema  Catheter Site: without erythema or tenderness draining darren urine  PICC:  Right arm without erythema or edema  Neuro: Mental status intact. CN 2-12 intact and no focal sensory or motor deficits  11/24/20 CT Pelviw W Contrast:  Impression    Findings consistent with the provided history of developing Jarek's    gangrene with extensive involvement of the subcutaneous fatty tissues as    described and possible involvement of the inferolateral aspect of the right    gluteus mary which shows only increased density but no evidence of fluid    or gas.       11/24/20 XR Chest Portable:  Impression    1. Endotracheal tube tip terminates approximately 6.1 cm above the sabrina. 2. Left internal jugular central venous catheter tip likely terminates in the    brachiocephalic vein. 3. Cardiomegaly with perihilar vascular prominence. 4. Consolidative opacities in the lung bases with left pleural effusion.     The findings were sent to the Radiology Results [address] at 9:38    pm on 11/24/2020to be communicated to a licensed caregiver.       11/25/20 XR Chest Portable:  Impression    Stable small left pleural effusion with basilar atelectasis and/or    consolidation.       11/25/20 XR Abdomen (KUB):  Impression    Enteric tube tip projects at the gastric body with side hole projecting at    the GE junction.  This should be advanced by at least 3 cm.       11/25/20 CTA Pulmonary W Contrast:  Impression    Pulmonary arteries are only well evaluated to the lobar level.  No main or    lobar pulmonary embolism.  Segmental and subsegmental branch vessels are    poorly evaluated due to artifact.         Enlarged pulmonary arterial tree compatible with pulmonary hypertension.         Cardiomegaly with four-chamber enlargement.         Multifocal nodularity and consolidation throughout the lungs favored    secondary to multifocal pneumonia with reactive mediastinal lymphadenopathy.         Left lower lobe collapse which may be due to mucous plugging as the left    lower lobe airways are not well visualized.         Support lines and tubes appear appropriate in positioning.       11/25/20 VL Dup Lower Extremity Venous Bilateral:  Impression    Positive DVT involving the right posterior tibial vein below the knee.         No evidence of left lower extremity DVT.       11/26/20 XR Chest Portable:  Impression    1. Lines and tubes as described. 2. No significant interval change of cardiomegaly, dense retrocardiac    opacity, and bilateral effusions. 3. Pulmonary edema.       11/26/20 US Head Neck Soft Tissue Thyroid:  Impression    Limited exam due to patient being intubated, suboptimal positioning as well    as motion artifact.         Heterogeneous thyroid gland without dominant thyroid nodule      11/30/20 CT Abdomen Pelvis W IV Contrast:  Impression    1. Note: Study significantly limited by patient motion related artifact. 2. Moderate bilateral layering posterior pleural effusions. 3. Mild pulmonary interstitial edema within the visualized lower lungs. 4. Hepatic steatosis. 5. Cholelithiasis, as described above.       11/30/20 US Head Neck Soft TIssue Thyroid:  Impression    Bilateral thyroidal enlargement.         Small spongiform lesion in the right thyroid lobe measuring 3.9 x 5.8 x 3.9    mm.  It is of very low suspicion for thyroid malignancy.  No fine needle    aspiration is needed.         1 benign colloid cyst in the right thyroid lobe and 2 benign colloid cysts in    the left thyroid lobe.         Vital Signs: BP (!) 172/91   Pulse 67   Temp 98.4 °F (36.9 °C) (Oral)   Resp 20   Ht 5' 9.02\" (1.753 m)   Wt 262 lb 5.6 oz (119 kg)   SpO2 97%   BMI 38.72 kg/m²     11/24/20 CT Pelviw W Contrast:  Impression    Findings consistent with the provided history of developing Jarek's No evidence of left lower extremity DVT.       11/26/20 XR Chest Portable:  Impression    1. Lines and tubes as described. 2. No significant interval change of cardiomegaly, dense retrocardiac    opacity, and bilateral effusions. 3. Pulmonary edema.       11/26/20 US Head Neck Soft Tissue Thyroid:  Impression    Limited exam due to patient being intubated, suboptimal positioning as well    as motion artifact.         Heterogeneous thyroid gland without dominant thyroid nodule      11/30/20 CT Abdomen Pelvis W IV Contrast:  Impression    1. Note: Study significantly limited by patient motion related artifact. 2. Moderate bilateral layering posterior pleural effusions. 3. Mild pulmonary interstitial edema within the visualized lower lungs. 4. Hepatic steatosis. 5. Cholelithiasis, as described above.       11/30/20 US Head Neck Soft TIssue Thyroid:  Impression    Bilateral thyroidal enlargement.         Small spongiform lesion in the right thyroid lobe measuring 3.9 x 5.8 x 3.9    mm.  It is of very low suspicion for thyroid malignancy.  No fine needle    aspiration is needed.         1 benign colloid cyst in the right thyroid lobe and 2 benign colloid cysts in    the left thyroid lobe. Radiologic / Imaging / TESTING  11/24/20 CT Pelviw W Contrast:  Impression    Findings consistent with the provided history of developing Jarek's    gangrene with extensive involvement of the subcutaneous fatty tissues as    described and possible involvement of the inferolateral aspect of the right    gluteus mary which shows only increased density but no evidence of fluid    or gas.       11/24/20 XR Chest Portable:  Impression    1. Endotracheal tube tip terminates approximately 6.1 cm above the sabrina. 2. Left internal jugular central venous catheter tip likely terminates in the    brachiocephalic vein. 3. Cardiomegaly with perihilar vascular prominence.     4. Consolidative opacities in the lung bases with left pleural effusion. The findings were sent to the Radiology Results [address] at 9:38    pm on 11/24/2020to be communicated to a licensed caregiver.       11/25/20 XR Chest Portable:  Impression    Stable small left pleural effusion with basilar atelectasis and/or    consolidation.       11/25/20 XR Abdomen (KUB):  Impression    Enteric tube tip projects at the gastric body with side hole projecting at    the GE junction.  This should be advanced by at least 3 cm.       11/25/20 CTA Pulmonary W Contrast:  Impression    Pulmonary arteries are only well evaluated to the lobar level.  No main or    lobar pulmonary embolism.  Segmental and subsegmental branch vessels are    poorly evaluated due to artifact.         Enlarged pulmonary arterial tree compatible with pulmonary hypertension.         Cardiomegaly with four-chamber enlargement.         Multifocal nodularity and consolidation throughout the lungs favored    secondary to multifocal pneumonia with reactive mediastinal lymphadenopathy.         Left lower lobe collapse which may be due to mucous plugging as the left    lower lobe airways are not well visualized.         Support lines and tubes appear appropriate in positioning.       11/25/20 VL Dup Lower Extremity Venous Bilateral:  Impression    Positive DVT involving the right posterior tibial vein below the knee.         No evidence of left lower extremity DVT.       11/26/20 XR Chest Portable:  Impression    1. Lines and tubes as described. 2. No significant interval change of cardiomegaly, dense retrocardiac    opacity, and bilateral effusions. 3. Pulmonary edema.       11/26/20 US Head Neck Soft Tissue Thyroid:  Impression    Limited exam due to patient being intubated, suboptimal positioning as well    as motion artifact.         Heterogeneous thyroid gland without dominant thyroid nodule      11/30/20 CT Abdomen Pelvis W IV Contrast:  Impression    1.  Note: Study significantly limited by patient motion related artifact. 2. Moderate bilateral layering posterior pleural effusions. 3. Mild pulmonary interstitial edema within the visualized lower lungs. 4. Hepatic steatosis. 5. Cholelithiasis, as described above.       11/30/20 US Head Neck Soft TIssue Thyroid:  Impression    Bilateral thyroidal enlargement.         Small spongiform lesion in the right thyroid lobe measuring 3.9 x 5.8 x 3.9    mm.  It is of very low suspicion for thyroid malignancy.  No fine needle    aspiration is needed.         1 benign colloid cyst in the right thyroid lobe and 2 benign colloid cysts in    the left thyroid lobe.            Labs:    Recent Results (from the past 24 hour(s))   POCT Glucose    Collection Time: 12/01/20  6:44 PM   Result Value Ref Range    POC Glucose 127 (H) 70 - 99 MG/DL   POCT Glucose    Collection Time: 12/01/20 10:47 PM   Result Value Ref Range    POC Glucose 110 (H) 70 - 99 MG/DL   CBC    Collection Time: 12/02/20  5:00 AM   Result Value Ref Range    WBC 18.4 (H) 4.0 - 10.5 K/CU MM    RBC 4.00 (L) 4.6 - 6.2 M/CU MM    Hemoglobin 12.3 (L) 13.5 - 18.0 GM/DL    Hematocrit 39.6 (L) 42 - 52 %    MCV 99.0 78 - 100 FL    MCH 30.8 27 - 31 PG    MCHC 31.1 (L) 32.0 - 36.0 %    RDW 14.4 11.7 - 14.9 %    Platelets 662 486 - 030 K/CU MM    MPV 11.0 7.5 - 11.1 FL   Comprehensive Metabolic Panel w/ Reflex to MG    Collection Time: 12/02/20  5:00 AM   Result Value Ref Range    Sodium 137 135 - 145 MMOL/L    Potassium 2.8 (LL) 3.5 - 5.1 MMOL/L    Chloride 108 99 - 110 mMol/L    CO2 22 21 - 32 MMOL/L    BUN 8 6 - 23 MG/DL    CREATININE 0.4 (L) 0.9 - 1.3 MG/DL    Glucose 115 (H) 70 - 99 MG/DL    Calcium 5.8 (LL) 8.3 - 10.6 MG/DL    Alb 2.0 (L) 3.4 - 5.0 GM/DL    Total Protein 4.1 (L) 6.4 - 8.2 GM/DL    Total Bilirubin 0.4 0.0 - 1.0 MG/DL    ALT 17 10 - 40 U/L    AST 17 15 - 37 IU/L    Alkaline Phosphatase 48 40 - 129 IU/L    GFR Non-African American >60 >60 mL/min/1.73m2    GFR African American >60 >60 mL/min/1.73m2    Anion Gap 7 4 - 16   Magnesium    Collection Time: 12/02/20  5:00 AM   Result Value Ref Range    Magnesium 1.5 (L) 1.8 - 2.4 mg/dl   C-Reactive Protein    Collection Time: 12/02/20  6:00 AM   Result Value Ref Range    CRP, High Sensitivity 28.7 mg/L   POCT Glucose    Collection Time: 12/02/20  8:59 AM   Result Value Ref Range    POC Glucose 110 (H) 70 - 99 MG/DL   POCT Glucose    Collection Time: 12/02/20 12:08 PM   Result Value Ref Range    POC Glucose 153 (H) 70 - 99 MG/DL     CULTURE results: Invalid input(s): BLOOD CULTURE,  URINE CULTURE, SURGICAL CULTURE    Diagnosis:  Patient Active Problem List   Diagnosis    Venous stasis ulcer of both lower extremities without varicose veins (HCC)    WD-Ulcer of shin, left, with fat layer exposed ([address]    PVD (peripheral vascular disease) ([address]    Type 2 diabetes mellitus with diabetic peripheral angiopathy without gangrene, without long-term current use of insulin (HCC)    WD-Venous stasis of both lower extremities    WD-Lymphedema of both lower extremities    WD-Idiopathic chronic venous hypertension of left lower extremity with ulcer ([address]    WD-Traumatic open wound of left lower leg, complicated by diabetes and venous insufficiency    Gangrene of scrotum    Abscess of perineum    Cellulitis of perineum       Active Problems  Active Problems:    Gangrene of scrotum    Abscess of perineum    Cellulitis of perineum  Resolved Problems:    * No resolved hospital problems. *    Electronically signed by: Electronically signed by Derek Mcknight.  FAITH Campo CNP on 12/2/2020 at 4:20 PM

## 2020-12-03 LAB
ALBUMIN SERPL-MCNC: 2.6 GM/DL (ref 3.4–5)
ALP BLD-CCNC: 61 IU/L (ref 40–128)
ALT SERPL-CCNC: 25 U/L (ref 10–40)
ANION GAP SERPL CALCULATED.3IONS-SCNC: 6 MMOL/L (ref 4–16)
AST SERPL-CCNC: 21 IU/L (ref 15–37)
BILIRUB SERPL-MCNC: 0.5 MG/DL (ref 0–1)
BUN BLDV-MCNC: 9 MG/DL (ref 6–23)
CALCIUM SERPL-MCNC: 8 MG/DL (ref 8.3–10.6)
CHLORIDE BLD-SCNC: 105 MMOL/L (ref 99–110)
CO2: 29 MMOL/L (ref 21–32)
CREAT SERPL-MCNC: 0.6 MG/DL (ref 0.9–1.3)
DOSE AMOUNT: NORMAL
DOSE TIME: NORMAL
GFR AFRICAN AMERICAN: >60 ML/MIN/1.73M2
GFR NON-AFRICAN AMERICAN: >60 ML/MIN/1.73M2
GLUCOSE BLD-MCNC: 100 MG/DL (ref 70–99)
GLUCOSE BLD-MCNC: 104 MG/DL (ref 70–99)
GLUCOSE BLD-MCNC: 108 MG/DL (ref 70–99)
GLUCOSE BLD-MCNC: 109 MG/DL (ref 70–99)
GLUCOSE BLD-MCNC: 110 MG/DL (ref 70–99)
GLUCOSE BLD-MCNC: 141 MG/DL (ref 70–99)
GLUCOSE BLD-MCNC: 87 MG/DL (ref 70–99)
HCT VFR BLD CALC: 39.9 % (ref 42–52)
HEMOGLOBIN: 12.9 GM/DL (ref 13.5–18)
HIGH SENSITIVE C-REACTIVE PROTEIN: 31.5 MG/L
MAGNESIUM: 2.1 MG/DL (ref 1.8–2.4)
MCH RBC QN AUTO: 31.2 PG (ref 27–31)
MCHC RBC AUTO-ENTMCNC: 32.3 % (ref 32–36)
MCV RBC AUTO: 96.4 FL (ref 78–100)
PDW BLD-RTO: 14.4 % (ref 11.7–14.9)
PLATELET # BLD: 218 K/CU MM (ref 140–440)
PMV BLD AUTO: 11 FL (ref 7.5–11.1)
POTASSIUM SERPL-SCNC: 4 MMOL/L (ref 3.5–5.1)
POTASSIUM SERPL-SCNC: 4 MMOL/L (ref 3.5–5.1)
RBC # BLD: 4.14 M/CU MM (ref 4.6–6.2)
SODIUM BLD-SCNC: 140 MMOL/L (ref 135–145)
TOTAL PROTEIN: 5.5 GM/DL (ref 6.4–8.2)
VANCOMYCIN TROUGH: 14.2 UG/ML (ref 10–20)
WBC # BLD: 20.1 K/CU MM (ref 4–10.5)

## 2020-12-03 PROCEDURE — 85027 COMPLETE CBC AUTOMATED: CPT

## 2020-12-03 PROCEDURE — 97530 THERAPEUTIC ACTIVITIES: CPT

## 2020-12-03 PROCEDURE — 2580000003 HC RX 258: Performed by: SURGERY

## 2020-12-03 PROCEDURE — 99024 POSTOP FOLLOW-UP VISIT: CPT | Performed by: SURGERY

## 2020-12-03 PROCEDURE — 6360000002 HC RX W HCPCS: Performed by: NURSE PRACTITIONER

## 2020-12-03 PROCEDURE — 2580000003 HC RX 258: Performed by: NURSE PRACTITIONER

## 2020-12-03 PROCEDURE — 2500000003 HC RX 250 WO HCPCS: Performed by: NURSE PRACTITIONER

## 2020-12-03 PROCEDURE — 94640 AIRWAY INHALATION TREATMENT: CPT

## 2020-12-03 PROCEDURE — 6370000000 HC RX 637 (ALT 250 FOR IP): Performed by: PHYSICIAN ASSISTANT

## 2020-12-03 PROCEDURE — 6370000000 HC RX 637 (ALT 250 FOR IP): Performed by: INTERNAL MEDICINE

## 2020-12-03 PROCEDURE — 99232 SBSQ HOSP IP/OBS MODERATE 35: CPT | Performed by: INTERNAL MEDICINE

## 2020-12-03 PROCEDURE — 93005 ELECTROCARDIOGRAM TRACING: CPT | Performed by: STUDENT IN AN ORGANIZED HEALTH CARE EDUCATION/TRAINING PROGRAM

## 2020-12-03 PROCEDURE — 2580000003 HC RX 258: Performed by: STUDENT IN AN ORGANIZED HEALTH CARE EDUCATION/TRAINING PROGRAM

## 2020-12-03 PROCEDURE — 83735 ASSAY OF MAGNESIUM: CPT

## 2020-12-03 PROCEDURE — 1200000000 HC SEMI PRIVATE

## 2020-12-03 PROCEDURE — 86141 C-REACTIVE PROTEIN HS: CPT

## 2020-12-03 PROCEDURE — 82962 GLUCOSE BLOOD TEST: CPT

## 2020-12-03 PROCEDURE — 80053 COMPREHEN METABOLIC PANEL: CPT

## 2020-12-03 PROCEDURE — 80202 ASSAY OF VANCOMYCIN: CPT

## 2020-12-03 PROCEDURE — 94761 N-INVAS EAR/PLS OXIMETRY MLT: CPT

## 2020-12-03 PROCEDURE — 97116 GAIT TRAINING THERAPY: CPT

## 2020-12-03 PROCEDURE — 2700000000 HC OXYGEN THERAPY PER DAY

## 2020-12-03 PROCEDURE — 6360000002 HC RX W HCPCS: Performed by: SURGERY

## 2020-12-03 PROCEDURE — 84132 ASSAY OF SERUM POTASSIUM: CPT

## 2020-12-03 PROCEDURE — 6370000000 HC RX 637 (ALT 250 FOR IP): Performed by: NURSE PRACTITIONER

## 2020-12-03 PROCEDURE — 99226 PR SBSQ OBSERVATION CARE/DAY 35 MINUTES: CPT | Performed by: NURSE PRACTITIONER

## 2020-12-03 RX ORDER — AMIODARONE HYDROCHLORIDE 200 MG/1
200 TABLET ORAL 2 TIMES DAILY
Status: DISCONTINUED | OUTPATIENT
Start: 2020-12-03 | End: 2020-12-06

## 2020-12-03 RX ADMIN — ASPIRIN 81 MG CHEWABLE TABLET 81 MG: 81 TABLET CHEWABLE at 09:04

## 2020-12-03 RX ADMIN — METRONIDAZOLE 500 MG: 500 INJECTION, SOLUTION INTRAVENOUS at 09:06

## 2020-12-03 RX ADMIN — METOPROLOL SUCCINATE 25 MG: 25 TABLET, EXTENDED RELEASE ORAL at 21:41

## 2020-12-03 RX ADMIN — COLLAGENASE SANTYL: 250 OINTMENT TOPICAL at 09:06

## 2020-12-03 RX ADMIN — RIVAROXABAN 15 MG: 15 TABLET, FILM COATED ORAL at 17:38

## 2020-12-03 RX ADMIN — LISINOPRIL 40 MG: 20 TABLET ORAL at 09:05

## 2020-12-03 RX ADMIN — ALBUTEROL SULFATE 2 PUFF: 90 AEROSOL, METERED RESPIRATORY (INHALATION) at 20:31

## 2020-12-03 RX ADMIN — SODIUM CHLORIDE, PRESERVATIVE FREE 10 ML: 5 INJECTION INTRAVENOUS at 21:41

## 2020-12-03 RX ADMIN — SODIUM CHLORIDE, PRESERVATIVE FREE 10 ML: 5 INJECTION INTRAVENOUS at 09:06

## 2020-12-03 RX ADMIN — MAGNESIUM OXIDE 400 MG (241.3 MG MAGNESIUM) TABLET 400 MG: TABLET at 09:07

## 2020-12-03 RX ADMIN — POTASSIUM CHLORIDE 20 MEQ: 1500 TABLET, EXTENDED RELEASE ORAL at 21:41

## 2020-12-03 RX ADMIN — METOPROLOL SUCCINATE 25 MG: 25 TABLET, EXTENDED RELEASE ORAL at 09:05

## 2020-12-03 RX ADMIN — POTASSIUM CHLORIDE 20 MEQ: 1500 TABLET, EXTENDED RELEASE ORAL at 09:05

## 2020-12-03 RX ADMIN — CEFEPIME 2 G: 2 INJECTION, POWDER, FOR SOLUTION INTRAVENOUS at 09:05

## 2020-12-03 RX ADMIN — MEROPENEM 2 G: 1 INJECTION, POWDER, FOR SOLUTION INTRAVENOUS at 23:03

## 2020-12-03 RX ADMIN — METRONIDAZOLE 500 MG: 500 INJECTION, SOLUTION INTRAVENOUS at 01:13

## 2020-12-03 RX ADMIN — AMLODIPINE BESYLATE 10 MG: 10 TABLET ORAL at 21:41

## 2020-12-03 RX ADMIN — AMIODARONE HYDROCHLORIDE 200 MG: 200 TABLET ORAL at 23:03

## 2020-12-03 RX ADMIN — VANCOMYCIN HYDROCHLORIDE 2000 MG: 1 INJECTION, POWDER, LYOPHILIZED, FOR SOLUTION INTRAVENOUS at 15:26

## 2020-12-03 RX ADMIN — CEFEPIME 2 G: 2 INJECTION, POWDER, FOR SOLUTION INTRAVENOUS at 02:20

## 2020-12-03 RX ADMIN — MEROPENEM 1 G: 1 INJECTION, POWDER, FOR SOLUTION INTRAVENOUS at 15:26

## 2020-12-03 ASSESSMENT — PAIN SCALES - GENERAL
PAINLEVEL_OUTOF10: 0

## 2020-12-03 NOTE — PROGRESS NOTES
Progress Note( Dr. Chandrika Goldman)  12/2/2020  Subjective:   Admit Date: 11/24/2020  PCP: FAITH Nettles CNP    Admitted For :Perineal abscess //Jarek gangrene   Incision drainage and debridement of Jarek gangrene on 11/24/2020    Consulted For:  better control of blood glucose    Interval History: Patient i is extubated and off ventilator awake and alert    On is also hyperthyroid with high serum free T4 and low TSH    Intake/Output Summary (Last 24 hours) at 12/2/2020 2329  Last data filed at 12/2/2020 1830  Gross per 24 hour   Intake 995 ml   Output 1600 ml   Net -605 ml       DATA    CBC:   Recent Labs     11/30/20  0630 12/01/20  0520 12/02/20  0500   WBC 23.3* 19.3* 18.4*   HGB 12.2* 12.4* 12.3*    210 200    CMP:  Recent Labs     11/30/20  0630 12/01/20  0520 12/02/20  0500    141 137   K 3.3* 3.4* 2.8*    103 108   CO2 31 31 22   BUN 13 12 8   CREATININE 0.6* 0.6* 0.4*   CALCIUM 8.2* 8.2* 5.8*   PROT 5.6* 5.7* 4.1*   LABALBU 2.7* 2.8* 2.0*   BILITOT 0.7 0.7 0.4   ALKPHOS 74 70 48   AST 19 22 17   ALT 22 24 17     Lipids:   Lab Results   Component Value Date    CHOL 162 02/28/2018    HDL 63 02/28/2018    TRIG 155 02/28/2018     Glucose:  Recent Labs     12/02/20  1208 12/02/20  1731 12/02/20  2100   POCGLU 153* 110* 105*     [id number]:  Lab Results   Component Value Date    LABA1C 5.2 07/20/2020     High Sensitivity TSH:   Lab Results   Component Value Date    TSHHS 0.130 11/25/2020     Free T3:   Lab Results   Component Value Date    FT3 1.4 11/26/2020     Free T4:  Lab Results   Component Value Date    T4FREE 1.83 11/25/2020       Xr Abdomen (kub) (single Ap View)    Result Date: 11/25/2020  EXAMINATION: ONE SUPINE XRAY VIEW(S) OF THE ABDOMEN 11/25/2020 9:23 am COMPARISON: None.  HISTORY: ORDERING SYSTEM PROVIDED HISTORY: NG placement verification TECHNOLOGIST PROVIDED HISTORY: Reason for exam:->NG placement verification Reason for Exam: NG placement verification FINDINGS: bilateral effusions. 3. Pulmonary edema. Xr Chest Portable    Result Date: 11/25/2020  EXAMINATION: ONE XRAY VIEW OF THE CHEST 11/25/2020 9:24 am COMPARISON: 11/24/2020     Stable small left pleural effusion with basilar atelectasis and/or consolidation. Vl Dup Lower Extremity Venous Bilateral    Result Date: 11/25/2020  EXAMINATION: DUPLEX VENOUS ULTRASOUND OF THE BILATERAL LOWER EXTREMITIES, 11/25/2020 5:10 pm     Positive DVT involving the right posterior tibial vein below the knee. No evidence of left lower extremity DVT. Cta Pulmonary W Contrast    Result Date: 11/25/2020  EXAMINATION: CTA OF THE CHEST 11/25/2020 1:32 pm    Pulmonary arteries are only well evaluated to the lobar level. No main or lobar pulmonary embolism. Segmental and subsegmental branch vessels are poorly evaluated due to artifact. Enlarged pulmonary arterial tree compatible with pulmonary hypertension. Cardiomegaly with four-chamber enlargement. Multifocal nodularity and consolidation throughout the lungs favored secondary to multifocal pneumonia with reactive mediastinal lymphadenopathy. Left lower lobe collapse which may be due to mucous plugging as the left lower lobe airways are not well visualized. Support lines and tubes appear appropriate in positioning. Scheduled Medicines required.   With hypoglycemia continues to be present medications:    potassium chloride  20 mEq Oral BID    insulin glargine  10 Units Subcutaneous Nightly    insulin lispro  0-12 Units Subcutaneous TID WC    insulin lispro  0-6 Units Subcutaneous 2 times per day    amLODIPine  10 mg Oral Nightly    magnesium oxide  400 mg Oral Daily    collagenase   Topical Daily    lisinopril  40 mg Oral Daily    cefepime  2 g Intravenous Q8H    metroNIDAZOLE  500 mg Intravenous Q8H    sodium chloride flush  10 mL Intravenous BID    vancomycin  2,000 mg Intravenous Q18H    sodium chloride flush  10 mL Intravenous 2 times per day    metoprolol succinate  25 mg Oral BID    aspirin  81 mg Oral Daily    rivaroxaban  15 mg Oral Daily    influenza virus vaccine  0.5 mL Intramuscular Prior to discharge      Infusions:    dextrose           Objective:   Vitals: BP (!) 175/56   Pulse 68   Temp 98.3 °F (36.8 °C) (Oral)   Resp 26   Ht 5' 9.02\" (1.753 m)   Wt 262 lb 5.6 oz (119 kg)   SpO2 98%   BMI 38.72 kg/m²   General appearance: patient is extubated and off ventilator awake and alert  Neck: no JVD or bruit  Thyroid : Normal lobes   Lungs: Has Vesicular Breath sounds   Heart:  regular rate and rhythm  Abdomen: soft, non-tender; bowel sounds normal; no masses,  no organomegaly  Has dressing perineal left thigh area has had incision and drainage done and debridement of the area also done  Musculoskeletal: Normal  Extremities: extremities normal, , no edema  Neurologic:  Awake, patient is extubated and off ventilator awake and alert    Assessment:     Patient Active Problem List:     Venous stasis ulcer of both lower extremities without varicose veins (HCC)     WD-Ulcer of shin, left, with fat layer exposed ([address]     PVD (peripheral vascular disease) ([address]     Type 2 diabetes mellitus with diabetic peripheral angiopathy without gangrene, without long-term current use of insulin (HCC)     WD-Venous stasis of both lower extremities     WD-Lymphedema of both lower extremities     WD-Idiopathic chronic venous hypertension of left lower extremity with ulcer ([address]     WD-Traumatic open wound of left lower leg, complicated by diabetes and venous insufficiency     Gangrene of scrotum     Abscess of perineum     Cellulitis of perinium./  Incision drainage debridement done  Patient is mildly hypothyroid possibly due to thyroiditis    Assessment and plan   1. Labs and X ray reviewed   2. reviewed Current Medicines   3. On meal plus correction bolus Humalog/ Basal Lantus Insulin regime   4. Monitor Blood glucose frequently   5.  Modified  the dose of Insulin/ other medicines as needed  6. Patient is mildly hyper pain thyroid possibly due to thyroiditis I would not prefer  7. I prefer not to change antithyroid drugs  8. Except symptomatically tachycardic with a beta-blocker  9. We will review ultrasound //other thyroid function tests  10. Will follow     .      Justin Owen MD

## 2020-12-03 NOTE — PROGRESS NOTES
Hospitalist Progress Note      Name:  John Tavares /Age/Sex: 1955  (72 y.o. 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  - Continue Cefepime, Flagyl and Vancomycin  - ID following for antibiotic assistance, appreciate recs  - General Surgery following, appreciate recs  - Plan for LTACH at discharge when LTACH bed is available     Acute respiratory failure with hypoxia requiring intubation  - Extubated successfully  - Currently on room air doing well     Pulmonary HTN; 2 or 3  Afib with RVR  R Posterior Tibial DVT  - Continue beta-blocker; was given Amio  - Continue Xarelto  - Will continue to monitor     HTN  - Continue Amlodipine, Lisinopril     DM  - SSI    Discussed with  today; referrals sent to LTACH's as patient's wife is unable to assist with dressing changes at home. Will continue to follow-up     Diet DIET CARDIAC; Carb Control: 5 carb choices (75 gms)/meal  Dietary Nutrition Supplements: Wound Healing Oral Supplement   DVT Prophylaxis [] Lovenox, []  Heparin, [] SCDs, [] Ambulation   GI Prophylaxis [] PPI,  [] H2 Blocker,  [] Carafate,  [] Diet/Tube Feeds   Code Status Prior   Disposition Patient requires continued admission due to gangrene   MDM [] Low, [] Moderate,[]  High  Patient's risk as above due to      History of Present Illness:     Doing well this AM; had episode of diarrhea overnight and wound vac taken off. No fevers, chest pains, or SOB overnight. Objective:        Intake/Output Summary (Last 24 hours) at 12/3/2020 1119  Last data filed at 12/3/2020 0942  Gross per 24 hour   Intake 1945 ml   Output 2950 ml   Net -1005 ml      Vitals:   Vitals:    20 0904   BP: 134/72   Pulse: 73   Resp:    Temp:    SpO2:      Physical Exam:     GEN    Awake male, sitting upright in bed in no apparent distress. Appears given age. EYES   Pupils are equally round.  No scleral erythema, discharge, or conjunctivitis. HENT  Mucous membranes are moist.   NECK  Supple, no apparent thyromegaly or masses. RESP  Clear to auscultation, no wheezes, rales or rhonchi.  Symmetric chest movement while on room air. CARDIO/VASC           S1/S2 auscultated. Regular rate without appreciable murmurs, rubs, or gallops. Truman Rose Marie is soft without significant tenderness, masses, or guarding.        Pepper in place  HEME/LYMPH             No petechiae or ecchymoses. MSK    No gross joint deformities. SKIN    Normal coloration, warm, dry, incision area packed with wet to dry dressings  NEURO           Cranial nerves appear grossly intact, normal speech  PSYCH            Awake, alert, oriented x 4.  Affect appropriate.     Medications:   Medications:    apixaban  5 mg Oral BID    potassium chloride  20 mEq Oral BID    insulin glargine  10 Units Subcutaneous Nightly    insulin lispro  0-12 Units Subcutaneous TID WC    insulin lispro  0-6 Units Subcutaneous 2 times per day    amLODIPine  10 mg Oral Nightly    magnesium oxide  400 mg Oral Daily    collagenase   Topical Daily    lisinopril  40 mg Oral Daily    cefepime  2 g Intravenous Q8H    metroNIDAZOLE  500 mg Intravenous Q8H    sodium chloride flush  10 mL Intravenous BID    vancomycin  2,000 mg Intravenous Q18H    sodium chloride flush  10 mL Intravenous 2 times per day    metoprolol succinate  25 mg Oral BID    aspirin  81 mg Oral Daily    rivaroxaban  15 mg Oral Daily    influenza virus vaccine  0.5 mL Intramuscular Prior to discharge      Infusions:    dextrose       PRN Meds: potassium chloride, 20 mEq, PRN  potassium chloride, 40 mEq, PRN    Or  potassium alternative oral replacement, 40 mEq, PRN    Or  potassium chloride, 10 mEq, PRN  HYDROcodone 5 mg - acetaminophen, 1 tablet, Q6H PRN  sodium chloride flush, 10 mL, PRN  glucose, 15 g, PRN  dextrose, 12.5 g, PRN  glucagon (rDNA), 1 mg, PRN  dextrose, 100 mL/hr, PRN  HYDROmorphone, 0.5 mg, Q4H PRN  albuterol sulfate HFA, 2 puff, Q6H PRN          Electronically signed by Nasir Anderson MD on 12/3/2020 at 11:19 AM

## 2020-12-03 NOTE — PROGRESS NOTES
[address] [facility]  consulted by Dr. Gabbi Myers for monitoring and adjustment. Indication for treatment: Jarek's gangrene  Goal trough: 10 -15 mcg/mL (AUC/MINDY <500)      Pertinent Laboratory Values:   Temp Readings from Last 3 Encounters:   12/03/20 98.3 °F (36.8 °C) (Oral)   11/24/20 99.5 °F (37.5 °C)   11/23/20 97.6 °F (36.4 °C) (Temporal)     Recent Labs     12/01/20  0520 12/02/20  0500 12/03/20  0630   WBC 19.3* 18.4* 20.1*     Recent Labs     12/01/20  0520 12/02/20  0500 12/03/20  0630   BUN 12 8 9   CREATININE 0.6* 0.4* 0.6*     Estimated Creatinine Clearance: 156 mL/min (A) (based on SCr of 0.6 mg/dL (L)). Intake/Output Summary (Last 24 hours) at 12/3/2020 1049  Last data filed at 12/3/2020 0942  Gross per 24 hour   Intake 1945 ml   Output 2950 ml   Net -1005 ml       Pertinent Cultures:  Date                             Source                                     Results  11/24                           Tissue cx                                 Staph Hominis  11/24                           Blood                                       Negative  11/25                           Respiratory                              Pseudomonas, Proteus  11/29                           Surgical                                   Ecoli, MDRO    Vancomycin level:   TROUGH:    Recent Labs     12/03/20  1356   VANCOTROUGH 14.2     RANDOM:  No results for input(s): VANCORANDOM in the last 72 hours.     Assessment:  · WBC and temperature: WBC remains elevated, afebrile   · SCr, BUN, and urine output: returned to baseline   · Day(s) of therapy: 10   · Vancomycin level: 14.2, therapeutic     Plan:  · Vancomycin 2000mg q24h resulted in sub-therapeutic trough as renal function improved  · Dose increased to 2000mg q18h with a therapeutic trough   · Continue to monitor renal trends closely and repeat levels to monitor for accumulation  · Pharmacy will continue to monitor patient and adjust therapy as indicated    Thank you for the consult.   Saima Rain, PharmD, BCPS   12/3/2020 10:49 AM

## 2020-12-03 NOTE — PROGRESS NOTES
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 Luisa Woods, I&D of Jarek's gangrene  § -Surgical culture of buttocks: E.Coli  § Severe hypotension, Levophed onboard, weaned off      · DMII:  Dr. Alberto Nino onboard, Last [facility] 20-5. 2     ? Morbid Obesity: BMI 38.72     ? PHTN     ? Tobacco Abuse     ? Acute Encephalopathy     ? AF:  Dr. So Maxwell onboard     ? Acute Hypoxic Respiratory Failure:  § -respiratory culture Pseudomonas aeringinosa, Proteus mirabilis  § -RDP Negative  § Intubated , Extubated   § Dr. Carolynn Kramer onboard     ? PVD     · Multi-morbidity: per PMHx:  CHF, BLE chronic ulcers, DMII, HTN, PVD, venous stasis BLE, morbid obesity  Plan:   · DC cefepime and Flagyl  ? Continue vancomycin IV x 3 weeks duration (end date 12/15/20)  ? Start meropenem 1 gm IV q8h x 3 weeks (end date 20)  ? DW Dr. Luisa Woods regarding wound placement being so close to the anus, concern for wound healing, question of would patient benefit from a diverting colostomy as a temporary measure for wound healing? ? Trend CRP and Pct  ? WBC trended up today, ABX changes made     Ongoing Antimicrobial Therapy  Meropenem 12/3-  Vancomycin -  ? Completed Antimicrobial Therapy  Cipro   Clindamycin -  Piperacillin-tazobactam -? Flagyl -3  Cefepime -3  ? History:? Interval history noted. 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 Denies n/v/d/f or untoward effects of antibiotics. Resting quietly. Physical Exam:  Gen: alert and oriented X3, no distress  Skin: no stigmata of endocarditis  Wounds: C/D/I buttock/perineum with wound vac intact.   HEMT: AT/NC Oropharynx pink, moist, and without lesions or exudates, edentulous  Eyes: SHRUTI STERLING, conjunctiva pink, sclera anicteric. Neck: Supple. Trachea midline. No LAD. Chest: no distress and CTA. Good air movement. Heart: RRR and no MRG. Abd: soft, non-distended, no tenderness, no hepatomegaly. Normoactive bowel sounds. Ext: no clubbing, cyanosis, with non-pitting BLE edema  Catheter Site: without erythema or tenderness draining darren urine  PICC:  Right arm without erythema or edema  Neuro: Mental status intact. CN 2-12 intact and no focal sensory or motor deficits  11/24/20 CT Pelviw W Contrast:  Impression    Findings consistent with the provided history of developing Jarek's    gangrene with extensive involvement of the subcutaneous fatty tissues as    described and possible involvement of the inferolateral aspect of the right    gluteus mary which shows only increased density but no evidence of fluid    or gas.       11/24/20 XR Chest Portable:  Impression    1. Endotracheal tube tip terminates approximately 6.1 cm above the sabrina. 2. Left internal jugular central venous catheter tip likely terminates in the    brachiocephalic vein. 3. Cardiomegaly with perihilar vascular prominence. 4. Consolidative opacities in the lung bases with left pleural effusion.     The findings were sent to the Radiology Results [address] at 9:38    pm on 11/24/2020to be communicated to a licensed caregiver.       11/25/20 XR Chest Portable:  Impression    Stable small left pleural effusion with basilar atelectasis and/or    consolidation.       11/25/20 XR Abdomen (KUB):  Impression    Enteric tube tip projects at the gastric body with side hole projecting at    the GE junction.  This should be advanced by at least 3 cm.       11/25/20 CTA Pulmonary W Contrast:  Impression    Pulmonary arteries are only well evaluated to the lobar level.  No main or    lobar pulmonary embolism.  Segmental and subsegmental branch vessels are    poorly evaluated due to artifact.         Enlarged pulmonary arterial tree compatible with pulmonary hypertension.         Cardiomegaly with four-chamber enlargement.         Multifocal nodularity and consolidation throughout the lungs favored    secondary to multifocal pneumonia with reactive mediastinal lymphadenopathy.         Left lower lobe collapse which may be due to mucous plugging as the left    lower lobe airways are not well visualized.         Support lines and tubes appear appropriate in positioning.       11/25/20 VL Dup Lower Extremity Venous Bilateral:  Impression    Positive DVT involving the right posterior tibial vein below the knee.         No evidence of left lower extremity DVT.       11/26/20 XR Chest Portable:  Impression    1. Lines and tubes as described. 2. No significant interval change of cardiomegaly, dense retrocardiac    opacity, and bilateral effusions. 3. Pulmonary edema.       11/26/20 US Head Neck Soft Tissue Thyroid:  Impression    Limited exam due to patient being intubated, suboptimal positioning as well    as motion artifact.         Heterogeneous thyroid gland without dominant thyroid nodule      11/30/20 CT Abdomen Pelvis W IV Contrast:  Impression    1. Note: Study significantly limited by patient motion related artifact. 2. Moderate bilateral layering posterior pleural effusions. 3. Mild pulmonary interstitial edema within the visualized lower lungs. 4. Hepatic steatosis. 5. Cholelithiasis, as described above.       11/30/20 US Head Neck Soft TIssue Thyroid:  Impression    Bilateral thyroidal enlargement.         Small spongiform lesion in the right thyroid lobe measuring 3.9 x 5.8 x 3.9    mm.  It is of very low suspicion for thyroid malignancy.  No fine needle    aspiration is needed.         1 benign colloid cyst in the right thyroid lobe and 2 benign colloid cysts in    the left thyroid lobe.         Vital Signs: /70   Pulse 73   Temp 98.3 °F (36.8 °C) (Oral)   Resp 18   Ht 5' 9.02\" (1.753 m)   Wt 262 lb 5.6 oz (119 kg)   SpO2 99%   BMI 38.72 kg/m²     11/24/20 CT Pelviw W Contrast:  Impression    Findings consistent with the provided history of developing Jarek's    gangrene with extensive involvement of the subcutaneous fatty tissues as    described and possible involvement of the inferolateral aspect of the right    gluteus mary which shows only increased density but no evidence of fluid    or gas.       11/24/20 XR Chest Portable:  Impression    1. Endotracheal tube tip terminates approximately 6.1 cm above the sabrina. 2. Left internal jugular central venous catheter tip likely terminates in the    brachiocephalic vein. 3. Cardiomegaly with perihilar vascular prominence. 4. Consolidative opacities in the lung bases with left pleural effusion.     The findings were sent to the Radiology Results [address] at 9:38    pm on 11/24/2020to be communicated to a licensed caregiver.       11/25/20 XR Chest Portable:  Impression    Stable small left pleural effusion with basilar atelectasis and/or    consolidation.       11/25/20 XR Abdomen (KUB):  Impression    Enteric tube tip projects at the gastric body with side hole projecting at    the GE junction.  This should be advanced by at least 3 cm.       11/25/20 CTA Pulmonary W Contrast:  Impression    Pulmonary arteries are only well evaluated to the lobar level.  No main or    lobar pulmonary embolism.  Segmental and subsegmental branch vessels are    poorly evaluated due to artifact.         Enlarged pulmonary arterial tree compatible with pulmonary hypertension.         Cardiomegaly with four-chamber enlargement.         Multifocal nodularity and consolidation throughout the lungs favored    secondary to multifocal pneumonia with reactive mediastinal lymphadenopathy.         Left lower lobe collapse which may be due to mucous plugging as the left    lower lobe airways are not well visualized.         Support lines and tubes appear appropriate in positioning.       11/25/20 VL Dup Lower Extremity Venous Bilateral:  Impression    Positive DVT involving the right posterior tibial vein below the knee.         No evidence of left lower extremity DVT.       11/26/20 XR Chest Portable:  Impression    1. Lines and tubes as described. 2. No significant interval change of cardiomegaly, dense retrocardiac    opacity, and bilateral effusions. 3. Pulmonary edema.       11/26/20 US Head Neck Soft Tissue Thyroid:  Impression    Limited exam due to patient being intubated, suboptimal positioning as well    as motion artifact.         Heterogeneous thyroid gland without dominant thyroid nodule      11/30/20 CT Abdomen Pelvis W IV Contrast:  Impression    1. Note: Study significantly limited by patient motion related artifact. 2. Moderate bilateral layering posterior pleural effusions. 3. Mild pulmonary interstitial edema within the visualized lower lungs. 4. Hepatic steatosis. 5. Cholelithiasis, as described above.       11/30/20 US Head Neck Soft TIssue Thyroid:  Impression    Bilateral thyroidal enlargement.         Small spongiform lesion in the right thyroid lobe measuring 3.9 x 5.8 x 3.9    mm.  It is of very low suspicion for thyroid malignancy.  No fine needle    aspiration is needed.         1 benign colloid cyst in the right thyroid lobe and 2 benign colloid cysts in    the left thyroid lobe. Radiologic / Imaging / TESTING  11/24/20 CT Pelviw W Contrast:  Impression    Findings consistent with the provided history of developing Jarek's    gangrene with extensive involvement of the subcutaneous fatty tissues as    described and possible involvement of the inferolateral aspect of the right    gluteus mary which shows only increased density but no evidence of fluid    or gas.       11/24/20 XR Chest Portable:  Impression    1. Endotracheal tube tip terminates approximately 6.1 cm above the sabrina.     2. Left internal jugular central venous catheter tip likely terminates in the    brachiocephalic vein. 3. Cardiomegaly with perihilar vascular prominence. 4. Consolidative opacities in the lung bases with left pleural effusion. The findings were sent to the Radiology Results [address] at 9:38    pm on 11/24/2020to be communicated to a licensed caregiver.       11/25/20 XR Chest Portable:  Impression    Stable small left pleural effusion with basilar atelectasis and/or    consolidation.       11/25/20 XR Abdomen (KUB):  Impression    Enteric tube tip projects at the gastric body with side hole projecting at    the GE junction.  This should be advanced by at least 3 cm.       11/25/20 CTA Pulmonary W Contrast:  Impression    Pulmonary arteries are only well evaluated to the lobar level.  No main or    lobar pulmonary embolism.  Segmental and subsegmental branch vessels are    poorly evaluated due to artifact.         Enlarged pulmonary arterial tree compatible with pulmonary hypertension.         Cardiomegaly with four-chamber enlargement.         Multifocal nodularity and consolidation throughout the lungs favored    secondary to multifocal pneumonia with reactive mediastinal lymphadenopathy.         Left lower lobe collapse which may be due to mucous plugging as the left    lower lobe airways are not well visualized.         Support lines and tubes appear appropriate in positioning.       11/25/20 VL Dup Lower Extremity Venous Bilateral:  Impression    Positive DVT involving the right posterior tibial vein below the knee.         No evidence of left lower extremity DVT.       11/26/20 XR Chest Portable:  Impression    1. Lines and tubes as described. 2. No significant interval change of cardiomegaly, dense retrocardiac    opacity, and bilateral effusions.     3. Pulmonary edema.       11/26/20 US Head Neck Soft Tissue Thyroid:  Impression    Limited exam due to patient being intubated, suboptimal positioning as well    as motion artifact.         Heterogeneous thyroid gland without dominant thyroid nodule      11/30/20 CT Abdomen Pelvis W IV Contrast:  Impression    1. Note: Study significantly limited by patient motion related artifact. 2. Moderate bilateral layering posterior pleural effusions. 3. Mild pulmonary interstitial edema within the visualized lower lungs. 4. Hepatic steatosis. 5. Cholelithiasis, as described above.       11/30/20 US Head Neck Soft TIssue Thyroid:  Impression    Bilateral thyroidal enlargement.         Small spongiform lesion in the right thyroid lobe measuring 3.9 x 5.8 x 3.9    mm.  It is of very low suspicion for thyroid malignancy.  No fine needle    aspiration is needed.         1 benign colloid cyst in the right thyroid lobe and 2 benign colloid cysts in    the left thyroid lobe.            Labs:    Recent Results (from the past 24 hour(s))   POCT Glucose    Collection Time: 12/02/20  5:31 PM   Result Value Ref Range    POC Glucose 110 (H) 70 - 99 MG/DL   POCT Glucose    Collection Time: 12/02/20  9:00 PM   Result Value Ref Range    POC Glucose 105 (H) 70 - 99 MG/DL   POCT Glucose    Collection Time: 12/03/20  2:24 AM   Result Value Ref Range    POC Glucose 110 (H) 70 - 99 MG/DL   POCT Glucose    Collection Time: 12/03/20  6:06 AM   Result Value Ref Range    POC Glucose 104 (H) 70 - 99 MG/DL   CBC    Collection Time: 12/03/20  6:30 AM   Result Value Ref Range    WBC 20.1 (H) 4.0 - 10.5 K/CU MM    RBC 4.14 (L) 4.6 - 6.2 M/CU MM    Hemoglobin 12.9 (L) 13.5 - 18.0 GM/DL    Hematocrit 39.9 (L) 42 - 52 %    MCV 96.4 78 - 100 FL    MCH 31.2 (H) 27 - 31 PG    MCHC 32.3 32.0 - 36.0 %    RDW 14.4 11.7 - 14.9 %    Platelets 215 796 - 995 K/CU MM    MPV 11.0 7.5 - 11.1 FL   Comprehensive Metabolic Panel w/ Reflex to MG    Collection Time: 12/03/20  6:30 AM   Result Value Ref Range    Sodium 140 135 - 145 MMOL/L    Potassium 4.0 3.5 - 5.1 MMOL/L    Chloride 105 99 - 110 mMol/L    CO2 29 21 - 32 MMOL/L    BUN 9 6 - 23 MG/DL    CREATININE 0.6 (L) 0.9 - 1.3 MG/DL    Glucose 108 (H) 70 - 99 MG/DL    Calcium 8.0 (L) 8.3 - 10.6 MG/DL    Alb 2.6 (L) 3.4 - 5.0 GM/DL    Total Protein 5.5 (L) 6.4 - 8.2 GM/DL    Total Bilirubin 0.5 0.0 - 1.0 MG/DL    ALT 25 10 - 40 U/L    AST 21 15 - 37 IU/L    Alkaline Phosphatase 61 40 - 128 IU/L    GFR Non-African American >60 >60 mL/min/1.73m2    GFR African American >60 >60 mL/min/1.73m2    Anion Gap 6 4 - 16   POCT Glucose    Collection Time: 12/03/20  8:59 AM   Result Value Ref Range    POC Glucose 109 (H) 70 - 99 MG/DL   POCT Glucose    Collection Time: 12/03/20 12:10 PM   Result Value Ref Range    POC Glucose 100 (H) 70 - 99 MG/DL     CULTURE results: Invalid input(s): BLOOD CULTURE,  URINE CULTURE, SURGICAL CULTURE    Diagnosis:  Patient Active Problem List   Diagnosis    Venous stasis ulcer of both lower extremities without varicose veins (HCC)    WD-Ulcer of shin, left, with fat layer exposed ([address]    PVD (peripheral vascular disease) ([address]    Type 2 diabetes mellitus with diabetic peripheral angiopathy without gangrene, without long-term current use of insulin (HCC)    WD-Venous stasis of both lower extremities    WD-Lymphedema of both lower extremities    WD-Idiopathic chronic venous hypertension of left lower extremity with ulcer ([address]    WD-Traumatic open wound of left lower leg, complicated by diabetes and venous insufficiency    Gangrene of scrotum    Abscess of perineum    Cellulitis of perineum       Active Problems  Active Problems:    Gangrene of scrotum    Abscess of perineum    Cellulitis of perineum  Resolved Problems:    * No resolved hospital problems. *    Electronically signed by: Electronically signed by Tom Howard.  FAITH Campo - CNP on 12/3/2020 at 2:13 PM

## 2020-12-03 NOTE — PROGRESS NOTES
GENERAL SURGERY PROGRESS NOTE    Carmina Tavares is a 72 y.o. male POD 9 from Jarek's gangrene debridement. Subjective:  Remains afebrile. Pain well controlled. Denies complaints. VAC no longer in place due to soilage overnight. Objective:    Vitals: VITALS:  BP (!) 150/68   Pulse 61   Temp 98.3 °F (36.8 °C) (Oral)   Resp 18   Ht 5' 9.02\" (1.753 m)   Wt 262 lb 5.6 oz (119 kg)   SpO2 99%   BMI 38.72 kg/m²     I/O: 12/02 0701 - 12/03 0700  In: 4652 [P.O.:525; I.V.:720]  Out: 2950 [Urine:2950]    Labs/Imaging Results:   Lab Results   Component Value Date     12/03/2020    K 4.0 12/03/2020     12/03/2020    CO2 29 12/03/2020    BUN 9 12/03/2020    CREATININE 0.6 12/03/2020    GLUCOSE 108 12/03/2020    CALCIUM 8.0 12/03/2020      Lab Results   Component Value Date    WBC 20.1 (H) 12/03/2020    HGB 12.9 (L) 12/03/2020    HCT 39.9 (L) 12/03/2020    MCV 96.4 12/03/2020     12/03/2020       IV Fluids: dextrose    Scheduled Meds: potassium chloride, 20 mEq, Oral, BID    insulin glargine, 10 Units, Subcutaneous, Nightly    insulin lispro, 0-12 Units, Subcutaneous, TID WC    insulin lispro, 0-6 Units, Subcutaneous, 2 times per day    amLODIPine, 10 mg, Oral, Nightly    magnesium oxide, 400 mg, Oral, Daily    collagenase, , Topical, Daily    lisinopril, 40 mg, Oral, Daily    cefepime, 2 g, Intravenous, Q8H    metroNIDAZOLE, 500 mg, Intravenous, Q8H    sodium chloride flush, 10 mL, Intravenous, BID    vancomycin, 2,000 mg, Intravenous, Q18H    sodium chloride flush, 10 mL, Intravenous, 2 times per day    metoprolol succinate, 25 mg, Oral, BID    aspirin, 81 mg, Oral, Daily    rivaroxaban, 15 mg, Oral, Daily    influenza virus vaccine, 0.5 mL, Intramuscular, Prior to discharge    Physical Exam:  General: A&O mildly disoriented, no distress. HEENT: Anicteric sclerae, MMM. Extremities: Mild edema bilat LE.    Buttock: dressing in place this morning  Abdomen: Soft, nontender, nondistended. 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 Doing OK. Continued elevated of WBC although no other indication of ongoing infection. Wound has been stable. Patient Active Problem List:     Venous stasis ulcer of both lower extremities without varicose veins (HCC)     WD-Ulcer of shin, left, with fat layer exposed ([address]     PVD (peripheral vascular disease) ([address]     Type 2 diabetes mellitus with diabetic peripheral angiopathy without gangrene, without long-term current use of insulin (HCC)     WD-Venous stasis of both lower extremities     WD-Lymphedema of both lower extremities     WD-Idiopathic chronic venous hypertension of left lower extremity with ulcer ([address]     WD-Traumatic open wound of left lower leg, complicated by diabetes and venous insufficiency     Gangrene of scrotum     Abscess of perineum    -  on Xarelto for DVT  - bid and PRN wet to dry dressing changes--will continue these dressings as VAC will not maintain seal  -OK from a surgical standpoint for transfer to the floor and work on disposition. May need placement post hospitalization to accomplish wound cares.   - will continue to follow    Yana Walker MD  12/3/2020  8:33 AM

## 2020-12-03 NOTE — PROGRESS NOTES
Will cont to work towards pt's goals per his tolerance  Time in:  1300  Time out:  1355  Timed treatment minutes:  55  Total treatment time:  54  Previously filed items:  Social/Functional History  Lives With: Spouse(home all the time)  Type of Home: House  Home Layout: One level  Home Access: Stairs to enter without rails  Entrance Stairs - Number of Steps: 1  Bathroom Shower/Tub: Walk-in shower  Home Equipment: [employer], Oxygen(3 wheel walker, hover round. States he does not normally wear O2 but has it if he needs it)  ADL Assistance: Independent  Homemaking Assistance: Independent  Ambulation Assistance: Independent(PRN use of cane or walker)  Transfer Assistance: Independent  Active : Yes  Occupation: Retired  Type of occupation: [employer]  Short term goals  Time Frame for Short term goals: 1 week  Short term goal 1: Pt will perform sit><supine Ambika  Short term goal 2: Pt will transfer to all surfaces Ambika  Short term goal 3: Pt will ambulate 150ft with LRAD Ambika  Short term goal 4: Pt will ascend/descend 1 step, single rail SBA   Electronically signed by:     Maria C Storm PTA  12/3/2020, 1:44 PM

## 2020-12-03 NOTE — PROGRESS NOTES
Cardiology Progress Note       Erma Tavares is a 72 y.o. male   1955     SUBJECTIVE:   Patient seen and examined doing well discussed with nurse currently atrial fibrillation rate is well controlled  OBJECTIVE:    Review of Systems:  General appearance: alert, appears stated age and cooperative  Skin: Skin color, texture, normal. No rashes or lesions  HEENT: No nose bleed, headache, vision problems  CV: C/O chest pain, tightness, pressure,   Respiratory: C/o no SOB, PITTS, Orthopnea, PND  GI: No abdominal pain, black stool, bloating  Limbs: No c/o edema, pain, swelling, intermittent claudication, joint pains  Neuro: No dizziness, lightheadedness, syncope, gait problems, memory problems  Psych: grossly normal. No SI/depression. Vitals:   Blood pressure 134/72, pulse 73, temperature 98.3 °F (36.8 °C), temperature source Oral, resp. rate 18, height 5' 9.02\" (1.753 m), weight 262 lb 5.6 oz (119 kg), SpO2 99 %.     HEENT: AT, NC, PERRLA  Neck: No JVD  Heart: S1 S2 audible, no murmur   Lungs: Bilateral rhonchi  Abdomen: Nontender   Limbs: No edema   CNS: no focal deficit      Past Medical History:   Diagnosis Date    CHF (congestive heart failure) (HCC)     Chronic ulcer of left leg with fat layer exposed ([address] 12/12/2016    Chronic ulcer of right leg with fat layer exposed ([address] 12/12/2016    Chronic ulcer of right leg with fat layer exposed ([address] 12/12/2016    Diabetes mellitus ([address]     Hypertension     PVD (peripheral vascular disease) ([address] 12/12/2016    Type 2 diabetes mellitus with diabetic peripheral angiopathy without gangrene, without long-term current use of insulin ([address] 12/12/2016    Venous stasis ulcer of both lower extremities without varicose veins ([address] 12/12/2016        Patient Active Problem List   Diagnosis    Venous stasis ulcer of both lower extremities without varicose veins ([address]    WD-Ulcer of shin, left, with fat layer exposed ([address]    PVD (peripheral vascular disease) ([address]    Type 2 diabetes mellitus with diabetic peripheral angiopathy without gangrene, without long-term current use of insulin (HCC)    WD-Venous stasis of both lower extremities    WD-Lymphedema of both lower extremities    WD-Idiopathic chronic venous hypertension of left lower extremity with ulcer [facility]    WD-Traumatic open wound of left lower leg, complicated by diabetes and venous insufficiency    Gangrene of scrotum    Abscess of perineum    Cellulitis of perineum        No Known Allergies     Current Inpatient Medications:    Current Facility-Administered Medications   Medication Dose Route Frequency Provider Last Rate Last Dose    potassium chloride 20 mEq/50 mL IVPB (Central Line)  20 mEq Intravenous PRN Sue Lee MD        potassium chloride (KLOR-CON M) extended release tablet 40 mEq  40 mEq Oral PRN Sue Lee MD        Or    potassium bicarb-citric acid (EFFER-K) effervescent tablet 40 mEq  40 mEq Oral PRN Sue Lee MD        Or    potassium chloride 10 mEq/100 mL IVPB (Peripheral Line)  10 mEq Intravenous PRN Sue Lee MD        potassium chloride (KLOR-CON M) extended release tablet 20 mEq  20 mEq Oral BID Murtaza Yepez MD   20 mEq at 12/03/20 0905    insulin glargine (LANTUS) injection vial 10 Units  10 Units Subcutaneous Nightly Tanika Wolff MD        insulin lispro (HUMALOG) injection vial 0-12 Units  0-12 Units Subcutaneous TID WC Tanika Wolff MD   2 Units at 12/02/20 1208    insulin lispro (HUMALOG) injection vial 0-6 Units  0-6 Units Subcutaneous 2 times per day Tanika Wolff MD        amLODIPine (NORVASC) tablet 10 mg  10 mg Oral Nightly Rene Beach PA-C   10 mg at 12/02/20 2105    magnesium oxide (MAG-OX) tablet 400 mg  400 mg Oral Daily Rene Beach PA-C   400 mg at 12/03/20 9070    collagenase ointment   Topical Daily Jaden Montana MD        lisinopril (PRINIVIL;ZESTRIL) tablet 40 mg  40 mg Oral Daily Rene Beach PA-C   40 mg at 12/03/20 3773    HYDROcodone-acetaminophen (NORCO) 5-325 MG per tablet 1 tablet  1 tablet Oral Q6H PRN Daniel Thompson MD   1 tablet at 12/01/20 1824    cefepime (MAXIPIME) 2 g IVPB minibag  2 g Intravenous Q8H FAITH Nevarez - CNP   Stopped at 12/03/20 0941    metronidazole (FLAGYL) 500 mg in NaCl 100 mL IVPB premix  500 mg Intravenous Q8H Janet Wiley - CNP   Stopped at 12/03/20 4487    sodium chloride flush 0.9 % injection 10 mL  10 mL Intravenous BID Courtney Hope MD   10 mL at 12/03/20 0906    vancomycin (VANCOCIN) 2,000 mg in dextrose 5 % 500 mL IVPB  2,000 mg Intravenous Q18H Courtney Hope MD   Stopped at 12/03/20 0048    sodium chloride flush 0.9 % injection 10 mL  10 mL Intravenous 2 times per day Barry Console, DO   10 mL at 12/03/20 0906    sodium chloride flush 0.9 % injection 10 mL  10 mL Intravenous PRN Barry Console, DO        metoprolol succinate (TOPROL XL) extended release tablet 25 mg  25 mg Oral BID Abraham Kimball MD   25 mg at 12/03/20 4463    aspirin chewable tablet 81 mg  81 mg Oral Daily Abraham Kimball MD   81 mg at 12/03/20 7767    rivaroxaban (XARELTO) tablet 15 mg  15 mg Oral Daily Abraham Kimabll MD   15 mg at 12/02/20 1709    glucose (GLUTOSE) 40 % oral gel 15 g  15 g Oral PRN Daniel Thompson MD        dextrose 50 % IV solution  12.5 g Intravenous PRN Daniel Thompson MD        glucagon (rDNA) injection 1 mg  1 mg Intramuscular PRN Daniel Thompson MD        dextrose 5 % solution  100 mL/hr Intravenous PRN Daniel Thompson MD        HYDROmorphone (DILAUDID) injection 0.5 mg  0.5 mg Intravenous Q4H PRN Daniel Thompson MD   0.5 mg at 11/30/20 1353    influenza quadrivalent split vaccine (FLUZONE;FLUARIX;FLULAVAL;AFLURIA) injection 0.5 mL  0.5 mL Intramuscular Prior to discharge Courtney Hope MD        albuterol sulfate  (90 Base) MCG/ACT inhaler 2 puff  2 puff Inhalation Q6H PRN Nitza Mcintyre PA-C               Labs:  CBC with Differential:    Lab Results   Component Value Date    WBC 20.1 12/03/2020    RBC 4.14 12/03/2020    HGB 12.9 12/03/2020    HCT 39.9 12/03/2020     12/03/2020    MCV 96.4 12/03/2020    MCH 31.2 12/03/2020    MCHC 32.3 12/03/2020    RDW 14.4 12/03/2020    SEGSPCT 92.8 11/26/2020    BANDSPCT 38 11/24/2020    LYMPHOPCT 2.5 11/26/2020    PROMYELOPCT 1 11/24/2020    MONOPCT 3.0 11/26/2020    MYELOPCT 1 11/24/2020    BASOPCT 0.2 11/26/2020    MONOSABS 0.8 11/26/2020    LYMPHSABS 0.7 11/26/2020    EOSABS 0.0 11/26/2020    BASOSABS 0.1 11/26/2020    DIFFTYPE AUTOMATED DIFFERENTIAL 11/26/2020     CMP:    Lab Results   Component Value Date     12/03/2020    K 4.0 12/03/2020     12/03/2020    CO2 29 12/03/2020    BUN 9 12/03/2020    CREATININE 0.6 12/03/2020    GFRAA >60 12/03/2020    LABGLOM >60 12/03/2020    GLUCOSE 108 12/03/2020    PROT 5.5 12/03/2020    LABALBU 2.6 12/03/2020    CALCIUM 8.0 12/03/2020    BILITOT 0.5 12/03/2020    ALKPHOS 61 12/03/2020    AST 21 12/03/2020    ALT 25 12/03/2020     Hepatic Function Panel:    Lab Results   Component Value Date    ALKPHOS 61 12/03/2020    ALT 25 12/03/2020    AST 21 12/03/2020    PROT 5.5 12/03/2020    BILITOT 0.5 12/03/2020    LABALBU 2.6 12/03/2020     Magnesium:    Lab Results   Component Value Date    MG 1.5 12/02/2020     PT/INR:    Lab Results   Component Value Date    PROTIME 13.8 11/25/2020    INR 1.14 11/25/2020     Last 3 Troponin:  No results found for: TROPONINI  U/A:    Lab Results   Component Value Date    COLORU YELLOW 11/24/2020    WBCUA NONE SEEN 11/24/2020    RBCUA NONE SEEN 11/24/2020    MUCUS RARE 11/24/2020    TRICHOMONAS NONE SEEN 11/24/2020    BACTERIA NEGATIVE 11/24/2020    CLARITYU CLEAR 11/24/2020    SPECGRAV 1.014 11/24/2020    LEUKOCYTESUR NEGATIVE 11/24/2020    UROBILINOGEN 2.0 11/24/2020    BILIRUBINUR NEGATIVE 11/24/2020    BLOODU NEGATIVE 11/24/2020     ABG:    Lab Results   Component Value Date    SDW2GCA 34.0 11/27/2020 PO2ART 160 11/27/2020    SHF3TDH 25.3 11/27/2020     FLP:    Lab Results   Component Value Date    TRIG 155 02/28/2018    HDL 63 02/28/2018    LDLDIRECT 77 02/28/2018     TSH:  No results found for: TSH   DATA:   ECG: Sinus Rhythm       ASSESSMENT:   1 atrial fibrillation rate is well controlled will need long-term anticoagulation  2 septic shock resolving off inotropes  3 acute respiratory failure with pulmonary hypertension  4 hypertension  5 diabetes management hospitalist group  6 morbid obesity chronic due to excess caloric intake   PLAN   Eliquis 5 mg twice daily

## 2020-12-04 LAB
ALBUMIN SERPL-MCNC: 2.7 GM/DL (ref 3.4–5)
ALP BLD-CCNC: 63 IU/L (ref 40–128)
ALT SERPL-CCNC: 28 U/L (ref 10–40)
ANION GAP SERPL CALCULATED.3IONS-SCNC: 7 MMOL/L (ref 4–16)
AST SERPL-CCNC: 28 IU/L (ref 15–37)
BILIRUB SERPL-MCNC: 0.5 MG/DL (ref 0–1)
BUN BLDV-MCNC: 8 MG/DL (ref 6–23)
CALCIUM SERPL-MCNC: 8 MG/DL (ref 8.3–10.6)
CHLORIDE BLD-SCNC: 102 MMOL/L (ref 99–110)
CO2: 28 MMOL/L (ref 21–32)
CREAT SERPL-MCNC: 0.6 MG/DL (ref 0.9–1.3)
EKG ATRIAL RATE: 60 BPM
EKG DIAGNOSIS: NORMAL
EKG P AXIS: 46 DEGREES
EKG P-R INTERVAL: 122 MS
EKG Q-T INTERVAL: 480 MS
EKG QRS DURATION: 104 MS
EKG QTC CALCULATION (BAZETT): 480 MS
EKG R AXIS: -48 DEGREES
EKG T AXIS: 0 DEGREES
EKG VENTRICULAR RATE: 60 BPM
GFR AFRICAN AMERICAN: >60 ML/MIN/1.73M2
GFR NON-AFRICAN AMERICAN: >60 ML/MIN/1.73M2
GLUCOSE BLD-MCNC: 114 MG/DL (ref 70–99)
GLUCOSE BLD-MCNC: 120 MG/DL (ref 70–99)
GLUCOSE BLD-MCNC: 120 MG/DL (ref 70–99)
GLUCOSE BLD-MCNC: 87 MG/DL (ref 70–99)
GLUCOSE BLD-MCNC: 91 MG/DL (ref 70–99)
HCT VFR BLD CALC: 40.3 % (ref 42–52)
HEMOGLOBIN: 13.2 GM/DL (ref 13.5–18)
MCH RBC QN AUTO: 31.4 PG (ref 27–31)
MCHC RBC AUTO-ENTMCNC: 32.8 % (ref 32–36)
MCV RBC AUTO: 95.7 FL (ref 78–100)
PDW BLD-RTO: 14.6 % (ref 11.7–14.9)
PLATELET # BLD: 244 K/CU MM (ref 140–440)
PMV BLD AUTO: 11 FL (ref 7.5–11.1)
POTASSIUM SERPL-SCNC: 4.3 MMOL/L (ref 3.5–5.1)
RBC # BLD: 4.21 M/CU MM (ref 4.6–6.2)
SODIUM BLD-SCNC: 137 MMOL/L (ref 135–145)
TOTAL PROTEIN: 5.9 GM/DL (ref 6.4–8.2)
WBC # BLD: 22.5 K/CU MM (ref 4–10.5)

## 2020-12-04 PROCEDURE — 2580000003 HC RX 258: Performed by: NURSE PRACTITIONER

## 2020-12-04 PROCEDURE — 6360000002 HC RX W HCPCS: Performed by: NURSE PRACTITIONER

## 2020-12-04 PROCEDURE — 6370000000 HC RX 637 (ALT 250 FOR IP): Performed by: NURSE PRACTITIONER

## 2020-12-04 PROCEDURE — 6370000000 HC RX 637 (ALT 250 FOR IP): Performed by: PHYSICIAN ASSISTANT

## 2020-12-04 PROCEDURE — 97530 THERAPEUTIC ACTIVITIES: CPT

## 2020-12-04 PROCEDURE — 99024 POSTOP FOLLOW-UP VISIT: CPT | Performed by: SURGERY

## 2020-12-04 PROCEDURE — 2700000000 HC OXYGEN THERAPY PER DAY

## 2020-12-04 PROCEDURE — 93010 ELECTROCARDIOGRAM REPORT: CPT | Performed by: INTERNAL MEDICINE

## 2020-12-04 PROCEDURE — 6370000000 HC RX 637 (ALT 250 FOR IP): Performed by: INTERNAL MEDICINE

## 2020-12-04 PROCEDURE — 2580000003 HC RX 258: Performed by: SURGERY

## 2020-12-04 PROCEDURE — 6360000002 HC RX W HCPCS: Performed by: SURGERY

## 2020-12-04 PROCEDURE — 97535 SELF CARE MNGMENT TRAINING: CPT

## 2020-12-04 PROCEDURE — 2580000003 HC RX 258: Performed by: STUDENT IN AN ORGANIZED HEALTH CARE EDUCATION/TRAINING PROGRAM

## 2020-12-04 PROCEDURE — 82962 GLUCOSE BLOOD TEST: CPT

## 2020-12-04 PROCEDURE — 94761 N-INVAS EAR/PLS OXIMETRY MLT: CPT

## 2020-12-04 PROCEDURE — 99232 SBSQ HOSP IP/OBS MODERATE 35: CPT | Performed by: INTERNAL MEDICINE

## 2020-12-04 PROCEDURE — 85027 COMPLETE CBC AUTOMATED: CPT

## 2020-12-04 PROCEDURE — 97116 GAIT TRAINING THERAPY: CPT

## 2020-12-04 PROCEDURE — 99233 SBSQ HOSP IP/OBS HIGH 50: CPT | Performed by: NURSE PRACTITIONER

## 2020-12-04 PROCEDURE — 80053 COMPREHEN METABOLIC PANEL: CPT

## 2020-12-04 PROCEDURE — 1200000000 HC SEMI PRIVATE

## 2020-12-04 RX ORDER — METRONIDAZOLE 250 MG/1
500 TABLET ORAL EVERY 8 HOURS SCHEDULED
Status: COMPLETED | OUTPATIENT
Start: 2020-12-04 | End: 2020-12-04

## 2020-12-04 RX ADMIN — POTASSIUM CHLORIDE 20 MEQ: 1500 TABLET, EXTENDED RELEASE ORAL at 21:49

## 2020-12-04 RX ADMIN — MEROPENEM 2 G: 1 INJECTION, POWDER, FOR SOLUTION INTRAVENOUS at 06:23

## 2020-12-04 RX ADMIN — METRONIDAZOLE 500 MG: 250 TABLET ORAL at 21:48

## 2020-12-04 RX ADMIN — VANCOMYCIN HYDROCHLORIDE 2000 MG: 1 INJECTION, POWDER, LYOPHILIZED, FOR SOLUTION INTRAVENOUS at 10:49

## 2020-12-04 RX ADMIN — AMIODARONE HYDROCHLORIDE 200 MG: 200 TABLET ORAL at 08:43

## 2020-12-04 RX ADMIN — METOPROLOL SUCCINATE 25 MG: 25 TABLET, EXTENDED RELEASE ORAL at 08:43

## 2020-12-04 RX ADMIN — SODIUM CHLORIDE, PRESERVATIVE FREE 10 ML: 5 INJECTION INTRAVENOUS at 08:42

## 2020-12-04 RX ADMIN — METRONIDAZOLE 500 MG: 250 TABLET ORAL at 14:48

## 2020-12-04 RX ADMIN — COLLAGENASE SANTYL: 250 OINTMENT TOPICAL at 08:43

## 2020-12-04 RX ADMIN — AMLODIPINE BESYLATE 10 MG: 10 TABLET ORAL at 21:48

## 2020-12-04 RX ADMIN — RIVAROXABAN 15 MG: 15 TABLET, FILM COATED ORAL at 18:04

## 2020-12-04 RX ADMIN — HYDROCODONE BITARTRATE AND ACETAMINOPHEN 1 TABLET: 5; 325 TABLET ORAL at 20:28

## 2020-12-04 RX ADMIN — ASPIRIN 81 MG CHEWABLE TABLET 81 MG: 81 TABLET CHEWABLE at 08:43

## 2020-12-04 RX ADMIN — POTASSIUM CHLORIDE 20 MEQ: 1500 TABLET, EXTENDED RELEASE ORAL at 08:43

## 2020-12-04 RX ADMIN — METOPROLOL SUCCINATE 25 MG: 25 TABLET, EXTENDED RELEASE ORAL at 21:48

## 2020-12-04 RX ADMIN — MEROPENEM 2 G: 1 INJECTION, POWDER, FOR SOLUTION INTRAVENOUS at 16:16

## 2020-12-04 RX ADMIN — SODIUM CHLORIDE, PRESERVATIVE FREE 10 ML: 5 INJECTION INTRAVENOUS at 21:54

## 2020-12-04 RX ADMIN — AMIODARONE HYDROCHLORIDE 200 MG: 200 TABLET ORAL at 21:48

## 2020-12-04 RX ADMIN — LISINOPRIL 40 MG: 20 TABLET ORAL at 08:43

## 2020-12-04 RX ADMIN — MAGNESIUM OXIDE 400 MG (241.3 MG MAGNESIUM) TABLET 400 MG: TABLET at 14:48

## 2020-12-04 ASSESSMENT — PAIN SCALES - GENERAL: PAINLEVEL_OUTOF10: 10

## 2020-12-04 NOTE — PROGRESS NOTES
Occupational Therapy    Attempted at 0912 hours c patient eating breakfast. Plan is to re-attempt later this morning as schedule allows. Occupational Therapy Treatment Note  Name: Jessica Arora MRN: [id number] :   1955   Date:  2020   Admission Date: 2020 Room:  [address] [place] [address]   Restrictions/Precautions:    General precautions; Fall Risk    Communication with other providers:  Per chart review, patient is appropriate for therapeutic intervention. RN Jensen Ferrera. Partial co-Tx c PTA Lissett for safety. Subjective:  Patient states: \"They changed my dressing this morning and it hurt some, but I'm fine now. \" Pt agreeable to functional transfers / mobility, declined exercises. Pain:   Location, Type, Intensity (0/10 to 10/10):  0/10, denies pain initially, identified increased pain to 7/10 in buttocks c sitting 20 minutes. Objective:    Observation:   Pt received supine in bed. Objective Measures:  Telemetry, HR 71, O2 sat 92% on 2L O2 NC, Pepper catheter    Treatment, including education:  Therapeutic Activity Training:   Therapeutic activity training was instructed today. Cues were given for safety, sequence, UE/LE placement, awareness, and balance. Activities performed today included bed mobility training, sup-sit, sit-stand, SPT. Supine to sit: SBA   Sit to supine: SBA w/o bed features  Scooting: SBA + safety cue  Sit<>stands: CGA c RW + min vc's for safe body positioning, especially hand placement during stand to sits  Sitting balance / tolerance: After twenty minutes seated in bedside chair for ADLs, pt c/o of pain and need to transfer back to bed. SPT: CGA c RW + one vc for safe body positioning for this last transfer. Standing balance / tolerance: Multiple stands SBA static / CGA dynamic, tolerated >5 minutes  Functional Mobility: CGA c RW + SBA for medical lines / safety. Educated patient for breathing techniique and self-pacing c standing rest breaks PRN.      Self Care Training: Cues were given for safety, sequence, UE/LE placement, visual cues, and balance. Activities performed today included dressing, toileting, hand hygiene, and grooming. Grooming: Set up seated c emphasis on dynamic sitting balance / tolerance to perform hair care, oral care, and face washing and hand hygiene. All therapeutic intervention performed c emphasis on grooming tasks, dynamic balance / sitting and standing tolerance to inc strength, endurance and act tolerance for inc Indep c ADL tasks, func transfers / mobility. Safety  Patient safely in bed at end of session, with call light/phone in reach, and nursing aware. Gait belt used for func transfers / mobility. Masked in hallway. Nurse Pk Grider arrived to start IV. Assessment / Impression:        Patient's tolerance of treatment:  Well, managed brief SOB during functional mobility c use of breathing technique / rest break c quick recovery.    Adverse Reaction: None  Significant change in status and impact: None  Barriers to improvement:  Pain c sitting upright, shortness of breath c functional mobility, decreased safety    Plan for Next Session:    Continue per OT POC c plan to address functional transfers during ADL tasks    Time in:  1005  Time out:  1049  Timed treatment minutes:  44  Total treatment time:  44    Electronically signed by:    KVNG Maria  12/4/2020, 9:21 AM    Previously filed values:       Goals:  Pt goal: go home  Time Frame for STGs: discharge  Goal 1: Pt will perform UE ADLs Independent  Goal 2: Pt will perform LE ADLs Judy  Goal 3: Pt will perform toileting Judy  Goal 4: Pt will perform functional transfer w/ AD Judy  Goal 5: Pt will perform functional mobility w/ AD Judy  Goal 6: Pt will perform therex/theract in order to increase functional activity tolerance and dynamic standing balance

## 2020-12-04 NOTE — DISCHARGE INSTR - COC
Continuity of Care Form    Patient Name: Angelo Tavares   :  1955  MRN:  [id number]    Admit date:  2020  Discharge date:  ***    Code Status Order: Prior   Advance Directives:   [facility] Documentation       Date/Time Healthcare Directive Type of Healthcare Directive Copy in [address] Agent's Name Healthcare Agent's Phone Number    20 1435  No, patient does not have an advance directive for healthcare treatment -- -- -- -- --            Admitting Physician:  Boyd Colvin MD  PCP: FAITH Byoce CNP    Discharging Nurse: [facility] Unit/Room#: 6/6-A  Discharging Unit Phone Number: ***    Emergency Contact:   Extended Emergency Contact Information  Primary Emergency Contact: Pushpa Tavares  Address: [address] [place] [address], [address] [place] [address] Phone: [phone]  Mobile Phone: [phone]  Relation: Spouse    Past Surgical History:  Past Surgical History:   Procedure Laterality Date    RECTAL SURGERY N/A 2020    RECTAL PERIRECTAL INCISION AND DRAINAGE performed by Melissa Singleton MD at [facility] OR       Immunization History:   Immunization History   Administered Date(s) Administered    Tdap (Boostrix, Adacel) 10/04/2020       Active Problems:  Patient Active Problem List   Diagnosis Code    Venous stasis ulcer of both lower extremities without varicose veins (HCC) I87.2, L97.919, L97.929    WD-Ulcer of shin, left, with fat layer exposed ([address] L27.189    PVD (peripheral vascular disease) ([address] I73.9    Type 2 diabetes mellitus with diabetic peripheral angiopathy without gangrene, without long-term current use of insulin (HCC) E11.51    WD-Venous stasis of both lower extremities I87.8    WD-Lymphedema of both lower extremities I89.0    WD-Idiopathic chronic venous hypertension of left lower extremity with ulcer ([address] I87.312, L97.929    WD-Traumatic open wound of left 12/02/20 1400   Odor None 12/02/20 1400   Number of days: 1       Wound 10/16/20 Pretibial Left #1 Left Lateral Lower Leg (Active)   Wound Etiology Venous 12/03/20 0801   Dressing Status Clean;Dry; Intact 12/03/20 2123   Wound Cleansed Cleansed with saline 12/03/20 0801   Dressing/Treatment Dry dressing 12/03/20 2123   Wound Length (cm) 3.5 cm 12/02/20 1400   Wound Width (cm) 2 cm 12/02/20 1400   Wound Depth (cm) 0.2 cm 12/02/20 1400   Wound Surface Area (cm^2) 7 cm^2 12/02/20 1400   Change in Wound Size % (l*w) 72.87 12/02/20 1400   Wound Volume (cm^3) 1.4 cm^3 12/02/20 1400   Wound Healing % 46 12/02/20 1400   Distance Tunneling (cm) 0 cm 12/02/20 1400   Tunneling Position ___ O'Clock 0 12/02/20 1400   Undermining Starts ___ O'Clock 0 12/02/20 1400   Undermining Ends___ O'Clock 0 12/02/20 1400   Undermining Maxium Distance (cm) 0 12/02/20 1400   Wound Assessment Granulation tissue;Slough 12/03/20 2123   Drainage Amount Scant 12/03/20 2123   Drainage Description Serosanguinous 12/03/20 2123   Odor None 12/02/20 1400   Merary-wound Assessment Intact 12/03/20 2123   Margins Defined edges 12/02/20 1400   Wound Thickness Description not for Pressure Injury Full thickness 12/02/20 1400   Number of days: 49       Wound 11/25/20 Scrotum perineum (Active)   Wound Etiology Non-Healing Surgical 12/03/20 0801   Dressing Status Clean;Dry; Intact 12/03/20 2123   Wound Cleansed Cleansed with saline 12/03/20 0801   Dressing/Treatment Negative pressure wound therapy 12/02/20 2100   Wound Length (cm) 32.5 cm 12/02/20 1400   Wound Width (cm) 6.5 cm 12/02/20 1400   Wound Depth (cm) 6 cm 12/02/20 1400   Wound Surface Area (cm^2) 211.25 cm^2 12/02/20 1400   Change in Wound Size % (l*w) -69 12/02/20 1400   Wound Volume (cm^3) 1267.5 cm^3 12/02/20 1400   Wound Healing % -91 12/02/20 1400   Distance Tunneling (cm) 0 cm 12/02/20 1400   Tunneling Position ___ O'Clock 0 12/02/20 1400   Undermining Starts ___ O'Clock 10 12/02/20 1400 {Done Not Done [employer] [facility] [employer]:[id number]}    Treatments at the Time of Hospital Discharge:   Respiratory Treatments: ***  Oxygen Therapy:  {Therapy; copd oxygen:19271}  Ventilator:    {MH CC Vent [facility]:[id number]}    Rehab Therapies: Physical Therapy  Weight Bearing Status/Restrictions: No weight bearing restirctions  Other Medical Equipment (for information only, NOT a DME order):  none  Other Treatments:   Antibiotics per Infectious Disease  Continue vancomycin IV x 3 weeks duration (end date 12/15/20)  Continue meropenem 1 gm IV q8h extended infusion x 3 weeks (end date 12/23/20)      Prognosis: Good    Condition at Discharge: Stable    Rehab Potential (if transferring to Rehab): Good    Recommended Labs or Other Treatments After Discharge: Trend CRP and Procalcitonin    Physician Certification: I certify the above information and transfer of Alanis Huertas S Coffee  is necessary for the continuing treatment of the diagnosis listed and that he requires LTAC for greater 30 days.      Update Admission H&P: No change in H&P    PHYSICIAN SIGNATURE:  Electronically signed by Judith Correa MD on 12/6/20 at 11:24 AM EST

## 2020-12-04 NOTE — PROGRESS NOTES
Rhythm change noted on tele monitor. EKG obtained. Hussein Espinoza NP and Dr. Beata Nicolas notified. They contacted Dr. Leeann Mock from Cardiology. Amiodarone added. Labs drawn to check potassium and magnesium levels. Will continue to monitor.

## 2020-12-04 NOTE — PROGRESS NOTES
EKG showed sinus arrhythmias. Also reviewed by Dr Pat Bartlett. He ordered amiodarone 200mg bid. He states he will see patient tomorrow. Patient already on xarelto.

## 2020-12-04 NOTE — PROGRESS NOTES
GENERAL SURGERY PROGRESS NOTE    Christopher Tavares is a 72 y.o. 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 Subjective:  Remains afebrile. Pain well controlled. Denies complaints. Objective:    Vitals: VITALS:  BP (!) 155/68   Pulse 65   Temp 98.9 °F (37.2 °C)   Resp 22   Ht 5' 9.02\" (1.753 m)   Wt 262 lb 5.6 oz (119 kg)   SpO2 97%   BMI 38.72 kg/m²     I/O: 12/03 0701 - 12/04 0700  In: 1440 [P.O.:670; I.V.:20]  Out: 2725 [Urine:2725]    Labs/Imaging Results:   Lab Results   Component Value Date     12/04/2020    K 4.3 12/04/2020     12/04/2020    CO2 28 12/04/2020    BUN 8 12/04/2020    CREATININE 0.6 12/04/2020    GLUCOSE 87 12/04/2020    CALCIUM 8.0 12/04/2020      Lab Results   Component Value Date    WBC 22.5 (H) 12/04/2020    HGB 13.2 (L) 12/04/2020    HCT 40.3 (L) 12/04/2020    MCV 95.7 12/04/2020     12/04/2020       IV Fluids: dextrose    Scheduled Meds:   meropenem, 2 g, Intravenous, Q8H    amiodarone, 200 mg, Oral, BID    potassium chloride, 20 mEq, Oral, BID    insulin glargine, 10 Units, Subcutaneous, Nightly    insulin lispro, 0-12 Units, Subcutaneous, TID WC    insulin lispro, 0-6 Units, Subcutaneous, 2 times per day    amLODIPine, 10 mg, Oral, Nightly    magnesium oxide, 400 mg, Oral, Daily    collagenase, , Topical, Daily    lisinopril, 40 mg, Oral, Daily    sodium chloride flush, 10 mL, Intravenous, BID    vancomycin, 2,000 mg, Intravenous, Q18H    sodium chloride flush, 10 mL, Intravenous, 2 times per day    metoprolol succinate, 25 mg, Oral, BID    aspirin, 81 mg, Oral, Daily    rivaroxaban, 15 mg, Oral, Daily    influenza virus vaccine, 0.5 mL, Intramuscular, Prior to discharge    Physical Exam:  General: A&O mildly disoriented, no distress. HEENT: Anicteric sclerae, MMM. Extremities: Mild edema bilat LE.    Buttock: dressing changed today, wound is clean for the most part, small amount of fibrinous material/necrotic slough trimmed from the posterior aspect of the wound with dressing changes. Abdomen: Soft, nontender, nondistended. 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 Doing OK. Wound stable with slow improvement. I discussed the option of diverting colostomy with Alfonso Durán. Colostomy would help limit fecal soilage of the wound but formation and takedown in the future may be somewhat difficult due to his body habitus. Risks and benefits were discussed. Alfonso Durán reports having BMs once per day in the mornings the past couple of days, he is not having frequent stools. The wound is showing progress and no evidence decline due to soilage on my exams. At this point I feel we can hold off on diverting colostomy and continue with local wound cares. Will continue to reassess and can consider colostomy in the future if the situation changes. Patient Active Problem List:     Venous stasis ulcer of both lower extremities without varicose veins (HCC)     WD-Ulcer of shin, left, with fat layer exposed ([address]     PVD (peripheral vascular disease) ([address]     Type 2 diabetes mellitus with diabetic peripheral angiopathy without gangrene, without long-term current use of insulin (HCC)     WD-Venous stasis of both lower extremities     WD-Lymphedema of both lower extremities     WD-Idiopathic chronic venous hypertension of left lower extremity with ulcer ([address]     WD-Traumatic open wound of left lower leg, complicated by diabetes and venous insufficiency     Gangrene of scrotum     Abscess of perineum    -  on Xarelto for DVT  - bid and PRN wet to dry dressing changes--will continue these dressings as VAC will not maintain seal  -OK from a surgical standpoint for transfer to the floor and work on disposition. Will need placement post hospitalization to accomplish wound cares.   - will continue to follow    Laura Saravia MD  12/4/2020  9:17 AM

## 2020-12-04 NOTE — PROGRESS NOTES
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 Ale Redman, I&D of Jarek's gangrene  § -Surgical culture of buttocks: E.Coli  § Severe hypotension, Levophed onboard, weaned off      · DMII:  Dr. Lawrence Soto onboard, Last [facility] 20-5. 2     ? Morbid Obesity: BMI 38.72     ? PHTN     ? Tobacco Abuse     ? Acute Encephalopathy     ? AF:  Dr. Eugene Cherry onboard     ? Acute Hypoxic Respiratory Failure:  § -respiratory culture Pseudomonas aeringinosa, Proteus mirabilis  § -RDP Negative  § Intubated , Extubated   § Dr. Keyla Edmond onboard     ? PVD     · Multi-morbidity: per PMHx:  CHF, BLE chronic ulcers, DMII, HTN, PVD, venous stasis BLE, morbid obesity  Plan:   ? Continue vancomycin IV x 3 weeks duration (end date 12/15/20)  ? Continue meropenem 1 gm IV q8h extended infusion x 3 weeks (end date 20)  ? Start Flagyl 500 mg po q8h  ? DW Dr. Ale Redman regarding possible diverting colostomy for wound healing, will wait for now since wound tissue looks good, DW patient also, if healing becomes an issue, then will revisit   ? Trend CRP and Pct  ? WBC trended up, Flagyl added, will reassess      Ongoing Antimicrobial Therapy  Meropenem 12/3-  Vancomycin -  Flagyl -  ? Completed Antimicrobial Therapy  Cipro   Clindamycin -  Piperacillin-tazobactam -? Flagyl -3  Cefepime -3  ? History:? Interval history noted. 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 Denies n/v/d/f or untoward effects of antibiotics. Resting quietly. Physical Exam:  Gen: alert and oriented X3, no distress  Skin: no stigmata of endocarditis  Wounds: C/D/I buttock/perineum with wound vac intact.   HEMT: AT/NC Oropharynx pink, moist, and without lesions or exudates, edentulous  Eyes: PERRLA, EOMI, conjunctiva pink, sclera anicteric. Neck: Supple. Trachea midline. No LAD. Chest: no distress and CTA. Good air movement. Heart: RRR and no MRG. Abd: soft, non-distended, no tenderness, no hepatomegaly. Normoactive bowel sounds. Ext: no clubbing, cyanosis, with non-pitting BLE edema  Catheter Site: without erythema or tenderness draining darren urine  PICC:  Right arm without erythema or edema  Neuro: Mental status intact. CN 2-12 intact and no focal sensory or motor deficits     11/24/20 CT Pelviw W Contrast:  Impression    Findings consistent with the provided history of developing Jarek's    gangrene with extensive involvement of the subcutaneous fatty tissues as    described and possible involvement of the inferolateral aspect of the right    gluteus mary which shows only increased density but no evidence of fluid    or gas.       11/24/20 XR Chest Portable:  Impression    1. Endotracheal tube tip terminates approximately 6.1 cm above the sabrina. 2. Left internal jugular central venous catheter tip likely terminates in the    brachiocephalic vein. 3. Cardiomegaly with perihilar vascular prominence. 4. Consolidative opacities in the lung bases with left pleural effusion.     The findings were sent to the Radiology Results [address] at 9:38    pm on 11/24/2020to be communicated to a licensed caregiver.       11/25/20 XR Chest Portable:  Impression    Stable small left pleural effusion with basilar atelectasis and/or    consolidation.       11/25/20 XR Abdomen (KUB):  Impression    Enteric tube tip projects at the gastric body with side hole projecting at    the GE junction.  This should be advanced by at least 3 cm.       11/25/20 CTA Pulmonary W Contrast:  Impression    Pulmonary arteries are only well evaluated to the lobar level.  No main or    lobar pulmonary embolism.  Segmental and subsegmental branch vessels are    poorly evaluated due to artifact.         Enlarged pulmonary arterial tree compatible with pulmonary hypertension.         Cardiomegaly with four-chamber enlargement.         Multifocal nodularity and consolidation throughout the lungs favored    secondary to multifocal pneumonia with reactive mediastinal lymphadenopathy.         Left lower lobe collapse which may be due to mucous plugging as the left    lower lobe airways are not well visualized.         Support lines and tubes appear appropriate in positioning.       11/25/20 VL Dup Lower Extremity Venous Bilateral:  Impression    Positive DVT involving the right posterior tibial vein below the knee.         No evidence of left lower extremity DVT.       11/26/20 XR Chest Portable:  Impression    1. Lines and tubes as described. 2. No significant interval change of cardiomegaly, dense retrocardiac    opacity, and bilateral effusions. 3. Pulmonary edema.       11/26/20 US Head Neck Soft Tissue Thyroid:  Impression    Limited exam due to patient being intubated, suboptimal positioning as well    as motion artifact.         Heterogeneous thyroid gland without dominant thyroid nodule      11/30/20 CT Abdomen Pelvis W IV Contrast:  Impression    1. Note: Study significantly limited by patient motion related artifact. 2. Moderate bilateral layering posterior pleural effusions. 3. Mild pulmonary interstitial edema within the visualized lower lungs. 4. Hepatic steatosis. 5. Cholelithiasis, as described above.       11/30/20 US Head Neck Soft TIssue Thyroid:  Impression    Bilateral thyroidal enlargement.         Small spongiform lesion in the right thyroid lobe measuring 3.9 x 5.8 x 3.9    mm.  It is of very low suspicion for thyroid malignancy.  No fine needle    aspiration is needed.         1 benign colloid cyst in the right thyroid lobe and 2 benign colloid cysts in    the left thyroid lobe.         Vital Signs: BP (!) 155/68   Pulse 65   Temp 98.9 °F (37.2 °C)   Resp 22 Ht 5' 9.02\" (1.753 m)   Wt 262 lb 5.6 oz (119 kg)   SpO2 97%   BMI 38.72 kg/m²     11/24/20 CT Pelviw W Contrast:  Impression    Findings consistent with the provided history of developing Jarek's    gangrene with extensive involvement of the subcutaneous fatty tissues as    described and possible involvement of the inferolateral aspect of the right    gluteus mary which shows only increased density but no evidence of fluid    or gas.       11/24/20 XR Chest Portable:  Impression    1. Endotracheal tube tip terminates approximately 6.1 cm above the sabrina. 2. Left internal jugular central venous catheter tip likely terminates in the    brachiocephalic vein. 3. Cardiomegaly with perihilar vascular prominence. 4. Consolidative opacities in the lung bases with left pleural effusion.     The findings were sent to the Radiology Results [address] at 9:38    pm on 11/24/2020to be communicated to a licensed caregiver.       11/25/20 XR Chest Portable:  Impression    Stable small left pleural effusion with basilar atelectasis and/or    consolidation.       11/25/20 XR Abdomen (KUB):  Impression    Enteric tube tip projects at the gastric body with side hole projecting at    the GE junction.  This should be advanced by at least 3 cm.       11/25/20 CTA Pulmonary W Contrast:  Impression    Pulmonary arteries are only well evaluated to the lobar level.  No main or    lobar pulmonary embolism.  Segmental and subsegmental branch vessels are    poorly evaluated due to artifact.         Enlarged pulmonary arterial tree compatible with pulmonary hypertension.         Cardiomegaly with four-chamber enlargement.         Multifocal nodularity and consolidation throughout the lungs favored    secondary to multifocal pneumonia with reactive mediastinal lymphadenopathy.         Left lower lobe collapse which may be due to mucous plugging as the left    lower lobe airways are not well visualized.      the sabrina. 2. Left internal jugular central venous catheter tip likely terminates in the    brachiocephalic vein. 3. Cardiomegaly with perihilar vascular prominence. 4. Consolidative opacities in the lung bases with left pleural effusion. The findings were sent to the Radiology Results [address] at 9:38    pm on 11/24/2020to be communicated to a licensed caregiver.       11/25/20 XR Chest Portable:  Impression    Stable small left pleural effusion with basilar atelectasis and/or    consolidation.       11/25/20 XR Abdomen (KUB):  Impression    Enteric tube tip projects at the gastric body with side hole projecting at    the GE junction.  This should be advanced by at least 3 cm.       11/25/20 CTA Pulmonary W Contrast:  Impression    Pulmonary arteries are only well evaluated to the lobar level.  No main or    lobar pulmonary embolism.  Segmental and subsegmental branch vessels are    poorly evaluated due to artifact.         Enlarged pulmonary arterial tree compatible with pulmonary hypertension.         Cardiomegaly with four-chamber enlargement.         Multifocal nodularity and consolidation throughout the lungs favored    secondary to multifocal pneumonia with reactive mediastinal lymphadenopathy.         Left lower lobe collapse which may be due to mucous plugging as the left    lower lobe airways are not well visualized.         Support lines and tubes appear appropriate in positioning.       11/25/20 VL Dup Lower Extremity Venous Bilateral:  Impression    Positive DVT involving the right posterior tibial vein below the knee.         No evidence of left lower extremity DVT.       11/26/20 XR Chest Portable:  Impression    1. Lines and tubes as described. 2. No significant interval change of cardiomegaly, dense retrocardiac    opacity, and bilateral effusions.     3. Pulmonary edema.       11/26/20 US Head Neck Soft Tissue Thyroid:  Impression    Limited exam due to patient being intubated, suboptimal positioning as well    as motion artifact.         Heterogeneous thyroid gland without dominant thyroid nodule      11/30/20 CT Abdomen Pelvis W IV Contrast:  Impression    1. Note: Study significantly limited by patient motion related artifact. 2. Moderate bilateral layering posterior pleural effusions. 3. Mild pulmonary interstitial edema within the visualized lower lungs. 4. Hepatic steatosis. 5. Cholelithiasis, as described above.       11/30/20 US Head Neck Soft TIssue Thyroid:  Impression    Bilateral thyroidal enlargement.         Small spongiform lesion in the right thyroid lobe measuring 3.9 x 5.8 x 3.9    mm.  It is of very low suspicion for thyroid malignancy.  No fine needle    aspiration is needed.         1 benign colloid cyst in the right thyroid lobe and 2 benign colloid cysts in    the left thyroid lobe.            Labs:    Recent Results (from the past 24 hour(s))   POCT Glucose    Collection Time: 12/03/20  5:40 PM   Result Value Ref Range    POC Glucose 141 (H) 70 - 99 MG/DL   POCT Glucose    Collection Time: 12/03/20  9:07 PM   Result Value Ref Range    POC Glucose 87 70 - 99 MG/DL   EKG 12 Lead    Collection Time: 12/03/20  9:27 PM   Result Value Ref Range    Ventricular Rate 60 BPM    Atrial Rate 60 BPM    P-R Interval 122 ms    QRS Duration 104 ms    Q-T Interval 480 ms    QTc Calculation (Bazett) 480 ms    P Axis 46 degrees    R Axis -48 degrees    T Axis 0 degrees    Diagnosis       Sinus rhythm with marked sinus arrhythmia  Pulmonary disease pattern  Left anterior fascicular block  Prolonged QT  Abnormal ECG  When compared with ECG of 24-NOV-2020 21:29,  Significant changes have occurred     Magnesium    Collection Time: 12/03/20  9:45 PM   Result Value Ref Range    Magnesium 2.1 1.8 - 2.4 mg/dl   Potassium    Collection Time: 12/03/20  9:45 PM   Result Value Ref Range    Potassium 4.0 3.5 - 5.1 MMOL/L   POCT Glucose    Collection Time: 12/04/20  2:13 AM Result Value Ref Range    POC Glucose 91 70 - 99 MG/DL   CBC    Collection Time: 12/04/20  2:16 AM   Result Value Ref Range    WBC 22.5 (H) 4.0 - 10.5 K/CU MM    RBC 4.21 (L) 4.6 - 6.2 M/CU MM    Hemoglobin 13.2 (L) 13.5 - 18.0 GM/DL    Hematocrit 40.3 (L) 42 - 52 %    MCV 95.7 78 - 100 FL    MCH 31.4 (H) 27 - 31 PG    MCHC 32.8 32.0 - 36.0 %    RDW 14.6 11.7 - 14.9 %    Platelets 020 727 - 499 K/CU MM    MPV 11.0 7.5 - 11.1 FL   Comprehensive Metabolic Panel w/ Reflex to MG    Collection Time: 12/04/20  2:16 AM   Result Value Ref Range    Sodium 137 135 - 145 MMOL/L    Potassium 4.3 3.5 - 5.1 MMOL/L    Chloride 102 99 - 110 mMol/L    CO2 28 21 - 32 MMOL/L    BUN 8 6 - 23 MG/DL    CREATININE 0.6 (L) 0.9 - 1.3 MG/DL    Glucose 87 70 - 99 MG/DL    Calcium 8.0 (L) 8.3 - 10.6 MG/DL    Alb 2.7 (L) 3.4 - 5.0 GM/DL    Total Protein 5.9 (L) 6.4 - 8.2 GM/DL    Total Bilirubin 0.5 0.0 - 1.0 MG/DL    ALT 28 10 - 40 U/L    AST 28 15 - 37 IU/L    Alkaline Phosphatase 63 40 - 128 IU/L    GFR Non-African American >60 >60 mL/min/1.73m2    GFR African American >60 >60 mL/min/1.73m2    Anion Gap 7 4 - 16   POCT Glucose    Collection Time: 12/04/20 11:52 AM   Result Value Ref Range    POC Glucose 120 (H) 70 - 99 MG/DL     CULTURE results: Invalid input(s): BLOOD CULTURE,  URINE CULTURE, SURGICAL CULTURE    Diagnosis:  Patient Active Problem List   Diagnosis    Venous stasis ulcer of both lower extremities without varicose veins (HCC)    WD-Ulcer of shin, left, with fat layer exposed ([facility]    PVD (peripheral vascular disease) ([facility]    Type 2 diabetes mellitus with diabetic peripheral angiopathy without gangrene, without long-term current use of insulin (HCC)    WD-Venous stasis of both lower extremities    WD-Lymphedema of both lower extremities    WD-Idiopathic chronic venous hypertension of left lower extremity with ulcer ([facility]    WD-Traumatic open wound of left lower leg, complicated by diabetes and venous insufficiency  Gangrene of scrotum    Abscess of perineum    Cellulitis of perineum       Active Problems  Active Problems:    Gangrene of scrotum    Abscess of perineum    Cellulitis of perineum  Resolved Problems:    * No resolved hospital problems. *    Electronically signed by: Electronically signed by FAITH Velasquez CNP on 12/4/2020 at 2:41 PM

## 2020-12-04 NOTE — PROGRESS NOTES
Pulmonary and Critical Care  Progress Note      VITALS:  BP (!) 155/68   Pulse 65   Temp 98.9 °F (37.2 °C)   Resp 22   Ht 5' 9.02\" (1.753 m)   Wt 262 lb 5.6 oz (119 kg)   SpO2 97%   BMI 38.72 kg/m²     Subjective:   CHIEF COMPLAINT :Perineal pain     HPI:                The patient is lying in the bed. He is not in acute reps distress    Objective:   PHYSICAL EXAM:    LUNGS:decreased air entry bilateral bases  Abd-soft, BS+  Ext - no pedal edema  CVS-s1s2, no murmurs      DATA:    CBC:  Recent Labs     12/02/20  0500 12/03/20  0630 12/04/20  0216   WBC 18.4* 20.1* 22.5*   RBC 4.00* 4.14* 4.21*   HGB 12.3* 12.9* 13.2*   HCT 39.6* 39.9* 40.3*    218 244   MCV 99.0 96.4 95.7   MCH 30.8 31.2* 31.4*   MCHC 31.1* 32.3 32.8   RDW 14.4 14.4 14.6      BMP:  Recent Labs     12/02/20  0500 12/03/20  0630 12/03/20  2145 12/04/20  0216    140  --  137   K 2.8* 4.0 4.0 4.3    105  --  102   CO2 22 29  --  28   BUN 8 9  --  8   CREATININE 0.4* 0.6*  --  0.6*   CALCIUM 5.8* 8.0*  --  8.0*   GLUCOSE 115* 108*  --  87      ABG:  No results for input(s): PH, PO2ART, AEI4MZW, HCO3, BEART, O2SAT in the last 72 hours. BNP  No results found for: BNP   D-Dimer:  Lab Results   Component Value Date    DDIMER 1694 (H) 11/25/2020      1.  Radiology: None      Assessment/Plan     Patient Active Problem List    Diagnosis Date Noted    Cellulitis of perineum     Gangrene of scrotum 11/24/2020    Abscess of perineum     WD-Traumatic open wound of left lower leg, complicated by diabetes and venous insufficiency 10/08/2020    WD-Idiopathic chronic venous hypertension of left lower extremity with ulcer ([facility] [address] 04/21/2020    WD-Venous stasis of both lower extremities 04/14/2020    WD-Lymphedema of both lower extremities 04/14/2020    Venous stasis ulcer of both lower extremities without varicose veins ([address] 12/12/2016    WD-Ulcer of shin, left, with fat layer exposed ([address] 12/12/2016    PVD (peripheral vascular disease) ([facility] 12/12/2016    Type 2 diabetes mellitus with diabetic peripheral angiopathy without gangrene, without long-term current use of insulin ([facility] 12/12/2016     Leukocytosis- improving  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 JAMA  Septic shock- resolved  Hypoxia  VDRF s/p extubation  COPD  RLE below knee DVT  Anxiety-better  Multi drug resistant Ecoli sepsis       1. abx  2. BIPAP prn  3. Inhalers  4. Wound care  5. PT/OT  6. Await placement  7.  C/w present managment    Electronically signed by Marjorie Phan MD on 12/4/2020 at 12:04 PM negative -  no rash

## 2020-12-04 NOTE — PROGRESS NOTES
Progress Note( Dr. Princess Meeks)    Subjective:   Admit Date: 11/24/2020  PCP: FAITH Almeida CNP    I saw the patient on throat 12/3/2020 but made the note later    Admitted For :Perineal abscess //Jarek gangrene   Incision drainage and debridement of Jarek gangrene on 11/24/2020    Consulted For:  better control of blood glucose    Interval History: Patient i is extubated and off ventilator awake and alert    On is also hyperthyroid with high serum free T4 and low TSH    Intake/Output Summary (Last 24 hours) at 12/4/2020 1318  Last data filed at 12/4/2020 0314  Gross per 24 hour   Intake 800 ml   Output 2725 ml   Net -1925 ml       DATA    CBC:   Recent Labs     12/02/20  0500 12/03/20  0630 12/04/20  0216   WBC 18.4* 20.1* 22.5*   HGB 12.3* 12.9* 13.2*    218 244    CMP:  Recent Labs     12/02/20  0500 12/03/20  0630 12/03/20  2145 12/04/20  0216    140  --  137   K 2.8* 4.0 4.0 4.3    105  --  102   CO2 22 29  --  28   BUN 8 9  --  8   CREATININE 0.4* 0.6*  --  0.6*   CALCIUM 5.8* 8.0*  --  8.0*   PROT 4.1* 5.5*  --  5.9*   LABALBU 2.0* 2.6*  --  2.7*   BILITOT 0.4 0.5  --  0.5   ALKPHOS 48 61  --  63   AST 17 21  --  28   ALT 17 25  --  28     Lipids:   Lab Results   Component Value Date    CHOL 162 02/28/2018    HDL 63 02/28/2018    TRIG 155 02/28/2018     Glucose:  Recent Labs     12/03/20  2107 12/04/20  0213 12/04/20  1152   POCGLU 87 91 120*     [id number]:  Lab Results   Component Value Date    LABA1C 5.2 07/20/2020     High Sensitivity TSH:   Lab Results   Component Value Date    TSHHS 0.130 11/25/2020     Free T3:   Lab Results   Component Value Date    FT3 1.4 11/26/2020     Free T4:  Lab Results   Component Value Date    T4FREE 1.83 11/25/2020       Xr Abdomen (kub) (single Ap View)    Result Date: 11/25/2020  EXAMINATION: ONE SUPINE XRAY VIEW(S) OF THE ABDOMEN 11/25/2020 9:23 am COMPARISON: None.  HISTORY: ORDERING SYSTEM PROVIDED HISTORY: NG placement verification 1. Lines and tubes as described. 2. No significant interval change of cardiomegaly, dense retrocardiac opacity, and bilateral effusions. 3. Pulmonary edema. Xr Chest Portable    Result Date: 11/25/2020  EXAMINATION: ONE XRAY VIEW OF THE CHEST 11/25/2020 9:24 am COMPARISON: 11/24/2020     Stable small left pleural effusion with basilar atelectasis and/or consolidation. Vl Dup Lower Extremity Venous Bilateral    Result Date: 11/25/2020  EXAMINATION: DUPLEX VENOUS ULTRASOUND OF THE BILATERAL LOWER EXTREMITIES, 11/25/2020 5:10 pm     Positive DVT involving the right posterior tibial vein below the knee. No evidence of left lower extremity DVT. Cta Pulmonary W Contrast    Result Date: 11/25/2020  EXAMINATION: CTA OF THE CHEST 11/25/2020 1:32 pm    Pulmonary arteries are only well evaluated to the lobar level. No main or lobar pulmonary embolism. Segmental and subsegmental branch vessels are poorly evaluated due to artifact. Enlarged pulmonary arterial tree compatible with pulmonary hypertension. Cardiomegaly with four-chamber enlargement. Multifocal nodularity and consolidation throughout the lungs favored secondary to multifocal pneumonia with reactive mediastinal lymphadenopathy. Left lower lobe collapse which may be due to mucous plugging as the left lower lobe airways are not well visualized. Support lines and tubes appear appropriate in positioning. Scheduled Medicines required.   With hypoglycemia continues to be present medications:    metroNIDAZOLE  500 mg Oral 3 times per day    meropenem  2 g Intravenous Q8H    amiodarone  200 mg Oral BID    potassium chloride  20 mEq Oral BID    insulin glargine  10 Units Subcutaneous Nightly    insulin lispro  0-12 Units Subcutaneous TID WC    insulin lispro  0-6 Units Subcutaneous 2 times per day    amLODIPine  10 mg Oral Nightly    magnesium oxide  400 mg Oral Daily    collagenase   Topical Daily    lisinopril  40 mg Oral Daily    sodium chloride flush  10 mL Intravenous BID    vancomycin  2,000 mg Intravenous Q18H    sodium chloride flush  10 mL Intravenous 2 times per day    metoprolol succinate  25 mg Oral BID    aspirin  81 mg Oral Daily    rivaroxaban  15 mg Oral Daily    influenza virus vaccine  0.5 mL Intramuscular Prior to discharge      Infusions:    dextrose           Objective:   Vitals: BP (!) 155/68   Pulse 65   Temp 98.9 °F (37.2 °C)   Resp 22   Ht 5' 9.02\" (1.753 m)   Wt 262 lb 5.6 oz (119 kg)   SpO2 97%   BMI 38.72 kg/m²   General appearance: patient is extubated and off ventilator awake and alert  Neck: no JVD or bruit  Thyroid : Thyroid gland enlarged thyroid gland with small nodule bilaterally  Lungs: Has Vesicular Breath sounds   Heart:  regular rate and rhythm  Abdomen: soft, non-tender; bowel sounds normal; no masses,  no organomegaly  Has dressing perineal left thigh area has had incision and drainage done and debridement of the area also done  Musculoskeletal: Normal  Extremities: extremities normal, , no edema  Neurologic:  Awake, patient is extubated and off ventilator awake and alert    Assessment:     Patient Active Problem List:     Venous stasis ulcer of both lower extremities without varicose veins (HCC)     WD-Ulcer of shin, left, with fat layer exposed ([address]     PVD (peripheral vascular disease) ([address]     Type 2 diabetes mellitus with diabetic peripheral angiopathy without gangrene, without long-term current use of insulin (HCC)     WD-Venous stasis of both lower extremities     WD-Lymphedema of both lower extremities     WD-Idiopathic chronic venous hypertension of left lower extremity with ulcer ([address]     WD-Traumatic open wound of left lower leg, complicated by diabetes and venous insufficiency     Gangrene of scrotum     Abscess of perineum     Cellulitis of perinium./  Incision drainage debridement done  Patient is mildly hypothyroid possibly due to thyroiditis    Assessment and plan   1.   Labs and X ray reviewed   2. reviewed Current Medicines   3. On meal plus correction bolus Humalog/ Basal Lantus Insulin regime   4. Monitor Blood glucose frequently   5. Modified  the dose of Insulin/ other medicines as needed  6. Patient is mildly hyper pain thyroid possibly due to thyroiditis I would not prefer  7. I prefer not to change antithyroid drugs  8. Except symptomatically tachycardic with a beta-blocker  9. We will review ultrasound //other thyroid function tests  10. Will follow     .      Cris Leon MD

## 2020-12-04 NOTE — PROGRESS NOTES
[address] [facility]  consulted by Dr. Christina Glover for monitoring and adjustment. Indication for treatment: Jarek's gangrene  Goal trough: 10 -15 mcg/mL (AUC/MINDY <500)      Pertinent Laboratory Values:   Temp Readings from Last 3 Encounters:   12/04/20 98.9 °F (37.2 °C)   11/24/20 99.5 °F (37.5 °C)   11/23/20 97.6 °F (36.4 °C) (Temporal)     Recent Labs     12/02/20  0500 12/03/20  0630 12/04/20  0216   WBC 18.4* 20.1* 22.5*     Recent Labs     12/02/20  0500 12/03/20  0630 12/04/20  0216   BUN 8 9 8   CREATININE 0.4* 0.6* 0.6*     Estimated Creatinine Clearance: 156 mL/min (A) (based on SCr of 0.6 mg/dL (L)). Intake/Output Summary (Last 24 hours) at 12/4/2020 1038  Last data filed at 12/4/2020 0314  Gross per 24 hour   Intake 1020 ml   Output 2725 ml   Net -1705 ml       Pertinent Cultures:  Date                             Source                                     Results  11/24                           Tissue cx                                 Staph Hominis  11/24                           Blood                                       Negative  11/25                           Respiratory                              Pseudomonas, Proteus  11/29                           Surgical                                   Ecoli, MDRO    Vancomycin level:   TROUGH:    Recent Labs     12/03/20  1356   VANCOTROUGH 14.2     RANDOM:  No results for input(s): VANCORANDOM in the last 72 hours. Assessment:  · WBC and temperature: WBC remains elevated, afebrile   · SCr, BUN, and urine output: returned to baseline   · Therapy to be completed 12-15-20  · Vancomycin level: 14.2, therapeutic     Plan:  · Continue vancomycin 2000mg q18h with a therapeutic trough   · Continue to monitor renal trends closely and repeat levels to monitor for accumulation  · Repeat trough 12/7 @0730  · Pharmacy will continue to monitor patient and adjust therapy as indicated    Thank you for the consult.   Mandi Olson,

## 2020-12-04 NOTE — PROGRESS NOTES
Hospitalist Progress Note      Name:  Mayda Tavares /Age/Sex: 1955  (72 y.o. 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  - Continue Cefepime, Flagyl and Vancomycin  - ID following for antibiotic assistance, appreciate recs  - General Surgery following, appreciate recs  - Plan for LTACH at discharge when LTACH bed is available     Acute respiratory failure with hypoxia requiring intubation  - Extubated successfully  - Currently on room air doing well     Pulmonary HTN; 2 or 3  Afib with RVR  R Posterior Tibial DVT  - Continue beta-blocker; was given Amio  - Continue Xarelto  - Will continue to monitor     HTN  - Continue Amlodipine, Lisinopril     DM  - SSI     Plan for LTACH once bed available    Diet DIET CARDIAC; Carb Control: 5 carb choices (75 gms)/meal  Dietary Nutrition Supplements: Wound Healing Oral Supplement   DVT Prophylaxis [] Lovenox, []  Heparin, [] SCDs, [] Ambulation   GI Prophylaxis [] PPI,  [] H2 Blocker,  [] Carafate,  [] Diet/Tube Feeds   Code Status Prior   Disposition Patient requires continued admission due to [facility] [] Low, [] Moderate,[]  High  Patient's risk as above due to      History of Present Illness:     Patient doing well this AM.  No fevers, chills, nausea or vomiting.  BM's overnight. Had sinus arrthythmia overnight but resolved    Objective: Intake/Output Summary (Last 24 hours) at 2020 1138  Last data filed at 2020 0314  Gross per 24 hour   Intake 1020 ml   Output 2725 ml   Net -1705 ml      Vitals:   Vitals:    20 0805   BP: (!) 155/68   Pulse: 65   Resp: 22   Temp: 98.9 °F (37.2 °C)   SpO2: 97%     Physical Exam:     GEN    Awake male, sitting upright in bed in no apparent distress. Appears given age.   EYES   Pupils are equally round.  No scleral erythema, discharge, or conjunctivitis. HENT  Mucous membranes are moist.   NECK  Supple, no apparent thyromegaly or masses. RESP  Clear to auscultation, no wheezes, rales or rhonchi.  Symmetric chest movement while on room air. CARDIO/VASC           S1/S2 auscultated. Regular rate without appreciable murmurs, rubs, or gallops. Tamika Champ is soft without significant tenderness, masses, or guarding.        Pepper in place  HEME/LYMPH             No petechiae or ecchymoses. MSK    No gross joint deformities. SKIN    Normal coloration, warm, dry, incision area packed with wet to dry dressings  NEURO           Cranial nerves appear grossly intact, normal speech  PSYCH            Awake, alert, oriented x 4.  Affect appropriate.     Medications:   Medications:    meropenem  2 g Intravenous Q8H    amiodarone  200 mg Oral BID    potassium chloride  20 mEq Oral BID    insulin glargine  10 Units Subcutaneous Nightly    insulin lispro  0-12 Units Subcutaneous TID WC    insulin lispro  0-6 Units Subcutaneous 2 times per day    amLODIPine  10 mg Oral Nightly    magnesium oxide  400 mg Oral Daily    collagenase   Topical Daily    lisinopril  40 mg Oral Daily    sodium chloride flush  10 mL Intravenous BID    vancomycin  2,000 mg Intravenous Q18H    sodium chloride flush  10 mL Intravenous 2 times per day    metoprolol succinate  25 mg Oral BID    aspirin  81 mg Oral Daily    rivaroxaban  15 mg Oral Daily    influenza virus vaccine  0.5 mL Intramuscular Prior to discharge      Infusions:    dextrose       PRN Meds: potassium chloride, 20 mEq, PRN  potassium chloride, 40 mEq, PRN    Or  potassium alternative oral replacement, 40 mEq, PRN    Or  potassium chloride, 10 mEq, PRN  HYDROcodone 5 mg - acetaminophen, 1 tablet, Q6H PRN  sodium chloride flush, 10 mL, PRN  glucose, 15 g, PRN  dextrose, 12.5 g, PRN  glucagon (rDNA), 1 mg, PRN  dextrose, 100 mL/hr, PRN  HYDROmorphone, 0.5 mg, Q4H PRN  albuterol sulfate HFA, 2 puff, Q6H PRN          Electronically signed by Ivet Kyle MD on 12/4/2020 at 11:38 AM

## 2020-12-04 NOTE — PROGRESS NOTES
does not normally wear O2 but has it if he needs it)  ADL Assistance: Debora: Independent  Ambulation Assistance: Independent(PRN use of cane or walker)  Transfer Assistance: Independent  Active : Yes  Occupation: Retired  Type of occupation: [employer], [employer]  Short term goals  Time Frame for Short term goals: 1 week  Short term goal 1: Pt will perform sit><supine Ambika  Short term goal 2: Pt will transfer to all surfaces Ambika  Short term goal 3: Pt will ambulate 150ft with LRAD Ambika  Short term goal 4: Pt will ascend/descend 1 step, single rail SBA   Electronically signed by:     Reno Guzman PTA  12/4/2020, 10:25 AM

## 2020-12-04 NOTE — PROGRESS NOTES
Cardiology Progress Note       Carlos Tavares is a 72 y.o. male   1955     SUBJECTIVE:   Patient seen and examined doing well discussed with nurse currently atrial fibrillation rate is well controlled   12/4  Patient in and out of atrial fibrillation but currently in sinus rhythm looks and states he feels much better  OBJECTIVE:    Review of Systems:  General appearance: alert, appears stated age and cooperative  Skin: Skin color, texture, normal. No rashes or lesions  HEENT: No nose bleed, headache, vision problems  CV: C/O chest pain, tightness, pressure,   Respiratory: C/o no SOB, PITTS, Orthopnea, PND  GI: No abdominal pain, black stool, bloating  Limbs: No c/o edema, pain, swelling, intermittent claudication, joint pains  Neuro: No dizziness, lightheadedness, syncope, gait problems, memory problems  Psych: grossly normal. No SI/depression. Vitals:   Blood pressure (!) 155/68, pulse 65, temperature 98.9 °F (37.2 °C), resp. rate 22, height 5' 9.02\" (1.753 m), weight 262 lb 5.6 oz (119 kg), SpO2 97 %.     HEENT: AT, NC, PERRLA  Neck: No JVD  Heart: S1 S2 audible, no murmur   Lungs: Bilateral rhonchi  Abdomen: Nontender   Limbs: No edema   CNS: no focal deficit      Past Medical History:   Diagnosis Date    CHF (congestive heart failure) (HCC)     Chronic ulcer of left leg with fat layer exposed ([address] 12/12/2016    Chronic ulcer of right leg with fat layer exposed ([address] 12/12/2016    Chronic ulcer of right leg with fat layer exposed ([address] 12/12/2016    Diabetes mellitus ([address]     Hypertension     PVD (peripheral vascular disease) ([address] 12/12/2016    Type 2 diabetes mellitus with diabetic peripheral angiopathy without gangrene, without long-term current use of insulin ([address] 12/12/2016    Venous stasis ulcer of both lower extremities without varicose veins ([address] 12/12/2016        Patient Active Problem List   Diagnosis    Venous stasis ulcer of both lower extremities without varicose veins ([address]    WD-Ulcer of shin, left, with fat layer exposed [facility]    PVD (peripheral vascular disease) ([facility]    Type 2 diabetes mellitus with diabetic peripheral angiopathy without gangrene, without long-term current use of insulin (HCC)    WD-Venous stasis of both lower extremities    WD-Lymphedema of both lower extremities    WD-Idiopathic chronic venous hypertension of left lower extremity with ulcer ([facility]    WD-Traumatic open wound of left lower leg, complicated by diabetes and venous insufficiency    Gangrene of scrotum    Abscess of perineum    Cellulitis of perineum        No Known Allergies     Current Inpatient Medications:    Current Facility-Administered Medications   Medication Dose Route Frequency Provider Last Rate Last Dose    meropenem (MERREM) 2 g in sodium chloride 0.9 % 100 mL IVPB  2 g Intravenous Q8H Janet Campo APRN - CNP   Stopped at 12/04/20 6501    amiodarone (CORDARONE) tablet 200 mg  200 mg Oral BID Fabricio Tenorio APRN - CNP   200 mg at 12/04/20 0843    potassium chloride 20 mEq/50 mL IVPB (Central Line)  20 mEq Intravenous PRN Patricia Vieira MD        potassium chloride (KLOR-CON M) extended release tablet 40 mEq  40 mEq Oral PRN Patricia Vieira MD        Or    potassium bicarb-citric acid (EFFER-K) effervescent tablet 40 mEq  40 mEq Oral PRN Patricia Vieira MD        Or    potassium chloride 10 mEq/100 mL IVPB (Peripheral Line)  10 mEq Intravenous PRN Patricia Vieira MD        potassium chloride (KLOR-CON M) extended release tablet 20 mEq  20 mEq Oral BID Robert Hernandez MD   20 mEq at 12/04/20 0843    insulin glargine (LANTUS) injection vial 10 Units  10 Units Subcutaneous Nightly Andra Cook MD   Stopped at 12/03/20 2109    insulin lispro (HUMALOG) injection vial 0-12 Units  0-12 Units Subcutaneous TID WC Andra Cook MD   2 Units at 12/03/20 1746    insulin lispro (HUMALOG) injection vial 0-6 Units  0-6 Units Subcutaneous 2 times per day Andra Cook MD albuterol sulfate  (90 Base) MCG/ACT inhaler 2 puff  2 puff Inhalation Q6H PRN Ines Ch PA-C   2 puff at 12/03/20 2031           Labs:  CBC with Differential:    Lab Results   Component Value Date    WBC 22.5 12/04/2020    RBC 4.21 12/04/2020    HGB 13.2 12/04/2020    HCT 40.3 12/04/2020     12/04/2020    MCV 95.7 12/04/2020    MCH 31.4 12/04/2020    MCHC 32.8 12/04/2020    RDW 14.6 12/04/2020    SEGSPCT 92.8 11/26/2020    BANDSPCT 38 11/24/2020    LYMPHOPCT 2.5 11/26/2020    PROMYELOPCT 1 11/24/2020    MONOPCT 3.0 11/26/2020    MYELOPCT 1 11/24/2020    BASOPCT 0.2 11/26/2020    MONOSABS 0.8 11/26/2020    LYMPHSABS 0.7 11/26/2020    EOSABS 0.0 11/26/2020    BASOSABS 0.1 11/26/2020    DIFFTYPE AUTOMATED DIFFERENTIAL 11/26/2020     CMP:    Lab Results   Component Value Date     12/04/2020    K 4.3 12/04/2020     12/04/2020    CO2 28 12/04/2020    BUN 8 12/04/2020    CREATININE 0.6 12/04/2020    GFRAA >60 12/04/2020    LABGLOM >60 12/04/2020    GLUCOSE 87 12/04/2020    PROT 5.9 12/04/2020    LABALBU 2.7 12/04/2020    CALCIUM 8.0 12/04/2020    BILITOT 0.5 12/04/2020    ALKPHOS 63 12/04/2020    AST 28 12/04/2020    ALT 28 12/04/2020     Hepatic Function Panel:    Lab Results   Component Value Date    ALKPHOS 63 12/04/2020    ALT 28 12/04/2020    AST 28 12/04/2020    PROT 5.9 12/04/2020    BILITOT 0.5 12/04/2020    LABALBU 2.7 12/04/2020     Magnesium:    Lab Results   Component Value Date    MG 2.1 12/03/2020     PT/INR:    Lab Results   Component Value Date    PROTIME 13.8 11/25/2020    INR 1.14 11/25/2020     Last 3 Troponin:  No results found for: TROPONINI  U/A:    Lab Results   Component Value Date    COLORU YELLOW 11/24/2020    WBCUA NONE SEEN 11/24/2020    RBCUA NONE SEEN 11/24/2020    MUCUS RARE 11/24/2020    TRICHOMONAS NONE SEEN 11/24/2020    BACTERIA NEGATIVE 11/24/2020    CLARITYU CLEAR 11/24/2020    SPECGRAV 1.014 11/24/2020    LEUKOCYTESUR NEGATIVE 11/24/2020    UROBILINOGEN 2.0 11/24/2020    BILIRUBINUR NEGATIVE 11/24/2020    BLOODU NEGATIVE 11/24/2020     ABG:    Lab Results   Component Value Date    VFZ3NMP 34.0 11/27/2020    PO2ART 160 11/27/2020    DMK1OTM 25.3 11/27/2020     FLP:    Lab Results   Component Value Date    TRIG 155 02/28/2018    HDL 63 02/28/2018    LDLDIRECT 77 02/28/2018     TSH:  No results found for: TSH   DATA:   ECG: Sinus Rhythm       ASSESSMENT:   1 atrial fibrillation rate is well controlled will need long-term anticoagulation  Currently in sinus rhythm    2 septic shock resolving off inotropes  3 acute respiratory failure with pulmonary hypertension  Likely doing well    4 hypertension  Blood pressure well controlled  5 diabetes management hospitalist group  6 morbid obesity chronic due to excess caloric intake   PLAN   Stable from cardiology standpoint

## 2020-12-05 LAB
ALBUMIN SERPL-MCNC: 2.1 GM/DL (ref 3.4–5)
ALP BLD-CCNC: 49 IU/L (ref 40–128)
ALT SERPL-CCNC: 22 U/L (ref 10–40)
ANION GAP SERPL CALCULATED.3IONS-SCNC: 7 MMOL/L (ref 4–16)
AST SERPL-CCNC: 19 IU/L (ref 15–37)
BILIRUB SERPL-MCNC: 0.3 MG/DL (ref 0–1)
BUN BLDV-MCNC: 8 MG/DL (ref 6–23)
CALCIUM SERPL-MCNC: 6.7 MG/DL (ref 8.3–10.6)
CHLORIDE BLD-SCNC: 108 MMOL/L (ref 99–110)
CO2: 26 MMOL/L (ref 21–32)
CREAT SERPL-MCNC: 0.5 MG/DL (ref 0.9–1.3)
GFR AFRICAN AMERICAN: >60 ML/MIN/1.73M2
GFR NON-AFRICAN AMERICAN: >60 ML/MIN/1.73M2
GLUCOSE BLD-MCNC: 119 MG/DL (ref 70–99)
GLUCOSE BLD-MCNC: 123 MG/DL (ref 70–99)
GLUCOSE BLD-MCNC: 128 MG/DL (ref 70–99)
GLUCOSE BLD-MCNC: 133 MG/DL (ref 70–99)
GLUCOSE BLD-MCNC: 149 MG/DL (ref 70–99)
GLUCOSE BLD-MCNC: 175 MG/DL (ref 70–99)
HCT VFR BLD CALC: 34.5 % (ref 42–52)
HEMOGLOBIN: 10.5 GM/DL (ref 13.5–18)
HIGH SENSITIVE C-REACTIVE PROTEIN: 17.7 MG/L
MCH RBC QN AUTO: 29.7 PG (ref 27–31)
MCHC RBC AUTO-ENTMCNC: 30.4 % (ref 32–36)
MCV RBC AUTO: 97.7 FL (ref 78–100)
PDW BLD-RTO: 14.5 % (ref 11.7–14.9)
PLATELET # BLD: 208 K/CU MM (ref 140–440)
PMV BLD AUTO: 10.6 FL (ref 7.5–11.1)
POTASSIUM SERPL-SCNC: 3.9 MMOL/L (ref 3.5–5.1)
PROCALCITONIN: 0.04
RBC # BLD: 3.53 M/CU MM (ref 4.6–6.2)
SODIUM BLD-SCNC: 141 MMOL/L (ref 135–145)
TOTAL PROTEIN: 4.6 GM/DL (ref 6.4–8.2)
WBC # BLD: 20 K/CU MM (ref 4–10.5)

## 2020-12-05 PROCEDURE — 84145 PROCALCITONIN (PCT): CPT

## 2020-12-05 PROCEDURE — 6360000002 HC RX W HCPCS: Performed by: NURSE PRACTITIONER

## 2020-12-05 PROCEDURE — 2580000003 HC RX 258: Performed by: NURSE PRACTITIONER

## 2020-12-05 PROCEDURE — 2580000003 HC RX 258: Performed by: SURGERY

## 2020-12-05 PROCEDURE — 80053 COMPREHEN METABOLIC PANEL: CPT

## 2020-12-05 PROCEDURE — 2700000000 HC OXYGEN THERAPY PER DAY

## 2020-12-05 PROCEDURE — 94761 N-INVAS EAR/PLS OXIMETRY MLT: CPT

## 2020-12-05 PROCEDURE — 6370000000 HC RX 637 (ALT 250 FOR IP): Performed by: INTERNAL MEDICINE

## 2020-12-05 PROCEDURE — 86141 C-REACTIVE PROTEIN HS: CPT

## 2020-12-05 PROCEDURE — 6360000002 HC RX W HCPCS: Performed by: SURGERY

## 2020-12-05 PROCEDURE — 82962 GLUCOSE BLOOD TEST: CPT

## 2020-12-05 PROCEDURE — 6370000000 HC RX 637 (ALT 250 FOR IP): Performed by: NURSE PRACTITIONER

## 2020-12-05 PROCEDURE — 1200000000 HC SEMI PRIVATE

## 2020-12-05 PROCEDURE — 99024 POSTOP FOLLOW-UP VISIT: CPT | Performed by: SURGERY

## 2020-12-05 PROCEDURE — 99232 SBSQ HOSP IP/OBS MODERATE 35: CPT | Performed by: INTERNAL MEDICINE

## 2020-12-05 PROCEDURE — 2580000003 HC RX 258: Performed by: STUDENT IN AN ORGANIZED HEALTH CARE EDUCATION/TRAINING PROGRAM

## 2020-12-05 PROCEDURE — 85027 COMPLETE CBC AUTOMATED: CPT

## 2020-12-05 PROCEDURE — 6370000000 HC RX 637 (ALT 250 FOR IP): Performed by: PHYSICIAN ASSISTANT

## 2020-12-05 RX ADMIN — POTASSIUM CHLORIDE 20 MEQ: 1500 TABLET, EXTENDED RELEASE ORAL at 20:49

## 2020-12-05 RX ADMIN — CALCIUM GLUCONATE 2 G: 98 INJECTION, SOLUTION INTRAVENOUS at 08:10

## 2020-12-05 RX ADMIN — VANCOMYCIN HYDROCHLORIDE 2000 MG: 1 INJECTION, POWDER, LYOPHILIZED, FOR SOLUTION INTRAVENOUS at 20:50

## 2020-12-05 RX ADMIN — METOPROLOL SUCCINATE 25 MG: 25 TABLET, EXTENDED RELEASE ORAL at 08:14

## 2020-12-05 RX ADMIN — AMIODARONE HYDROCHLORIDE 200 MG: 200 TABLET ORAL at 08:14

## 2020-12-05 RX ADMIN — SODIUM CHLORIDE, PRESERVATIVE FREE 10 ML: 5 INJECTION INTRAVENOUS at 08:14

## 2020-12-05 RX ADMIN — MAGNESIUM OXIDE 400 MG (241.3 MG MAGNESIUM) TABLET 400 MG: TABLET at 08:15

## 2020-12-05 RX ADMIN — METOPROLOL SUCCINATE 25 MG: 25 TABLET, EXTENDED RELEASE ORAL at 20:49

## 2020-12-05 RX ADMIN — NYSTATIN 500000 UNITS: 100000 SUSPENSION ORAL at 20:49

## 2020-12-05 RX ADMIN — AMIODARONE HYDROCHLORIDE 200 MG: 200 TABLET ORAL at 20:50

## 2020-12-05 RX ADMIN — VANCOMYCIN HYDROCHLORIDE 2000 MG: 1 INJECTION, POWDER, LYOPHILIZED, FOR SOLUTION INTRAVENOUS at 04:06

## 2020-12-05 RX ADMIN — INSULIN GLARGINE 10 UNITS: 100 INJECTION, SOLUTION SUBCUTANEOUS at 20:47

## 2020-12-05 RX ADMIN — NYSTATIN 500000 UNITS: 100000 SUSPENSION ORAL at 15:49

## 2020-12-05 RX ADMIN — MEROPENEM 2 G: 1 INJECTION, POWDER, FOR SOLUTION INTRAVENOUS at 15:47

## 2020-12-05 RX ADMIN — LISINOPRIL 40 MG: 20 TABLET ORAL at 08:15

## 2020-12-05 RX ADMIN — SODIUM CHLORIDE, PRESERVATIVE FREE 10 ML: 5 INJECTION INTRAVENOUS at 08:19

## 2020-12-05 RX ADMIN — ASPIRIN 81 MG CHEWABLE TABLET 81 MG: 81 TABLET CHEWABLE at 08:14

## 2020-12-05 RX ADMIN — POTASSIUM CHLORIDE 20 MEQ: 1500 TABLET, EXTENDED RELEASE ORAL at 08:14

## 2020-12-05 RX ADMIN — MEROPENEM 2 G: 1 INJECTION, POWDER, FOR SOLUTION INTRAVENOUS at 00:22

## 2020-12-05 RX ADMIN — HYDROCODONE BITARTRATE AND ACETAMINOPHEN 1 TABLET: 5; 325 TABLET ORAL at 22:41

## 2020-12-05 RX ADMIN — COLLAGENASE SANTYL: 250 OINTMENT TOPICAL at 08:20

## 2020-12-05 RX ADMIN — RIVAROXABAN 15 MG: 15 TABLET, FILM COATED ORAL at 18:05

## 2020-12-05 RX ADMIN — MEROPENEM 2 G: 1 INJECTION, POWDER, FOR SOLUTION INTRAVENOUS at 23:15

## 2020-12-05 RX ADMIN — AMLODIPINE BESYLATE 10 MG: 10 TABLET ORAL at 20:50

## 2020-12-05 RX ADMIN — HYDROCODONE BITARTRATE AND ACETAMINOPHEN 1 TABLET: 5; 325 TABLET ORAL at 15:52

## 2020-12-05 RX ADMIN — MEROPENEM 2 G: 1 INJECTION, POWDER, FOR SOLUTION INTRAVENOUS at 06:45

## 2020-12-05 ASSESSMENT — PAIN DESCRIPTION - PAIN TYPE: TYPE: SURGICAL PAIN

## 2020-12-05 ASSESSMENT — PAIN DESCRIPTION - PROGRESSION

## 2020-12-05 ASSESSMENT — PAIN DESCRIPTION - LOCATION: LOCATION: PERINEUM

## 2020-12-05 ASSESSMENT — PAIN DESCRIPTION - ORIENTATION: ORIENTATION: LOWER;MID

## 2020-12-05 ASSESSMENT — PAIN DESCRIPTION - DESCRIPTORS: DESCRIPTORS: SORE

## 2020-12-05 ASSESSMENT — PAIN SCALES - GENERAL
PAINLEVEL_OUTOF10: 3
PAINLEVEL_OUTOF10: 4
PAINLEVEL_OUTOF10: 9
PAINLEVEL_OUTOF10: 9

## 2020-12-05 ASSESSMENT — PAIN DESCRIPTION - FREQUENCY: FREQUENCY: INTERMITTENT

## 2020-12-05 ASSESSMENT — PAIN - FUNCTIONAL ASSESSMENT: PAIN_FUNCTIONAL_ASSESSMENT: ACTIVITIES ARE NOT PREVENTED

## 2020-12-05 ASSESSMENT — PAIN DESCRIPTION - ONSET: ONSET: GRADUAL

## 2020-12-05 NOTE — CARE COORDINATION
Received call from [address] [facility] at [address] [facility] with approval for [facility]. Sent PS to Dr. Betina Gottlieb to update. 1 Spoke with Bryant from [place] , updated that Dr Annemarie Durand is for pt to discharge tomorrow.

## 2020-12-05 NOTE — PROGRESS NOTES
GENERAL SURGERY PROGRESS NOTE    Angelique Tavares is a 72 y.o. male POD 11 from Jarek's gangrene debridement. Subjective:  Remains afebrile. Pain well controlled. Denies complaints. Objective:    Vitals: VITALS:  BP (!) 138/52   Pulse 60   Temp 98.4 °F (36.9 °C) (Oral)   Resp 21   Ht 5' 9\" (1.753 m)   Wt 262 lb 8 oz (119.1 kg)   SpO2 98%   BMI 38.76 kg/m²     I/O: 12/04 0701 - 12/05 0700  In: -   Out: 2450 [Urine:2450]    Labs/Imaging Results:   Lab Results   Component Value Date     12/05/2020    K 3.9 12/05/2020     12/05/2020    CO2 26 12/05/2020    BUN 8 12/05/2020    CREATININE 0.5 12/05/2020    GLUCOSE 123 12/05/2020    CALCIUM 6.7 12/05/2020      Lab Results   Component Value Date    WBC 20.0 (H) 12/05/2020    HGB 10.5 (L) 12/05/2020    HCT 34.5 (L) 12/05/2020    MCV 97.7 12/05/2020     12/05/2020       IV Fluids: dextrose    Scheduled Meds:   nystatin, 5 mL, Oral, 4x Daily    meropenem, 2 g, Intravenous, Q8H    amiodarone, 200 mg, Oral, BID    potassium chloride, 20 mEq, Oral, BID    insulin glargine, 10 Units, Subcutaneous, Nightly    insulin lispro, 0-12 Units, Subcutaneous, TID WC    insulin lispro, 0-6 Units, Subcutaneous, 2 times per day    amLODIPine, 10 mg, Oral, Nightly    magnesium oxide, 400 mg, Oral, Daily    collagenase, , Topical, Daily    lisinopril, 40 mg, Oral, Daily    sodium chloride flush, 10 mL, Intravenous, BID    vancomycin, 2,000 mg, Intravenous, Q18H    sodium chloride flush, 10 mL, Intravenous, 2 times per day    metoprolol succinate, 25 mg, Oral, BID    aspirin, 81 mg, Oral, Daily    rivaroxaban, 15 mg, Oral, Daily    influenza virus vaccine, 0.5 mL, Intramuscular, Prior to discharge    Physical Exam:  General: A&O mildly disoriented, no distress. HEENT: Anicteric sclerae, MMM. Extremities: Mild edema bilat LE.    Buttock: dressing changed today, wound is clean for the most part, small amount of fibrinous

## 2020-12-05 NOTE — PROGRESS NOTES
[address] [facility]  consulted by Dr. Jovi Fletcher for monitoring and adjustment. Indication for treatment: Jarek's gangrene  Goal trough: 10 -15 mcg/mL (AUC/MINDY <500)      Pertinent Laboratory Values:   Temp Readings from Last 3 Encounters:   12/05/20 98.4 °F (36.9 °C) (Oral)   11/24/20 99.5 °F (37.5 °C)   11/23/20 97.6 °F (36.4 °C) (Temporal)     Recent Labs     12/03/20  0630 12/04/20 0216 12/05/20 0520   WBC 20.1* 22.5* 20.0*     Recent Labs     12/03/20  0630 12/04/20  0216 12/05/20 0520   BUN 9 8 8   CREATININE 0.6* 0.6* 0.5*     Estimated Creatinine Clearance: 188 mL/min (A) (based on SCr of 0.5 mg/dL (L)). Intake/Output Summary (Last 24 hours) at 12/5/2020 1618  Last data filed at 12/5/2020 0648  Gross per 24 hour   Intake --   Output 1450 ml   Net -1450 ml       Pertinent Cultures:  Date                             Source                                     Results  11/24                           Tissue cx                                 Staph Hominis  11/24                           Blood                                       Negative  11/25                           Respiratory                              Pseudomonas, Proteus  11/29                           Surgical                                   Ecoli, MDRO    Vancomycin level:   TROUGH:    Recent Labs     12/03/20  1356   VANCOTROUGH 14.2     RANDOM:  No results for input(s): VANCORANDOM in the last 72 hours. Assessment:  · WBC and temperature: WBC remains elevated, afebrile   · SCr, BUN, and urine output: returned to baseline   · Therapy to be completed 12-15-20  · Vancomycin level: 14.2, therapeutic     Plan:  · Continue vancomycin 2000mg q18h with a therapeutic trough   · Continue to monitor renal trends closely and repeat levels to monitor for accumulation  · Repeat trough 12/7 @ 0730  · Pharmacy will continue to monitor patient and adjust therapy as indicated    Thank you for the consult.   Payton MILES Ole Morgans, PharmD, [address]   12/5/2020 4:18 PM

## 2020-12-05 NOTE — PROGRESS NOTES
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 Remi Fernandez, I&D of Jarek's gangrene  § -Surgical culture of buttocks: E.Coli  § Severe hypotension, Levophed onboard, weaned off      · Oral thrush  · DMII:  Dr. Eloina Gee onboard, Last [facility] 20-5. 2     ? Morbid Obesity: BMI 38.72     ? PHTN     ? Tobacco Abuse     ? Acute Encephalopathy     ? AF:  Dr. Sean Montelongo onboard     ? Acute Hypoxic Respiratory Failure:  § -respiratory culture Pseudomonas aeringinosa, Proteus mirabilis  § -RDP Negative  § Intubated , Extubated   § Dr. Vince Hedrick onboard     ? PVD     · Multi-morbidity: per PMHx:  CHF, BLE chronic ulcers, DMII, HTN, PVD, venous stasis BLE, morbid obesity  Plan:   ? Continue vancomycin IV x 3 weeks duration (end date 12/15/20)  ? Continue meropenem 1 gm IV q8h extended infusion x 3 weeks (end date 20)  ? Continue Flagyl 500 mg po q8h  ? Previous discussion with Dr. Remi Fernandez regarding possible diverting colostomy for wound healing, will wait for now since wound tissue looks good, DW patient also, if healing becomes an issue, then will revisit    ? Trend CRP and Pct  ? WBC trended up, Flagyl added, will reassess      Ongoing Antimicrobial Therapy  Meropenem 12/3-  Vancomycin -  Flagyl -  ? Completed Antimicrobial Therapy  Cipro   Clindamycin -  Piperacillin-tazobactam -? Flagyl -3  Cefepime -3  ? History:? Interval history noted. 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 Denies n/v/d/f or untoward effects of antibiotics. Resting quietly. Physical Exam:  Gen: alert and oriented X3, no distress  Skin: no stigmata of endocarditis  Wounds: C/D/I buttock/perineum with wound vac intact.   HEMT: AT/NC Oropharynx pink, moist, and without lesions or exudates, edentulous  Eyes: PERRLA, EOMI, conjunctiva pink, sclera anicteric. Neck: Supple. Trachea midline. No LAD. Chest: no distress and CTA. Good air movement. Heart: RRR and no MRG. Abd: soft, non-distended, no tenderness, no hepatomegaly. Normoactive bowel sounds. Ext: no clubbing, cyanosis, with non-pitting BLE edema  Catheter Site: without erythema or tenderness draining darren urine  PICC:  Right arm without erythema or edema  Neuro: Mental status intact. CN 2-12 intact and no focal sensory or motor deficits     11/24/20 CT Pelviw W Contrast:  Impression    Findings consistent with the provided history of developing Jarek's    gangrene with extensive involvement of the subcutaneous fatty tissues as    described and possible involvement of the inferolateral aspect of the right    gluteus mary which shows only increased density but no evidence of fluid    or gas.       11/24/20 XR Chest Portable:  Impression    1. Endotracheal tube tip terminates approximately 6.1 cm above the sabrina. 2. Left internal jugular central venous catheter tip likely terminates in the    brachiocephalic vein. 3. Cardiomegaly with perihilar vascular prominence. 4. Consolidative opacities in the lung bases with left pleural effusion.     The findings were sent to the Radiology Results [address] at 9:38    pm on 11/24/2020to be communicated to a licensed caregiver.       11/25/20 XR Chest Portable:  Impression    Stable small left pleural effusion with basilar atelectasis and/or    consolidation.       11/25/20 XR Abdomen (KUB):  Impression    Enteric tube tip projects at the gastric body with side hole projecting at    the GE junction.  This should be advanced by at least 3 cm.       11/25/20 CTA Pulmonary W Contrast:  Impression    Pulmonary arteries are only well evaluated to the lobar level.  No main or    lobar pulmonary embolism.  Segmental and subsegmental branch vessels are    poorly evaluated due to artifact.         Enlarged pulmonary arterial tree compatible with pulmonary hypertension.         Cardiomegaly with four-chamber enlargement.         Multifocal nodularity and consolidation throughout the lungs favored    secondary to multifocal pneumonia with reactive mediastinal lymphadenopathy.         Left lower lobe collapse which may be due to mucous plugging as the left    lower lobe airways are not well visualized.         Support lines and tubes appear appropriate in positioning.       11/25/20 VL Dup Lower Extremity Venous Bilateral:  Impression    Positive DVT involving the right posterior tibial vein below the knee.         No evidence of left lower extremity DVT.       11/26/20 XR Chest Portable:  Impression    1. Lines and tubes as described. 2. No significant interval change of cardiomegaly, dense retrocardiac    opacity, and bilateral effusions. 3. Pulmonary edema.       11/26/20 US Head Neck Soft Tissue Thyroid:  Impression    Limited exam due to patient being intubated, suboptimal positioning as well    as motion artifact.         Heterogeneous thyroid gland without dominant thyroid nodule      11/30/20 CT Abdomen Pelvis W IV Contrast:  Impression    1. Note: Study significantly limited by patient motion related artifact. 2. Moderate bilateral layering posterior pleural effusions. 3. Mild pulmonary interstitial edema within the visualized lower lungs. 4. Hepatic steatosis. 5. Cholelithiasis, as described above.       11/30/20 US Head Neck Soft TIssue Thyroid:  Impression    Bilateral thyroidal enlargement.         Small spongiform lesion in the right thyroid lobe measuring 3.9 x 5.8 x 3.9    mm.  It is of very low suspicion for thyroid malignancy.  No fine needle    aspiration is needed.         1 benign colloid cyst in the right thyroid lobe and 2 benign colloid cysts in    the left thyroid lobe.         Vital Signs: BP (!) 146/65   Pulse 64   Temp 98.4 °F (36.9 °C) (Oral)   Resp 20   Ht 5' 9.02\" (1.753 m)   Wt 262 lb 5.6 oz (119 kg)   SpO2 98%   BMI 38.72 kg/m²     11/24/20 CT Pelviw W Contrast:  Impression    Findings consistent with the provided history of developing Jarek's    gangrene with extensive involvement of the subcutaneous fatty tissues as    described and possible involvement of the inferolateral aspect of the right    gluteus mary which shows only increased density but no evidence of fluid    or gas.       11/24/20 XR Chest Portable:  Impression    1. Endotracheal tube tip terminates approximately 6.1 cm above the sabrina. 2. Left internal jugular central venous catheter tip likely terminates in the    brachiocephalic vein. 3. Cardiomegaly with perihilar vascular prominence. 4. Consolidative opacities in the lung bases with left pleural effusion.     The findings were sent to the Radiology Results [address] at 9:38    pm on 11/24/2020to be communicated to a licensed caregiver.       11/25/20 XR Chest Portable:  Impression    Stable small left pleural effusion with basilar atelectasis and/or    consolidation.       11/25/20 XR Abdomen (KUB):  Impression    Enteric tube tip projects at the gastric body with side hole projecting at    the GE junction.  This should be advanced by at least 3 cm.       11/25/20 CTA Pulmonary W Contrast:  Impression    Pulmonary arteries are only well evaluated to the lobar level.  No main or    lobar pulmonary embolism.  Segmental and subsegmental branch vessels are    poorly evaluated due to artifact.         Enlarged pulmonary arterial tree compatible with pulmonary hypertension.         Cardiomegaly with four-chamber enlargement.         Multifocal nodularity and consolidation throughout the lungs favored    secondary to multifocal pneumonia with reactive mediastinal lymphadenopathy.         Left lower lobe collapse which may be due to mucous plugging as the left    lower lobe airways are not well visualized.         Support lines and tubes appear appropriate in positioning.       11/25/20 VL Dup Lower Extremity Venous Bilateral:  Impression    Positive DVT involving the right posterior tibial vein below the knee.         No evidence of left lower extremity DVT.       11/26/20 XR Chest Portable:  Impression    1. Lines and tubes as described. 2. No significant interval change of cardiomegaly, dense retrocardiac    opacity, and bilateral effusions. 3. Pulmonary edema.       11/26/20 US Head Neck Soft Tissue Thyroid:  Impression    Limited exam due to patient being intubated, suboptimal positioning as well    as motion artifact.         Heterogeneous thyroid gland without dominant thyroid nodule      11/30/20 CT Abdomen Pelvis W IV Contrast:  Impression    1. Note: Study significantly limited by patient motion related artifact. 2. Moderate bilateral layering posterior pleural effusions. 3. Mild pulmonary interstitial edema within the visualized lower lungs. 4. Hepatic steatosis. 5. Cholelithiasis, as described above.       11/30/20 US Head Neck Soft TIssue Thyroid:  Impression    Bilateral thyroidal enlargement.         Small spongiform lesion in the right thyroid lobe measuring 3.9 x 5.8 x 3.9    mm.  It is of very low suspicion for thyroid malignancy.  No fine needle    aspiration is needed.         1 benign colloid cyst in the right thyroid lobe and 2 benign colloid cysts in    the left thyroid lobe. Radiologic / Imaging / TESTING  11/24/20 CT Pelviw W Contrast:  Impression    Findings consistent with the provided history of developing Jarek's    gangrene with extensive involvement of the subcutaneous fatty tissues as    described and possible involvement of the inferolateral aspect of the right    gluteus mary which shows only increased density but no evidence of fluid    or gas.       11/24/20 XR Chest Portable:  Impression    1.  Endotracheal tube tip terminates approximately 6.1 cm above the sabrina. 2. Left internal jugular central venous catheter tip likely terminates in the    brachiocephalic vein. 3. Cardiomegaly with perihilar vascular prominence. 4. Consolidative opacities in the lung bases with left pleural effusion. The findings were sent to the Radiology Results [address] at 9:38    pm on 11/24/2020to be communicated to a licensed caregiver.       11/25/20 XR Chest Portable:  Impression    Stable small left pleural effusion with basilar atelectasis and/or    consolidation.       11/25/20 XR Abdomen (KUB):  Impression    Enteric tube tip projects at the gastric body with side hole projecting at    the GE junction.  This should be advanced by at least 3 cm.       11/25/20 CTA Pulmonary W Contrast:  Impression    Pulmonary arteries are only well evaluated to the lobar level.  No main or    lobar pulmonary embolism.  Segmental and subsegmental branch vessels are    poorly evaluated due to artifact.         Enlarged pulmonary arterial tree compatible with pulmonary hypertension.         Cardiomegaly with four-chamber enlargement.         Multifocal nodularity and consolidation throughout the lungs favored    secondary to multifocal pneumonia with reactive mediastinal lymphadenopathy.         Left lower lobe collapse which may be due to mucous plugging as the left    lower lobe airways are not well visualized.         Support lines and tubes appear appropriate in positioning.       11/25/20 VL Dup Lower Extremity Venous Bilateral:  Impression    Positive DVT involving the right posterior tibial vein below the knee.         No evidence of left lower extremity DVT.       11/26/20 XR Chest Portable:  Impression    1. Lines and tubes as described. 2. No significant interval change of cardiomegaly, dense retrocardiac    opacity, and bilateral effusions.     3. Pulmonary edema.       11/26/20 US Head Neck Soft Tissue Thyroid:  Impression Limited exam due to patient being intubated, suboptimal positioning as well    as motion artifact.         Heterogeneous thyroid gland without dominant thyroid nodule      11/30/20 CT Abdomen Pelvis W IV Contrast:  Impression    1. Note: Study significantly limited by patient motion related artifact. 2. Moderate bilateral layering posterior pleural effusions. 3. Mild pulmonary interstitial edema within the visualized lower lungs. 4. Hepatic steatosis. 5. Cholelithiasis, as described above.       11/30/20 US Head Neck Soft TIssue Thyroid:  Impression    Bilateral thyroidal enlargement.         Small spongiform lesion in the right thyroid lobe measuring 3.9 x 5.8 x 3.9    mm.  It is of very low suspicion for thyroid malignancy.  No fine needle    aspiration is needed.         1 benign colloid cyst in the right thyroid lobe and 2 benign colloid cysts in    the left thyroid lobe.            Labs:    Recent Results (from the past 24 hour(s))   POCT Glucose    Collection Time: 12/04/20 11:52 AM   Result Value Ref Range    POC Glucose 120 (H) 70 - 99 MG/DL   POCT Glucose    Collection Time: 12/04/20  5:18 PM   Result Value Ref Range    POC Glucose 114 (H) 70 - 99 MG/DL   POCT Glucose    Collection Time: 12/04/20  9:53 PM   Result Value Ref Range    POC Glucose 120 (H) 70 - 99 MG/DL   POCT Glucose    Collection Time: 12/05/20  5:17 AM   Result Value Ref Range    POC Glucose 128 (H) 70 - 99 MG/DL   CBC    Collection Time: 12/05/20  5:20 AM   Result Value Ref Range    WBC 20.0 (H) 4.0 - 10.5 K/CU MM    RBC 3.53 (L) 4.6 - 6.2 M/CU MM    Hemoglobin 10.5 (L) 13.5 - 18.0 GM/DL    Hematocrit 34.5 (L) 42 - 52 %    MCV 97.7 78 - 100 FL    MCH 29.7 27 - 31 PG    MCHC 30.4 (L) 32.0 - 36.0 %    RDW 14.5 11.7 - 14.9 %    Platelets 574 972 - 756 K/CU MM    MPV 10.6 7.5 - 11.1 FL   Comprehensive Metabolic Panel w/ Reflex to MG    Collection Time: 12/05/20  5:20 AM   Result Value Ref Range    Sodium 141 135 - 145 MMOL/L Potassium 3.9 3.5 - 5.1 MMOL/L    Chloride 108 99 - 110 mMol/L    CO2 26 21 - 32 MMOL/L    BUN 8 6 - 23 MG/DL    CREATININE 0.5 (L) 0.9 - 1.3 MG/DL    Glucose 123 (H) 70 - 99 MG/DL    Calcium 6.7 (LL) 8.3 - 10.6 MG/DL    Alb 2.1 (L) 3.4 - 5.0 GM/DL    Total Protein 4.6 (L) 6.4 - 8.2 GM/DL    Total Bilirubin 0.3 0.0 - 1.0 MG/DL    ALT 22 10 - 40 U/L    AST 19 15 - 37 IU/L    Alkaline Phosphatase 49 40 - 128 IU/L    GFR Non-African American >60 >60 mL/min/1.73m2    GFR African American >60 >60 mL/min/1.73m2    Anion Gap 7 4 - 16   C-Reactive Protein    Collection Time: 12/05/20  5:20 AM   Result Value Ref Range    CRP, High Sensitivity 17.7 mg/L   Procalcitonin    Collection Time: 12/05/20  5:20 AM   Result Value Ref Range    Procalcitonin 0.041    POCT Glucose    Collection Time: 12/05/20  8:21 AM   Result Value Ref Range    POC Glucose 119 (H) 70 - 99 MG/DL     CULTURE results: Invalid input(s): BLOOD CULTURE,  URINE CULTURE, SURGICAL CULTURE    Diagnosis:  Patient Active Problem List   Diagnosis    Venous stasis ulcer of both lower extremities without varicose veins (HCC)    WD-Ulcer of shin, left, with fat layer exposed ([address]    PVD (peripheral vascular disease) ([address]    Type 2 diabetes mellitus with diabetic peripheral angiopathy without gangrene, without long-term current use of insulin (HCC)    WD-Venous stasis of both lower extremities    WD-Lymphedema of both lower extremities    WD-Idiopathic chronic venous hypertension of left lower extremity with ulcer ([address]    WD-Traumatic open wound of left lower leg, complicated by diabetes and venous insufficiency    Gangrene of scrotum    Abscess of perineum    Cellulitis of perineum       Active Problems  Active Problems:    Gangrene of scrotum    Abscess of perineum    Cellulitis of perineum  Resolved Problems:    * No resolved hospital problems.  *    Electronically signed by: Electronically signed by Dc Santoyo MD on 12/5/2020 at 8:35 AM

## 2020-12-05 NOTE — PROGRESS NOTES
age.  EYES   Pupils are equally round.  No scleral erythema, discharge, or conjunctivitis. HENT  Mucous membranes are moist.   NECK  Supple, no apparent thyromegaly or masses. RESP  Clear to auscultation, no wheezes, rales or rhonchi.  Symmetric chest movement while on room air. CARDIO/VASC           S1/S2 auscultated. Regular rate without appreciable murmurs, rubs, or gallops. Nguyen Mac is soft without significant tenderness, masses, or guarding.        Pepper in place  HEME/LYMPH             No petechiae or ecchymoses. MSK    No gross joint deformities. SKIN    Normal coloration, warm, dry, incision area packed with wet to dry dressings  NEURO           Cranial nerves appear grossly intact, normal speech  PSYCH            Awake, alert, oriented x 4.  Affect appropriate.     Medications:   Medications:    nystatin  5 mL Oral 4x Daily    meropenem  2 g Intravenous Q8H    amiodarone  200 mg Oral BID    potassium chloride  20 mEq Oral BID    insulin glargine  10 Units Subcutaneous Nightly    insulin lispro  0-12 Units Subcutaneous TID WC    insulin lispro  0-6 Units Subcutaneous 2 times per day    amLODIPine  10 mg Oral Nightly    magnesium oxide  400 mg Oral Daily    collagenase   Topical Daily    lisinopril  40 mg Oral Daily    sodium chloride flush  10 mL Intravenous BID    vancomycin  2,000 mg Intravenous Q18H    sodium chloride flush  10 mL Intravenous 2 times per day    metoprolol succinate  25 mg Oral BID    aspirin  81 mg Oral Daily    rivaroxaban  15 mg Oral Daily    influenza virus vaccine  0.5 mL Intramuscular Prior to discharge      Infusions:    dextrose       PRN Meds: potassium chloride, 20 mEq, PRN  potassium chloride, 40 mEq, PRN    Or  potassium alternative oral replacement, 40 mEq, PRN    Or  potassium chloride, 10 mEq, PRN  HYDROcodone 5 mg - acetaminophen, 1 tablet, Q6H PRN  sodium chloride flush, 10 mL, PRN  glucose, 15 g, PRN  dextrose, 12.5 g, PRN  glucagon (rDNA), 1 mg, PRN  dextrose, 100 mL/hr, PRN  HYDROmorphone, 0.5 mg, Q4H PRN  albuterol sulfate HFA, 2 puff, Q6H PRN          Electronically signed by Laureen Segura MD on 12/5/2020 at 11:33 AM

## 2020-12-05 NOTE — PROGRESS NOTES
Cardiology Progress Note       Severo Peek Coffee is a 72 y.o. male   1955     SUBJECTIVE:   Patient seen and examined doing well discussed with nurse currently atrial fibrillation rate is well controlled   12/5  Sitting up eating breakfast currently on room air rhythm is sinus  OBJECTIVE:    Review of Systems:  General appearance: alert, appears stated age and cooperative  Skin: Skin color, texture, normal. No rashes or lesions  HEENT: No nose bleed, headache, vision problems  CV: C/O chest pain, tightness, pressure,   Respiratory: C/o no SOB, PITTS, Orthopnea, PND  GI: No abdominal pain, black stool, bloating  Limbs: No c/o edema, pain, swelling, intermittent claudication, joint pains  Neuro: No dizziness, lightheadedness, syncope, gait problems, memory problems  Psych: grossly normal. No SI/depression. Vitals:   Blood pressure (!) 146/65, pulse 64, temperature 98.4 °F (36.9 °C), temperature source Oral, resp. rate 20, height 5' 9.02\" (1.753 m), weight 262 lb 5.6 oz (119 kg), SpO2 98 %.     HEENT: AT, NC, PERRLA  Neck: No JVD  Heart: S1 S2 audible, no murmur   Lungs: Bilateral rhonchi  Abdomen: Nontender   Limbs: No edema   CNS: no focal deficit      Past Medical History:   Diagnosis Date    CHF (congestive heart failure) (HCC)     Chronic ulcer of left leg with fat layer exposed ([address] 12/12/2016    Chronic ulcer of right leg with fat layer exposed ([address] 12/12/2016    Chronic ulcer of right leg with fat layer exposed ([address] 12/12/2016    Diabetes mellitus ([address]     Hypertension     PVD (peripheral vascular disease) ([address] 12/12/2016    Type 2 diabetes mellitus with diabetic peripheral angiopathy without gangrene, without long-term current use of insulin ([address] 12/12/2016    Venous stasis ulcer of both lower extremities without varicose veins ([address] 12/12/2016        Patient Active Problem List   Diagnosis    Venous stasis ulcer of both lower extremities without varicose veins ([address]    WD-Ulcer of shin, left, with fat layer exposed [facility]    PVD (peripheral vascular disease) ([facility]    Type 2 diabetes mellitus with diabetic peripheral angiopathy without gangrene, without long-term current use of insulin (HCC)    WD-Venous stasis of both lower extremities    WD-Lymphedema of both lower extremities    WD-Idiopathic chronic venous hypertension of left lower extremity with ulcer ([facility]    WD-Traumatic open wound of left lower leg, complicated by diabetes and venous insufficiency    Gangrene of scrotum    Abscess of perineum    Cellulitis of perineum        No Known Allergies     Current Inpatient Medications:    Current Facility-Administered Medications   Medication Dose Route Frequency Provider Last Rate Last Dose    nystatin (MYCOSTATIN) 534507 UNIT/ML suspension 500,000 Units  5 mL Oral 4x Daily Alyson De La Cruz MD        meropenem [facility]) 2 g in sodium chloride 0.9 % 100 mL IVPB  2 g Intravenous Q8H Janet Campo APRN - CNP 0 mL/hr at 12/05/20 0330 2 g at 12/05/20 0645    amiodarone (CORDARONE) tablet 200 mg  200 mg Oral BID Benjamin Cobb APRN - CNP   200 mg at 12/05/20 7942    potassium chloride 20 mEq/50 mL IVPB (Central Line)  20 mEq Intravenous PRJULIO C Whelan MD        potassium chloride (KLOR-CON M) extended release tablet 40 mEq  40 mEq Oral PRN Blake Whelan MD        Or    potassium bicarb-citric acid (EFFER-K) effervescent tablet 40 mEq  40 mEq Oral PRJULIO C Whelan MD        Or    potassium chloride 10 mEq/100 mL IVPB (Peripheral Line)  10 mEq Intravenous PRJULIO C Whelan MD        potassium chloride (KLOR-CON M) extended release tablet 20 mEq  20 mEq Oral BID Guilherme Amaya MD   20 mEq at 12/05/20 0814    insulin glargine (LANTUS) injection vial 10 Units  10 Units Subcutaneous Nightly Sriram Stuart MD   Stopped at 12/03/20 2109    insulin lispro (HUMALOG) injection vial 0-12 Units  0-12 Units Subcutaneous TID WC Sriram Stuart MD   Stopped at 12/05/20 6743    insulin lispro (HUMALOG) injection vial 0-6 Units  0-6 Units Subcutaneous 2 times per day Sayra Hooper MD   Stopped at 12/03/20 2109    amLODIPine (NORVASC) tablet 10 mg  10 mg Oral Nightly Lindsay [place], PA-C   10 mg at 12/04/20 2148    magnesium oxide (MAG-OX) tablet 400 mg  400 mg Oral Daily Lindsay Saroj, PA-C   400 mg at 12/05/20 0815    collagenase ointment   Topical Daily Rylie Johnson MD        lisinopril (PRINIVIL;ZESTRIL) tablet 40 mg  40 mg Oral Daily Lindsay Saroj, PA-C   40 mg at 12/05/20 0815    HYDROcodone-acetaminophen (NORCO) 5-325 MG per tablet 1 tablet  1 tablet Oral Q6H PRN Funmilayo Bray MD   1 tablet at 12/04/20 2028    sodium chloride flush 0.9 % injection 10 mL  10 mL Intravenous BID Rylie Johnson MD   10 mL at 12/05/20 0819    vancomycin (VANCOCIN) 2,000 mg in dextrose 5 % 500 mL IVPB  2,000 mg Intravenous Q18H Rylie Johnson MD   Stopped at 12/05/20 0630    sodium chloride flush 0.9 % injection 10 mL  10 mL Intravenous 2 times per day Thao Light DO   10 mL at 12/05/20 0814    sodium chloride flush 0.9 % injection 10 mL  10 mL Intravenous PRN Thao Light DO        metoprolol succinate (TOPROL XL) extended release tablet 25 mg  25 mg Oral BID Pretty Edwards MD   25 mg at 12/05/20 7076    aspirin chewable tablet 81 mg  81 mg Oral Daily Pretty Edwards MD   81 mg at 12/05/20 3631    rivaroxaban (XARELTO) tablet 15 mg  15 mg Oral Daily Pretty Edwards MD   15 mg at 12/04/20 1804    glucose (GLUTOSE) 40 % oral gel 15 g  15 g Oral HUEN Funmilayo Bray MD        dextrose 50 % IV solution  12.5 g Intravenous KRISTEN Bray MD        glucagon (rDNA) injection 1 mg  1 mg Intramuscular PRN Funmilayo Bray MD        dextrose 5 % solution  100 mL/hr Intravenous PRJULIO C Bray MD        HYDROmorphone (DILAUDID) injection 0.5 mg  0.5 mg Intravenous Q4H PRN Funmilayo Bray MD   0.5 mg at 11/30/20 1662    influenza quadrivalent split vaccine (FLUZONE;FLUARIX;FLULAVAL;AFLURIA) injection 0.5 mL  0.5 mL Intramuscular Prior to discharge Ebb MD Rubén        albuterol sulfate  (90 Base) MCG/ACT inhaler 2 puff  2 puff Inhalation Q6H PRN Neelima Burns PA-C   2 puff at 12/03/20 2031           Labs:  CBC with Differential:    Lab Results   Component Value Date    WBC 20.0 12/05/2020    RBC 3.53 12/05/2020    HGB 10.5 12/05/2020    HCT 34.5 12/05/2020     12/05/2020    MCV 97.7 12/05/2020    MCH 29.7 12/05/2020    MCHC 30.4 12/05/2020    RDW 14.5 12/05/2020    SEGSPCT 92.8 11/26/2020    BANDSPCT 38 11/24/2020    LYMPHOPCT 2.5 11/26/2020    PROMYELOPCT 1 11/24/2020    MONOPCT 3.0 11/26/2020    MYELOPCT 1 11/24/2020    BASOPCT 0.2 11/26/2020    MONOSABS 0.8 11/26/2020    LYMPHSABS 0.7 11/26/2020    EOSABS 0.0 11/26/2020    BASOSABS 0.1 11/26/2020    DIFFTYPE AUTOMATED DIFFERENTIAL 11/26/2020     CMP:    Lab Results   Component Value Date     12/05/2020    K 3.9 12/05/2020     12/05/2020    CO2 26 12/05/2020    BUN 8 12/05/2020    CREATININE 0.5 12/05/2020    GFRAA >60 12/05/2020    LABGLOM >60 12/05/2020    GLUCOSE 123 12/05/2020    PROT 4.6 12/05/2020    LABALBU 2.1 12/05/2020    CALCIUM 6.7 12/05/2020    BILITOT 0.3 12/05/2020    ALKPHOS 49 12/05/2020    AST 19 12/05/2020    ALT 22 12/05/2020     Hepatic Function Panel:    Lab Results   Component Value Date    ALKPHOS 49 12/05/2020    ALT 22 12/05/2020    AST 19 12/05/2020    PROT 4.6 12/05/2020    BILITOT 0.3 12/05/2020    LABALBU 2.1 12/05/2020     Magnesium:    Lab Results   Component Value Date    MG 2.1 12/03/2020     PT/INR:    Lab Results   Component Value Date    PROTIME 13.8 11/25/2020    INR 1.14 11/25/2020     Last 3 Troponin:  No results found for: TROPONINI  U/A:    Lab Results   Component Value Date    COLORU YELLOW 11/24/2020    WBCUA NONE SEEN 11/24/2020    RBCUA NONE SEEN 11/24/2020    MUCUS RARE 11/24/2020    TRICHOMONAS NONE SEEN 11/24/2020    BACTERIA NEGATIVE 11/24/2020    CLARITYU CLEAR 11/24/2020    SPECGRAV 1.014 11/24/2020    LEUKOCYTESUR NEGATIVE 11/24/2020    UROBILINOGEN 2.0 11/24/2020    BILIRUBINUR NEGATIVE 11/24/2020    BLOODU NEGATIVE 11/24/2020     ABG:    Lab Results   Component Value Date    GVV5MUN 34.0 11/27/2020    PO2ART 160 11/27/2020    YDZ4GLA 25.3 11/27/2020     FLP:    Lab Results   Component Value Date    TRIG 155 02/28/2018    HDL 63 02/28/2018    LDLDIRECT 77 02/28/2018     TSH:  No results found for: TSH   DATA:   ECG: Sinus Rhythm       ASSESSMENT:   1 atrial fibrillation rate is well controlled will need long-term anticoagulation  Currently in sinus rhythm remains in sinus rhythm no new cardiac issues    2 septic shock resolving off inotropes    3 acute respiratory failure with pulmonary hypertension  Clinically doing very well on room air    4 hypertension  Blood pressure well controlled    5 diabetes management hospitalist group  6 morbid obesity chronic due to excess caloric intake   PLAN   Stable from cardiology standpoint

## 2020-12-06 VITALS
HEIGHT: 69 IN | OXYGEN SATURATION: 98 % | SYSTOLIC BLOOD PRESSURE: 122 MMHG | DIASTOLIC BLOOD PRESSURE: 56 MMHG | HEART RATE: 58 BPM | TEMPERATURE: 98.4 F | WEIGHT: 262.5 LBS | BODY MASS INDEX: 38.88 KG/M2 | RESPIRATION RATE: 16 BRPM

## 2020-12-06 PROBLEM — N49.2 GANGRENE OF SCROTUM: Status: RESOLVED | Noted: 2020-11-24 | Resolved: 2020-12-06

## 2020-12-06 LAB
ALBUMIN SERPL-MCNC: 2.6 GM/DL (ref 3.4–5)
ALP BLD-CCNC: 55 IU/L (ref 40–128)
ALT SERPL-CCNC: 24 U/L (ref 10–40)
ANION GAP SERPL CALCULATED.3IONS-SCNC: 5 MMOL/L (ref 4–16)
AST SERPL-CCNC: 19 IU/L (ref 15–37)
BILIRUB SERPL-MCNC: 0.4 MG/DL (ref 0–1)
BUN BLDV-MCNC: 12 MG/DL (ref 6–23)
CALCIUM IONIZED: 4.6 MG/DL (ref 4.48–5.28)
CALCIUM SERPL-MCNC: 8.1 MG/DL (ref 8.3–10.6)
CHLORIDE BLD-SCNC: 101 MMOL/L (ref 99–110)
CO2: 29 MMOL/L (ref 21–32)
CREAT SERPL-MCNC: 0.7 MG/DL (ref 0.9–1.3)
GFR AFRICAN AMERICAN: >60 ML/MIN/1.73M2
GFR NON-AFRICAN AMERICAN: >60 ML/MIN/1.73M2
GLUCOSE BLD-MCNC: 103 MG/DL (ref 70–99)
GLUCOSE BLD-MCNC: 110 MG/DL (ref 70–99)
GLUCOSE BLD-MCNC: 137 MG/DL (ref 70–99)
GLUCOSE BLD-MCNC: 99 MG/DL (ref 70–99)
HCT VFR BLD CALC: 38.6 % (ref 42–52)
HEMOGLOBIN: 12.1 GM/DL (ref 13.5–18)
HIGH SENSITIVE C-REACTIVE PROTEIN: 12.2 MG/L
IONIZED CA: 1.15 MMOL/L (ref 1.12–1.32)
MCH RBC QN AUTO: 30.5 PG (ref 27–31)
MCHC RBC AUTO-ENTMCNC: 31.3 % (ref 32–36)
MCV RBC AUTO: 97.2 FL (ref 78–100)
PDW BLD-RTO: 14.6 % (ref 11.7–14.9)
PLATELET # BLD: 272 K/CU MM (ref 140–440)
PMV BLD AUTO: 11.6 FL (ref 7.5–11.1)
POTASSIUM SERPL-SCNC: 4.2 MMOL/L (ref 3.5–5.1)
RBC # BLD: 3.97 M/CU MM (ref 4.6–6.2)
SODIUM BLD-SCNC: 135 MMOL/L (ref 135–145)
TOTAL PROTEIN: 5.3 GM/DL (ref 6.4–8.2)
WBC # BLD: 19 K/CU MM (ref 4–10.5)

## 2020-12-06 PROCEDURE — 85027 COMPLETE CBC AUTOMATED: CPT

## 2020-12-06 PROCEDURE — 86141 C-REACTIVE PROTEIN HS: CPT

## 2020-12-06 PROCEDURE — 82962 GLUCOSE BLOOD TEST: CPT

## 2020-12-06 PROCEDURE — 2580000003 HC RX 258: Performed by: NURSE PRACTITIONER

## 2020-12-06 PROCEDURE — 2580000003 HC RX 258: Performed by: SURGERY

## 2020-12-06 PROCEDURE — 80053 COMPREHEN METABOLIC PANEL: CPT

## 2020-12-06 PROCEDURE — 82330 ASSAY OF CALCIUM: CPT

## 2020-12-06 PROCEDURE — 6370000000 HC RX 637 (ALT 250 FOR IP): Performed by: INTERNAL MEDICINE

## 2020-12-06 PROCEDURE — 2580000003 HC RX 258: Performed by: STUDENT IN AN ORGANIZED HEALTH CARE EDUCATION/TRAINING PROGRAM

## 2020-12-06 PROCEDURE — 6370000000 HC RX 637 (ALT 250 FOR IP): Performed by: PHYSICIAN ASSISTANT

## 2020-12-06 PROCEDURE — 6360000002 HC RX W HCPCS: Performed by: NURSE PRACTITIONER

## 2020-12-06 PROCEDURE — 6360000002 HC RX W HCPCS: Performed by: SURGERY

## 2020-12-06 RX ORDER — AMIODARONE HYDROCHLORIDE 200 MG/1
200 TABLET ORAL DAILY
Qty: 30 TABLET | Refills: 0
Start: 2020-12-07 | End: 2021-01-06

## 2020-12-06 RX ORDER — MAGNESIUM OXIDE 400 MG/1
400 TABLET ORAL DAILY
Qty: 30 TABLET | Refills: 1
Start: 2020-12-07

## 2020-12-06 RX ORDER — AMLODIPINE BESYLATE 10 MG/1
10 TABLET ORAL NIGHTLY
Qty: 30 TABLET | Refills: 3
Start: 2020-12-06 | End: 2021-10-11 | Stop reason: ALTCHOICE

## 2020-12-06 RX ORDER — AMIODARONE HYDROCHLORIDE 200 MG/1
200 TABLET ORAL DAILY
Status: DISCONTINUED | OUTPATIENT
Start: 2020-12-06 | End: 2020-12-06 | Stop reason: HOSPADM

## 2020-12-06 RX ORDER — METOPROLOL SUCCINATE 25 MG/1
25 TABLET, EXTENDED RELEASE ORAL 2 TIMES DAILY
Qty: 30 TABLET | Refills: 3
Start: 2020-12-06 | End: 2021-10-11 | Stop reason: ALTCHOICE

## 2020-12-06 RX ADMIN — LISINOPRIL 40 MG: 20 TABLET ORAL at 09:50

## 2020-12-06 RX ADMIN — NYSTATIN 500000 UNITS: 100000 SUSPENSION ORAL at 09:49

## 2020-12-06 RX ADMIN — METOPROLOL SUCCINATE 25 MG: 25 TABLET, EXTENDED RELEASE ORAL at 09:49

## 2020-12-06 RX ADMIN — VANCOMYCIN HYDROCHLORIDE 2000 MG: 1 INJECTION, POWDER, LYOPHILIZED, FOR SOLUTION INTRAVENOUS at 13:28

## 2020-12-06 RX ADMIN — MAGNESIUM OXIDE 400 MG (241.3 MG MAGNESIUM) TABLET 400 MG: TABLET at 09:49

## 2020-12-06 RX ADMIN — MEROPENEM 2 G: 1 INJECTION, POWDER, FOR SOLUTION INTRAVENOUS at 09:48

## 2020-12-06 RX ADMIN — AMIODARONE HYDROCHLORIDE 200 MG: 200 TABLET ORAL at 09:49

## 2020-12-06 RX ADMIN — NYSTATIN 500000 UNITS: 100000 SUSPENSION ORAL at 13:28

## 2020-12-06 RX ADMIN — SODIUM CHLORIDE, PRESERVATIVE FREE 10 ML: 5 INJECTION INTRAVENOUS at 09:50

## 2020-12-06 RX ADMIN — ASPIRIN 81 MG CHEWABLE TABLET 81 MG: 81 TABLET CHEWABLE at 09:49

## 2020-12-06 RX ADMIN — POTASSIUM CHLORIDE 20 MEQ: 1500 TABLET, EXTENDED RELEASE ORAL at 09:49

## 2020-12-06 RX ADMIN — HYDROCODONE BITARTRATE AND ACETAMINOPHEN 1 TABLET: 5; 325 TABLET ORAL at 10:14

## 2020-12-06 RX ADMIN — COLLAGENASE SANTYL: 250 OINTMENT TOPICAL at 10:14

## 2020-12-06 ASSESSMENT — PAIN SCALES - GENERAL: PAINLEVEL_OUTOF10: 8

## 2020-12-06 NOTE — DISCHARGE SUMMARY
Discharge Summary    Name:  Gabriel Tavares /Age/Sex: 1955  (72 y.o. 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 Patient was taken to the OR emergently for debridement by general surgery. Following surgery patient was started on pressor support and had a prolonged course of respiratory failure for which he remained intubated. Patient was eventually extubated and did well on minimal oxygen. Hospital course also complicated by afib for which he was seen by Cardiology. Infectious disease was consulted to assist with the antibiotic regimen. Patient was eventually discharged to [facility] for further assistance with dressing changes and antibiotic therapy. 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  - Scripts given for Vancomycin and Meropenem with stop dates per ID  - Will f/u with General Surgery as an outpatient for further discussion regarding vac placement or diverting ostomy if site will not heal     Acute respiratory failure with hypoxia requiring intubation  - Extubated successfully  - Currently on room air doing well     Hypocalcemia  - Replacing PRN  - Ionized calcium in AM     Pulmonary HTN; 2 or 3  Afib with RVR  R Posterior Tibial DVT  - Continue beta-blocker; was given Amio  - Continue Xarelto  - Will continue to monitor     HTN  - Continue Amlodipine, Lisinopril     DM  - SSI      The patient expressed appropriate understanding of and agreement with the discharge recommendations, medications, and plan.      Consults this admission:  IP CONSULT TO GENERAL SURGERY  IP CONSULT TO HOSPITALIST  PHARMACY TO DOSE VANCOMYCIN  IP CONSULT TO DIETITIAN  PHARMACY TO DOSE VANCOMYCIN  IP CONSULT TO PULMONOLOGY  IP CONSULT TO CARDIOLOGY  IP CONSULT TO ENDOCRINOLOGY  IP CONSULT TO (KLOR-CON M) 20 MEQ extended release tablet  Take 20 mEq by mouth daily             rivaroxaban (XARELTO) 15 MG TABS tablet  Take 1 tablet by mouth daily             tamsulosin (FLOMAX) 0.4 MG capsule  Take 0.4 mg by mouth daily                 Objective Findings at Discharge:   BP (!) 138/53   Pulse 65   Temp 98.4 °F (36.9 °C) (Oral)   Resp 19   Ht 5' 9\" (1.753 m)   Wt 262 lb 8 oz (119.1 kg)   SpO2 97%   BMI 38.76 kg/m²            GEN    Awake male, sitting upright in bed in no apparent distress. Appears given age. EYES   Pupils are equally round.  No scleral erythema, discharge, or conjunctivitis. HENT  Mucous membranes are moist.   NECK  Supple, no apparent thyromegaly or masses. RESP  Clear to auscultation, no wheezes, rales or rhonchi.  Symmetric chest movement while on room air. CARDIO/VASC           S1/S2 auscultated. Regular rate without appreciable murmurs, rubs, or gallops. [place] is soft without significant tenderness, masses, or guarding.        Pepper in place  HEME/LYMPH             No petechiae or ecchymoses. MSK    No gross joint deformities. SKIN    Normal coloration, warm, dry, incision area packed with wet to dry dressings  NEURO           Cranial nerves appear grossly intact, normal speech  PSYCH            Awake, alert, oriented x 4.  Affect appropriate. BMP/CBC  Recent Labs     12/04/20  0216 12/05/20  0520 12/06/20  0625    141 135   K 4.3 3.9 4.2    108 101   CO2 28 26 29   BUN 8 8 12   CREATININE 0.6* 0.5* 0.7*   WBC 22.5* 20.0* 19.0*   HCT 40.3* 34.5* 38.6*    208 272       IMAGING:  CT A/P:  1. Note: Study significantly limited by patient motion related artifact. 2. Moderate bilateral layering posterior pleural effusions. 3. Mild pulmonary interstitial edema within the visualized lower lungs. 4. Hepatic steatosis. 5. Cholelithiasis, as described above.       CT Pelvis:  Findings consistent with the provided history of developing

## 2020-12-06 NOTE — PROGRESS NOTES
right was 0.8.     PAST SURGICAL HISTORY:  Left leg intervention done, Jarek's gangrene,  and rectal surgery done.     SOCIAL HISTORY:  History of smoking.  No alcohol use.     ALLERGIES:  NKDA.     MEDICATIONS AT HOME:  Prior to hospital, he was on Glucotrol, potassium,  Xanax, Zaroxolyn, aspirin, Lasix, lisinopril, and Flomax.       Objective:   BP (!) 133/54   Pulse 54   Temp 98.3 °F (36.8 °C) (Oral)   Resp 22   Ht 5' 9\" (1.753 m)   Wt 262 lb 8 oz (119.1 kg)   SpO2 98%   BMI 38.76 kg/m²       Intake/Output Summary (Last 24 hours) at 12/6/2020 0806  Last data filed at 12/5/2020 1833  Gross per 24 hour   Intake 1600 ml   Output 850 ml   Net 750 ml       Medications:   Scheduled Meds:   nystatin  5 mL Oral 4x Daily    meropenem  2 g Intravenous Q8H    amiodarone  200 mg Oral BID    potassium chloride  20 mEq Oral BID    insulin glargine  10 Units Subcutaneous Nightly    insulin lispro  0-12 Units Subcutaneous TID WC    insulin lispro  0-6 Units Subcutaneous 2 times per day    amLODIPine  10 mg Oral Nightly    magnesium oxide  400 mg Oral Daily    collagenase   Topical Daily    lisinopril  40 mg Oral Daily    sodium chloride flush  10 mL Intravenous BID    vancomycin  2,000 mg Intravenous Q18H    sodium chloride flush  10 mL Intravenous 2 times per day    metoprolol succinate  25 mg Oral BID    aspirin  81 mg Oral Daily    rivaroxaban  15 mg Oral Daily    influenza virus vaccine  0.5 mL Intramuscular Prior to discharge      Infusions:   dextrose        PRN Meds:  potassium chloride, potassium chloride **OR** potassium alternative oral replacement **OR** potassium chloride, HYDROcodone 5 mg - acetaminophen, sodium chloride flush, glucose, dextrose, glucagon (rDNA), dextrose, HYDROmorphone, albuterol sulfate HFA       Physical Exam:  Vitals:    12/05/20 2100   BP: (!) 133/54   Pulse: 54   Resp: 22   Temp:    SpO2: 98%        General: AAO, NAD  Chest: Nontender  Cardiac: [facility]

## 2020-12-28 LAB
CULTURE: NORMAL
FUNGUS STAIN: NORMAL
Lab: NORMAL
SPECIMEN: NORMAL

## 2020-12-29 ENCOUNTER — HOSPITAL ENCOUNTER (OUTPATIENT)
Age: 65
Setting detail: SPECIMEN
Discharge: HOME | End: 2020-12-29

## 2020-12-29 LAB
ALBUMIN SERPL-MCNC: 3.2 GM/DL (ref 3.4–5)
ALP BLD-CCNC: 64 IU/L (ref 40–128)
ALT SERPL-CCNC: 16 U/L (ref 10–40)
ANION GAP SERPL CALCULATED.3IONS-SCNC: 9 MMOL/L (ref 4–16)
AST SERPL-CCNC: 17 IU/L (ref 15–37)
BASOPHILS ABSOLUTE: 0 K/CU MM
BASOPHILS RELATIVE PERCENT: 0.5 % (ref 0–1)
BILIRUB SERPL-MCNC: 0.4 MG/DL (ref 0–1)
BUN BLDV-MCNC: 9 MG/DL (ref 6–23)
CALCIUM SERPL-MCNC: 8.2 MG/DL (ref 8.3–10.6)
CHLORIDE BLD-SCNC: 103 MMOL/L (ref 99–110)
CO2: 25 MMOL/L (ref 21–32)
CREAT SERPL-MCNC: 1 MG/DL (ref 0.9–1.3)
DIFFERENTIAL TYPE: ABNORMAL
EOSINOPHILS ABSOLUTE: 0.4 K/CU MM
EOSINOPHILS RELATIVE PERCENT: 4.7 % (ref 0–3)
GFR AFRICAN AMERICAN: >60 ML/MIN/1.73M2
GFR NON-AFRICAN AMERICAN: >60 ML/MIN/1.73M2
GLUCOSE BLD-MCNC: 108 MG/DL (ref 70–99)
HCT VFR BLD CALC: 39.1 % (ref 42–52)
HEMOGLOBIN: 12.4 GM/DL (ref 13.5–18)
IMMATURE NEUTROPHIL %: 0.5 % (ref 0–0.43)
LYMPHOCYTES ABSOLUTE: 2.4 K/CU MM
LYMPHOCYTES RELATIVE PERCENT: 29.6 % (ref 24–44)
MCH RBC QN AUTO: 30.2 PG (ref 27–31)
MCHC RBC AUTO-ENTMCNC: 31.7 % (ref 32–36)
MCV RBC AUTO: 95.4 FL (ref 78–100)
MONOCYTES ABSOLUTE: 0.6 K/CU MM
MONOCYTES RELATIVE PERCENT: 7.7 % (ref 0–4)
NUCLEATED RBC %: 0 %
PDW BLD-RTO: 16 % (ref 11.7–14.9)
PLATELET # BLD: 286 K/CU MM (ref 140–440)
PMV BLD AUTO: 9.9 FL (ref 7.5–11.1)
POTASSIUM SERPL-SCNC: 4.9 MMOL/L (ref 3.5–5.1)
RBC # BLD: 4.1 M/CU MM (ref 4.6–6.2)
SEGMENTED NEUTROPHILS ABSOLUTE COUNT: 4.7 K/CU MM
SEGMENTED NEUTROPHILS RELATIVE PERCENT: 57 % (ref 36–66)
SODIUM BLD-SCNC: 137 MMOL/L (ref 135–145)
TOTAL IMMATURE NEUTOROPHIL: 0.04 K/CU MM
TOTAL NUCLEATED RBC: 0 K/CU MM
TOTAL PROTEIN: 6.7 GM/DL (ref 6.4–8.2)
WBC # BLD: 8.2 K/CU MM (ref 4–10.5)

## 2020-12-29 PROCEDURE — 85025 COMPLETE CBC W/AUTO DIFF WBC: CPT

## 2020-12-29 PROCEDURE — 36415 COLL VENOUS BLD VENIPUNCTURE: CPT

## 2020-12-29 PROCEDURE — 80053 COMPREHEN METABOLIC PANEL: CPT

## 2020-12-30 ENCOUNTER — TELEPHONE (OUTPATIENT)
Dept: WOUND CARE | Age: 65
End: 2020-12-30

## 2020-12-30 LAB
CULTURE: ABNORMAL
Lab: ABNORMAL
SPECIMEN: ABNORMAL

## 2021-01-04 ENCOUNTER — HOSPITAL ENCOUNTER (OUTPATIENT)
Age: 66
Setting detail: SPECIMEN
Discharge: HOME | End: 2021-01-04

## 2021-01-04 LAB
ANION GAP SERPL CALCULATED.3IONS-SCNC: 8 MMOL/L (ref 4–16)
BUN BLDV-MCNC: 10 MG/DL (ref 6–23)
CALCIUM SERPL-MCNC: 8 MG/DL (ref 8.3–10.6)
CHLORIDE BLD-SCNC: 99 MMOL/L (ref 99–110)
CO2: 27 MMOL/L (ref 21–32)
CREAT SERPL-MCNC: 1.1 MG/DL (ref 0.9–1.3)
GFR AFRICAN AMERICAN: >60 ML/MIN/1.73M2
GFR NON-AFRICAN AMERICAN: >60 ML/MIN/1.73M2
GLUCOSE BLD-MCNC: 111 MG/DL (ref 70–99)
HCT VFR BLD CALC: 36.2 % (ref 42–52)
HEMOGLOBIN: 11 GM/DL (ref 13.5–18)
MCH RBC QN AUTO: 28.9 PG (ref 27–31)
MCHC RBC AUTO-ENTMCNC: 30.4 % (ref 32–36)
MCV RBC AUTO: 95 FL (ref 78–100)
PDW BLD-RTO: 15.9 % (ref 11.7–14.9)
PLATELET # BLD: 251 K/CU MM (ref 140–440)
PMV BLD AUTO: 10.5 FL (ref 7.5–11.1)
POTASSIUM SERPL-SCNC: 4.6 MMOL/L (ref 3.5–5.1)
RBC # BLD: 3.81 M/CU MM (ref 4.6–6.2)
SODIUM BLD-SCNC: 134 MMOL/L (ref 135–145)
WBC # BLD: 9 K/CU MM (ref 4–10.5)

## 2021-01-04 PROCEDURE — 85027 COMPLETE CBC AUTOMATED: CPT

## 2021-01-04 PROCEDURE — 80048 BASIC METABOLIC PNL TOTAL CA: CPT

## 2021-01-04 PROCEDURE — 36415 COLL VENOUS BLD VENIPUNCTURE: CPT

## 2021-01-05 ENCOUNTER — TELEPHONE (OUTPATIENT)
Dept: WOUND CARE | Age: 66
End: 2021-01-05

## 2021-01-05 NOTE — TELEPHONE ENCOUNTER
Addy Form at [facility] confirming that [facility] CNP will follow for home care. Discharged from [place] on 1/5/21. [facility] said \"yes. \" Be sure to fax wound orders to [facility].

## 2021-01-06 ENCOUNTER — HOSPITAL ENCOUNTER (OUTPATIENT)
Dept: WOUND CARE | Age: 66
Discharge: HOME | End: 2021-01-06
Payer: MEDICARE

## 2021-01-06 VITALS
RESPIRATION RATE: 22 BRPM | DIASTOLIC BLOOD PRESSURE: 70 MMHG | TEMPERATURE: 95.7 F | SYSTOLIC BLOOD PRESSURE: 144 MMHG | HEART RATE: 85 BPM

## 2021-01-06 DIAGNOSIS — T81.89XA NON-HEALING SURGICAL WOUND, INITIAL ENCOUNTER: ICD-10-CM

## 2021-01-06 DIAGNOSIS — S81.802A TRAUMATIC OPEN WOUND OF LEFT LOWER LEG, INITIAL ENCOUNTER: Primary | ICD-10-CM

## 2021-01-06 DIAGNOSIS — M72.6 NECROTIZING FASCIITIS (HCC): ICD-10-CM

## 2021-01-06 DIAGNOSIS — T81.89XA NONHEALING SURGICAL WOUND, INITIAL ENCOUNTER: ICD-10-CM

## 2021-01-06 DIAGNOSIS — I89.0 LYMPHEDEMA OF BOTH LOWER EXTREMITIES: ICD-10-CM

## 2021-01-06 DIAGNOSIS — I87.8 VENOUS STASIS OF BOTH LOWER EXTREMITIES: ICD-10-CM

## 2021-01-06 PROCEDURE — 11045 DBRDMT SUBQ TISS EACH ADDL: CPT

## 2021-01-06 PROCEDURE — 11042 DBRDMT SUBQ TIS 1ST 20SQCM/<: CPT

## 2021-01-06 PROCEDURE — 11042 DBRDMT SUBQ TIS 1ST 20SQCM/<: CPT | Performed by: NURSE PRACTITIONER

## 2021-01-06 PROCEDURE — 11045 DBRDMT SUBQ TISS EACH ADDL: CPT | Performed by: NURSE PRACTITIONER

## 2021-01-06 RX ORDER — GINSENG 100 MG
CAPSULE ORAL ONCE
Status: CANCELLED | OUTPATIENT
Start: 2021-01-06 | End: 2021-01-06

## 2021-01-06 RX ORDER — LIDOCAINE HYDROCHLORIDE 40 MG/ML
SOLUTION TOPICAL ONCE
Status: DISCONTINUED | OUTPATIENT
Start: 2021-01-06 | End: 2021-01-07 | Stop reason: HOSPADM

## 2021-01-06 RX ORDER — LIDOCAINE HYDROCHLORIDE 20 MG/ML
JELLY TOPICAL ONCE
Status: CANCELLED | OUTPATIENT
Start: 2021-01-06 | End: 2021-01-06

## 2021-01-06 RX ORDER — GENTAMICIN SULFATE 1 MG/G
OINTMENT TOPICAL ONCE
Status: CANCELLED | OUTPATIENT
Start: 2021-01-06 | End: 2021-01-06

## 2021-01-06 RX ORDER — LIDOCAINE 50 MG/G
OINTMENT TOPICAL ONCE
Status: CANCELLED | OUTPATIENT
Start: 2021-01-06 | End: 2021-01-06

## 2021-01-06 RX ORDER — LIDOCAINE 40 MG/G
CREAM TOPICAL ONCE
Status: CANCELLED | OUTPATIENT
Start: 2021-01-06 | End: 2021-01-06

## 2021-01-06 RX ORDER — CLOBETASOL PROPIONATE 0.5 MG/G
OINTMENT TOPICAL ONCE
Status: CANCELLED | OUTPATIENT
Start: 2021-01-06 | End: 2021-01-06

## 2021-01-06 RX ORDER — BETAMETHASONE DIPROPIONATE 0.05 %
OINTMENT (GRAM) TOPICAL ONCE
Status: CANCELLED | OUTPATIENT
Start: 2021-01-06 | End: 2021-01-06

## 2021-01-06 RX ORDER — BACITRACIN ZINC AND POLYMYXIN B SULFATE 500; 1000 [USP'U]/G; [USP'U]/G
OINTMENT TOPICAL ONCE
Status: CANCELLED | OUTPATIENT
Start: 2021-01-06 | End: 2021-01-06

## 2021-01-06 RX ORDER — AMOXICILLIN AND CLAVULANATE POTASSIUM 875; 125 MG/1; MG/1
1 TABLET, FILM COATED ORAL 2 TIMES DAILY
Qty: 20 TABLET | Refills: 0 | Status: SHIPPED | OUTPATIENT
Start: 2021-01-06 | End: 2021-01-16

## 2021-01-06 RX ORDER — LIDOCAINE HYDROCHLORIDE 40 MG/ML
SOLUTION TOPICAL ONCE
Status: CANCELLED | OUTPATIENT
Start: 2021-01-06 | End: 2021-01-06

## 2021-01-06 RX ORDER — GREEN TEA/HOODIA GORDONII 315-12.5MG
1 CAPSULE ORAL 2 TIMES DAILY
Qty: 60 TABLET | Refills: 0 | Status: SHIPPED | OUTPATIENT
Start: 2021-01-06 | End: 2021-02-05

## 2021-01-06 RX ORDER — BACITRACIN, NEOMYCIN, POLYMYXIN B 400; 3.5; 5 [USP'U]/G; MG/G; [USP'U]/G
OINTMENT TOPICAL ONCE
Status: CANCELLED | OUTPATIENT
Start: 2021-01-06 | End: 2021-01-06

## 2021-01-06 RX ORDER — CHLORHEXIDINE GLUCONATE 4 G/100ML
SOLUTION TOPICAL
Qty: 473 ML | Refills: 5 | Status: SHIPPED | OUTPATIENT
Start: 2021-01-06 | End: 2021-01-20

## 2021-01-06 NOTE — PLAN OF CARE
Problem: Wound:  Goal: Will show signs of wound healing; wound closure and no evidence of infection  Description: Will show signs of wound healing; wound closure and no evidence of infection  Outcome: Ongoing  Intervention: Assess ankle, calf, or foot circumference blilaterally  Note: See flowsheet  Intervention: Assess pain status  Note: See flowsheet  Intervention: Assess wound size, appearance and drainage  Note: See flowsheet  Intervention: Assess pedal pulses bilaterally if patient has a foot or leg ulcer  Note: See flowsheet  Intervention: Doppler if unable to palpate pedal pulse  Note: See flowsheet

## 2021-01-06 NOTE — PROGRESS NOTES
Wound Care Center Progress Note With Procedure    Gabino Tavares  AGE: 72 y.o. GENDER: male  : 1955  EPISODE DATE:  2021     Subjective:     Chief Complaint   Patient presents with    Wound Check     scrotum         HISTORY of PRESENT ILLNESS      Megan Tavares is a 72 y.o. male who presents today for wound evaluation of Acute non-healing surgical and necrotizing fascitis ulcer(s) of the rectal and perirectal area. The ulcer is of marked severity. The underlying cause of the wound is nonhealing surgical post rectal and perirectal incision and drain related to necrotizing fascitis performed by Dr. Elizabeth Boston 2020.   Wound Pain Timing/Severity: waxing and waning, moderate  Quality of pain: aching, tender  Severity of pain:  3 / 10   Modifying Factors: venous stasis, lymphedema, diabetes, poor glucose control, obesity, smoking and anticoagulation therapy  Associated Signs/Symptoms: edema, erythema, drainage and pain        PAST MEDICAL HISTORY        Diagnosis Date    CHF (congestive heart failure) (HCC)     Chronic ulcer of left leg with fat layer exposed ([address] 2016    Chronic ulcer of right leg with fat layer exposed ([address] 2016    Chronic ulcer of right leg with fat layer exposed ([address] 2016    Diabetes mellitus ([address]     Hypertension     PVD (peripheral vascular disease) ([address] 2016    Type 2 diabetes mellitus with diabetic peripheral angiopathy without gangrene, without long-term current use of insulin ([address] 2016    Type 2 diabetes mellitus with diabetic peripheral angiopathy without gangrene, without long-term current use of insulin ([address] 2016    Venous stasis ulcer of both lower extremities without varicose veins ([address] 2016    WD-Traumatic open wound of left lower leg, complicated by diabetes and venous insufficiency 10/8/2020    WD-Ulcer of shin, left, with fat layer exposed ([address] 2016       PAST SURGICAL HISTORY    Past Surgical History:   Procedure Laterality Date    RECTAL SURGERY N/A 11/24/2020    RECTAL PERIRECTAL INCISION AND DRAINAGE performed by Favian Currie MD at [facility] History   Problem Relation Age of Onset    Asthma Mother     Breast Cancer Mother     Cancer Mother     Diabetes Mother     High Blood Pressure Mother     Cancer Father     Early Death Brother     Arthritis Paternal Grandmother        SOCIAL HISTORY    Social History     Tobacco Use    Smoking status: Current Every Day Smoker     Packs/day: 1.00     Years: 60.00     Pack years: 60.00     Types: Cigarettes    Smokeless tobacco: Never Used    Tobacco comment: healing    Substance Use Topics    Alcohol use: Never     Frequency: Never    Drug use: No       ALLERGIES    No Known Allergies    MEDICATIONS    Current Outpatient Medications on File Prior to Encounter   Medication Sig Dispense Refill    magnesium oxide (MAG-OX) 400 MG tablet Take 1 tablet by mouth daily 30 tablet 1    amiodarone (CORDARONE) 200 MG tablet Take 1 tablet by mouth daily 30 tablet 0    rivaroxaban (XARELTO) 15 MG TABS tablet Take 1 tablet by mouth daily 42 tablet 1    metoprolol succinate (TOPROL XL) 25 MG extended release tablet Take 1 tablet by mouth 2 times daily 30 tablet 3    amLODIPine (NORVASC) 10 MG tablet Take 1 tablet by mouth nightly 30 tablet 3    Latanoprost 0.005 % EMUL Apply to eye daily      ibuprofen (ADVIL;MOTRIN) 600 MG tablet Take 1 tablet by mouth every 6 hours as needed for Pain 30 tablet 0    glipiZIDE (GLUCOTROL) 5 MG tablet Take 5 mg by mouth 2 times daily (before meals)      potassium chloride (KLOR-CON M) 20 MEQ extended release tablet Take 20 mEq by mouth daily      ALPRAZolam (XANAX) 0.5 MG tablet Take 0.5 mg by mouth nightly. [place] Flattery ipratropium-albuterol (DUONEB) 0.5-2.5 (3) MG/3ML SOLN nebulizer solution Inhale 1 vial into the lungs every 4 hours      albuterol sulfate  (90 Base) MCG/ACT inhaler Inhale 2 puffs into the lungs every 6 hours as needed for Wheezing      aspirin EC 81 MG EC tablet Take 1 tablet by mouth daily 30 tablet 3    HYDROcodone-acetaminophen (NORCO) 7.5-325 MG per tablet Take 1 tablet by mouth 3 times daily .  metFORMIN (GLUCOPHAGE) 1000 MG tablet Take 1,000 mg by mouth 2 times daily (with meals)      lisinopril (PRINIVIL;ZESTRIL) 20 MG tablet Take 40 mg by mouth daily       tamsulosin (FLOMAX) 0.4 MG capsule Take 0.4 mg by mouth daily      finasteride (PROSCAR) 5 MG tablet Take 5 mg by mouth daily       No current facility-administered medications on file prior to encounter. REVIEW OF SYSTEMS    Pertinent items are noted in HPI. Constitutional: Negative for systemic symptoms including fever, chills and malaise. Objective:      BP (!) 144/70   Pulse 85   Temp 95.7 °F (35.4 °C) (Temporal)   Resp 22     PHYSICAL EXAM    General: The patient is in no acute distress. Mental status:  Patient is appropriate, is  oriented to place and plan of care.   Dermatologic exam: Visual inspection of the periwound reveals the skin to be edematous  Wound exam: see wound description below in procedure note      Assessment:     Problem List Items Addressed This Visit     WD-Venous stasis of both lower extremities    WD-Lymphedema of both lower extremities    WD-Traumatic open wound of left lower leg, complicated by diabetes and venous insufficiency - Primary    Relevant Medications    lidocaine (XYLOCAINE) 4 % external solution    Other Relevant Orders    Supply: Wound Cleanser    Supply: Merary Wound    Supply: Wound Dressings    Supply: Cover and Secure    WD-Nonhealing surgical wound groin & buttock, initial encounter    WD-Necrotizing fasciitis ([facility]      Other Visit Diagnoses     Non-healing surgical wound, initial encounter        Relevant Orders    AR STERILE WATER/SALINE, 500 ML        Procedure Note    Indications:  Based on my examination of this patient's wound(s) Moderate 01/06/21 0802   Drainage Description Serosanguinous 01/06/21 0802   Odor None 01/06/21 0802   Merary-wound Assessment Intact 01/06/21 0802   Margins Defined edges 01/06/21 0802   Wound Thickness Description not for Pressure Injury Full thickness 01/06/21 0802   Number of days: 82       Wound 11/25/20 Scrotum #2 Perineum (Active)   Wound Image   01/06/21 0802   Wound Cleansed Other (Comment) 01/06/21 0802   Dressing/Treatment ABD 01/06/21 0924   Wound Length (cm) 32.2 cm 01/06/21 0802   Wound Width (cm) 6.2 cm 01/06/21 0802   Wound Depth (cm) 1.5 cm 01/06/21 0802   Wound Surface Area (cm^2) 199.64 cm^2 01/06/21 0802   Change in Wound Size % (l*w) -59.71 01/06/21 0802   Wound Volume (cm^3) 299.46 cm^3 01/06/21 0802   Wound Healing % 55 01/06/21 0802   Post-Procedure Length (cm) 32.2 cm 01/06/21 0845   Post-Procedure Width (cm) 6.2 cm 01/06/21 0845   Post-Procedure Depth (cm) 1.5 cm 01/06/21 0845   Post-Procedure Surface Area (cm^2) 199.64 cm^2 01/06/21 0845   Post-Procedure Volume (cm^3) 299.46 cm^3 01/06/21 0845   Distance Tunneling (cm) 0 cm 01/06/21 0802   Tunneling Position ___ O'Clock 0 01/06/21 0802   Undermining Starts ___ O'Clock 9 01/06/21 0802   Undermining Ends___ O'Clock 3 01/06/21 0802   Undermining Maxium Distance (cm) 2.8 01/06/21 0802   Wound Assessment Granulation tissue 01/06/21 0802   Drainage Amount Moderate 01/06/21 0802   Drainage Description Serosanguinous 01/06/21 0802   Odor Mild 01/06/21 0802   Number of days: 41       Percent of Wound(s) Debrided: approximately 100%    Total  Area  Debrided:  199.64 sq cm     Bleeding:  Minimal    Hemostasis Achieved:  by pressure    Procedural Pain:  2  / 10     Post Procedural Pain:  0 / 10     Response to treatment:  Well tolerated by patient. Status of wound progress and description from last visit: The patient is well known to the wound clinic and myself.  He has been treated for venous and lymphedema ulcers in the past and most recently a traumatic wound to the left leg. He was last seen for the traumatic wound 11/23/2020 when he developed cellulitis of the perineum  on 11/24/2020 which ultimately resulted in rectal perirectal incision and drain for necrotizing fascitis. He was hospitalized 11/24[facility]12/6/2020 at [facility] and then went to [facility] and then [facility]. He was released yesterday and presents for wound follow up today. He does have home health in place. The wound bed is beefy red and there is no evident S&S infection. Will add stimulin powder to the wet to dry dressing, we brought the wife in to reinforce wound care instruction. His lower legs are red warm and swollen will start on Augmentin for 10 days along with a probiotic and start compression with Coban lite, will  re-evaluate on Friday at [facility]. Plan:       Discharge Instructions       PHYSICIAN ORDERS AND DISCHARGE INSTRUCTIONS     NOTE: Upon discharge from the [address], you will receive a patient experience survey. We would be grateful if you would take the time to fill this survey out.     Wound care order history:                 ANDREW's   Right       Left               Date:              Cultures:                Grafts:                Antibiotics:           Continuing wound care orders and information:                 Residence:  Home              Continue home health care with: [facility]              Your wound-care supplies will be provided by:      Wound cleansing:               TC not scrub or use excessive force.              Wash hands with soap and water before and after dressing changes.               DBWJA to applying a clean dressing, cleanse wound with normal saline, wound cleanser, or mild soap and water.               Ask the physician or nurse before getting the wound(s) wet in a shower     Daily Wound management:              Keep weight off wounds and reposition every 2 hours.              Avoid standing for long periods of time.              BJYLW wraps/stockings in AM and remove at bedtime.              If swelling is present, elevate legs to the level of the heart or above for 30 minutes 4-5 times a day and/or when sitting.                                      When taking antibiotics take entire prescription as ordered by physician do not stop taking until medicine is all gone.                                                      Orders for this week:  1/6/21          Left Lower leg-- Clean with soap and water, pat dry with 4x4.  A&D to leg and foot. Apply Rubi to wound bed. cover with calcium alginate and ABD, wrap with Coban 2 lite. Leave in place x 1 week         Perineum -  Wash with hibicleanse and water. Rinse with saline. Pat dry with 4x4. Apply stimulen powder to base of wound. Apply skin prep to periwound. Gently pack with saline dampened kerlix, cover with ABD pad and secure with hyperfix tape. Change Daily and with bowel movements.       Script for hibicleanse, oral antibiotic and probiotic sent to pharmacy     Follow up with Dahlia HENDERSON In 1 weeks in the [facility] wound care center  Call [phone] for any questions or concerns.   Date__________   Time____________           Treatment Note Wound 11/25/20 Scrotum #2 Perineum-Dressing/Treatment: ABD(stimulen powder, saline moist kerlix, ABD, hypafix tape,)  Wound 10/16/20 Pretibial Left #1 Left Lateral Lower Leg-Dressing/Treatment: ABD, Alginate, Moisturizing cream, Silver dressing(A&D, rubi, kelsi alg, ABD, coban2, lite,)    Written Patient Dismissal Instructions Given            Electronically signed by FAITH Bermeo CNP on 1/6/2021 at 10:22 AM

## 2021-01-08 ENCOUNTER — HOSPITAL ENCOUNTER (OUTPATIENT)
Dept: WOUND CARE | Age: 66
Discharge: HOME | End: 2021-01-08
Payer: MEDICARE

## 2021-01-08 VITALS
DIASTOLIC BLOOD PRESSURE: 74 MMHG | SYSTOLIC BLOOD PRESSURE: 166 MMHG | TEMPERATURE: 97 F | RESPIRATION RATE: 79 BRPM | HEART RATE: 79 BPM

## 2021-01-08 DIAGNOSIS — M72.6 NECROTIZING FASCIITIS (HCC): ICD-10-CM

## 2021-01-08 DIAGNOSIS — S81.802A TRAUMATIC OPEN WOUND OF LEFT LOWER LEG, INITIAL ENCOUNTER: Primary | ICD-10-CM

## 2021-01-08 DIAGNOSIS — T81.89XA NONHEALING SURGICAL WOUND, INITIAL ENCOUNTER: ICD-10-CM

## 2021-01-08 DIAGNOSIS — I87.8 VENOUS STASIS OF BOTH LOWER EXTREMITIES: ICD-10-CM

## 2021-01-08 DIAGNOSIS — I89.0 LYMPHEDEMA OF BOTH LOWER EXTREMITIES: ICD-10-CM

## 2021-01-08 PROCEDURE — 11042 DBRDMT SUBQ TIS 1ST 20SQCM/<: CPT

## 2021-01-08 PROCEDURE — 11042 DBRDMT SUBQ TIS 1ST 20SQCM/<: CPT | Performed by: NURSE PRACTITIONER

## 2021-01-08 PROCEDURE — 11045 DBRDMT SUBQ TISS EACH ADDL: CPT | Performed by: NURSE PRACTITIONER

## 2021-01-08 PROCEDURE — 11045 DBRDMT SUBQ TISS EACH ADDL: CPT

## 2021-01-08 RX ORDER — CLOBETASOL PROPIONATE 0.5 MG/G
OINTMENT TOPICAL ONCE
Status: CANCELLED | OUTPATIENT
Start: 2021-01-08 | End: 2021-01-08

## 2021-01-08 RX ORDER — GINSENG 100 MG
CAPSULE ORAL ONCE
Status: CANCELLED | OUTPATIENT
Start: 2021-01-08 | End: 2021-01-08

## 2021-01-08 RX ORDER — BETAMETHASONE DIPROPIONATE 0.05 %
OINTMENT (GRAM) TOPICAL ONCE
Status: CANCELLED | OUTPATIENT
Start: 2021-01-08 | End: 2021-01-08

## 2021-01-08 RX ORDER — LIDOCAINE HYDROCHLORIDE 20 MG/ML
JELLY TOPICAL ONCE
Status: CANCELLED | OUTPATIENT
Start: 2021-01-08 | End: 2021-01-08

## 2021-01-08 RX ORDER — LIDOCAINE 50 MG/G
OINTMENT TOPICAL ONCE
Status: CANCELLED | OUTPATIENT
Start: 2021-01-08 | End: 2021-01-08

## 2021-01-08 RX ORDER — BACITRACIN, NEOMYCIN, POLYMYXIN B 400; 3.5; 5 [USP'U]/G; MG/G; [USP'U]/G
OINTMENT TOPICAL ONCE
Status: CANCELLED | OUTPATIENT
Start: 2021-01-08 | End: 2021-01-08

## 2021-01-08 RX ORDER — LIDOCAINE HYDROCHLORIDE 40 MG/ML
SOLUTION TOPICAL ONCE
Status: CANCELLED | OUTPATIENT
Start: 2021-01-08 | End: 2021-01-08

## 2021-01-08 RX ORDER — LIDOCAINE 40 MG/G
CREAM TOPICAL ONCE
Status: CANCELLED | OUTPATIENT
Start: 2021-01-08 | End: 2021-01-08

## 2021-01-08 RX ORDER — BACITRACIN ZINC AND POLYMYXIN B SULFATE 500; 1000 [USP'U]/G; [USP'U]/G
OINTMENT TOPICAL ONCE
Status: CANCELLED | OUTPATIENT
Start: 2021-01-08 | End: 2021-01-08

## 2021-01-08 RX ORDER — LIDOCAINE HYDROCHLORIDE 40 MG/ML
SOLUTION TOPICAL ONCE
Status: DISCONTINUED | OUTPATIENT
Start: 2021-01-08 | End: 2021-01-09 | Stop reason: HOSPADM

## 2021-01-08 RX ORDER — GENTAMICIN SULFATE 1 MG/G
OINTMENT TOPICAL ONCE
Status: CANCELLED | OUTPATIENT
Start: 2021-01-08 | End: 2021-01-08

## 2021-01-08 NOTE — PROGRESS NOTES
Surgical History:   Procedure Laterality Date    RECTAL SURGERY N/A 11/24/2020    RECTAL PERIRECTAL INCISION AND DRAINAGE performed by Canelo Vizcaino MD at [facility] History   Problem Relation Age of Onset    Asthma Mother     Breast Cancer Mother     Cancer Mother     Diabetes Mother     High Blood Pressure Mother     Cancer Father     Early Death Brother     Arthritis Paternal Grandmother        SOCIAL HISTORY    Social History     Tobacco Use    Smoking status: Current Every Day Smoker     Packs/day: 1.00     Years: 60.00     Pack years: 60.00     Types: Cigarettes    Smokeless tobacco: Never Used    Tobacco comment: healing    Substance Use Topics    Alcohol use: Never     Frequency: Never    Drug use: No       ALLERGIES    No Known Allergies    MEDICATIONS    Current Outpatient Medications on File Prior to Encounter   Medication Sig Dispense Refill    amoxicillin-clavulanate (AUGMENTIN) 875-125 MG per tablet Take 1 tablet by mouth 2 times daily for 10 days 20 tablet 0    Probiotic Acidophilus (FLORANEX) TABS Take 1 tablet by mouth 2 times daily 60 tablet 0    chlorhexidine (HIBICLENS) 4 % external liquid Apply topically daily as needed.  473 mL 5    magnesium oxide (MAG-OX) 400 MG tablet Take 1 tablet by mouth daily 30 tablet 1    rivaroxaban (XARELTO) 15 MG TABS tablet Take 1 tablet by mouth daily 42 tablet 1    metoprolol succinate (TOPROL XL) 25 MG extended release tablet Take 1 tablet by mouth 2 times daily 30 tablet 3    amLODIPine (NORVASC) 10 MG tablet Take 1 tablet by mouth nightly 30 tablet 3    Latanoprost 0.005 % EMUL Apply to eye daily      ibuprofen (ADVIL;MOTRIN) 600 MG tablet Take 1 tablet by mouth every 6 hours as needed for Pain 30 tablet 0    glipiZIDE (GLUCOTROL) 5 MG tablet Take 5 mg by mouth 2 times daily (before meals)      potassium chloride (KLOR-CON M) 20 MEQ extended release tablet Take 20 mEq by mouth daily      ALPRAZolam Bobbetta Gutierrez) 0.5 MG tablet Take 0.5 mg by mouth nightly. [place] ipratropium-albuterol (DUONEB) 0.5-2.5 (3) MG/3ML SOLN nebulizer solution Inhale 1 vial into the lungs every 4 hours      albuterol sulfate  (90 Base) MCG/ACT inhaler Inhale 2 puffs into the lungs every 6 hours as needed for Wheezing      aspirin EC 81 MG EC tablet Take 1 tablet by mouth daily 30 tablet 3    HYDROcodone-acetaminophen (NORCO) 7.5-325 MG per tablet Take 1 tablet by mouth 3 times daily .  metFORMIN (GLUCOPHAGE) 1000 MG tablet Take 1,000 mg by mouth 2 times daily (with meals)      lisinopril (PRINIVIL;ZESTRIL) 20 MG tablet Take 40 mg by mouth daily       tamsulosin (FLOMAX) 0.4 MG capsule Take 0.4 mg by mouth daily      finasteride (PROSCAR) 5 MG tablet Take 5 mg by mouth daily      amiodarone (CORDARONE) 200 MG tablet Take 1 tablet by mouth daily 30 tablet 0     No current facility-administered medications on file prior to encounter. REVIEW OF SYSTEMS    Pertinent items are noted in HPI. Constitutional: Negative for systemic symptoms including fever, chills and malaise. Objective:      BP (!) 166/74   Pulse 79   Temp 97 °F (36.1 °C) (Temporal)   Resp (!) 79     PHYSICAL EXAM      General: The patient is in no acute distress. Mental status:  Patient is appropriate, is  oriented to place and plan of care.   Dermatologic exam: Visual inspection of the periwound reveals the skin to be moist and edematous  Wound exam: see wound description below in procedure note      Assessment:     Problem List Items Addressed This Visit     WD-Venous stasis of both lower extremities    Relevant Medications    lidocaine (XYLOCAINE) 4 % external solution    WD-Lymphedema of both lower extremities    Relevant Medications    lidocaine (XYLOCAINE) 4 % external solution    WD-Traumatic open wound of left lower leg, complicated by diabetes and venous insufficiency - Primary    Relevant Orders    Supply: Wound Cleanser    Supply: Merary Wound    Supply: Wound Dressings    Supply: Cover and Secure    WD-Nonhealing surgical wound groin & buttock, initial encounter    Relevant Medications    lidocaine (XYLOCAINE) 4 % external solution    WD-Necrotizing fasciitis (HCC)    Relevant Medications    lidocaine (XYLOCAINE) 4 % external solution        Procedure Note    Indications:  Based on my examination of this patient's wound(s) today, sharp excision into necrotic subcutaneous tissue is required to promote healing and evaluate the extent of previous healing. Performed by: FAITH Bailey CNP    Consent obtained: Yes    Time out taken:  Yes    Pain Control: Anesthetic  Anesthetic: 4% Lidocaine Liquid Topical     Debridement:Excisional Debridement    Using curette the wound(s) was/were sharply debrided down through and including the removal of subcutaneous tissue. Devitalized Tissue Debrided:  fibrin, biofilm, slough and exudate    Pre Debridement Measurements:  Are located in the Wound Documentation Flow Sheet    All active wounds listed below with today's date are evaluated  Wound(s)    debrided this date include # : 1 and 2     Post  Debridement Measurements:  Wound 10/16/20 Pretibial Left #1 Left Lateral Lower Leg (Active)   Wound Image   01/06/21 0802   Wound Etiology Other 11/23/20 0808   Dressing Status New dressing applied 01/06/21 0924   Wound Cleansed Soap and water 01/08/21 0828   Dressing/Treatment ABD; Alginate;Moisturizing cream;Silver dressing 01/06/21 0924   Offloading for Diabetic Foot Ulcers No offloading required 01/08/21 0828   Wound Length (cm) 2.7 cm 01/08/21 0828   Wound Width (cm) 2 cm 01/08/21 0828   Wound Depth (cm) 0.3 cm 01/08/21 0828   Wound Surface Area (cm^2) 5.4 cm^2 01/08/21 0828   Change in Wound Size % (l*w) 79.07 01/08/21 0828   Wound Volume (cm^3) 1.62 cm^3 01/08/21 0828   Wound Healing % 37 01/08/21 0828   Post-Procedure Length (cm) 2.7 cm 01/08/21 0914   Post-Procedure Width (cm) 2 cm 01/08/21 Moderate 01/08/21 0828   Drainage Description Serosanguinous 01/08/21 0828   Odor None 01/08/21 0828   Merary-wound Assessment Blanchable erythema 01/08/21 0828   Margins Defined edges 01/08/21 0828   Wound Thickness Description not for Pressure Injury Full thickness 01/08/21 0828   Number of days: 43       Percent of Wound(s) Debrided: approximately 100%    Total  Area  Debrided:  325.4 sq cm     Bleeding:  Minimal    Hemostasis Achieved:  by pressure    Procedural Pain:  0  / 10     Post Procedural Pain:  0 / 10     Response to treatment:  Well tolerated by patient. Status of wound progress and description from last visit:   Improving. Periwound is clean and dry. Good healthy tissue in wound bed without necrotic tissue or signs of infection. Some slough debrided but not much. Continue POC for now. Plan:       Discharge Instructions       PHYSICIAN ORDERS AND DISCHARGE INSTRUCTIONS     NOTE: Upon discharge from the [address], you will receive a patient experience survey. We would be grateful if you would take the time to fill this survey out.     Wound care order history:                 ANDREW's   Right       Left               Date:              Cultures:                Grafts:                Antibiotics:           Continuing wound care orders and information:                 Residence:  Home              Continue home health care with: [facility]              Your wound-care supplies will be provided by:      Wound cleansing:               IM not scrub or use excessive force.              Wash hands with soap and water before and after dressing changes.               DYKZE to applying a clean dressing, cleanse wound with normal saline, wound cleanser, or mild soap and water.               Ask the physician or nurse before getting the wound(s) wet in a shower     Daily Wound management:              Keep weight off wounds and reposition every 2 hours.              Avoid standing for long periods of time.              URYAJ wraps/stockings in AM and remove at bedtime.              If swelling is present, elevate legs to the level of the heart or above for 30 minutes 4-5 times a day and/or when sitting.                                      When taking antibiotics take entire prescription as ordered by physician do not stop taking until medicine is all gone.                                                      Orders for this week:  1/08/21     Left Lower leg-- Clean with soap and water, pat dry with 4x4.    A&D to leg and foot. Apply Mitali to wound bed.   cover with calcium alginate and ABD   wrap with Coban 2 lite. Leave in place x 1 week         Perineum -  Wash with hibicleanse and water. Rinse with saline. Pat dry with 4x4. Apply stimulen powder to base of wound. Apply skin prep to periwound. Gently pack with saline dampened kerlix  cover with ABD pad and secure with hyperfix tape. Duncan to Change Daily and with bowel movements.      Follow up with Dahlia HENDERSON at the wound care center in 1 week  Call [phone] for any questions or concerns.   Date__________   Time__________        Treatment Note      Written Patient Dismissal Instructions Given            Electronically signed by FAITH Matt CNP on 1/8/2021 at 9:17 AM

## 2021-01-12 LAB
AFB SMEAR: NORMAL
CULTURE: NORMAL
Lab: NORMAL
SPECIMEN: NORMAL

## 2021-01-15 ENCOUNTER — HOSPITAL ENCOUNTER (OUTPATIENT)
Dept: WOUND CARE | Age: 66
Discharge: HOME | End: 2021-01-15
Payer: MEDICARE

## 2021-01-15 VITALS
SYSTOLIC BLOOD PRESSURE: 159 MMHG | DIASTOLIC BLOOD PRESSURE: 67 MMHG | HEART RATE: 68 BPM | TEMPERATURE: 97.7 F | RESPIRATION RATE: 18 BRPM

## 2021-01-15 DIAGNOSIS — S81.802A TRAUMATIC OPEN WOUND OF LEFT LOWER LEG, INITIAL ENCOUNTER: ICD-10-CM

## 2021-01-15 DIAGNOSIS — M72.6 NECROTIZING FASCIITIS (HCC): ICD-10-CM

## 2021-01-15 DIAGNOSIS — I87.312 IDIOPATHIC CHRONIC VENOUS HYPERTENSION OF LEFT LOWER EXTREMITY WITH ULCER (HCC): ICD-10-CM

## 2021-01-15 DIAGNOSIS — T81.89XA NONHEALING SURGICAL WOUND, INITIAL ENCOUNTER: ICD-10-CM

## 2021-01-15 DIAGNOSIS — L97.929 IDIOPATHIC CHRONIC VENOUS HYPERTENSION OF LEFT LOWER EXTREMITY WITH ULCER (HCC): ICD-10-CM

## 2021-01-15 DIAGNOSIS — I87.8 VENOUS STASIS OF BOTH LOWER EXTREMITIES: ICD-10-CM

## 2021-01-15 DIAGNOSIS — T81.89XA NONHEALING SURGICAL WOUND, INITIAL ENCOUNTER: Primary | ICD-10-CM

## 2021-01-15 DIAGNOSIS — S81.802D TRAUMATIC OPEN WOUND OF LEFT LOWER LEG, SUBSEQUENT ENCOUNTER: ICD-10-CM

## 2021-01-15 DIAGNOSIS — I89.0 LYMPHEDEMA OF BOTH LOWER EXTREMITIES: ICD-10-CM

## 2021-01-15 PROCEDURE — 11042 DBRDMT SUBQ TIS 1ST 20SQCM/<: CPT | Performed by: NURSE PRACTITIONER

## 2021-01-15 PROCEDURE — 11042 DBRDMT SUBQ TIS 1ST 20SQCM/<: CPT

## 2021-01-15 PROCEDURE — 11045 DBRDMT SUBQ TISS EACH ADDL: CPT | Performed by: NURSE PRACTITIONER

## 2021-01-15 PROCEDURE — 87077 CULTURE AEROBIC IDENTIFY: CPT

## 2021-01-15 PROCEDURE — 87075 CULTR BACTERIA EXCEPT BLOOD: CPT

## 2021-01-15 PROCEDURE — 11045 DBRDMT SUBQ TISS EACH ADDL: CPT

## 2021-01-15 PROCEDURE — 87070 CULTURE OTHR SPECIMN AEROBIC: CPT

## 2021-01-15 PROCEDURE — 87186 SC STD MICRODIL/AGAR DIL: CPT

## 2021-01-15 PROCEDURE — 87076 CULTURE ANAEROBE IDENT EACH: CPT

## 2021-01-15 RX ORDER — LIDOCAINE HYDROCHLORIDE 20 MG/ML
JELLY TOPICAL ONCE
Status: CANCELLED | OUTPATIENT
Start: 2021-01-15 | End: 2021-01-15

## 2021-01-15 RX ORDER — GINSENG 100 MG
CAPSULE ORAL ONCE
Status: CANCELLED | OUTPATIENT
Start: 2021-01-15 | End: 2021-01-15

## 2021-01-15 RX ORDER — GENTAMICIN SULFATE 1 MG/G
OINTMENT TOPICAL ONCE
Status: CANCELLED | OUTPATIENT
Start: 2021-01-15 | End: 2021-01-15

## 2021-01-15 RX ORDER — BACITRACIN ZINC AND POLYMYXIN B SULFATE 500; 1000 [USP'U]/G; [USP'U]/G
OINTMENT TOPICAL ONCE
Status: CANCELLED | OUTPATIENT
Start: 2021-01-15 | End: 2021-01-15

## 2021-01-15 RX ORDER — BETAMETHASONE DIPROPIONATE 0.05 %
OINTMENT (GRAM) TOPICAL ONCE
Status: CANCELLED | OUTPATIENT
Start: 2021-01-15 | End: 2021-01-15

## 2021-01-15 RX ORDER — NYSTATIN 100000 [USP'U]/G
POWDER TOPICAL
Qty: 30 G | Refills: 1 | Status: SHIPPED | OUTPATIENT
Start: 2021-01-15 | End: 2021-03-22

## 2021-01-15 RX ORDER — LIDOCAINE HYDROCHLORIDE 40 MG/ML
SOLUTION TOPICAL ONCE
Status: DISCONTINUED | OUTPATIENT
Start: 2021-01-15 | End: 2021-01-16 | Stop reason: HOSPADM

## 2021-01-15 RX ORDER — LIDOCAINE 50 MG/G
OINTMENT TOPICAL ONCE
Status: CANCELLED | OUTPATIENT
Start: 2021-01-15 | End: 2021-01-15

## 2021-01-15 RX ORDER — LIDOCAINE 40 MG/G
CREAM TOPICAL ONCE
Status: CANCELLED | OUTPATIENT
Start: 2021-01-15 | End: 2021-01-15

## 2021-01-15 RX ORDER — LIDOCAINE HYDROCHLORIDE 40 MG/ML
SOLUTION TOPICAL ONCE
Status: CANCELLED | OUTPATIENT
Start: 2021-01-15 | End: 2021-01-15

## 2021-01-15 RX ORDER — CLOBETASOL PROPIONATE 0.5 MG/G
OINTMENT TOPICAL ONCE
Status: CANCELLED | OUTPATIENT
Start: 2021-01-15 | End: 2021-01-15

## 2021-01-15 RX ORDER — BACITRACIN, NEOMYCIN, POLYMYXIN B 400; 3.5; 5 [USP'U]/G; MG/G; [USP'U]/G
OINTMENT TOPICAL ONCE
Status: CANCELLED | OUTPATIENT
Start: 2021-01-15 | End: 2021-01-15

## 2021-01-15 NOTE — PROGRESS NOTES
Hyperbaric oxygen history and physical        Gabino Tavares  AGE: 72 y.o. GENDER: male  DOD: 1955  TODAY'S DATE:  1/15/2021    Subjective:      Tyshawn Tavares is a 72 y.o. male who is being considered for Hyperbaric Oxygen Therapy for nonhealing surgical wound related to necrotizing fascitis. The condition is of marked severity and is considered non healing and chronic. The wound has been present since 11/24/2020. The inciting event was necrotizing fascitis. The patient is also receiving periodic debridement and wound care. This care has been given at [facility]. The patient was hospitalized 11/24-12/6/2020 with gangrene of the scrotum and abscess of the perineum from necrotizing fascitis with rectal and perirectal incision and drain related to necrotizing fascitis performed by Dr. Christy Hoffman 11/24/2020. He was treated with Vancomycin and Meropenem and sent to [facility] and then SNF for further care.  The patient has significant underlying medical conditions as listed below in [address]    Past Medical History:        Diagnosis Date    CHF (congestive heart failure) (HCC)     Chronic ulcer of left leg with fat layer exposed ([address] 12/12/2016    Chronic ulcer of right leg with fat layer exposed ([address] 12/12/2016    Chronic ulcer of right leg with fat layer exposed ([address] 12/12/2016    Diabetes mellitus ([address]     Hypertension     PVD (peripheral vascular disease) ([address] 12/12/2016    Type 2 diabetes mellitus with diabetic peripheral angiopathy without gangrene, without long-term current use of insulin ([address] 12/12/2016    Type 2 diabetes mellitus with diabetic peripheral angiopathy without gangrene, without long-term current use of insulin ([address] 12/12/2016    Venous stasis ulcer of both lower extremities without varicose veins ([address] 12/12/2016    WD-Traumatic open wound of left lower leg, complicated by diabetes and venous insufficiency 10/8/2020    WD-Ulcer of shin, left, with fat layer exposed [facility] 12/12/2016       Medical History Pertinent to HBO:   Patient denies a history of amiodarone use or Bleomycin use with the last 12 months. No current chemotherapy use. No history of chronic sinusitis or upper respiratory infections, no reconstructive ear surgery or significant auditory impairment. No history of recent or uncontrolled seizure disorder. No history of significant cognitive impairment or psychiatric illnesses. No history of severe COPD with CO2 retention or bullous emphysema. No history of recent chest surgery. No history of pneumothorax. No history of pacemaker or AICD. No history of sudden CHF. No spherocytoses, sickle cell disease, or other hemoglobinopathy known. No history of optic neuritis or significant visual impairment. Past Surgical History:        Procedure Laterality Date    RECTAL SURGERY N/A 11/24/2020    RECTAL PERIRECTAL INCISION AND DRAINAGE performed by Leonardo Garcia MD at [place] [address]         Medications Prior to Admission:    Prior to Admission medications    Medication Sig Start Date End Date Taking? Authorizing Provider   nystatin (MYCOSTATIN) 581124 UNIT/GM powder Apply 3 times daily and as needed to scrotum for moisture control. 1/15/21  Yes FAITH Cisse CNP   amoxicillin-clavulanate (AUGMENTIN) 875-125 MG per tablet Take 1 tablet by mouth 2 times daily for 10 days 1/6/21 1/16/21 Yes FAITH Anderson CNP   Probiotic Acidophilus [facility]) TABS Take 1 tablet by mouth 2 times daily 1/6/21 2/5/21 Yes FAITH Anderson CNP   chlorhexidine (HIBICLENS) 4 % external liquid Apply topically daily as needed.  1/6/21 1/20/21 Yes FAITH Anderson CNP   magnesium oxide (MAG-OX) 400 MG tablet Take 1 tablet by mouth daily 12/7/20  Yes Charity Celaya MD   rivaroxaban (XARELTO) 15 MG TABS tablet Take 1 tablet by mouth daily 12/6/20  Yes Charity Celaya MD   metoprolol succinate (TOPROL XL) 25 MG extended release tablet Take 1 tablet by mouth 2 times daily 12/6/20  Yes Farheen Cleaning MD   amLODIPine (NORVASC) 10 MG tablet Take 1 tablet by mouth nightly 12/6/20  Yes Farheen Cleaning MD   Latanoprost 0.005 % EMUL Apply to eye daily   Yes Historical Provider, MD   ibuprofen (ADVIL;MOTRIN) 600 MG tablet Take 1 tablet by mouth every 6 hours as needed for Pain 12/4/18  Yes Mary Stewart MD   glipiZIDE (GLUCOTROL) 5 MG tablet Take 5 mg by mouth 2 times daily (before meals)   Yes Historical Provider, MD   potassium chloride (KLOR-CON M) 20 MEQ extended release tablet Take 20 mEq by mouth daily   Yes Historical Provider, MD   ALPRAZolam Rainell Lowers) 0.5 MG tablet Take 0.5 mg by mouth nightly. .   Yes Historical Provider, MD   ipratropium-albuterol (DUONEB) 0.5-2.5 (3) MG/3ML SOLN nebulizer solution Inhale 1 vial into the lungs every 4 hours   Yes Historical Provider, MD   albuterol sulfate  (90 Base) MCG/ACT inhaler Inhale 2 puffs into the lungs every 6 hours as needed for Wheezing   Yes Historical Provider, MD   aspirin EC 81 MG EC tablet Take 1 tablet by mouth daily 1/3/17  Yes Remus Heyworth, MD   HYDROcodone-acetaminophen (NORCO) 7.5-325 MG per tablet Take 1 tablet by mouth 3 times daily . Yes Historical Provider, MD   metFORMIN (GLUCOPHAGE) 1000 MG tablet Take 1,000 mg by mouth 2 times daily (with meals)   Yes Historical Provider, MD   lisinopril (PRINIVIL;ZESTRIL) 20 MG tablet Take 40 mg by mouth daily    Yes Historical Provider, MD   tamsulosin (FLOMAX) 0.4 MG capsule Take 0.4 mg by mouth daily   Yes Historical Provider, MD   finasteride (PROSCAR) 5 MG tablet Take 5 mg by mouth daily   Yes Historical Provider, MD   amiodarone (CORDARONE) 200 MG tablet Take 1 tablet by mouth daily 12/7/20 1/6/21  Farheen Cleaning MD       Allergies:  Patient has no known allergies. Family history:  See information in Epic chart. Positive for diabetes, hypertension, hyperlipidemia and obesity.     Social History:   TOBACCO:   reports that he has been smoking cigarettes. He has a 60.00 pack-year smoking history.  He has never used smokeless tobacco.  ETOH:   reports no history of alcohol use.  home with a reliable caregiver  ROS:     REVIEW OF SYSTEMS    Constitutional: negative for anorexia, chills, fatigue, fevers, malaise, sweats and weight loss  Ears, nose, mouth, throat, and face: negative for ear drainage, earaches, epistaxis, facial trauma, hearing loss, hoarseness, nasal congestion, sore mouth, sore throat, tinnitus and voice change  Respiratory: negative for cough, dyspnea on exertion, hemoptysis, pleurisy/chest pain, shortness of breath, sputum, stridor and wheezing  Cardiovascular: negative for chest pain, chest pressure/discomfort, dyspnea, fatigue, near-syncope, orthopnea, palpitations, paroxysmal nocturnal dyspnea, syncope and tachypnea  Integument/breast: positive for skin lesion(s)        Objective:     Physical exam:    General Appearance: alert and oriented to person, place and time, well-developed and well-nourished, in no acute distress  ENT: tympanic membrane, external ear and ear canal normal bilaterally, oropharynx clear and moist with normal mucous membranes  Pulmonary/Chest: clear to auscultation bilaterally- no wheezes, rales or rhonchi, normal air movement, no respiratory distress  Cardiovascular: normal rate, normal S1 and S2, no gallops and intact distal pulses    Wound exam:     Wound 11/25/20 Scrotum #2 Perineum (Active)   Wound Image   01/06/21 0802   Dressing Status New dressing applied 01/06/21 0924   Wound Cleansed Soap and water 01/15/21 0819   Dressing/Treatment ABD 01/06/21 0924   Offloading for Diabetic Foot Ulcers No offloading required 01/15/21 0819   Wound Length (cm) 27.5 cm 01/15/21 0819   Wound Width (cm) 6.8 cm 01/15/21 0819   Wound Depth (cm) 1 cm 01/15/21 0819   Wound Surface Area (cm^2) 187 cm^2 01/15/21 0819   Change in Wound Size % (l*w) -49.6 01/15/21 0819   Wound Volume (cm^3) 187 cm^3 01/15/21 0819   Wound Healing % 72 01/15/21 0819   Post-Procedure Length (cm) 27.5 cm 01/15/21 0848   Post-Procedure Width (cm) 6.8 cm 01/15/21 0848   Post-Procedure Depth (cm) 1 cm 01/15/21 0848   Post-Procedure Surface Area (cm^2) 187 cm^2 01/15/21 0848   Post-Procedure Volume (cm^3) 187 cm^3 01/15/21 0848   Distance Tunneling (cm) 0 cm 01/15/21 0819   Tunneling Position ___ O'Clock 0 01/15/21 0819   Undermining Starts ___ O'Clock 6 01/15/21 0819   Undermining Ends___ O'Clock 9 01/15/21 0819   Undermining Maxium Distance (cm) 2 01/15/21 0819   Wound Assessment Granulation tissue 01/15/21 0819   Drainage Amount Moderate 01/15/21 0819   Drainage Description Serosanguinous; Yellow 01/15/21 0819   Odor None 01/15/21 0819   Merary-wound Assessment Blanchable erythema 01/15/21 0819   Margins Defined edges 01/15/21 0819   Wound Thickness Description not for Pressure Injury Full thickness 01/15/21 0819   Number of days: 50      Measurements shown are from today's visit. Assessment       Gerry Tavares  appears to have a non-healing wound of the  Scrotum and perineum. The etiology of the wound is felt to be necrotizing fascitis. There are multiple complicating factors including  diabetes, obesity, hypertension and Afib on Xarelto. Hyperbaric oxygen would be an appropriate and essential adjunct in the treatment and resolution of his nonhealing surgical wound. A comprehensive wound management program should also be continued. This patient has no contraindications to HBO therapy and would be considered appropriate candidate. This patient appears to have sufficient physiologic and psychologic stamina to undergo Hyperbaric Oxygen Therapy.         Plan     Initiate HBO per order sheet        Diagnostic Impression:     Non-healing wound of the  Scrotum and perineum   Necrotizing fascitis  Diabetes  Obesity      Lissett Gregg MD    1/15/2021

## 2021-01-15 NOTE — PROGRESS NOTES
Wound Care Center Progress Note With Procedure    Gabino Tavares  AGE: 72 y.o. GENDER: male  : 1955  EPISODE DATE:  1/15/2021     Subjective:     Chief Complaint   Patient presents with    Wound Check     Left leg/buttocks          HISTORY of PRESENT ILLNESS      Gabino Tavares is a 72 y. o. male who presents today for wound evaluation of Acute non-healing surgical and necrotizing fascitis ulcer(s) of the rectal and perirectal area.  The ulcer is of marked severity.  The underlying cause of the wound is nonhealing surgical post rectal and perirectal incision and drain related to necrotizing fascitis performed by Dr. Zak Parsons 2020.   Wound Pain Timing/Severity: waxing and waning, moderate  Quality of pain: aching, tender  Severity of pain:  3 / 10   Modifying Factors: venous stasis, lymphedema, diabetes, poor glucose control, obesity, smoking and anticoagulation therapy  Associated Signs/Symptoms: edema, erythema, drainage and pain        PAST MEDICAL HISTORY        Diagnosis Date    CHF (congestive heart failure) (HCC)     Chronic ulcer of left leg with fat layer exposed ([address] 2016    Chronic ulcer of right leg with fat layer exposed ([address] 2016    Chronic ulcer of right leg with fat layer exposed ([address] 2016    Diabetes mellitus ([address]     Hypertension     PVD (peripheral vascular disease) ([address] 2016    Type 2 diabetes mellitus with diabetic peripheral angiopathy without gangrene, without long-term current use of insulin ([address] 2016    Type 2 diabetes mellitus with diabetic peripheral angiopathy without gangrene, without long-term current use of insulin ([address] 2016    Venous stasis ulcer of both lower extremities without varicose veins ([address] 2016    WD-Traumatic open wound of left lower leg, complicated by diabetes and venous insufficiency 10/8/2020    WD-Ulcer of shin, left, with fat layer exposed ([address] 2016       PAST SURGICAL HISTORY    Past Surgical History:   Procedure Laterality Date    RECTAL SURGERY N/A 11/24/2020    RECTAL PERIRECTAL INCISION AND DRAINAGE performed by Dory Kan MD at [facility] History   Problem Relation Age of Onset    Asthma Mother     Breast Cancer Mother     Cancer Mother     Diabetes Mother     High Blood Pressure Mother     Cancer Father     Early Death Brother     Arthritis Paternal Grandmother        SOCIAL HISTORY    Social History     Tobacco Use    Smoking status: Current Every Day Smoker     Packs/day: 1.00     Years: 60.00     Pack years: 60.00     Types: Cigarettes    Smokeless tobacco: Never Used    Tobacco comment: healing    Substance Use Topics    Alcohol use: Never     Frequency: Never    Drug use: No       ALLERGIES    No Known Allergies    MEDICATIONS    Current Outpatient Medications on File Prior to Encounter   Medication Sig Dispense Refill    amoxicillin-clavulanate (AUGMENTIN) 875-125 MG per tablet Take 1 tablet by mouth 2 times daily for 10 days 20 tablet 0    Probiotic Acidophilus (FLORANEX) TABS Take 1 tablet by mouth 2 times daily 60 tablet 0    chlorhexidine (HIBICLENS) 4 % external liquid Apply topically daily as needed.  473 mL 5    magnesium oxide (MAG-OX) 400 MG tablet Take 1 tablet by mouth daily 30 tablet 1    rivaroxaban (XARELTO) 15 MG TABS tablet Take 1 tablet by mouth daily 42 tablet 1    metoprolol succinate (TOPROL XL) 25 MG extended release tablet Take 1 tablet by mouth 2 times daily 30 tablet 3    amLODIPine (NORVASC) 10 MG tablet Take 1 tablet by mouth nightly 30 tablet 3    Latanoprost 0.005 % EMUL Apply to eye daily      ibuprofen (ADVIL;MOTRIN) 600 MG tablet Take 1 tablet by mouth every 6 hours as needed for Pain 30 tablet 0    glipiZIDE (GLUCOTROL) 5 MG tablet Take 5 mg by mouth 2 times daily (before meals)      potassium chloride (KLOR-CON M) 20 MEQ extended release tablet Take 20 mEq by mouth daily  ALPRAZolam (XANAX) 0.5 MG tablet Take 0.5 mg by mouth nightly. Zaheer Langley ipratropium-albuterol (DUONEB) 0.5-2.5 (3) MG/3ML SOLN nebulizer solution Inhale 1 vial into the lungs every 4 hours      albuterol sulfate  (90 Base) MCG/ACT inhaler Inhale 2 puffs into the lungs every 6 hours as needed for Wheezing      aspirin EC 81 MG EC tablet Take 1 tablet by mouth daily 30 tablet 3    HYDROcodone-acetaminophen (NORCO) 7.5-325 MG per tablet Take 1 tablet by mouth 3 times daily .  metFORMIN (GLUCOPHAGE) 1000 MG tablet Take 1,000 mg by mouth 2 times daily (with meals)      lisinopril (PRINIVIL;ZESTRIL) 20 MG tablet Take 40 mg by mouth daily       tamsulosin (FLOMAX) 0.4 MG capsule Take 0.4 mg by mouth daily      finasteride (PROSCAR) 5 MG tablet Take 5 mg by mouth daily      amiodarone (CORDARONE) 200 MG tablet Take 1 tablet by mouth daily 30 tablet 0     No current facility-administered medications on file prior to encounter. REVIEW OF SYSTEMS    Pertinent items are noted in HPI. Constitutional: Negative for systemic symptoms including fever, chills and malaise. Objective:      BP (!) 159/67   Pulse 68   Temp 97.7 °F (36.5 °C) (Temporal)   Resp 18     PHYSICAL EXAM      General: The patient is in no acute distress. Mental status:  Patient is appropriate, is  oriented to place and plan of care.   Dermatologic exam: Visual inspection of the periwound reveals the skin to be moist and edematous  Wound exam: see wound description below in procedure note      Assessment:     Problem List Items Addressed This Visit     WD-Venous stasis of both lower extremities    Relevant Medications    lidocaine (XYLOCAINE) 4 % external solution (Start on 1/15/2021  9:00 AM)    WD-Lymphedema of both lower extremities    Relevant Medications    lidocaine (XYLOCAINE) 4 % external solution (Start on 1/15/2021  9:00 AM)    WD-Idiopathic chronic venous hypertension of left lower extremity with ulcer ([address] WD-Traumatic open wound of left lower leg, complicated by diabetes and venous insufficiency    Relevant Orders    Supply: Wound Cleanser    WD-Nonhealing surgical wound groin & buttock, initial encounter - Primary    Relevant Medications    lidocaine (XYLOCAINE) 4 % external solution (Start on 1/15/2021  9:00 AM)    WD-Necrotizing fasciitis (HCC)    Relevant Medications    lidocaine (XYLOCAINE) 4 % external solution (Start on 1/15/2021  9:00 AM)        Procedure Note    Indications:  Based on my examination of this patient's wound(s) today, sharp excision into necrotic subcutaneous tissue is required to promote healing and evaluate the extent of previous healing. Performed by: FAITH Barry CNP    Consent obtained: Yes    Time out taken:  Yes    Pain Control: Anesthetic  Anesthetic: 4% Lidocaine Liquid Topical     Debridement:Excisional Debridement    Using curette the wound(s) was/were sharply debrided down through and including the removal of subcutaneous tissue. Devitalized Tissue Debrided:  fibrin, biofilm, slough and exudate    Pre Debridement Measurements:  Are located in the Wound Documentation Flow Sheet    All active wounds listed below with today's date are evaluated  Wound(s)    debrided this date include # : 2     Post  Debridement Measurements:  Wound 10/16/20 Pretibial Left #1 Left Lateral Lower Leg (Active)   Wound Image   01/06/21 0802   Wound Etiology Other 11/23/20 0808   Dressing Status New dressing applied 01/06/21 0924   Wound Cleansed Soap and water 01/08/21 0828   Dressing/Treatment ABD; Alginate;Moisturizing cream;Silver dressing 01/06/21 0924   Offloading for Diabetic Foot Ulcers No offloading required 01/08/21 0828   Wound Length (cm) 2.7 cm 01/08/21 0828   Wound Width (cm) 2 cm 01/08/21 0828   Wound Depth (cm) 0.3 cm 01/08/21 0828   Wound Surface Area (cm^2) 5.4 cm^2 01/08/21 0828   Change in Wound Size % (l*w) 79.07 01/08/21 0828   Wound Volume (cm^3) 1.62 cm^3 01/08/21 1421   Wound Healing % 37 01/08/21 0828   Post-Procedure Length (cm) 2.7 cm 01/08/21 0914   Post-Procedure Width (cm) 2 cm 01/08/21 0914   Post-Procedure Depth (cm) 0.3 cm 01/08/21 0914   Post-Procedure Surface Area (cm^2) 5.4 cm^2 01/08/21 0914   Post-Procedure Volume (cm^3) 1.62 cm^3 01/08/21 0914   Distance Tunneling (cm) 0 cm 01/08/21 0828   Tunneling Position ___ O'Clock 0 01/08/21 0828   Undermining Starts ___ O'Clock 0 01/08/21 0828   Undermining Ends___ O'Clock 0 01/08/21 0828   Undermining Maxium Distance (cm) 0 01/08/21 0828   Wound Assessment Granulation tissue;Slough 01/08/21 0828   Drainage Amount Moderate 01/08/21 0828   Drainage Description Serosanguinous 01/08/21 0828   Odor None 01/08/21 0828   Merary-wound Assessment Intact 01/08/21 0828   Margins Defined edges 01/08/21 0828   Wound Thickness Description not for Pressure Injury Full thickness 01/08/21 0828   Number of days: 91       Wound 11/25/20 Scrotum #2 Perineum (Active)   Wound Image   01/06/21 0802   Dressing Status New dressing applied 01/06/21 0924   Wound Cleansed Soap and water 01/15/21 0819   Dressing/Treatment ABD 01/06/21 0924   Offloading for Diabetic Foot Ulcers No offloading required 01/15/21 0819   Wound Length (cm) 27.5 cm 01/15/21 0819   Wound Width (cm) 6.8 cm 01/15/21 0819   Wound Depth (cm) 1 cm 01/15/21 0819   Wound Surface Area (cm^2) 187 cm^2 01/15/21 0819   Change in Wound Size % (l*w) -49.6 01/15/21 0819   Wound Volume (cm^3) 187 cm^3 01/15/21 0819   Wound Healing % 72 01/15/21 0819   Post-Procedure Length (cm) 32 cm 01/08/21 0914   Post-Procedure Width (cm) 10 cm 01/08/21 0914   Post-Procedure Depth (cm) 1 cm 01/08/21 0914   Post-Procedure Surface Area (cm^2) 320 cm^2 01/08/21 0914   Post-Procedure Volume (cm^3) 320 cm^3 01/08/21 0914   Distance Tunneling (cm) 0 cm 01/15/21 0819   Tunneling Position ___ O'Clock 0 01/15/21 0819   Undermining Starts ___ O'Clock 6 01/15/21 0819   Undermining Ends___ O'Clock 9 01/15/21 0819 Undermining Maxium Distance (cm) 2 01/15/21 0819   Wound Assessment Granulation tissue 01/15/21 0819   Drainage Amount Moderate 01/15/21 0819   Drainage Description Serosanguinous; Yellow 01/15/21 0819   Odor None 01/15/21 0819   Merary-wound Assessment Blanchable erythema 01/15/21 0819   Margins Defined edges 01/15/21 0819   Wound Thickness Description not for Pressure Injury Full thickness 01/15/21 0819   Number of days: 50       Percent of Wound(s) Debrided: approximately 50%    Total  Area  Debrided:  90 sq cm     Bleeding:  Minimal    Hemostasis Achieved:  by pressure    Procedural Pain:  0  / 10     Post Procedural Pain:  0 / 10     Response to treatment:  Well tolerated by patient. Status of wound progress and description from last visit:   Improving. Left lower leg wound is healed. Periwound is reddened and sore. We will apply dry gauze to wound bed to reduce moisture and add silver alginate. Culture for prophylaxis. Will follow up when resulted. Working up for HBO. Order A1C. Nystatin in scrotal area for moisture and fungal control. Plan:       Discharge Instructions       PHYSICIAN ORDERS AND DISCHARGE INSTRUCTIONS     NOTE: Upon discharge from the [address], you will receive a patient experience survey. We would be grateful if you would take the time to fill this survey out.     Wound care order history:                 ANDREW's   Right       Left               Date:              Cultures:                Grafts:                Antibiotics:           Continuing wound care orders and information:                 Residence:  Home              Continue home health care with: [facility]              Your wound-care supplies will be provided by:      Wound cleansing:               WH not scrub or use excessive force.              Wash hands with soap and water before and after dressing changes.               UUSAF to applying a clean dressing, cleanse wound with normal saline, wound cleanser, or mild soap and water.               Ask the physician or nurse before getting the wound(s) wet in a shower     Daily Wound management:              Keep weight off wounds and reposition every 2 hours.              Avoid standing for long periods of time.              Apply wraps/stockings in AM and remove at bedtime.              If swelling is present, elevate legs to the level of the heart or above for 30 minutes 4-5 times a day and/or when sitting.                                      When taking antibiotics take entire prescription as ordered by physician do not stop taking until medicine is all gone.                                                      Orders for this week:  1/15/21     Left Lower leg-- Healed         Perineum -  Wash with hibicleanse and water. Rinse with saline. Pat dry with 4x4. Apply stimulen powder to base of wound. Apply desitin to periwound. Nystatin powder to groin. Cover with Ag+ alginate, and ABD pad and secure with hyperfix tape. [place] to Change Daily and with bowel movements.      Follow up with Dahlia HENDERSON at the wound care center on Monday  Call [phone] for any questions or concerns.   Date__________   Time__________        Treatment Note      Written Patient Dismissal Instructions Given            Electronically signed by FAITH Cho CNP on 1/15/2021 at 8:48 AM

## 2021-01-18 ENCOUNTER — HOSPITAL ENCOUNTER (OUTPATIENT)
Dept: WOUND CARE | Age: 66
Discharge: HOME | End: 2021-01-18
Payer: MEDICARE

## 2021-01-18 ENCOUNTER — HOSPITAL ENCOUNTER (OUTPATIENT)
Age: 66
Discharge: HOME | End: 2021-01-18
Payer: MEDICARE

## 2021-01-18 ENCOUNTER — HOSPITAL ENCOUNTER (OUTPATIENT)
Dept: GENERAL RADIOLOGY | Age: 66
Discharge: HOME | End: 2021-01-18
Payer: MEDICARE

## 2021-01-18 VITALS
RESPIRATION RATE: 16 BRPM | HEART RATE: 64 BPM | TEMPERATURE: 98 F | SYSTOLIC BLOOD PRESSURE: 155 MMHG | DIASTOLIC BLOOD PRESSURE: 72 MMHG

## 2021-01-18 DIAGNOSIS — I87.8 VENOUS STASIS OF BOTH LOWER EXTREMITIES: ICD-10-CM

## 2021-01-18 DIAGNOSIS — S81.802A TRAUMATIC OPEN WOUND OF LEFT LOWER LEG, INITIAL ENCOUNTER: Primary | ICD-10-CM

## 2021-01-18 DIAGNOSIS — T81.89XA NONHEALING SURGICAL WOUND, INITIAL ENCOUNTER: ICD-10-CM

## 2021-01-18 DIAGNOSIS — I89.0 LYMPHEDEMA OF BOTH LOWER EXTREMITIES: ICD-10-CM

## 2021-01-18 DIAGNOSIS — M72.6 NECROTIZING FASCIITIS (HCC): ICD-10-CM

## 2021-01-18 LAB
CULTURE: ABNORMAL
Lab: ABNORMAL
PREALBUMIN: 13 MG/DL (ref 20–40)
SPECIMEN: ABNORMAL

## 2021-01-18 PROCEDURE — 11042 DBRDMT SUBQ TIS 1ST 20SQCM/<: CPT

## 2021-01-18 PROCEDURE — 71046 X-RAY EXAM CHEST 2 VIEWS: CPT

## 2021-01-18 PROCEDURE — 84134 ASSAY OF PREALBUMIN: CPT

## 2021-01-18 PROCEDURE — 11045 DBRDMT SUBQ TISS EACH ADDL: CPT

## 2021-01-18 PROCEDURE — 11042 DBRDMT SUBQ TIS 1ST 20SQCM/<: CPT | Performed by: NURSE PRACTITIONER

## 2021-01-18 PROCEDURE — 36415 COLL VENOUS BLD VENIPUNCTURE: CPT

## 2021-01-18 PROCEDURE — 11045 DBRDMT SUBQ TISS EACH ADDL: CPT | Performed by: NURSE PRACTITIONER

## 2021-01-18 RX ORDER — LIDOCAINE 40 MG/G
CREAM TOPICAL ONCE
Status: CANCELLED | OUTPATIENT
Start: 2021-01-18 | End: 2021-01-18

## 2021-01-18 RX ORDER — LIDOCAINE HYDROCHLORIDE 20 MG/ML
JELLY TOPICAL ONCE
Status: CANCELLED | OUTPATIENT
Start: 2021-01-18 | End: 2021-01-18

## 2021-01-18 RX ORDER — CLOBETASOL PROPIONATE 0.5 MG/G
OINTMENT TOPICAL ONCE
Status: CANCELLED | OUTPATIENT
Start: 2021-01-18 | End: 2021-01-18

## 2021-01-18 RX ORDER — BETAMETHASONE DIPROPIONATE 0.05 %
OINTMENT (GRAM) TOPICAL ONCE
Status: CANCELLED | OUTPATIENT
Start: 2021-01-18 | End: 2021-01-18

## 2021-01-18 RX ORDER — LIDOCAINE HYDROCHLORIDE 40 MG/ML
SOLUTION TOPICAL ONCE
Status: CANCELLED | OUTPATIENT
Start: 2021-01-18 | End: 2021-01-18

## 2021-01-18 RX ORDER — LIDOCAINE 50 MG/G
OINTMENT TOPICAL ONCE
Status: CANCELLED | OUTPATIENT
Start: 2021-01-18 | End: 2021-01-18

## 2021-01-18 RX ORDER — LIDOCAINE HYDROCHLORIDE 40 MG/ML
SOLUTION TOPICAL ONCE
Status: DISCONTINUED | OUTPATIENT
Start: 2021-01-18 | End: 2021-01-19 | Stop reason: HOSPADM

## 2021-01-18 RX ORDER — BACITRACIN, NEOMYCIN, POLYMYXIN B 400; 3.5; 5 [USP'U]/G; MG/G; [USP'U]/G
OINTMENT TOPICAL ONCE
Status: CANCELLED | OUTPATIENT
Start: 2021-01-18 | End: 2021-01-18

## 2021-01-18 RX ORDER — BACITRACIN ZINC AND POLYMYXIN B SULFATE 500; 1000 [USP'U]/G; [USP'U]/G
OINTMENT TOPICAL ONCE
Status: CANCELLED | OUTPATIENT
Start: 2021-01-18 | End: 2021-01-18

## 2021-01-18 RX ORDER — GENTAMICIN SULFATE 1 MG/G
OINTMENT TOPICAL ONCE
Status: CANCELLED | OUTPATIENT
Start: 2021-01-18 | End: 2021-01-18

## 2021-01-18 RX ORDER — GINSENG 100 MG
CAPSULE ORAL ONCE
Status: CANCELLED | OUTPATIENT
Start: 2021-01-18 | End: 2021-01-18

## 2021-01-18 NOTE — PROGRESS NOTES
MG/3ML SOLN nebulizer solution Inhale 1 vial into the lungs every 4 hours      albuterol sulfate  (90 Base) MCG/ACT inhaler Inhale 2 puffs into the lungs every 6 hours as needed for Wheezing      aspirin EC 81 MG EC tablet Take 1 tablet by mouth daily 30 tablet 3    HYDROcodone-acetaminophen (NORCO) 7.5-325 MG per tablet Take 1 tablet by mouth 3 times daily .  metFORMIN (GLUCOPHAGE) 1000 MG tablet Take 1,000 mg by mouth 2 times daily (with meals)      lisinopril (PRINIVIL;ZESTRIL) 20 MG tablet Take 40 mg by mouth daily       tamsulosin (FLOMAX) 0.4 MG capsule Take 0.4 mg by mouth daily      finasteride (PROSCAR) 5 MG tablet Take 5 mg by mouth daily      amiodarone (CORDARONE) 200 MG tablet Take 1 tablet by mouth daily 30 tablet 0    glipiZIDE (GLUCOTROL) 5 MG tablet Take 5 mg by mouth 2 times daily (before meals)       No current facility-administered medications on file prior to encounter. REVIEW OF SYSTEMS    Pertinent items are noted in HPI. Constitutional: Negative for systemic symptoms including fever, chills and malaise. Objective:      BP (!) 155/72   Pulse 64   Temp 98 °F (36.7 °C) (Temporal)   Resp 16     PHYSICAL EXAM    General: The patient is in no acute distress. Mental status:  Patient is appropriate, is  oriented to place and plan of care.   Dermatologic exam: Visual inspection of the periwound reveals the skin to be moist and edematous  Wound exam: see wound description below in procedure note      Assessment:     Problem List Items Addressed This Visit     WD-Venous stasis of both lower extremities    Relevant Medications    lidocaine (XYLOCAINE) 4 % external solution    WD-Lymphedema of both lower extremities    Relevant Medications    lidocaine (XYLOCAINE) 4 % external solution    WD-Traumatic open wound of left lower leg, complicated by diabetes and venous insufficiency - Primary    Relevant Orders    Supply: Wound Cleanser    HBO-Nonhealing surgical wound groin & buttock, initial encounter    Relevant Medications    lidocaine (XYLOCAINE) 4 % external solution    HBO-Necrotizing fasciitis (HCC)    Relevant Medications    lidocaine (XYLOCAINE) 4 % external solution        Procedure Note    Indications:  Based on my examination of this patient's wound(s) today, sharp excision into necrotic subcutaneous tissue is required to promote healing and evaluate the extent of previous healing. Performed by: FAITH Pereyra CNP    Consent obtained: Yes    Time out taken:  Yes    Pain Control: Anesthetic  Anesthetic: 4% Lidocaine Liquid Topical     Debridement:Excisional Debridement    Using curette the wound(s) was/were sharply debrided down through and including the removal of subcutaneous tissue.         Devitalized Tissue Debrided:  slough and exudate    Pre Debridement Measurements:  Are located in the Wound Documentation Flow Sheet    All active wounds listed below with today's date are evaluated  Wound(s)    debrided this date include # : 2     Post  Debridement Measurements:  Wound 11/25/20 Scrotum #2 Perineum (Active)   Wound Image   01/06/21 0802   Wound Etiology Surgical 01/18/21 1106   Dressing Status New dressing applied 01/06/21 0924   Wound Cleansed Soap and water;Irrigated with saline 01/18/21 1106   Dressing/Treatment ABD 01/06/21 0924   Offloading for Diabetic Foot Ulcers No offloading required 01/15/21 0819   Wound Length (cm) 20.5 cm 01/18/21 1106   Wound Width (cm) 3 cm 01/18/21 1106   Wound Depth (cm) 1.5 cm 01/18/21 1106   Wound Surface Area (cm^2) 61.5 cm^2 01/18/21 1106   Change in Wound Size % (l*w) 50.8 01/18/21 1106   Wound Volume (cm^3) 92.25 cm^3 01/18/21 1106   Wound Healing % 86 01/18/21 1106   Post-Procedure Length (cm) 20.1 cm 01/18/21 1139   Post-Procedure Width (cm) 3 cm 01/18/21 1139   Post-Procedure Depth (cm) 1.5 cm 01/18/21 1139   Post-Procedure Surface Area (cm^2) 60.3 cm^2 01/18/21 1139   Post-Procedure Volume (cm^3) 90.45 cm^3 01/18/21 1139   Distance Tunneling (cm) 1 cm 01/18/21 1106   Tunneling Position ___ O'Clock 1.5 01/18/21 1106   Undermining Starts ___ O'Clock 1200 01/18/21 1106   Undermining Ends___ O'Clock 0 01/18/21 1106   Undermining Maxium Distance (cm) 0 01/18/21 1106   Wound Assessment Pink/red;Granulation tissue 01/18/21 1106   Drainage Amount Moderate 01/18/21 1106   Drainage Description Serosanguinous 01/18/21 1106   Odor None 01/18/21 1106   Merary-wound Assessment Blanchable erythema 01/18/21 1106   Margins Defined edges; Unattached edges 01/18/21 1106   Wound Thickness Description not for Pressure Injury Full thickness 01/18/21 1106   Number of days: 54       Percent of Wound(s) Debrided: approximately 100%    Total  Area  Debrided:  61.5 sq cm     Bleeding:  Minimal    Hemostasis Achieved:  by pressure    Procedural Pain:  0  / 10     Post Procedural Pain:  0 / 10     Response to treatment:  Well tolerated by patient. Status of wound progress and description from last visit: Consistently improving. Culture for prophylaxis, just completed Augmentin 10 day coarse. Working up for HBO, nystatin in scrotal area for moisture and fungal control. Plan:       Discharge Instructions       PHYSICIAN ORDERS AND DISCHARGE INSTRUCTIONS     NOTE: Upon discharge from the [address], you will receive a patient experience survey.  We would be grateful if you would take the time to fill this survey out.     Wound care order history:                 ANDREW's   Right       Left               Date:              Cultures: 1/15/21               Grafts:                Antibiotics:           Continuing wound care orders and information:                 Residence:  Home              Continue home health care with: [facility]              Your wound-care supplies will be provided by:      Wound cleansing:               IV not scrub or use excessive force.              Wash hands with soap and water before and after dressing changes.             TPKRX to applying a clean dressing, cleanse wound with normal saline, wound cleanser, or mild soap and water.               Ask the physician or nurse before getting the wound(s) wet in a shower     Daily Wound management:              Keep weight off wounds and reposition every 2 hours.              Avoid standing for long periods of time.              Apply wraps/stockings in AM and remove at bedtime.              If swelling is present, elevate legs to the level of the heart or above for 30 minutes 4-5 times a day and/or when sitting.                                      When taking antibiotics take entire prescription as ordered by physician do not stop taking until medicine is all gone.                                                      Orders for this week:  1/18/21     Left Lower leg-- Healed         Perineum -  Wash with hibicleanse and water. Rinse with saline. Pat dry with 4x4. Apply stimulen powder to base of wound. Apply desitin to periwound. Nystatin powder to groin. Cover with Ag+ alginate, and ABD pad and secure with hyperfix tape. Arco to Change Daily and with bowel movements.      Discussed HBO     Follow up with [facility] at the wound care center in 1 week on Monday 1/25/21  Call [phone] for any questions or concerns.   Date__________   Time__________        Treatment Note      Written Patient Dismissal Instructions Given            Electronically signed by FAITH Rico CNP on 1/18/2021 at 11:41 AM

## 2021-01-20 NOTE — PROGRESS NOTES
Hyperbaric Oxygen Therapy   Patient Orientation      NAME: Carolyn Tavares  MEDICAL RECORD NUMBER:  [id number]  AGE: 77 y.o. GENDER: male  : 1955  EPISODE DATE:  2021    HBO Orientation Completed with:  Patient         INTRODUCTION   Welcome to the [address] [facility],[address]. This guide will assist in answering any questions which you might have concerning your scheduled hyperbaric oxygen treatment and if appropriate, any wound care you might need. During your stay with us you will hear your treatment called a dive. This is just a nickname given to the procedure and does not refer to being in water. You will only be exposed to air pressure changes during your treatments. HYPERBARIC OXYGEN THERAPY  Hyperbaric oxygen treatment is a means of providing additional oxygen to your body tissues. By increasing the oxygen in the tissues, healing is enhanced. Two important effects on difficult wounds are first, to speed new microscopic blood vessel growth into the wound and second, to improve the ability of your white blood cells to kill germs. It is important to know that hyperbaric oxygen is an additional (adjunctive) therapy in addition to the current medical or surgical care you are receiving. It is also essential that you understand that this is not a cure-all but a part of your total medical care. THE STAFF  The [facility] staff consists of fully trained physicians, nursing staff, and chamber operators. There will always be a physician, as well as other staff members with you in the department while you are having your hyperbaric oxygen therapy treatment. Please feel free to ask for help from any of the staff members while you are here. THE CHAMBER  The hyperbaric chamber located at the [address] [place] [address] Road is a monoplace, seamless acrylic pressure chamber designed to treat one patient at a time. You will be lying down with your head slightly elevated for your treatments.   A communication system allows you to speak with the , family members or the physician. You will also be able to watch and listen to television during treatment. The monoplace chamber administers 100% oxygen at a physician prescribed pressure. Because you will be in an oxygen environment, a detailed list of safety precautions and guidelines will be strictly enforced for your safety. TREATMENT SCHEDULE  You will need to arrange for your own transportation. If there is difficulty, we will try to assist in making the necessary arrangements with an appropriate agency. Hyperbaric treatments are given daily, Monday through Friday. Each treatment will last almost two (2) hours. Be prepared to spend approximately three (3) hours at our facility. This allows time for preparing you for your treatment dive and the actual time in the chamber. It is essential that you try to make every scheduled treatment dive possible so that the effects of the hyperbaric oxygen will continue. Any long interruption could cause the healing enhancement to slow or even stop. If at any time you have chills, fever, nausea, vomiting, diarrhea or any problem with your glucose levels, please inform the physician or the nurse. You will be assessed to see if you should be in the chamber that day. You will receive a complete briefing by a staff member. Necessary forms and informed consents (permits) will need to be signed before treatments begin. You will also be given a tour of the area . VISITORS  Visitors are welcome and we encourage patients to bring their families. We ask that once your family has seen the facility that they remain in the waiting room to ensure your privacy, and the privacy of the other patients. Visitors are not allowed in the treatment area unless their presence is needed by the physician. Again, we welcome you to our facility.   Please let us know if there is anything that we can do to assist you during your time with us. GENERAL INFORMATION REVIEWED: Yes      You will need to arrive 30 minutes prior to your visit. Please call us if you are not feeling well the day of your visit, so that we can determine if it will be necessary to reschedule your treatment (before you arrive). Each visit will begin with a staff member asking you a series of questions. These questions may sound repetitive, but please understand, for safety reasons, that we must ask the same questions each and every visit. Only chamber approved clothing may be worn during treatment, therefore special clothing is provided for you at each visit. For safety reasons, chamber operators are required to ask about prohibited items each and every visit. Vital signs (blood pressure, temperature pulse, and respirations) will be taken both before and after each visit. PRESSURE CHANGES INFORMATION REVIEWED: Yes   As the pressure within the chamber changes, you may notice changes to your ears, sinuses or lungs. For example, your ears may \"pop\" as if you are traveling on an airplane or scuba diving. Please notify the chamber  if you are experiencing pain in your ears, sinuses or lungs during your treatment \"dive. \"      To help prevent pain or injury to your ears, you will be taught by the staff and will practice thoroughly a procedure, known as the Valsalva maneuver, as well as other techniques prior to your first treatment \"dive. \"  Although the pressure in the chamber is not felt on the body, you do feel changes in the ears. The eardrum is normally flat while you are at ground level. Pressure changes in the atmosphere around you will usually be felt in the ears. The eardrum tends to bow inwardly and unless you take positive action, fullness or pain will be felt.   In order to avoid this, you will be taught how to force air into the middle ear during the few minutes that it takes to pressurize the Smoking has a negative effect on treatment and may diminish the benefits you may receive. Smoking within 2 hours prior to your treatment poses additional risk and should be avoided completely. Drinking alcohol or using illicit drugs may also have a negative effect on your treatment and should be avoided. Drinking carbonated beverages such as soda pop within 1 hour of your treatment could cause pains in the stomach during the treatment. Caffeinated beverages or foods containing caffeine should be avoided before your treatment. Please let us know if we can assist you with seeking help to stop smoking, drinking or using recreational drugs. PROHIBITED ITEMS INFORMATION REVIEWED: Yes   No wigs, hair spray, hair oils, hair ornaments, creams, lotions, make-up, after shave, perfumes, colognes, chap stick, lip balms, mustache waxes, petroleum products (e.g. Vaseline), baby oil, deodorant or ointments  No nail polish    No synthetic clothing  No nylon hose, underwear, panty hose or bra  No food, gum or candy  No jewelry  No smoking materials such as lighter, cigarettes or matches  No reading material  No hearing aids, non-fixed dentures  No Hard (non-gas permeable) contact lenses  Most dressings are approved to wear into the hyperbaric chamber. Please advise the hyperbaric staff of any new dressings applied to your wound to ensure the new dressings are compatible with hyperbaric oxygen treatments. No alcohol preps  No heat packs  No street clothes or shoes  No cell phones or other electronics  If it was not a part of you when you were born into this world, if it was not provided by the chamber , then it cannot go into the chamber with you.         Electronically signed by Charlene Galindo LPN on 7/19/7867 at 9:56 PM

## 2021-01-21 ENCOUNTER — HOSPITAL ENCOUNTER (OUTPATIENT)
Dept: HYPERBARIC MEDICINE | Age: 66
Discharge: HOME | End: 2021-01-21
Payer: MEDICARE

## 2021-01-21 VITALS
HEART RATE: 65 BPM | TEMPERATURE: 97.6 F | SYSTOLIC BLOOD PRESSURE: 156 MMHG | DIASTOLIC BLOOD PRESSURE: 68 MMHG | RESPIRATION RATE: 16 BRPM

## 2021-01-21 DIAGNOSIS — T81.89XA NONHEALING SURGICAL WOUND, INITIAL ENCOUNTER: Primary | ICD-10-CM

## 2021-01-21 DIAGNOSIS — M72.6 NECROTIZING FASCIITIS (HCC): ICD-10-CM

## 2021-01-21 LAB
GLUCOSE BLD-MCNC: 107 MG/DL (ref 70–99)
GLUCOSE BLD-MCNC: 116 MG/DL (ref 70–99)

## 2021-01-21 PROCEDURE — 82962 GLUCOSE BLOOD TEST: CPT

## 2021-01-21 PROCEDURE — G0277 HBOT, FULL BODY CHAMBER, 30M: HCPCS

## 2021-01-21 RX ORDER — GENTAMICIN SULFATE 1 MG/G
OINTMENT TOPICAL ONCE
Status: CANCELLED | OUTPATIENT
Start: 2021-01-21 | End: 2021-01-21

## 2021-01-21 RX ORDER — BACITRACIN ZINC AND POLYMYXIN B SULFATE 500; 1000 [USP'U]/G; [USP'U]/G
OINTMENT TOPICAL ONCE
Status: CANCELLED | OUTPATIENT
Start: 2021-01-21 | End: 2021-01-21

## 2021-01-21 RX ORDER — CLOBETASOL PROPIONATE 0.5 MG/G
OINTMENT TOPICAL ONCE
Status: CANCELLED | OUTPATIENT
Start: 2021-01-21 | End: 2021-01-21

## 2021-01-21 RX ORDER — LIDOCAINE 50 MG/G
OINTMENT TOPICAL ONCE
Status: CANCELLED | OUTPATIENT
Start: 2021-01-21 | End: 2021-01-21

## 2021-01-21 RX ORDER — BACITRACIN, NEOMYCIN, POLYMYXIN B 400; 3.5; 5 [USP'U]/G; MG/G; [USP'U]/G
OINTMENT TOPICAL ONCE
Status: CANCELLED | OUTPATIENT
Start: 2021-01-21 | End: 2021-01-21

## 2021-01-21 RX ORDER — BETAMETHASONE DIPROPIONATE 0.05 %
OINTMENT (GRAM) TOPICAL ONCE
Status: CANCELLED | OUTPATIENT
Start: 2021-01-21 | End: 2021-01-21

## 2021-01-21 RX ORDER — LIDOCAINE HYDROCHLORIDE 20 MG/ML
JELLY TOPICAL ONCE
Status: CANCELLED | OUTPATIENT
Start: 2021-01-21 | End: 2021-01-21

## 2021-01-21 RX ORDER — LIDOCAINE HYDROCHLORIDE 40 MG/ML
SOLUTION TOPICAL ONCE
Status: CANCELLED | OUTPATIENT
Start: 2021-01-21 | End: 2021-01-21

## 2021-01-21 RX ORDER — GINSENG 100 MG
CAPSULE ORAL ONCE
Status: CANCELLED | OUTPATIENT
Start: 2021-01-21 | End: 2021-01-21

## 2021-01-21 RX ORDER — LIDOCAINE 40 MG/G
CREAM TOPICAL ONCE
Status: CANCELLED | OUTPATIENT
Start: 2021-01-21 | End: 2021-01-21

## 2021-01-21 ASSESSMENT — PAIN SCALES - GENERAL: PAINLEVEL_OUTOF10: 10

## 2021-01-21 ASSESSMENT — PAIN DESCRIPTION - ONSET: ONSET: ON-GOING

## 2021-01-22 ENCOUNTER — HOSPITAL ENCOUNTER (OUTPATIENT)
Dept: HYPERBARIC MEDICINE | Age: 66
Discharge: HOME | End: 2021-01-22
Payer: MEDICARE

## 2021-01-22 VITALS
DIASTOLIC BLOOD PRESSURE: 72 MMHG | RESPIRATION RATE: 16 BRPM | TEMPERATURE: 96.8 F | HEART RATE: 84 BPM | SYSTOLIC BLOOD PRESSURE: 106 MMHG

## 2021-01-22 DIAGNOSIS — M72.6 NECROTIZING FASCIITIS (HCC): ICD-10-CM

## 2021-01-22 DIAGNOSIS — T81.89XA NONHEALING SURGICAL WOUND, INITIAL ENCOUNTER: Primary | ICD-10-CM

## 2021-01-22 LAB
GLUCOSE BLD-MCNC: 115 MG/DL (ref 70–99)
GLUCOSE BLD-MCNC: 133 MG/DL (ref 70–99)

## 2021-01-22 PROCEDURE — G0277 HBOT, FULL BODY CHAMBER, 30M: HCPCS

## 2021-01-22 PROCEDURE — 82962 GLUCOSE BLOOD TEST: CPT

## 2021-01-25 ENCOUNTER — HOSPITAL ENCOUNTER (OUTPATIENT)
Dept: WOUND CARE | Age: 66
Discharge: HOME | End: 2021-01-25
Payer: MEDICARE

## 2021-01-25 ENCOUNTER — HOSPITAL ENCOUNTER (OUTPATIENT)
Dept: HYPERBARIC MEDICINE | Age: 66
Discharge: HOME | End: 2021-01-25
Payer: MEDICARE

## 2021-01-25 VITALS
SYSTOLIC BLOOD PRESSURE: 149 MMHG | DIASTOLIC BLOOD PRESSURE: 67 MMHG | HEART RATE: 54 BPM | TEMPERATURE: 97.2 F | RESPIRATION RATE: 16 BRPM

## 2021-01-25 DIAGNOSIS — I89.0 LYMPHEDEMA OF BOTH LOWER EXTREMITIES: ICD-10-CM

## 2021-01-25 DIAGNOSIS — S81.802A TRAUMATIC OPEN WOUND OF LEFT LOWER LEG, INITIAL ENCOUNTER: Primary | ICD-10-CM

## 2021-01-25 DIAGNOSIS — M72.6 NECROTIZING FASCIITIS (HCC): ICD-10-CM

## 2021-01-25 DIAGNOSIS — I87.8 VENOUS STASIS OF BOTH LOWER EXTREMITIES: ICD-10-CM

## 2021-01-25 DIAGNOSIS — T81.89XA NONHEALING SURGICAL WOUND, INITIAL ENCOUNTER: ICD-10-CM

## 2021-01-25 DIAGNOSIS — T81.89XA NONHEALING SURGICAL WOUND, INITIAL ENCOUNTER: Primary | ICD-10-CM

## 2021-01-25 LAB
GLUCOSE BLD-MCNC: 115 MG/DL (ref 70–99)
GLUCOSE BLD-MCNC: 130 MG/DL (ref 70–99)

## 2021-01-25 PROCEDURE — 99183 HYPERBARIC OXYGEN THERAPY: CPT | Performed by: NURSE PRACTITIONER

## 2021-01-25 PROCEDURE — 11045 DBRDMT SUBQ TISS EACH ADDL: CPT | Performed by: NURSE PRACTITIONER

## 2021-01-25 PROCEDURE — 11042 DBRDMT SUBQ TIS 1ST 20SQCM/<: CPT | Performed by: NURSE PRACTITIONER

## 2021-01-25 PROCEDURE — G0277 HBOT, FULL BODY CHAMBER, 30M: HCPCS

## 2021-01-25 PROCEDURE — 11045 DBRDMT SUBQ TISS EACH ADDL: CPT

## 2021-01-25 PROCEDURE — 82962 GLUCOSE BLOOD TEST: CPT

## 2021-01-25 PROCEDURE — 11042 DBRDMT SUBQ TIS 1ST 20SQCM/<: CPT

## 2021-01-25 RX ORDER — LIDOCAINE HYDROCHLORIDE 20 MG/ML
JELLY TOPICAL ONCE
Status: CANCELLED | OUTPATIENT
Start: 2021-01-25 | End: 2021-01-25

## 2021-01-25 RX ORDER — CLOBETASOL PROPIONATE 0.5 MG/G
OINTMENT TOPICAL ONCE
Status: CANCELLED | OUTPATIENT
Start: 2021-01-25 | End: 2021-01-25

## 2021-01-25 RX ORDER — BETAMETHASONE DIPROPIONATE 0.05 %
OINTMENT (GRAM) TOPICAL ONCE
Status: CANCELLED | OUTPATIENT
Start: 2021-01-25 | End: 2021-01-25

## 2021-01-25 RX ORDER — LIDOCAINE HYDROCHLORIDE 40 MG/ML
SOLUTION TOPICAL ONCE
Status: CANCELLED | OUTPATIENT
Start: 2021-01-25 | End: 2021-01-25

## 2021-01-25 RX ORDER — LIDOCAINE HYDROCHLORIDE 40 MG/ML
SOLUTION TOPICAL ONCE
Status: DISCONTINUED | OUTPATIENT
Start: 2021-01-25 | End: 2021-01-26 | Stop reason: HOSPADM

## 2021-01-25 RX ORDER — BACITRACIN, NEOMYCIN, POLYMYXIN B 400; 3.5; 5 [USP'U]/G; MG/G; [USP'U]/G
OINTMENT TOPICAL ONCE
Status: CANCELLED | OUTPATIENT
Start: 2021-01-25 | End: 2021-01-25

## 2021-01-25 RX ORDER — GENTAMICIN SULFATE 1 MG/G
OINTMENT TOPICAL ONCE
Status: CANCELLED | OUTPATIENT
Start: 2021-01-25 | End: 2021-01-25

## 2021-01-25 RX ORDER — GINSENG 100 MG
CAPSULE ORAL ONCE
Status: CANCELLED | OUTPATIENT
Start: 2021-01-25 | End: 2021-01-25

## 2021-01-25 RX ORDER — LIDOCAINE 40 MG/G
CREAM TOPICAL ONCE
Status: CANCELLED | OUTPATIENT
Start: 2021-01-25 | End: 2021-01-25

## 2021-01-25 RX ORDER — GENTAMICIN SULFATE 1 MG/G
OINTMENT TOPICAL ONCE
Status: DISCONTINUED | OUTPATIENT
Start: 2021-01-25 | End: 2021-01-26 | Stop reason: HOSPADM

## 2021-01-25 RX ORDER — BACITRACIN ZINC AND POLYMYXIN B SULFATE 500; 1000 [USP'U]/G; [USP'U]/G
OINTMENT TOPICAL ONCE
Status: CANCELLED | OUTPATIENT
Start: 2021-01-25 | End: 2021-01-25

## 2021-01-25 RX ORDER — LIDOCAINE 50 MG/G
OINTMENT TOPICAL ONCE
Status: CANCELLED | OUTPATIENT
Start: 2021-01-25 | End: 2021-01-25

## 2021-01-25 RX ORDER — GENTAMICIN SULFATE 1 MG/G
OINTMENT TOPICAL
Qty: 30 G | Refills: 3 | Status: SHIPPED | OUTPATIENT
Start: 2021-01-25 | End: 2021-03-22 | Stop reason: SDUPTHER

## 2021-01-25 NOTE — PROGRESS NOTES
[address] [facility] [facility]  Hyperbaric Oxygen Therapy   Progress Note      NAME: Cade Tavares   MEDICAL RECORD NUMBER:  [id number]  AGE: 77 y.o. GENDER: male  : 1955  EPISODE DATE:  2021     Subjective     HBO Treatment Number: 2 out of Total Treatments: 30    HBO Diagnosis:     Indications: Necrotizing Fasciitis (Necrotizing Soft Tissue Infection)(perineum)        Safety checks performed prior to treatment. See doc flowsheets for documentation. Objective           Recent Labs     21  1010 21  0753   POCGLU 133* 130*       Pre treatment Vital Signs       Temp: 97.8 °F (36.6 °C)     Pulse: 56     Resp: 16     BP: (!) 150/74(SN will inform SANTIAGO Villagomez)     Blood Sugar 115    Post treatment Vital Signs  Temp: 96.8 °F (36 °C)  Pulse: 84  Resp: 16  BP: 106/72  Blood Sugar 133    Assessment        Physical Exam:  General Appearance:  alert and oriented to person, place and time, well-developed and well-nourished, in no acute distress    Pre Tympanic Membrane Assessment:  tympanic membranes intact bilaterally    Post Tympanic Membrane Assessment:  Right: Normal(per Pt.)  Left: Normal(per Pt.)    Pulmonary/Chest:  clear to auscultation bilaterally- no wheezes, rales or rhonchi, normal air movement, no respiratory distress    Cardiovascular:  normal, regular rate and rhythm    Chamber #: [id number]       Treatment Start Time: 0827     Pressure Reached Time: 0845  JOHANNA : 2  Number of Air Breaks:  Treatment Status: No Air break      Decompression Time: 0940   Treatment End Time: 1138    Symptoms Noted During Treatment: None    Adverse Event: no      I was present on these premises and immediately available to furnish assistance & direction throughout the procedure. Plan          Cade Tavares is a 77 y.o. male  did successfully complete today's hyperbaric oxygen treatment at [address] [place] [address] [employer].     In my clinical judgement, ongoing HBO therapy is necessary at this time, given a threat to patient function, limb or life from the current condition. Supervision and attendance of Hyperbaric Oxygen Therapy provided. Continue HBO treatment as outlined in the treatment plan. Hyperbaric Oxygen: [employer] tolerated Treatment Number: 2 well today without complications.      Electronically signed by FAITH Barry CNP on 1/22/2021 at 8:38 AM

## 2021-01-25 NOTE — PROGRESS NOTES
[address] [facility]  Hyperbaric Oxygen Therapy   Progress Note      NAME: Merlyn Tavares   MEDICAL RECORD NUMBER:  [id number]  AGE: 77 y.o. GENDER: male  : 1955  EPISODE DATE:  2021     Subjective     HBO Treatment Number: 3 out of Total Treatments: 30    HBO Diagnosis:     Indications: Necrotizing Fasciitis (Necrotizing Soft Tissue Infection)(perineum)        Safety checks performed prior to treatment. See doc flowsheets for documentation. Objective           Recent Labs     21  0753 21  1016   POCGLU 130* 115*       Pre treatment Vital Signs       Temp: 97.7 °F (36.5 °C)     Pulse: 64     Resp: 16     BP: (!) 149/74(SN will inform SANTIAGO Villagomez)     Blood Sugar 130    Post treatment Vital Signs  Temp: 97.2 °F (36.2 °C)  Pulse: 54  Resp: 16  BP: (!) 149/67(SN will inform SANTIAGO Villagomez)  Blood Sugar 115    Assessment        Physical Exam:  General Appearance:  alert and oriented to person, place and time, well-developed and well-nourished, in no acute distress    Pre Tympanic Membrane Assessment:  tympanic membranes intact bilaterally    Post Tympanic Membrane Assessment:  Right: Normal(per Pt.)  Left: Normal(per Pt.)    Pulmonary/Chest:  clear to auscultation bilaterally- no wheezes, rales or rhonchi, normal air movement, no respiratory distress    Cardiovascular:  normal, regular rate and rhythm    Chamber #: [id number]       Treatment Start Time: 0807     Pressure Reached Time: 0820  JOHANNA : 2  Number of Air Breaks:  Treatment Status: No Air break      Decompression Time: 0950   Treatment End Time: 1002    Symptoms Noted During Treatment: None    Adverse Event: no      Total time in chamber: 115  Minutes at depth: 90    I was present on these premises and immediately available to furnish assistance & direction throughout the procedure.        Plan          Merlyn Tavares is a 77 y.o. male  did successfully complete today's hyperbaric oxygen treatment at [facility]

## 2021-01-25 NOTE — PROGRESS NOTES
RN HYPERBARIC OXYGEN THERAPY RISK ASSESSMENT TOOL   [facility]     Dilma Tavares  MEDICAL RECORD NUMBER:  [id number]  AGE: 77 y.o.    GENDER: male  : 1955  EPISODE DATE:  2021       PAST MEDICAL HISTORY      Diagnosis Date    CHF (congestive heart failure) (HCC)     Chronic ulcer of left leg with fat layer exposed ([address] 2016    Chronic ulcer of right leg with fat layer exposed ([address] 2016    Chronic ulcer of right leg with fat layer exposed ([address] 2016    Diabetes mellitus ([address]     Hypertension     PVD (peripheral vascular disease) ([address] 2016    Type 2 diabetes mellitus with diabetic peripheral angiopathy without gangrene, without long-term current use of insulin ([address] 2016    Type 2 diabetes mellitus with diabetic peripheral angiopathy without gangrene, without long-term current use of insulin ([address] 2016    Venous stasis ulcer of both lower extremities without varicose veins ([address] 2016    WD-Traumatic open wound of left lower leg, complicated by diabetes and venous insufficiency 10/8/2020    WD-Ulcer of shin, left, with fat layer exposed ([address] 2016       PAST SURGICAL HISTORY  Past Surgical History:   Procedure Laterality Date    RECTAL SURGERY N/A 2020    RECTAL PERIRECTAL INCISION AND DRAINAGE performed by Jacobo Mcintyre MD at [address] History   Problem Relation Age of Onset    Asthma Mother     Breast Cancer Mother     Cancer Mother     Diabetes Mother     High Blood Pressure Mother     Cancer Father     Early Death Brother     Arthritis Paternal Grandmother        SOCIAL HISTORY  Social History     Tobacco Use    Smoking status: Current Every Day Smoker     Packs/day: 1.00     Years: 60.00     Pack years: 60.00     Types: Cigarettes    Smokeless tobacco: Never Used    Tobacco comment: healing    Substance Use Topics    Alcohol use: Never     Frequency: Never    Drug use: No       ALLERGIES  No Known Allergies    MEDICATIONS  Current Outpatient Medications on File Prior to Encounter   Medication Sig Dispense Refill    gentamicin (GARAMYCIN) 0.1 % ointment Apply topically 2-3 times daily with each dressing change for one month 30 g 3    nystatin (MYCOSTATIN) 980231 UNIT/GM powder Apply 3 times daily and as needed to scrotum for moisture control. 30 g 1    Probiotic Acidophilus (FLORANEX) TABS Take 1 tablet by mouth 2 times daily 60 tablet 0    magnesium oxide (MAG-OX) 400 MG tablet Take 1 tablet by mouth daily 30 tablet 1    amiodarone (CORDARONE) 200 MG tablet Take 1 tablet by mouth daily 30 tablet 0    rivaroxaban (XARELTO) 15 MG TABS tablet Take 1 tablet by mouth daily 42 tablet 1    metoprolol succinate (TOPROL XL) 25 MG extended release tablet Take 1 tablet by mouth 2 times daily 30 tablet 3    amLODIPine (NORVASC) 10 MG tablet Take 1 tablet by mouth nightly 30 tablet 3    Latanoprost 0.005 % EMUL Apply to eye daily      ibuprofen (ADVIL;MOTRIN) 600 MG tablet Take 1 tablet by mouth every 6 hours as needed for Pain 30 tablet 0    glipiZIDE (GLUCOTROL) 5 MG tablet Take 5 mg by mouth 2 times daily (before meals)      potassium chloride (KLOR-CON M) 20 MEQ extended release tablet Take 20 mEq by mouth daily      ALPRAZolam (XANAX) 0.5 MG tablet Take 0.5 mg by mouth nightly. Shepard Fee ipratropium-albuterol (DUONEB) 0.5-2.5 (3) MG/3ML SOLN nebulizer solution Inhale 1 vial into the lungs every 4 hours      albuterol sulfate  (90 Base) MCG/ACT inhaler Inhale 2 puffs into the lungs every 6 hours as needed for Wheezing      aspirin EC 81 MG EC tablet Take 1 tablet by mouth daily 30 tablet 3    HYDROcodone-acetaminophen (NORCO) 7.5-325 MG per tablet Take 1 tablet by mouth 3 times daily .       metFORMIN (GLUCOPHAGE) 1000 MG tablet Take 1,000 mg by mouth 2 times daily (with meals)      lisinopril (PRINIVIL;ZESTRIL) 20 MG tablet Take 40 mg by mouth daily       tamsulosin (FLOMAX) 0.4 MG capsule Take 0.4 mg by mouth daily      finasteride (PROSCAR) 5 MG tablet Take 5 mg by mouth daily       Current Facility-Administered Medications on File Prior to Encounter   Medication Dose Route Frequency Provider Last Rate Last Admin    lidocaine (XYLOCAINE) 4 % external solution   Topical Once Roetta Charenton, APRN - CNP        gentamicin (GARAMYCIN) 0.1 % ointment   Topical Once Roetta Charenton, APRN - CNP           LABS  HgBA1c:    Lab Results   Component Value Date    LABA1C 5.2 07/20/2020            Please add comments to any \"yes\" answers. Do you have a history of: Comments   Seizure no   Congenital spherocytosis no   Optic Neuritis no   Cataracts yes - removed from both eyes   Eye Surgery Yes - see above   Ear problems no   Ear reconstructive surgery no   Sinus problems no   Asthma no   Bronchitis no   Emphysema no   Pneumothorax no   Tuberculosis no   Other lung problems yes - COPD under MD care   Hypertension yes - under MD care   Pacemaker/AICD no                                              Congestive heart failure no   EF% >30% no   Any implanted device no                                               Dialysis no   Current pregnancy N/A   Claustrophobia no   Diabetes yes - under MD care   Currently using these medications:     a. Disulfiram (Antabuse®) no    b. Mafenide acetate        (Sulfamylon®) no    c.  Diuretics for CHF no    d. Amiodarone dose > 400mg/day no    e. Lanoxin/Digoxin no    f. Current Steroid use     no   Cancer:  yes - skin cancer removed    a. Surgery for Cancer no    b. Radiation therapy no    c. Chemotherapy no    If yes, did you receive:     a. Doxorubicin (Adriamycin®) N/A    b.   Cisplatin (Platinol AQ®) N/A    c.  Bleomycin (Blenoxane®) N/A     The above was reviewed with: Patient    Electronically signed by Natalia Mehta RN on 1/25/2021 at 1:37 PM

## 2021-01-25 NOTE — PROGRESS NOTES
Wound Care Center Progress Note With Procedure    Gabino Tavares  AGE: 77 y.o. GENDER: male  : 1955  EPISODE DATE:  2021     Subjective:     Chief Complaint   Patient presents with    Wound Check         HISTORY of PRESENT ILLNESS     Jennifer Hernandez a 72 y. o. male who presents today for wound evaluation of Acute non-healing surgical and necrotizing fascitis ulcer(s) of the rectal and perirectal area.  The ulcer is of marked severity.  The underlying cause of the wound is nonhealing surgical post rectal and perirectal incision and drain related to necrotizing fascitis performed by Dr. Cassidy Comment 2020.   Wound Pain Timing/Severity: waxing and waning, moderate  Quality of pain: aching, tender  Severity of pain:  3 / 10   Modifying Factors: venous stasis, lymphedema, diabetes, poor glucose control, obesity, smoking and anticoagulation therapy  Associated Signs/Symptoms: edema, erythema, drainage and pain                                         PAST MEDICAL HISTORY        Diagnosis Date    CHF (congestive heart failure) (HCC)     Chronic ulcer of left leg with fat layer exposed ([address] 2016    Chronic ulcer of right leg with fat layer exposed ([address] 2016    Chronic ulcer of right leg with fat layer exposed ([address] 2016    Diabetes mellitus ([address]     Hypertension     PVD (peripheral vascular disease) ([address] 2016    Type 2 diabetes mellitus with diabetic peripheral angiopathy without gangrene, without long-term current use of insulin ([address] 2016    Type 2 diabetes mellitus with diabetic peripheral angiopathy without gangrene, without long-term current use of insulin ([address] 2016    Venous stasis ulcer of both lower extremities without varicose veins ([address] 2016    WD-Traumatic open wound of left lower leg, complicated by diabetes and venous insufficiency 10/8/2020    WD-Ulcer of shin, left, with fat layer exposed ([address] 2016       PAST SURGICAL Supply: Cover and Secure    HBO-Nonhealing surgical wound groin & buttock, initial encounter    Relevant Medications    lidocaine (XYLOCAINE) 4 % external solution    gentamicin (GARAMYCIN) 0.1 % ointment    HBO-Necrotizing fasciitis (HCC)    Relevant Medications    lidocaine (XYLOCAINE) 4 % external solution    gentamicin (GARAMYCIN) 0.1 % ointment        Procedure Note    Indications:  Based on my examination of this patient's wound(s) today, sharp excision into necrotic subcutaneous tissue is required to promote healing and evaluate the extent of previous healing. Performed by: FAITH Garza CNP    Consent obtained: Yes    Time out taken:  Yes    Pain Control: Anesthetic  Anesthetic: 4% Lidocaine Liquid Topical     Debridement:Excisional Debridement    Using curette the wound(s) was/were sharply debrided down through and including the removal of subcutaneous tissue. Devitalized Tissue Debrided:  slough and exudate    Pre Debridement Measurements:  Are located in the Wound Documentation Flow Sheet    All active wounds listed below with today's date are evaluated  Wound(s)    debrided this date include # : 2     Post  Debridement Measurements:  Wound 11/25/20 Scrotum #2 Perineum (Active)   Wound Image   01/06/21 0802   Wound Etiology Surgical 01/25/21 1045   Dressing Status New dressing applied 01/25/21 1153   Wound Cleansed Soap and water 01/25/21 1045   Dressing/Treatment ABD; Alginate with Ag;Moisture barrier;Collagen 01/18/21 1213   Offloading for Diabetic Foot Ulcers No offloading required 01/15/21 0819   Wound Length (cm) 26.5 cm 01/25/21 1045   Wound Width (cm) 2.5 cm 01/25/21 1045   Wound Depth (cm) 1.5 cm 01/25/21 1045   Wound Surface Area (cm^2) 66.25 cm^2 01/25/21 1045   Change in Wound Size % (l*w) 47 01/25/21 1045   Wound Volume (cm^3) 99.38 cm^3 01/25/21 1045   Wound Healing % 85 01/25/21 1045   Post-Procedure Length (cm) 26.5 cm 01/25/21 1106   Post-Procedure Width (cm) 2.5 cm 01/25/21 1106   Post-Procedure Depth (cm) 1.5 cm 01/25/21 1106   Post-Procedure Surface Area (cm^2) 66.25 cm^2 01/25/21 1106   Post-Procedure Volume (cm^3) 99.38 cm^3 01/25/21 1106   Distance Tunneling (cm) 0 cm 01/25/21 1045   Tunneling Position ___ O'Clock 0 01/25/21 1045   Undermining Starts ___ O'Clock 0 01/25/21 1045   Undermining Ends___ O'Clock 0 01/25/21 1045   Undermining Maxium Distance (cm) 0 01/25/21 1045   Wound Assessment Pink/red;Granulation tissue 01/25/21 1045   Drainage Amount Moderate 01/25/21 1045   Drainage Description Serosanguinous 01/25/21 1045   Odor None 01/25/21 1045   Merary-wound Assessment Blanchable erythema 01/25/21 1045   Margins Defined edges; Unattached edges 01/25/21 1045   Wound Thickness Description not for Pressure Injury Full thickness 01/25/21 1045   Number of days: 61       Percent of Wound(s) Debrided: approximately 100%    Total  Area  Debrided:  66.25 sq cm     Bleeding:  Minimal    Hemostasis Achieved:  by pressure    Procedural Pain:  0  / 10     Post Procedural Pain:  0 / 10     Response to treatment:  Well tolerated by patient. Status of wound progress and description from last visit: Continues to improve, will add Gent ointment to the wound regimen to minimize infection related to fecal or urine incontinence. The patient continue HBO therapy. Plan:       Discharge Instructions       PHYSICIAN ORDERS AND DISCHARGE INSTRUCTIONS     NOTE: Upon discharge from the [address], you will receive a patient experience survey.  We would be grateful if you would take the time to fill this survey out.     Wound care order history:                 ANDREW's   Right       Left               Date:              Cultures: 1/15/21               Grafts:                Antibiotics:           Continuing wound care orders and information:                 Residence:  Home              Continue home health care with: [facility]              Your wound-care supplies will be provided by:

## 2021-01-26 ENCOUNTER — HOSPITAL ENCOUNTER (OUTPATIENT)
Dept: HYPERBARIC MEDICINE | Age: 66
Discharge: HOME | End: 2021-01-26
Payer: MEDICARE

## 2021-01-26 VITALS
TEMPERATURE: 97 F | RESPIRATION RATE: 16 BRPM | SYSTOLIC BLOOD PRESSURE: 160 MMHG | HEART RATE: 49 BPM | DIASTOLIC BLOOD PRESSURE: 66 MMHG

## 2021-01-26 DIAGNOSIS — T81.89XA NONHEALING SURGICAL WOUND, INITIAL ENCOUNTER: Primary | ICD-10-CM

## 2021-01-26 DIAGNOSIS — M72.6 NECROTIZING FASCIITIS (HCC): ICD-10-CM

## 2021-01-26 LAB
GLUCOSE BLD-MCNC: 120 MG/DL (ref 70–99)
GLUCOSE BLD-MCNC: 122 MG/DL (ref 70–99)

## 2021-01-26 PROCEDURE — G0277 HBOT, FULL BODY CHAMBER, 30M: HCPCS

## 2021-01-26 PROCEDURE — 99183 HYPERBARIC OXYGEN THERAPY: CPT | Performed by: NURSE PRACTITIONER

## 2021-01-26 PROCEDURE — 82962 GLUCOSE BLOOD TEST: CPT

## 2021-01-26 NOTE — PROGRESS NOTES
[facility],4Th Floor  Hyperbaric Oxygen Therapy   Progress Note      NAME: Carolyn Tavares   MEDICAL RECORD NUMBER:  [id number]  AGE: 77 y.o. GENDER: male  : 1955  EPISODE DATE:  2021     Subjective     HBO Treatment Number: 4 out of Total Treatments: 30    HBO Diagnosis:     Indications: Necrotizing Fasciitis (Necrotizing Soft Tissue Infection)(perineum)        Safety checks performed prior to treatment. See doc flowsheets for documentation. Objective           Recent Labs     21  0757 21  1038   POCGLU 122* 120*       Pre treatment Vital Signs       Temp: 98.2 °F (36.8 °C)     Pulse: 58     Resp: 16     BP: (!) 145/71(SN will inform SANTIAGO Villagomez)     Blood Sugar 122    Post treatment Vital Signs  Temp: 97 °F (36.1 °C)  Pulse: (!) 49(SN will report to CNP. Pt. did not report c/o dizziness)  Resp: 16  BP: (!) 160/66(SN will report to SANTIAGO Villagomez)  Blood Sugar 120    Assessment        Physical Exam:  General Appearance:  alert and oriented to person, place and time, well-developed and well-nourished, in no acute distress    Pre Tympanic Membrane Assessment:  tympanic membranes intact bilaterally    Post Tympanic Membrane Assessment:  Right: Normal(per Pt.)  Left: Normal(per Pt.)    Pulmonary/Chest:  clear to auscultation bilaterally- no wheezes, rales or rhonchi, normal air movement, no respiratory distress    Cardiovascular:  normal, regular rate and rhythm    Chamber #: [id number]       Treatment Start Time: 0807     Pressure Reached Time: 0817  JOHANNA : 2  Number of Air Breaks:  Treatment Status: No Air break      Decompression Time: 0947   Treatment End Time: 4865    Symptoms Noted During Treatment: None    Adverse Event: no      Total time in chamber: 109  Minutes at depth:90    I was present on these premises and immediately available to furnish assistance & direction throughout the procedure.        Plan          Carolyn Tavares is a 77 y.o. male  did successfully complete today's hyperbaric oxygen treatment at [address] [place] [address] Narendra Lopez. In my clinical judgement, ongoing HBO therapy is  necessary at this time, given a threat to patient function, limb or life from the current condition. Supervision and attendance of Hyperbaric Oxygen Therapy provided. Continue HBO treatment as outlined in the treatment plan. Hyperbaric Oxygen: Yulisa Rodriguez Coffee tolerated Treatment Number: 4 well today without complications.      Electronically signed by FAITH Garza CNP on 1/26/2021 at 12:36 PM

## 2021-01-27 ENCOUNTER — HOSPITAL ENCOUNTER (OUTPATIENT)
Dept: HYPERBARIC MEDICINE | Age: 66
Discharge: HOME | End: 2021-01-27
Payer: MEDICARE

## 2021-01-27 VITALS
RESPIRATION RATE: 16 BRPM | TEMPERATURE: 96.6 F | HEART RATE: 54 BPM | SYSTOLIC BLOOD PRESSURE: 150 MMHG | DIASTOLIC BLOOD PRESSURE: 65 MMHG

## 2021-01-27 DIAGNOSIS — M72.6 NECROTIZING FASCIITIS (HCC): ICD-10-CM

## 2021-01-27 DIAGNOSIS — T81.89XA NONHEALING SURGICAL WOUND, INITIAL ENCOUNTER: Primary | ICD-10-CM

## 2021-01-27 LAB
GLUCOSE BLD-MCNC: 118 MG/DL (ref 70–99)
GLUCOSE BLD-MCNC: 157 MG/DL (ref 70–99)

## 2021-01-27 PROCEDURE — G0277 HBOT, FULL BODY CHAMBER, 30M: HCPCS

## 2021-01-27 PROCEDURE — 82962 GLUCOSE BLOOD TEST: CPT

## 2021-01-27 NOTE — PROGRESS NOTES
[facility] [place]  Hyperbaric Oxygen Therapy   Progress Note      NAME: Tyshawn Tavares   MEDICAL RECORD NUMBER:  [id number]  AGE: 77 y.o. GENDER: male  : 1955  EPISODE DATE:  2021     Subjective     HBO Treatment Number: 5 out of Total Treatments: 30    HBO Diagnosis:     Indications: Necrotizing Fasciitis (Necrotizing Soft Tissue Infection)(Perineum)        Safety checks performed prior to treatment. See doc flowsheets for documentation. Objective           Recent Labs     21  0933 21  1207   POCGLU 157* 118*       Pre treatment Vital Signs       Temp: 97.2 °F (36.2 °C)     Pulse: 60     Resp: 16     BP: 138/68     Blood Sugar 157    Post treatment Vital Signs  Temp: 96.6 °F (35.9 °C)  Pulse: 54  Resp: 16  BP: (!) 150/65  Blood Sugar 118    Assessment        Physical Exam:  General Appearance:  alert and oriented to person, place and time, well-developed and well-nourished, in no acute distress    Pre Tympanic Membrane Assessment:  tympanic membranes intact bilaterally, normal color, normal light reflex bilaterally    Post Tympanic Membrane Assessment:  Right: Normal(per Pt.)  Left: Normal(per Pt.)    Pulmonary/Chest:  clear to auscultation bilaterally- no wheezes, rales or rhonchi, normal air movement, no respiratory distress    Cardiovascular:  regular rate and rhythm, no murmurs rubs or gallops    Chamber #: [id number]       Treatment Start Time: 1025(Treatment started late from change over)     Pressure Reached Time: 1036  JOHANNA : 2  Number of Air Breaks:  Treatment Status: No Air break      Decompression Time: 1155   Treatment End Time: 1206    Symptoms Noted During Treatment: None    TOTAL TIME AT DEPTH-79    TOTAL TIME IN CHAMBER-101    Adverse Event: no      I was present on these premises and immediately available to furnish assistance & direction throughout the procedure.        Plan          Tyshawn Tavares is a 77 y.o. male  did successfully complete today's hyperbaric oxygen treatment at [address] [place] [address] Narendra Lopez. In my clinical judgement, ongoing HBO therapy is  necessary at this time, given a threat to patient function, limb or life from the current condition. Supervision and attendance of Hyperbaric Oxygen Therapy provided. Continue HBO treatment as outlined in the treatment plan. Hyperbaric Oxygen: Iman Simmons Coffee tolerated Treatment Number: 5 well today without complications.      Electronically signed by Lashay Hanna MD on 1/27/2021 at 5:13 PM

## 2021-01-28 ENCOUNTER — HOSPITAL ENCOUNTER (OUTPATIENT)
Dept: HYPERBARIC MEDICINE | Age: 66
Discharge: HOME | End: 2021-01-28
Payer: MEDICARE

## 2021-01-28 VITALS
HEART RATE: 51 BPM | DIASTOLIC BLOOD PRESSURE: 79 MMHG | SYSTOLIC BLOOD PRESSURE: 168 MMHG | TEMPERATURE: 97.1 F | RESPIRATION RATE: 16 BRPM

## 2021-01-28 DIAGNOSIS — T81.89XA NONHEALING SURGICAL WOUND, INITIAL ENCOUNTER: ICD-10-CM

## 2021-01-28 DIAGNOSIS — M72.6 NECROTIZING FASCIITIS (HCC): Primary | ICD-10-CM

## 2021-01-28 LAB
GLUCOSE BLD-MCNC: 110 MG/DL (ref 70–99)
GLUCOSE BLD-MCNC: 125 MG/DL (ref 70–99)

## 2021-01-28 PROCEDURE — G0277 HBOT, FULL BODY CHAMBER, 30M: HCPCS

## 2021-01-28 PROCEDURE — 82962 GLUCOSE BLOOD TEST: CPT

## 2021-01-28 NOTE — PROGRESS NOTES
[address] [facility],4[address]  Hyperbaric Oxygen Therapy   Progress Note      NAME: Debby Tavares   MEDICAL RECORD NUMBER:  [id number]  AGE: 77 y.o. GENDER: male  : 1955  EPISODE DATE:  2021     Subjective     HBO Treatment Number: 1 out of Total Treatments: 30    HBO Diagnosis:     Indications: Necrotizing Fasciitis (Necrotizing Soft Tissue Infection)(Perineum)        Safety checks performed prior to treatment. See doc flowsheets for documentation. Objective           Recent Labs     21  0933 21  1207   POCGLU 157* 118*       Pre treatment Vital Signs       Temp: 97.7 °F (36.5 °C)     Pulse: 53     Resp: 16     BP: (!) 135/54(SN will inform )     Blood Sugar 107    Post treatment Vital Signs  Temp: 97.6 °F (36.4 °C)  Pulse: 65  Resp: 16  BP: (!) 156/68(SN will inform )  Blood Sugar 116    Assessment        Physical Exam:  General Appearance:  alert and oriented to person, place and time, well-developed and well-nourished, in no acute distress and alert and oriented to person, place and time    Pre Tympanic Membrane Assessment:  tympanic membranes intact bilaterally, normal color    Post Tympanic Membrane Assessment:  Right: Normal(per Pt.)  Left: Normal(per Pt.)    Pulmonary/Chest:  clear to auscultation bilaterally- no wheezes, rales or rhonchi, normal air movement, no respiratory distress and no chest wall tenderness    Cardiovascular:  normal, regular rate and rhythm    Chamber #: [id number]       Treatment Start Time: 1046     Pressure Reached Time: 1101  JOHANNA : 2  Number of Air Breaks:  Treatment Status: No Air break          Treatment End Time: 1129    Symptoms Noted During Treatment: None    TOTAL TIME AT DEPTH-15    TOTAL TIME IN CHAMBER-43    Adverse Event: no      I was present on these premises and immediately available to furnish assistance & direction throughout the procedure.        Plan          Debby Tavares is a 77 y.o. male  did successfully complete today's hyperbaric oxygen treatment at [address] [place] [address] Narendra Lopez. In my clinical judgement, ongoing HBO therapy is  necessary at this time, given a threat to patient function, limb or life from the current condition. Supervision and attendance of Hyperbaric Oxygen Therapy provided. Continue HBO treatment as outlined in the treatment plan. Hyperbaric Oxygen: Yulisa Rodriguez [employer] tolerated Treatment Number: 1 well today without complications.      Electronically signed by Maxx Knowles MD on 1/21/2021 at 9:23 AM

## 2021-01-28 NOTE — PROGRESS NOTES
RN HYPERBARIC OXYGEN THERAPY RISK ASSESSMENT TOOL   [facility]     Melinda Tavares  MEDICAL RECORD NUMBER:  [id number]  AGE: 77 y.o.    GENDER: male  : 1955  EPISODE DATE:  2021       PAST MEDICAL HISTORY      Diagnosis Date    CHF (congestive heart failure) (HCC)     Chronic ulcer of left leg with fat layer exposed ([address] 2016    Chronic ulcer of right leg with fat layer exposed ([address] 2016    Chronic ulcer of right leg with fat layer exposed ([address] 2016    Diabetes mellitus ([address]     Hypertension     PVD (peripheral vascular disease) ([address] 2016    Type 2 diabetes mellitus with diabetic peripheral angiopathy without gangrene, without long-term current use of insulin ([address] 2016    Type 2 diabetes mellitus with diabetic peripheral angiopathy without gangrene, without long-term current use of insulin ([address] 2016    Venous stasis ulcer of both lower extremities without varicose veins ([address] 2016    WD-Traumatic open wound of left lower leg, complicated by diabetes and venous insufficiency 10/8/2020    WD-Ulcer of shin, left, with fat layer exposed ([address] 2016       PAST SURGICAL HISTORY  Past Surgical History:   Procedure Laterality Date    RECTAL SURGERY N/A 2020    RECTAL PERIRECTAL INCISION AND DRAINAGE performed by Leonardo Garcia MD at [address] History   Problem Relation Age of Onset    Asthma Mother     Breast Cancer Mother     Cancer Mother     Diabetes Mother     High Blood Pressure Mother     Cancer Father     Early Death Brother     Arthritis Paternal Grandmother        SOCIAL HISTORY  Social History     Tobacco Use    Smoking status: Current Every Day Smoker     Packs/day: 1.00     Years: 60.00     Pack years: 60.00     Types: Cigarettes    Smokeless tobacco: Never Used    Tobacco comment: healing    Substance Use Topics    Alcohol use: Never     Frequency: Never    Drug use: No       ALLERGIES  No Known Allergies    MEDICATIONS  Current Outpatient Medications on File Prior to Encounter   Medication Sig Dispense Refill    gentamicin (GARAMYCIN) 0.1 % ointment Apply topically 2-3 times daily with each dressing change for one month 30 g 3    nystatin (MYCOSTATIN) 603373 UNIT/GM powder Apply 3 times daily and as needed to scrotum for moisture control. 30 g 1    Probiotic Acidophilus (FLORANEX) TABS Take 1 tablet by mouth 2 times daily 60 tablet 0    magnesium oxide (MAG-OX) 400 MG tablet Take 1 tablet by mouth daily 30 tablet 1    amiodarone (CORDARONE) 200 MG tablet Take 1 tablet by mouth daily 30 tablet 0    rivaroxaban (XARELTO) 15 MG TABS tablet Take 1 tablet by mouth daily 42 tablet 1    metoprolol succinate (TOPROL XL) 25 MG extended release tablet Take 1 tablet by mouth 2 times daily 30 tablet 3    amLODIPine (NORVASC) 10 MG tablet Take 1 tablet by mouth nightly 30 tablet 3    Latanoprost 0.005 % EMUL Apply to eye daily      ibuprofen (ADVIL;MOTRIN) 600 MG tablet Take 1 tablet by mouth every 6 hours as needed for Pain 30 tablet 0    glipiZIDE (GLUCOTROL) 5 MG tablet Take 5 mg by mouth 2 times daily (before meals)      potassium chloride (KLOR-CON M) 20 MEQ extended release tablet Take 20 mEq by mouth daily      ALPRAZolam (XANAX) 0.5 MG tablet Take 0.5 mg by mouth nightly. Ariana Sherman ipratropium-albuterol (DUONEB) 0.5-2.5 (3) MG/3ML SOLN nebulizer solution Inhale 1 vial into the lungs every 4 hours      albuterol sulfate  (90 Base) MCG/ACT inhaler Inhale 2 puffs into the lungs every 6 hours as needed for Wheezing      aspirin EC 81 MG EC tablet Take 1 tablet by mouth daily 30 tablet 3    HYDROcodone-acetaminophen (NORCO) 7.5-325 MG per tablet Take 1 tablet by mouth 3 times daily .       metFORMIN (GLUCOPHAGE) 1000 MG tablet Take 1,000 mg by mouth 2 times daily (with meals)      lisinopril (PRINIVIL;ZESTRIL) 20 MG tablet Take 40 mg by mouth daily      

## 2021-01-28 NOTE — PROGRESS NOTES
[facility]  Hyperbaric Oxygen Therapy   Progress Note      NAME: Ellie Tavares   MEDICAL RECORD NUMBER:  [id number]  AGE: 77 y.o. GENDER: male  : 1955  EPISODE DATE:  2021     Subjective     HBO Treatment Number: 6 out of Total Treatments: 30    HBO Diagnosis:     Indications: Necrotizing Fasciitis (Necrotizing Soft Tissue Infection)(perineum)        Safety checks performed prior to treatment. See doc flowsheets for documentation. Objective           Recent Labs     21  0940 21  1151   POCGLU 110* 125*       Pre treatment Vital Signs       Temp: 97.7 °F (36.5 °C)     Pulse: 60     Resp: 16     BP: (!) 178/67     Blood Sugar 110    Post treatment Vital Signs  Temp: 97.1 °F (36.2 °C)  Pulse: 51  Resp: 16  BP: (!) 168/79  Blood Sugar 111    Assessment        Physical Exam:  General Appearance:  alert and oriented to person, place and time, well-developed and well-nourished, in no acute distress and alert and oriented to person, place and time    Pre Tympanic Membrane Assessment:  tympanic membranes intact bilaterally, normal color    Post Tympanic Membrane Assessment:  Right: Normal(per pt)  Left: Normal(per pt)    Pulmonary/Chest:  clear to auscultation bilaterally- no wheezes, rales or rhonchi, normal air movement, no respiratory distress and no chest wall tenderness    Cardiovascular:  normal, regular rate and rhythm    Chamber #: [id number]       Treatment Start Time: 0959     Pressure Reached Time: 1010  JOHANNA : 2  Number of Air Breaks:  Treatment Status: No Air break      Decompression Time: 1140   Treatment End Time: 1150    Symptoms Noted During Treatment: None    TOTAL TIME AT DEPTH-90    TOTAL TIME IN [id number]    Adverse Event: no      I was present on these premises and immediately available to furnish assistance & direction throughout the procedure.        Plan          Ellie Tavares is a 77 y.o. male  did successfully complete today's hyperbaric oxygen

## 2021-01-28 NOTE — PLAN OF CARE
Problem: Physical Regulation:  Goal: Tolerate HBO therapy and barotrauma will be prevented  Description: Tolerate HBO therapy and barotrauma will be prevented  Outcome: Ongoing     Problem: Physical Regulation:  Intervention: Assess for signs and symptoms of adverse events including confinement anxiety, pneumothorax, oxygen toxicity, and barotrauma  Note: Patient tolerated treatment well. Intervention: Assess knowledge and expectations of HBO therapy  Note: Patient tolerated treatment well. Intervention: Assess for history of confinement anxiety  Note: Patient tolerated treatment well. Intervention: Provide comfort related to the HBO environment and equalizaton of middle ear  Note: Patient tolerated treatment well.

## 2021-01-28 NOTE — PLAN OF CARE
Problem: Physical Regulation:  Goal: Tolerate HBO therapy and barotrauma will be prevented  Description: Tolerate HBO therapy and barotrauma will be prevented  Outcome: Ongoing     Problem: Physical Regulation:  Intervention: Assess for signs and symptoms of adverse events including confinement anxiety, pneumothorax, oxygen toxicity, and barotrauma  Note: Patient tolerated treatment well. Intervention: Assess knowledge and expectations of HBO therapy  Note: Patient tolerated treatment well. Intervention: Assess for history of confinement anxiety  Note: Patient tolerated treatment well. Intervention: Provide comfort related to the HBO environment and equalizaton of middle ear  Note: Patient tolerated treatment well. Intervention: Monitor for symptoms of seizure  Note: Patient tolerated treatment well.

## 2021-01-29 ENCOUNTER — HOSPITAL ENCOUNTER (OUTPATIENT)
Dept: HYPERBARIC MEDICINE | Age: 66
Discharge: HOME | End: 2021-01-29
Payer: MEDICARE

## 2021-01-29 VITALS
DIASTOLIC BLOOD PRESSURE: 65 MMHG | HEART RATE: 56 BPM | RESPIRATION RATE: 16 BRPM | TEMPERATURE: 96.8 F | SYSTOLIC BLOOD PRESSURE: 146 MMHG

## 2021-01-29 DIAGNOSIS — M72.6 NECROTIZING FASCIITIS (HCC): ICD-10-CM

## 2021-01-29 DIAGNOSIS — T81.89XA NONHEALING SURGICAL WOUND, INITIAL ENCOUNTER: Primary | ICD-10-CM

## 2021-01-29 LAB
GLUCOSE BLD-MCNC: 112 MG/DL (ref 70–99)
GLUCOSE BLD-MCNC: 147 MG/DL (ref 70–99)

## 2021-01-29 PROCEDURE — 82962 GLUCOSE BLOOD TEST: CPT

## 2021-01-29 PROCEDURE — G0277 HBOT, FULL BODY CHAMBER, 30M: HCPCS

## 2021-01-29 ASSESSMENT — PAIN DESCRIPTION - FREQUENCY: FREQUENCY: CONTINUOUS

## 2021-01-29 ASSESSMENT — PAIN DESCRIPTION - DESCRIPTORS: DESCRIPTORS: ACHING;BURNING

## 2021-01-29 NOTE — PROGRESS NOTES
[address] [facility],4[address]  Hyperbaric Oxygen Therapy   Progress Note      NAME: Aries Tavares   MEDICAL RECORD NUMBER:  [id number]  AGE: 77 y.o. GENDER: male  : 1955  EPISODE DATE:  2021     Subjective     HBO Treatment Number: 7 out of Total Treatments: 30    HBO Diagnosis:     Indications: Necrotizing Fasciitis (Necrotizing Soft Tissue Infection)(perineum)        Safety checks performed prior to treatment. See doc flowsheets for documentation. Objective           Recent Labs     21  0957 21  1228   POCGLU 147* 112*       Pre treatment Vital Signs       Temp: 96.6 °F (35.9 °C)     Pulse: 58     Resp: 16     BP: 131/73     Blood Sugar 147    Post treatment Vital Signs  Temp: 96.8 °F (36 °C)  Pulse: 56  Resp: 16  BP: (!) 146/65(SN will inform SANTIAGO Dean)  Blood Sugar 112    Assessment        Physical Exam:  General Appearance:  alert and oriented to person, place and time, well-developed and well-nourished, in no acute distress    Pre Tympanic Membrane Assessment:  tympanic membranes intact bilaterally    Post Tympanic Membrane Assessment:  Right: Normal(per Pt.)  Left: Normal(per Pt.)    Pulmonary/Chest:  clear to auscultation bilaterally- no wheezes, rales or rhonchi, normal air movement, no respiratory distress    Cardiovascular:  normal, regular rate and rhythm    Chamber #: [id number]       Treatment Start Time: 1030     Pressure Reached Time: 1042  JOHANNA : 2  Number of Air Breaks:  Treatment Status: No Air break      Decompression Time: 1212   Treatment End Time: 1222    Symptoms Noted During Treatment: None    Adverse Event: no      I was present on these premises and immediately available to furnish assistance & direction throughout the procedure. Plan          Aries Tavares is a 77 y.o. male  did successfully complete today's hyperbaric oxygen treatment at [address] [place] [address].     In my clinical judgement, ongoing HBO therapy is  necessary at this time, given a threat to patient function, limb or life from the current condition. Supervision and attendance of Hyperbaric Oxygen Therapy provided. Continue HBO treatment as outlined in the treatment plan. Hyperbaric Oxygen: Jeral Goldberg [employer] tolerated Treatment Number: 7 well today without complications.      Electronically signed by FAITH Larry CNP on 1/29/2021 at 1:02 PM

## 2021-02-01 ENCOUNTER — HOSPITAL ENCOUNTER (OUTPATIENT)
Dept: HYPERBARIC MEDICINE | Age: 66
Discharge: HOME | End: 2021-02-01
Payer: MEDICARE

## 2021-02-01 ENCOUNTER — HOSPITAL ENCOUNTER (OUTPATIENT)
Dept: WOUND CARE | Age: 66
Discharge: HOME | End: 2021-02-01
Payer: MEDICARE

## 2021-02-01 VITALS
TEMPERATURE: 96.3 F | DIASTOLIC BLOOD PRESSURE: 70 MMHG | RESPIRATION RATE: 18 BRPM | HEART RATE: 57 BPM | SYSTOLIC BLOOD PRESSURE: 163 MMHG

## 2021-02-01 VITALS
TEMPERATURE: 97.3 F | RESPIRATION RATE: 16 BRPM | HEART RATE: 58 BPM | DIASTOLIC BLOOD PRESSURE: 82 MMHG | SYSTOLIC BLOOD PRESSURE: 175 MMHG

## 2021-02-01 DIAGNOSIS — M72.6 NECROTIZING FASCIITIS (HCC): ICD-10-CM

## 2021-02-01 DIAGNOSIS — T81.89XA NONHEALING SURGICAL WOUND, INITIAL ENCOUNTER: ICD-10-CM

## 2021-02-01 DIAGNOSIS — I87.8 VENOUS STASIS OF BOTH LOWER EXTREMITIES: ICD-10-CM

## 2021-02-01 DIAGNOSIS — I89.0 LYMPHEDEMA OF BOTH LOWER EXTREMITIES: ICD-10-CM

## 2021-02-01 DIAGNOSIS — S81.802A TRAUMATIC OPEN WOUND OF LEFT LOWER LEG, INITIAL ENCOUNTER: Primary | ICD-10-CM

## 2021-02-01 DIAGNOSIS — T81.89XA NONHEALING SURGICAL WOUND, INITIAL ENCOUNTER: Primary | ICD-10-CM

## 2021-02-01 LAB
GLUCOSE BLD-MCNC: 107 MG/DL (ref 70–99)
GLUCOSE BLD-MCNC: 123 MG/DL (ref 70–99)

## 2021-02-01 PROCEDURE — 11042 DBRDMT SUBQ TIS 1ST 20SQCM/<: CPT

## 2021-02-01 PROCEDURE — 11045 DBRDMT SUBQ TISS EACH ADDL: CPT

## 2021-02-01 PROCEDURE — 82962 GLUCOSE BLOOD TEST: CPT

## 2021-02-01 PROCEDURE — 11045 DBRDMT SUBQ TISS EACH ADDL: CPT | Performed by: NURSE PRACTITIONER

## 2021-02-01 PROCEDURE — G0277 HBOT, FULL BODY CHAMBER, 30M: HCPCS

## 2021-02-01 PROCEDURE — 11042 DBRDMT SUBQ TIS 1ST 20SQCM/<: CPT | Performed by: NURSE PRACTITIONER

## 2021-02-01 PROCEDURE — 99183 HYPERBARIC OXYGEN THERAPY: CPT | Performed by: NURSE PRACTITIONER

## 2021-02-01 RX ORDER — BACITRACIN, NEOMYCIN, POLYMYXIN B 400; 3.5; 5 [USP'U]/G; MG/G; [USP'U]/G
OINTMENT TOPICAL ONCE
Status: CANCELLED | OUTPATIENT
Start: 2021-02-01 | End: 2021-02-01

## 2021-02-01 RX ORDER — BETAMETHASONE DIPROPIONATE 0.05 %
OINTMENT (GRAM) TOPICAL ONCE
Status: CANCELLED | OUTPATIENT
Start: 2021-02-01 | End: 2021-02-01

## 2021-02-01 RX ORDER — LIDOCAINE HYDROCHLORIDE 20 MG/ML
JELLY TOPICAL ONCE
Status: CANCELLED | OUTPATIENT
Start: 2021-02-01 | End: 2021-02-01

## 2021-02-01 RX ORDER — LIDOCAINE 40 MG/G
CREAM TOPICAL ONCE
Status: CANCELLED | OUTPATIENT
Start: 2021-02-01 | End: 2021-02-01

## 2021-02-01 RX ORDER — LIDOCAINE 50 MG/G
OINTMENT TOPICAL ONCE
Status: DISCONTINUED | OUTPATIENT
Start: 2021-02-01 | End: 2021-02-02 | Stop reason: HOSPADM

## 2021-02-01 RX ORDER — LIDOCAINE HYDROCHLORIDE 40 MG/ML
SOLUTION TOPICAL ONCE
Status: CANCELLED | OUTPATIENT
Start: 2021-02-01 | End: 2021-02-01

## 2021-02-01 RX ORDER — GINSENG 100 MG
CAPSULE ORAL ONCE
Status: CANCELLED | OUTPATIENT
Start: 2021-02-01 | End: 2021-02-01

## 2021-02-01 RX ORDER — LIDOCAINE 50 MG/G
OINTMENT TOPICAL ONCE
Status: CANCELLED | OUTPATIENT
Start: 2021-02-01 | End: 2021-02-01

## 2021-02-01 RX ORDER — GENTAMICIN SULFATE 1 MG/G
OINTMENT TOPICAL ONCE
Status: DISCONTINUED | OUTPATIENT
Start: 2021-02-01 | End: 2021-02-02 | Stop reason: HOSPADM

## 2021-02-01 RX ORDER — CLOBETASOL PROPIONATE 0.5 MG/G
OINTMENT TOPICAL ONCE
Status: CANCELLED | OUTPATIENT
Start: 2021-02-01 | End: 2021-02-01

## 2021-02-01 RX ORDER — GENTAMICIN SULFATE 1 MG/G
OINTMENT TOPICAL ONCE
Status: CANCELLED | OUTPATIENT
Start: 2021-02-01 | End: 2021-02-01

## 2021-02-01 RX ORDER — BACITRACIN ZINC AND POLYMYXIN B SULFATE 500; 1000 [USP'U]/G; [USP'U]/G
OINTMENT TOPICAL ONCE
Status: CANCELLED | OUTPATIENT
Start: 2021-02-01 | End: 2021-02-01

## 2021-02-01 NOTE — PROGRESS NOTES
Kiester. In my clinical judgement, ongoing HBO therapy is  necessary at this time, given a threat to patient function, limb or life from the current condition. Supervision and attendance of Hyperbaric Oxygen Therapy provided. Continue HBO treatment as outlined in the treatment plan. Hyperbaric Oxygen: Shy Tavares tolerated Treatment Number: 8 well today without complications.      Electronically signed by FAITH Daniel CNP on 2/1/2021 at 12:57 PM

## 2021-02-01 NOTE — PROGRESS NOTES
History:   Procedure Laterality Date    RECTAL SURGERY N/A 11/24/2020    RECTAL PERIRECTAL INCISION AND DRAINAGE performed by Maine Goldstein MD at [facility] History   Problem Relation Age of Onset    Asthma Mother     Breast Cancer Mother     Cancer Mother     Diabetes Mother     High Blood Pressure Mother     Cancer Father     Early Death Brother     Arthritis Paternal Grandmother        SOCIAL HISTORY    Social History     Tobacco Use    Smoking status: Current Every Day Smoker     Packs/day: 1.00     Years: 60.00     Pack years: 60.00     Types: Cigarettes    Smokeless tobacco: Never Used    Tobacco comment: healing    Substance Use Topics    Alcohol use: Never     Frequency: Never    Drug use: No       ALLERGIES    No Known Allergies    MEDICATIONS    Current Outpatient Medications on File Prior to Encounter   Medication Sig Dispense Refill    gentamicin (GARAMYCIN) 0.1 % ointment Apply topically 2-3 times daily with each dressing change for one month 30 g 3    nystatin (MYCOSTATIN) 075652 UNIT/GM powder Apply 3 times daily and as needed to scrotum for moisture control.  30 g 1    Probiotic Acidophilus (FLORANEX) TABS Take 1 tablet by mouth 2 times daily 60 tablet 0    magnesium oxide (MAG-OX) 400 MG tablet Take 1 tablet by mouth daily 30 tablet 1    rivaroxaban (XARELTO) 15 MG TABS tablet Take 1 tablet by mouth daily 42 tablet 1    metoprolol succinate (TOPROL XL) 25 MG extended release tablet Take 1 tablet by mouth 2 times daily 30 tablet 3    amLODIPine (NORVASC) 10 MG tablet Take 1 tablet by mouth nightly 30 tablet 3    Latanoprost 0.005 % EMUL Apply to eye daily      ibuprofen (ADVIL;MOTRIN) 600 MG tablet Take 1 tablet by mouth every 6 hours as needed for Pain 30 tablet 0    glipiZIDE (GLUCOTROL) 5 MG tablet Take 5 mg by mouth 2 times daily (before meals)      potassium chloride (KLOR-CON M) 20 MEQ extended release tablet Take 20 mEq by mouth daily  ALPRAZolam (XANAX) 0.5 MG tablet Take 0.5 mg by mouth nightly. Ledora Hand ipratropium-albuterol (DUONEB) 0.5-2.5 (3) MG/3ML SOLN nebulizer solution Inhale 1 vial into the lungs every 4 hours      albuterol sulfate  (90 Base) MCG/ACT inhaler Inhale 2 puffs into the lungs every 6 hours as needed for Wheezing      aspirin EC 81 MG EC tablet Take 1 tablet by mouth daily 30 tablet 3    HYDROcodone-acetaminophen (NORCO) 7.5-325 MG per tablet Take 1 tablet by mouth 3 times daily .  metFORMIN (GLUCOPHAGE) 1000 MG tablet Take 1,000 mg by mouth 2 times daily (with meals)      lisinopril (PRINIVIL;ZESTRIL) 20 MG tablet Take 40 mg by mouth daily       tamsulosin (FLOMAX) 0.4 MG capsule Take 0.4 mg by mouth daily      amiodarone (CORDARONE) 200 MG tablet Take 1 tablet by mouth daily 30 tablet 0    finasteride (PROSCAR) 5 MG tablet Take 5 mg by mouth daily       No current facility-administered medications on file prior to encounter. REVIEW OF SYSTEMS    Pertinent items are noted in HPI. Constitutional: Negative for systemic symptoms including fever, chills and malaise. Objective:      BP (!) 163/70   Pulse 57   Temp 96.3 °F (35.7 °C) (Temporal)   Resp 18     PHYSICAL EXAM  General: The patient is in no acute distress. Mental status:  Patient is appropriate, is  oriented to place and plan of care.   Dermatologic exam: Visual inspection of the periwound reveals the skin to be normal in turgor and texture  Wound exam: see wound description below in procedure note      Assessment:     Problem List Items Addressed This Visit     WD-Venous stasis of both lower extremities    Relevant Medications    lidocaine (XYLOCAINE) 5 % ointment (Start on 2/1/2021 11:00 AM)    gentamicin (GARAMYCIN) 0.1 % ointment (Start on 2/1/2021 11:15 AM)    WD-Lymphedema of both lower extremities    Relevant Medications    lidocaine (XYLOCAINE) 5 % ointment (Start on 2/1/2021 11:00 AM)    gentamicin (GARAMYCIN) 0.1 % ointment (Start on 2/1/2021 11:15 AM)    WD-Traumatic open wound of left lower leg, complicated by diabetes and venous insufficiency - Primary    Relevant Orders    Supply: Wound Cleanser    Supply: Wound Dressings    Supply: Cover and Secure    HBO-Nonhealing surgical wound groin & buttock, initial encounter    Relevant Medications    lidocaine (XYLOCAINE) 5 % ointment (Start on 2/1/2021 11:00 AM)    gentamicin (GARAMYCIN) 0.1 % ointment (Start on 2/1/2021 11:15 AM)    HBO-Necrotizing fasciitis (HCC)    Relevant Medications    lidocaine (XYLOCAINE) 5 % ointment (Start on 2/1/2021 11:00 AM)    gentamicin (GARAMYCIN) 0.1 % ointment (Start on 2/1/2021 11:15 AM)        Procedure Note    Indications:  Based on my examination of this patient's wound(s) today, sharp excision into necrotic subcutaneous tissue is required to promote healing and evaluate the extent of previous healing. Performed by: FAITH Montez - CNP    Consent obtained: Yes    Time out taken:  Yes    Pain Control: Anesthetic  Anesthetic: 4% Lidocaine Liquid Topical     Debridement:Excisional Debridement    Using curette the wound(s) was/were sharply debrided down through and including the removal of subcutaneous tissue. Devitalized Tissue Debrided:  slough and exudate    Pre Debridement Measurements:  Are located in the Wound Documentation Flow Sheet    All active wounds listed below with today's date are evaluated  Wound(s)    debrided this date include # : 2     Post  Debridement Measurements:  Wound 11/25/20 Scrotum #2 Perineum (Active)   Wound Image   01/06/21 0802   Wound Etiology Surgical 02/01/21 1035   Dressing Status New dressing applied 01/25/21 1153   Wound Cleansed Soap and water 02/01/21 1035   Dressing/Treatment ABD; Alginate with Ag;Moisture barrier;Collagen 01/18/21 1213   Offloading for Diabetic Foot Ulcers No offloading required 02/01/21 1035   Wound Length (cm) 22 cm 02/01/21 1035   Wound Width (cm) 3 cm 02/01/21 1035 information:                 Residence:  Home              Continue home health care with: [facility]              Your wound-care supplies will be provided by:      Wound cleansing:               LF not scrub or use excessive force.              Wash hands with soap and water before and after dressing changes.             IJFMY to applying a clean dressing, cleanse wound with normal saline, wound cleanser, or mild soap and water.               Ask the physician or nurse before getting the wound(s) wet in a shower     Daily Wound management:              Keep weight off wounds and reposition every 2 hours.              Avoid standing for long periods of time.              Apply wraps/stockings in AM and remove at bedtime.              If swelling is present, elevate legs to the level of the heart or above for 30 minutes 4-5 times a day and/or when sitting.                                      When taking antibiotics take entire prescription as ordered by physician do not stop taking until medicine is all gone.                                                      Orders for this week:  1/25/21     Left Lower leg-- Healed         Perineum -  Wash with hibicleanse and water. Rinse with saline. Pat dry with 4x4. Apply Gentamycin and Stimulin powder to base of wound   Apply desitin to periwound. Nystatin powder to groin. Cover with Ag+ alginate, and ABD pad and secure with paper tape. [place] to Change Daily and with bowel movements.            Follow up with Dahlia HENDERSON at the wound care center in 1 week on Monday   Call [phone] for any questions or concerns.   Date__________   Time__________        Treatment Note      Written Patient Dismissal Instructions Given            Electronically signed by FAITH Ramirez CNP on 2/1/2021 at 10:52 AM

## 2021-02-02 ENCOUNTER — HOSPITAL ENCOUNTER (OUTPATIENT)
Dept: HYPERBARIC MEDICINE | Age: 66
Discharge: HOME | End: 2021-02-02
Payer: MEDICARE

## 2021-02-02 VITALS
TEMPERATURE: 97.5 F | DIASTOLIC BLOOD PRESSURE: 77 MMHG | RESPIRATION RATE: 16 BRPM | HEART RATE: 54 BPM | SYSTOLIC BLOOD PRESSURE: 178 MMHG

## 2021-02-02 DIAGNOSIS — T81.89XA NONHEALING SURGICAL WOUND, INITIAL ENCOUNTER: Primary | ICD-10-CM

## 2021-02-02 DIAGNOSIS — M72.6 NECROTIZING FASCIITIS (HCC): ICD-10-CM

## 2021-02-02 LAB
GLUCOSE BLD-MCNC: 101 MG/DL (ref 70–99)
GLUCOSE BLD-MCNC: 130 MG/DL (ref 70–99)

## 2021-02-02 PROCEDURE — G0277 HBOT, FULL BODY CHAMBER, 30M: HCPCS

## 2021-02-02 PROCEDURE — 82962 GLUCOSE BLOOD TEST: CPT

## 2021-02-02 PROCEDURE — 99183 HYPERBARIC OXYGEN THERAPY: CPT | Performed by: NURSE PRACTITIONER

## 2021-02-02 ASSESSMENT — PAIN DESCRIPTION - PAIN TYPE: TYPE: ACUTE PAIN

## 2021-02-02 ASSESSMENT — PAIN DESCRIPTION - FREQUENCY: FREQUENCY: CONTINUOUS

## 2021-02-02 ASSESSMENT — PAIN SCALES - GENERAL: PAINLEVEL_OUTOF10: 5

## 2021-02-02 ASSESSMENT — PAIN DESCRIPTION - ONSET: ONSET: ON-GOING

## 2021-02-03 ENCOUNTER — HOSPITAL ENCOUNTER (OUTPATIENT)
Dept: HYPERBARIC MEDICINE | Age: 66
Discharge: HOME | End: 2021-02-03
Payer: MEDICARE

## 2021-02-03 VITALS
HEART RATE: 55 BPM | RESPIRATION RATE: 20 BRPM | SYSTOLIC BLOOD PRESSURE: 158 MMHG | TEMPERATURE: 97.3 F | DIASTOLIC BLOOD PRESSURE: 72 MMHG

## 2021-02-03 DIAGNOSIS — T81.89XA NONHEALING SURGICAL WOUND, INITIAL ENCOUNTER: Primary | ICD-10-CM

## 2021-02-03 DIAGNOSIS — M72.6 NECROTIZING FASCIITIS (HCC): ICD-10-CM

## 2021-02-03 LAB
GLUCOSE BLD-MCNC: 126 MG/DL (ref 70–99)
GLUCOSE BLD-MCNC: 149 MG/DL (ref 70–99)

## 2021-02-03 PROCEDURE — 82962 GLUCOSE BLOOD TEST: CPT

## 2021-02-03 PROCEDURE — G0277 HBOT, FULL BODY CHAMBER, 30M: HCPCS

## 2021-02-03 NOTE — PROGRESS NOTES
[facility]  Hyperbaric Oxygen Therapy   Progress Note      NAME: Gerry Tavares   MEDICAL RECORD NUMBER:  [id number]  AGE: 77 y.o. GENDER: male  : 1955  EPISODE DATE:  2/3/2021     Subjective     HBO Treatment Number: 10 out of Total Treatments: 30    HBO Diagnosis:     Indications: Necrotizing Fasciitis (Necrotizing Soft Tissue Infection)(Perineum)        Safety checks performed prior to treatment. See doc flowsheets for documentation. Objective           Recent Labs     21  0755 21  1012   POCGLU 149* 126*       Pre treatment Vital Signs       Temp: 98 °F (36.7 °C)     Pulse: 61     Resp: 20     BP: (!) 153/70(SN will inform )     Blood Sugar 149    Post treatment Vital Signs  Temp: 97.3 °F (36.3 °C)  Pulse: 55  Resp: 20  BP: (!) 158/72  Blood Sugar 126    Assessment        Physical Exam:  General Appearance:  alert and oriented to person, place and time, well-developed and well-nourished, in no acute distress    Pre Tympanic Membrane Assessment:  normal color, normal light reflex bilaterally    Post Tympanic Membrane Assessment:  Right: Normal(per Pt.)  Left: Normal(per Pt.)   Left tympanic membrane with old signs for perforation. Pulmonary/Chest:  clear to auscultation bilaterally- no wheezes, rales or rhonchi, normal air movement, no respiratory distress    Cardiovascular:  regular rate and rhythm, no murmurs rubs or gallops    Chamber #: [id number]       Treatment Start Time: 0812     Pressure Reached Time: 0823  JOHANNA : 2  Number of Air Breaks:  Treatment Status: No Air break      Decompression Time: 0953   Treatment End Time: 1005    Symptoms Noted During Treatment: None    TOTAL TIME AT DEPTH-90    TOTAL TIME IN [id number]    Adverse Event: no      I was present on these premises and immediately available to furnish assistance & direction throughout the procedure.        Plan          Gerry Tavares is a 77 y.o. male  did successfully complete today's

## 2021-02-03 NOTE — PROGRESS NOTES
[address] [facility],4[address] Floor  Hyperbaric Oxygen Therapy   Progress Note      NAME: Shital Tavares   MEDICAL RECORD NUMBER:  [id number]  AGE: 77 y.o. GENDER: male  : 1955  EPISODE DATE:  2021     Subjective     HBO Treatment Number: 9 out of Total Treatments: 30    HBO Diagnosis:     Indications: Necrotizing Fasciitis (Necrotizing Soft Tissue Infection)(Perineum)        Safety checks performed prior to treatment. See doc flowsheets for documentation. Objective           Recent Labs     21  0952 21  1159   POCGLU 130 101       Pre treatment Vital Signs       Temp: 97.2 °F (36.2 °C)     Pulse: 57     Resp: 18     BP: (!) 162/70(SN will inform SANTIAGO Villagomez)     Blood Sugar 130    Post treatment Vital Signs  Temp: 97.5 °F (36.4 °C)  Pulse: 54  Resp: 16  BP: (!) 178/77(SN will inform SANTIAGO Villagomez)  Blood Sugar 101    Assessment        Physical Exam:  General Appearance:  alert and oriented to person, place and time, well-developed and well-nourished, in no acute distress    Pre Tympanic Membrane Assessment:  tympanic membranes intact bilaterally    Post Tympanic Membrane Assessment:  Right: Normal(per Pt.)  Left: Normal(per Pt.)    Pulmonary/Chest:  clear to auscultation bilaterally- no wheezes, rales or rhonchi, normal air movement, no respiratory distress    Cardiovascular:  normal, regular rate and rhythm    Chamber #: [id number]       Treatment Start Time: 1000(correct start time)     Pressure Reached Time: 1012  JOHANNA : 2  Number of Air Breaks:  Treatment Status: No Air break      Decompression Time: 1142   Treatment End Time: 1154    Symptoms Noted During Treatment: None    Adverse Event: no      Total time in chamber: 114  Minutes at depth: 90    I was present on these premises and immediately available to furnish assistance & direction throughout the procedure.        Plan          Shital Tavares is a 77 y.o. male  did successfully complete today's hyperbaric oxygen treatment at [address] [place] [address] [facility]. In my clinical judgement, ongoing HBO therapy is  necessary at this time, given a threat to patient function, limb or life from the current condition. Supervision and attendance of Hyperbaric Oxygen Therapy provided. Continue HBO treatment as outlined in the treatment plan. Hyperbaric Oxygen: Dilma Gone [employer] tolerated Treatment Number: 9 well today without complications.      Electronically signed by FAITH Wakefield CNP on 2/2/2021 at 10:30 AM

## 2021-02-04 ENCOUNTER — HOSPITAL ENCOUNTER (OUTPATIENT)
Dept: HYPERBARIC MEDICINE | Age: 66
Discharge: HOME | End: 2021-02-04
Payer: MEDICARE

## 2021-02-04 VITALS
RESPIRATION RATE: 20 BRPM | TEMPERATURE: 97.7 F | HEART RATE: 51 BPM | DIASTOLIC BLOOD PRESSURE: 70 MMHG | SYSTOLIC BLOOD PRESSURE: 145 MMHG

## 2021-02-04 DIAGNOSIS — M72.6 NECROTIZING FASCIITIS (HCC): ICD-10-CM

## 2021-02-04 DIAGNOSIS — T81.89XA NONHEALING SURGICAL WOUND, INITIAL ENCOUNTER: Primary | ICD-10-CM

## 2021-02-04 LAB
GLUCOSE BLD-MCNC: 113 MG/DL (ref 70–99)
GLUCOSE BLD-MCNC: 155 MG/DL (ref 70–99)

## 2021-02-04 PROCEDURE — G0277 HBOT, FULL BODY CHAMBER, 30M: HCPCS

## 2021-02-04 PROCEDURE — 82962 GLUCOSE BLOOD TEST: CPT

## 2021-02-04 ASSESSMENT — PAIN DESCRIPTION - FREQUENCY: FREQUENCY: INTERMITTENT

## 2021-02-04 ASSESSMENT — PAIN DESCRIPTION - DESCRIPTORS: DESCRIPTORS: BURNING

## 2021-02-04 ASSESSMENT — PAIN DESCRIPTION - PAIN TYPE: TYPE: ACUTE PAIN

## 2021-02-04 NOTE — PROGRESS NOTES
[facility]  Hyperbaric Oxygen Therapy   Progress Note      NAME: Carolyn Tavares   MEDICAL RECORD NUMBER:  [id number]  AGE: 77 y.o. GENDER: male  : 1955  EPISODE DATE:  2021     Subjective     HBO Treatment Number: 11 out of Total Treatments: 30    HBO Diagnosis:     Indications: Necrotizing Fasciitis (Necrotizing Soft Tissue Infection)(perineum)        Safety checks performed prior to treatment. See doc flowsheets for documentation. Objective           Recent Labs     21  0754 21  1008   POCGLU 155* 113*       Pre treatment Vital Signs       Temp: 97.2 °F (36.2 °C)     Pulse: 52     Resp: 24     BP: (!) 170/67(No symptoms reported ;SN will inform  of elevated B/P)     Blood Sugar 155    Post treatment Vital Signs  Temp: 97.7 °F (36.5 °C)  Pulse: 51  Resp: 20  BP: (!) 145/70(SN will inform Dr. Bud Damon)  Blood Sugar 113    Assessment        Physical Exam:  General Appearance:  alert and oriented to person, place and time, well-developed and well-nourished, in no acute distress and alert and oriented to person, place and time    Pre Tympanic Membrane Assessment:  tympanic membranes intact bilaterally, normal color    Post Tympanic Membrane Assessment:  Right: Normal(per Pt.)  Left: Normal(per Pt.)    Pulmonary/Chest:  clear to auscultation bilaterally- no wheezes, rales or rhonchi, normal air movement, no respiratory distress and no chest wall tenderness    Cardiovascular:  normal, regular rate and rhythm    Chamber #: [id number]       Treatment Start Time: 0808     Pressure Reached Time: 0820  JOHANNA : 2  Number of Air Breaks:  Treatment Status: No Air break      Decompression Time: 0950   Treatment End Time: 1001    Symptoms Noted During Treatment: None    TOTAL TIME AT DEPTH-90    TOTAL TIME IN [id number]    Adverse Event: no      I was present on these premises and immediately available to furnish assistance & direction throughout the procedure.        Plan

## 2021-02-04 NOTE — PLAN OF CARE
Patient watched T.V. and tolerated treatment well. SN instructed patient on changing positions to his side for comfort if desired , while in the chamber.

## 2021-02-05 ENCOUNTER — HOSPITAL ENCOUNTER (OUTPATIENT)
Dept: HYPERBARIC MEDICINE | Age: 66
Discharge: HOME | End: 2021-02-05
Payer: MEDICARE

## 2021-02-05 VITALS
HEART RATE: 50 BPM | RESPIRATION RATE: 20 BRPM | TEMPERATURE: 97.2 F | SYSTOLIC BLOOD PRESSURE: 148 MMHG | DIASTOLIC BLOOD PRESSURE: 68 MMHG

## 2021-02-05 DIAGNOSIS — T81.89XA NONHEALING SURGICAL WOUND, INITIAL ENCOUNTER: Primary | ICD-10-CM

## 2021-02-05 DIAGNOSIS — M72.6 NECROTIZING FASCIITIS (HCC): ICD-10-CM

## 2021-02-05 LAB
GLUCOSE BLD-MCNC: 110 MG/DL (ref 70–99)
GLUCOSE BLD-MCNC: 136 MG/DL (ref 70–99)

## 2021-02-05 PROCEDURE — 82962 GLUCOSE BLOOD TEST: CPT

## 2021-02-05 PROCEDURE — G0277 HBOT, FULL BODY CHAMBER, 30M: HCPCS

## 2021-02-05 ASSESSMENT — PAIN DESCRIPTION - PAIN TYPE: TYPE: ACUTE PAIN

## 2021-02-05 ASSESSMENT — PAIN DESCRIPTION - FREQUENCY: FREQUENCY: INTERMITTENT

## 2021-02-05 ASSESSMENT — PAIN DESCRIPTION - ONSET: ONSET: ON-GOING

## 2021-02-05 ASSESSMENT — PAIN DESCRIPTION - DESCRIPTORS: DESCRIPTORS: BURNING

## 2021-02-05 NOTE — PLAN OF CARE
Patient tolerated treatment well; no complaints voiced during treatment. Vaccine Information Statement(s) was given today. This has been reviewed, questions answered, and verbal consent given by Parent for injection(s) and administration of Influenza (Inactivated).        Patient tolerated without incident. See immunization grid for documentation.

## 2021-02-05 NOTE — PROGRESS NOTES
[address] [facility],4Th Floor  Hyperbaric Oxygen Therapy   Progress Note      NAME: Debby Tavares   MEDICAL RECORD NUMBER:  [id number]  AGE: 77 y.o. GENDER: male  : 1955  EPISODE DATE:  2021     Subjective     HBO Treatment Number: 12 out of Total Treatments: 30    HBO Diagnosis:     Indications: Necrotizing Fasciitis (Necrotizing Soft Tissue Infection)(perineum)        Safety checks performed prior to treatment. See doc flowsheets for documentation. Objective           Recent Labs     21  0756 21  1010   POCGLU 136* 110*       Pre treatment Vital Signs       Temp: 98.1 °F (36.7 °C)     Pulse: 54     Resp: 20     BP: 138/77     Blood Sugar 136    Post treatment Vital Signs  Temp: 97.2 °F (36.2 °C)  Pulse: 50  Resp: 20  BP: (!) 148/68(SN will report to SANTIAGO Dean)  Blood Sugar 110    Assessment        Physical Exam:  General Appearance:  alert and oriented to person, place and time, well-developed and well-nourished, in no acute distress    Pre Tympanic Membrane Assessment:  tympanic membranes intact bilaterally    Post Tympanic Membrane Assessment:  Right: Normal(per Pt.)  Left: Normal(per Pt.)    Pulmonary/Chest:  clear to auscultation bilaterally- no wheezes, rales or rhonchi, normal air movement, no respiratory distress    Cardiovascular:  normal, regular rate and rhythm    Chamber #: [id number]       Treatment Start Time: 0811     Pressure Reached Time: 0822  JOHANNA : 2  Number of Air Breaks:  Treatment Status: No Air break      Decompression Time: 7721   Treatment End Time: 1003    Symptoms Noted During Treatment: None    Adverse Event: no      I was present on these premises and immediately available to furnish assistance & direction throughout the procedure. Plan          Debby Tavares is a 77 y.o. male  did successfully complete today's hyperbaric oxygen treatment at [address] [place] [address].     In my clinical judgement, ongoing HBO therapy is  necessary at this time, given a threat to patient function, limb or life from the current condition. Supervision and attendance of Hyperbaric Oxygen Therapy provided. Continue HBO treatment as outlined in the treatment plan. Hyperbaric Oxygen: Kelin Tavares tolerated Treatment Number: 12 well today without complications.      Electronically signed by FAITH Ahmadi CNP on 2/5/2021 at 11:38 AM

## 2021-02-08 ENCOUNTER — HOSPITAL ENCOUNTER (OUTPATIENT)
Dept: WOUND CARE | Age: 66
Discharge: HOME | End: 2021-02-08
Payer: MEDICARE

## 2021-02-08 ENCOUNTER — HOSPITAL ENCOUNTER (OUTPATIENT)
Dept: HYPERBARIC MEDICINE | Age: 66
Discharge: HOME | End: 2021-02-08
Payer: MEDICARE

## 2021-02-08 VITALS
HEART RATE: 54 BPM | TEMPERATURE: 97.1 F | DIASTOLIC BLOOD PRESSURE: 74 MMHG | RESPIRATION RATE: 16 BRPM | SYSTOLIC BLOOD PRESSURE: 155 MMHG

## 2021-02-08 VITALS
TEMPERATURE: 97.6 F | HEART RATE: 57 BPM | SYSTOLIC BLOOD PRESSURE: 183 MMHG | RESPIRATION RATE: 16 BRPM | DIASTOLIC BLOOD PRESSURE: 77 MMHG

## 2021-02-08 DIAGNOSIS — T81.89XA NONHEALING SURGICAL WOUND, INITIAL ENCOUNTER: Primary | ICD-10-CM

## 2021-02-08 DIAGNOSIS — M72.6 NECROTIZING FASCIITIS (HCC): ICD-10-CM

## 2021-02-08 DIAGNOSIS — S81.802A TRAUMATIC OPEN WOUND OF LEFT LOWER LEG, INITIAL ENCOUNTER: Primary | ICD-10-CM

## 2021-02-08 DIAGNOSIS — T81.89XA NONHEALING SURGICAL WOUND, INITIAL ENCOUNTER: ICD-10-CM

## 2021-02-08 DIAGNOSIS — I87.8 VENOUS STASIS OF BOTH LOWER EXTREMITIES: ICD-10-CM

## 2021-02-08 DIAGNOSIS — I89.0 LYMPHEDEMA OF BOTH LOWER EXTREMITIES: ICD-10-CM

## 2021-02-08 LAB — GLUCOSE BLD-MCNC: 166 MG/DL (ref 70–99)

## 2021-02-08 PROCEDURE — 11045 DBRDMT SUBQ TISS EACH ADDL: CPT | Performed by: NURSE PRACTITIONER

## 2021-02-08 PROCEDURE — 82962 GLUCOSE BLOOD TEST: CPT

## 2021-02-08 PROCEDURE — 11045 DBRDMT SUBQ TISS EACH ADDL: CPT

## 2021-02-08 PROCEDURE — 11042 DBRDMT SUBQ TIS 1ST 20SQCM/<: CPT | Performed by: NURSE PRACTITIONER

## 2021-02-08 PROCEDURE — 11042 DBRDMT SUBQ TIS 1ST 20SQCM/<: CPT

## 2021-02-08 RX ORDER — BETAMETHASONE DIPROPIONATE 0.05 %
OINTMENT (GRAM) TOPICAL ONCE
Status: CANCELLED | OUTPATIENT
Start: 2021-02-08 | End: 2021-02-08

## 2021-02-08 RX ORDER — CLOBETASOL PROPIONATE 0.5 MG/G
OINTMENT TOPICAL ONCE
Status: CANCELLED | OUTPATIENT
Start: 2021-02-08 | End: 2021-02-08

## 2021-02-08 RX ORDER — LIDOCAINE HYDROCHLORIDE 40 MG/ML
SOLUTION TOPICAL ONCE
Status: DISCONTINUED | OUTPATIENT
Start: 2021-02-08 | End: 2021-02-09 | Stop reason: HOSPADM

## 2021-02-08 RX ORDER — LIDOCAINE HYDROCHLORIDE 40 MG/ML
SOLUTION TOPICAL ONCE
Status: CANCELLED | OUTPATIENT
Start: 2021-02-08 | End: 2021-02-08

## 2021-02-08 RX ORDER — GENTAMICIN SULFATE 1 MG/G
OINTMENT TOPICAL ONCE
Status: CANCELLED | OUTPATIENT
Start: 2021-02-08 | End: 2021-02-08

## 2021-02-08 RX ORDER — LIDOCAINE 50 MG/G
OINTMENT TOPICAL ONCE
Status: CANCELLED | OUTPATIENT
Start: 2021-02-08 | End: 2021-02-08

## 2021-02-08 RX ORDER — BACITRACIN ZINC AND POLYMYXIN B SULFATE 500; 1000 [USP'U]/G; [USP'U]/G
OINTMENT TOPICAL ONCE
Status: CANCELLED | OUTPATIENT
Start: 2021-02-08 | End: 2021-02-08

## 2021-02-08 RX ORDER — LIDOCAINE HYDROCHLORIDE 20 MG/ML
JELLY TOPICAL ONCE
Status: CANCELLED | OUTPATIENT
Start: 2021-02-08 | End: 2021-02-08

## 2021-02-08 RX ORDER — GENTAMICIN SULFATE 1 MG/G
OINTMENT TOPICAL ONCE
Status: DISCONTINUED | OUTPATIENT
Start: 2021-02-08 | End: 2021-02-09 | Stop reason: HOSPADM

## 2021-02-08 RX ORDER — BACITRACIN, NEOMYCIN, POLYMYXIN B 400; 3.5; 5 [USP'U]/G; MG/G; [USP'U]/G
OINTMENT TOPICAL ONCE
Status: CANCELLED | OUTPATIENT
Start: 2021-02-08 | End: 2021-02-08

## 2021-02-08 RX ORDER — GINSENG 100 MG
CAPSULE ORAL ONCE
Status: CANCELLED | OUTPATIENT
Start: 2021-02-08 | End: 2021-02-08

## 2021-02-08 RX ORDER — LIDOCAINE 40 MG/G
CREAM TOPICAL ONCE
Status: CANCELLED | OUTPATIENT
Start: 2021-02-08 | End: 2021-02-08

## 2021-02-08 ASSESSMENT — PAIN SCALES - GENERAL
PAINLEVEL_OUTOF10: 3
PAINLEVEL_OUTOF10: 6

## 2021-02-08 ASSESSMENT — PAIN DESCRIPTION - PAIN TYPE: TYPE: CHRONIC PAIN

## 2021-02-08 ASSESSMENT — PAIN DESCRIPTION - ONSET: ONSET: ON-GOING

## 2021-02-08 ASSESSMENT — PAIN DESCRIPTION - DESCRIPTORS: DESCRIPTORS: BURNING

## 2021-02-08 ASSESSMENT — PAIN DESCRIPTION - FREQUENCY: FREQUENCY: INTERMITTENT

## 2021-02-08 ASSESSMENT — PAIN DESCRIPTION - ORIENTATION: ORIENTATION: LEFT

## 2021-02-08 ASSESSMENT — PAIN DESCRIPTION - LOCATION: LOCATION: PERINEUM

## 2021-02-08 NOTE — PLAN OF CARE
Problem: Pain:  Goal: Pain level will decrease  Description: Pain level will decrease  2/8/2021 1205 by Caroline Tiwari RN  Outcome: Ongoing  2/8/2021 1205 by Caroline Tiwari RN  Outcome: Ongoing  Goal: Control of acute pain  Description: Control of acute pain  2/8/2021 1205 by Caroline Tiwari RN  Outcome: Ongoing  2/8/2021 1205 by Caroline Tiwari RN  Outcome: Ongoing  Goal: Control of chronic pain  Description: Control of chronic pain  2/8/2021 1205 by Caroline Tiwari RN  Outcome: Ongoing  2/8/2021 1205 by Caroline Tiwari RN  Outcome: Ongoing     Problem: Wound:  Goal: Will show signs of wound healing; wound closure and no evidence of infection  Description: Will show signs of wound healing; wound closure and no evidence of infection  Outcome: Ongoing

## 2021-02-08 NOTE — PROGRESS NOTES
Wound Care Center Progress Note With Procedure    Gabino Tavares  AGE: 77 y.o. GENDER: male  : 1955  EPISODE DATE:  2021     Subjective:     Chief Complaint   Patient presents with    Wound Check         HISTORY of PRESENT ILLNESS     Coco Lisa a 72 y. o. male who presents today for wound evaluation of Acute non-healing surgical and necrotizing fascitis ulcer(s) of the rectal and perirectal area.  The ulcer is of marked severity.  The underlying cause of the wound is nonhealing surgical post rectal and perirectal incision and drain related to necrotizing fascitis performed by Dr. Elizabeth Boston 2020.   Wound Pain Timing/Severity: waxing and waning, moderate  Quality of pain: aching, tender  Severity of pain:  3 / 10   Modifying Factors: venous stasis, lymphedema, diabetes, poor glucose control, obesity, smoking and anticoagulation therapy  Associated Signs/Symptoms: edema, erythema, drainage and pain        PAST MEDICAL HISTORY        Diagnosis Date    CHF (congestive heart failure) (HCC)     Chronic ulcer of left leg with fat layer exposed ([address] 2016    Chronic ulcer of right leg with fat layer exposed ([address] 2016    Chronic ulcer of right leg with fat layer exposed ([address] 2016    Diabetes mellitus ([address]     Hypertension     PVD (peripheral vascular disease) ([address] 2016    Type 2 diabetes mellitus with diabetic peripheral angiopathy without gangrene, without long-term current use of insulin ([address] 2016    Type 2 diabetes mellitus with diabetic peripheral angiopathy without gangrene, without long-term current use of insulin ([address] 2016    Venous stasis ulcer of both lower extremities without varicose veins ([address] 2016    WD-Traumatic open wound of left lower leg, complicated by diabetes and venous insufficiency 10/8/2020    WD-Ulcer of shin, left, with fat layer exposed ([address] 2016       PAST SURGICAL HISTORY    Past Surgical History: Procedure Laterality Date    RECTAL SURGERY N/A 11/24/2020    RECTAL PERIRECTAL INCISION AND DRAINAGE performed by Silvina Hall MD at [facility] History   Problem Relation Age of Onset    Asthma Mother     Breast Cancer Mother     Cancer Mother     Diabetes Mother     High Blood Pressure Mother     Cancer Father     Early Death Brother     Arthritis Paternal Grandmother        SOCIAL HISTORY    Social History     Tobacco Use    Smoking status: Current Every Day Smoker     Packs/day: 1.00     Years: 60.00     Pack years: 60.00     Types: Cigarettes    Smokeless tobacco: Never Used    Tobacco comment: healing    Substance Use Topics    Alcohol use: Never     Frequency: Never    Drug use: No       ALLERGIES    No Known Allergies    MEDICATIONS    Current Outpatient Medications on File Prior to Encounter   Medication Sig Dispense Refill    nystatin (MYCOSTATIN) 428787 UNIT/GM powder Apply 3 times daily and as needed to scrotum for moisture control. 30 g 1    magnesium oxide (MAG-OX) 400 MG tablet Take 1 tablet by mouth daily 30 tablet 1    rivaroxaban (XARELTO) 15 MG TABS tablet Take 1 tablet by mouth daily 42 tablet 1    metoprolol succinate (TOPROL XL) 25 MG extended release tablet Take 1 tablet by mouth 2 times daily 30 tablet 3    amLODIPine (NORVASC) 10 MG tablet Take 1 tablet by mouth nightly 30 tablet 3    Latanoprost 0.005 % EMUL Apply to eye daily      ibuprofen (ADVIL;MOTRIN) 600 MG tablet Take 1 tablet by mouth every 6 hours as needed for Pain 30 tablet 0    glipiZIDE (GLUCOTROL) 5 MG tablet Take 5 mg by mouth 2 times daily (before meals)      potassium chloride (KLOR-CON M) 20 MEQ extended release tablet Take 20 mEq by mouth daily      ALPRAZolam (XANAX) 0.5 MG tablet Take 0.5 mg by mouth nightly. Shelton Cam ipratropium-albuterol (DUONEB) 0.5-2.5 (3) MG/3ML SOLN nebulizer solution Inhale 1 vial into the lungs every 4 hours      albuterol sulfate HFA 108 (90 Base) MCG/ACT inhaler Inhale 2 puffs into the lungs every 6 hours as needed for Wheezing      aspirin EC 81 MG EC tablet Take 1 tablet by mouth daily 30 tablet 3    HYDROcodone-acetaminophen (NORCO) 7.5-325 MG per tablet Take 1 tablet by mouth 3 times daily .  metFORMIN (GLUCOPHAGE) 1000 MG tablet Take 1,000 mg by mouth 2 times daily (with meals)      lisinopril (PRINIVIL;ZESTRIL) 20 MG tablet Take 40 mg by mouth daily       tamsulosin (FLOMAX) 0.4 MG capsule Take 0.4 mg by mouth daily      finasteride (PROSCAR) 5 MG tablet Take 5 mg by mouth daily      amiodarone (CORDARONE) 200 MG tablet Take 1 tablet by mouth daily 30 tablet 0     No current facility-administered medications on file prior to encounter. REVIEW OF SYSTEMS    Pertinent items are noted in HPI. Constitutional: Negative for systemic symptoms including fever, chills and malaise. Objective:      BP (!) 183/77   Pulse 57   Temp 97.6 °F (36.4 °C) (Temporal)   Resp 16     PHYSICAL EXAM    General: The patient is in no acute distress. Mental status:  Patient is appropriate, is  oriented to place and plan of care.   Dermatologic exam: Visual inspection of the periwound reveals the skin to be normal in turgor and texture  Wound exam: see wound description below in procedure note      Assessment:     Problem List Items Addressed This Visit     WD-Venous stasis of both lower extremities    Relevant Medications    lidocaine (XYLOCAINE) 4 % external solution    gentamicin (GARAMYCIN) 0.1 % ointment    WD-Lymphedema of both lower extremities    Relevant Medications    lidocaine (XYLOCAINE) 4 % external solution    gentamicin (GARAMYCIN) 0.1 % ointment    WD-Traumatic open wound of left lower leg, complicated by diabetes and venous insufficiency - Primary    Relevant Orders    Supply: Wound Cleanser    Supply: Wound Dressings    Supply: Cover and Secure    HBO-Nonhealing surgical wound groin & buttock, initial encounter Relevant Medications    lidocaine (XYLOCAINE) 4 % external solution    gentamicin (GARAMYCIN) 0.1 % ointment    HBO-Necrotizing fasciitis (HCC)    Relevant Medications    lidocaine (XYLOCAINE) 4 % external solution    gentamicin (GARAMYCIN) 0.1 % ointment        Procedure Note    Indications:  Based on my examination of this patient's wound(s) today, sharp excision into necrotic subcutaneous tissue is required to promote healing and evaluate the extent of previous healing. Performed by: FAITH Garza CNP    Consent obtained: Yes    Time out taken:  Yes    Pain Control: Anesthetic  Anesthetic: 4% Lidocaine Liquid Topical     Debridement:Excisional Debridement    Using curette the wound(s) was/were sharply debrided down through and including the removal of subcutaneous tissue. Devitalized Tissue Debrided:  slough and exudate    Pre Debridement Measurements:  Are located in the Wound Documentation Flow Sheet    All active wounds listed below with today's date are evaluated  Wound(s)    debrided this date include # : 2     Post  Debridement Measurements:  Wound 11/25/20 Scrotum #2 Perineum (Active)   Wound Image   01/06/21 0802   Wound Etiology Surgical 02/08/21 1136   Dressing Status New dressing applied 02/08/21 1229   Wound Cleansed Soap and water 02/08/21 1136   Dressing/Treatment ABD; Alginate with Ag;Moisture barrier;Collagen 01/18/21 1213   Offloading for Diabetic Foot Ulcers Other (comment) 02/08/21 1136   Wound Length (cm) 24 cm 02/08/21 1136   Wound Width (cm) 2.5 cm 02/08/21 1136   Wound Depth (cm) 1 cm 02/08/21 1136   Wound Surface Area (cm^2) 60 cm^2 02/08/21 1136   Change in Wound Size % (l*w) 52 02/08/21 1136   Wound Volume (cm^3) 60 cm^3 02/08/21 1136   Wound Healing % 91 02/08/21 1136   Post-Procedure Length (cm) 24 cm 02/08/21 1205   Post-Procedure Width (cm) 2.5 cm 02/08/21 1205   Post-Procedure Depth (cm) 1 cm 02/08/21 1205   Post-Procedure Surface Area (cm^2) 60 cm^2 02/08/21 1205 Post-Procedure Volume (cm^3) 60 cm^3 02/08/21 1205   Distance Tunneling (cm) 0 cm 02/08/21 1136   Tunneling Position ___ O'Clock 0 02/08/21 1136   Undermining Starts ___ O'Clock 0 02/08/21 1136   Undermining Ends___ O'Clock 0 02/08/21 1136   Undermining Maxium Distance (cm) 0 02/08/21 1136   Wound Assessment Pink/red;Granulation tissue 02/08/21 1136   Drainage Amount Moderate 02/08/21 1136   Drainage Description Serosanguinous 02/08/21 1136   Odor None 02/08/21 1136   Merary-wound Assessment Blanchable erythema 02/08/21 1136   Margins Defined edges; Unattached edges 02/08/21 1136   Wound Thickness Description not for Pressure Injury Full thickness 02/08/21 1136   Number of days: 75       Percent of Wound(s) Debrided: approximately 100%    Total  Area  Debrided:  60 sq cm     Bleeding:  Minimal    Hemostasis Achieved:  by pressure    Procedural Pain:  0  / 10     Post Procedural Pain:  0 / 10     Response to treatment:  Well tolerated by patient. Status of wound progress and description from last visit: Improving. Plan:       Discharge Instructions       PHYSICIAN ORDERS AND DISCHARGE INSTRUCTIONS     NOTE: Upon discharge from the [address], you will receive a patient experience survey. We would be grateful if you would take the time to fill this survey out.     Wound care order history:                 ANDREW's   Right       Left               Date:              Cultures: 1/15/21               Grafts:                Antibiotics:           Continuing wound care orders and information:                 Residence:  Home              Continue home health care with: Lorenzo              Your wound-care supplies will be provided by:      Wound cleansing:               QO not scrub or use excessive force.              Wash hands with soap and water before and after dressing changes.             AOCBE to applying a clean dressing, cleanse wound with normal saline, wound cleanser, or mild soap and water.             Ask the physician or nurse before getting the wound(s) wet in a shower     Daily Wound management:              Keep weight off wounds and reposition every 2 hours.              Avoid standing for long periods of time.              Apply wraps/stockings in AM and remove at bedtime.              If swelling is present, elevate legs to the level of the heart or above for 30 minutes 4-5 times a day and/or when sitting.                                      When taking antibiotics take entire prescription as ordered by physician do not stop taking until medicine is all gone.                                                      Orders for this week:  2/08/21     Left Lower leg-- Healed         Perineum -  Wash with hibicleanse and water. Rinse with saline. Pat dry with 4x4. Apply Gentamycin and Stimulin powder to base of wound   Apply desitin to periwound. Nystatin powder to groin. Cover with Ag+ alginate, and ABD pad and secure with paper tape. Lorenzo to Change Daily and with bowel movements.            Follow up with Dahlia HENDERSON at the wound care center in 1 week on Monday   Call [phone] for any questions or concerns.   Date__________   Time__________        Treatment Note Wound 11/25/20 Scrotum #2 Perineum-Dressing/Treatment: (Desitin to rupert, stimulin powder, gent, ag ca alg, abd, tape)    Written Patient Dismissal Instructions Given            Electronically signed by FAITH Loo CNP on 2/8/2021 at 4:59 PM

## 2021-02-09 ENCOUNTER — HOSPITAL ENCOUNTER (OUTPATIENT)
Dept: HYPERBARIC MEDICINE | Age: 66
Discharge: HOME | End: 2021-02-09
Payer: MEDICARE

## 2021-02-09 VITALS
RESPIRATION RATE: 16 BRPM | TEMPERATURE: 97.2 F | DIASTOLIC BLOOD PRESSURE: 72 MMHG | HEART RATE: 52 BPM | SYSTOLIC BLOOD PRESSURE: 143 MMHG

## 2021-02-09 DIAGNOSIS — M72.6 NECROTIZING FASCIITIS (HCC): ICD-10-CM

## 2021-02-09 DIAGNOSIS — T81.89XA NONHEALING SURGICAL WOUND, INITIAL ENCOUNTER: Primary | ICD-10-CM

## 2021-02-09 LAB
GLUCOSE BLD-MCNC: 122 MG/DL (ref 70–99)
GLUCOSE BLD-MCNC: 168 MG/DL (ref 70–99)

## 2021-02-09 PROCEDURE — 99183 HYPERBARIC OXYGEN THERAPY: CPT | Performed by: NURSE PRACTITIONER

## 2021-02-09 PROCEDURE — G0277 HBOT, FULL BODY CHAMBER, 30M: HCPCS

## 2021-02-09 PROCEDURE — 82962 GLUCOSE BLOOD TEST: CPT

## 2021-02-09 ASSESSMENT — PAIN DESCRIPTION - DESCRIPTORS: DESCRIPTORS: BURNING

## 2021-02-09 ASSESSMENT — PAIN DESCRIPTION - ONSET: ONSET: ON-GOING

## 2021-02-09 ASSESSMENT — PAIN DESCRIPTION - FREQUENCY: FREQUENCY: INTERMITTENT

## 2021-02-09 NOTE — PROGRESS NOTES
[facility]  Hyperbaric Oxygen Therapy   Progress Note      NAME: Traci Tavares   MEDICAL RECORD NUMBER:  [id number]  AGE: 77 y.o. GENDER: male  : 1955  EPISODE DATE:  2021     Subjective     HBO Treatment Number: 13(Pt. did not get treatment 13 yesterday as noted yesterday because provider did not make it to the [address] [place] [address] Road until late due to car trouble) out of Total Treatments: 30    HBO Diagnosis:     Indications: Necrotizing Fasciitis (Necrotizing Soft Tissue Infection)(perineum)        Safety checks performed prior to treatment. See doc flowsheets for documentation. Objective           Recent Labs     21  1002 21  1210   POCGLU 168* 122*       Pre treatment Vital Signs       Temp: 96.9 °F (36.1 °C)     Pulse: 58     Resp: 16     BP: (!) 159/74(SN will inform provider)     Blood Sugar 168    Post treatment Vital Signs  Temp: 97.2 °F (36.2 °C)  Pulse: 52  Resp: 16  BP: (!) 143/72(SN will inform Provider)  Blood Sugar 122    Assessment        Physical Exam:  General Appearance:  alert and oriented to person, place and time, well-developed and well-nourished, in no acute distress    Pre Tympanic Membrane Assessment:  tympanic membranes intact bilaterally    Post Tympanic Membrane Assessment:  Right: Normal(per Pt.)  Left: Normal(per Pt.)    Pulmonary/Chest:  clear to auscultation bilaterally- no wheezes, rales or rhonchi, normal air movement, no respiratory distress    Cardiovascular:  normal, regular rate and rhythm    Chamber #: [id number]       Treatment Start Time: 1010     Pressure Reached Time: 1021  JOHANNA : 2  Number of Air Breaks:  Treatment Status: No Air break      Decompression Time: 1151   Treatment End Time: 1201    Symptoms Noted During Treatment: None    Adverse Event: no      Total time in chamber: 111  Minutes at depth: 90    I was present on these premises and immediately available to furnish assistance & direction throughout the procedure. Plan          Raissa Tavares is a 77 y.o. male  did successfully complete today's hyperbaric oxygen treatment at [address] [place] [address]. In my clinical judgement, ongoing HBO therapy is  necessary at this time, given a threat to patient function, limb or life from the current condition. Supervision and attendance of Hyperbaric Oxygen Therapy provided. Continue HBO treatment as outlined in the treatment plan. Hyperbaric Oxygen: Raissa Tavares tolerated Treatment Number: 13(Pt. did not get treatment 13 yesterday as noted yesterday because provider did not make it to the [address] [place] [address] Road until late due to car trouble) well today without complications.      Electronically signed by FAITH Loo CNP on 2/9/2021 at 5:39 PM

## 2021-02-10 ENCOUNTER — HOSPITAL ENCOUNTER (OUTPATIENT)
Dept: HYPERBARIC MEDICINE | Age: 66
Discharge: HOME | End: 2021-02-10
Payer: MEDICARE

## 2021-02-10 VITALS
SYSTOLIC BLOOD PRESSURE: 143 MMHG | HEART RATE: 52 BPM | DIASTOLIC BLOOD PRESSURE: 63 MMHG | RESPIRATION RATE: 20 BRPM | TEMPERATURE: 97.3 F

## 2021-02-10 DIAGNOSIS — T81.89XA NONHEALING SURGICAL WOUND, INITIAL ENCOUNTER: Primary | ICD-10-CM

## 2021-02-10 DIAGNOSIS — M72.6 NECROTIZING FASCIITIS (HCC): ICD-10-CM

## 2021-02-10 LAB
GLUCOSE BLD-MCNC: 129 MG/DL (ref 70–99)
GLUCOSE BLD-MCNC: 139 MG/DL (ref 70–99)

## 2021-02-10 PROCEDURE — 82962 GLUCOSE BLOOD TEST: CPT

## 2021-02-10 PROCEDURE — G0277 HBOT, FULL BODY CHAMBER, 30M: HCPCS

## 2021-02-10 NOTE — PROGRESS NOTES
[facility] [place]  Hyperbaric Oxygen Therapy   Progress Note      NAME: Iman Tavares   MEDICAL RECORD NUMBER:  [id number]  AGE: 77 y.o. GENDER: male  : 1955  EPISODE DATE:  2/10/2021     Subjective     HBO Treatment Number: 14 out of Total Treatments: 30    HBO Diagnosis:     Indications: Necrotizing Fasciitis (Necrotizing Soft Tissue Infection)(perineum)        Safety checks performed prior to treatment. See doc flowsheets for documentation. Objective           Recent Labs     02/10/21  0748 02/10/21  1011   POCGLU 139* 129*       Pre treatment Vital Signs       Temp: 96.9 °F (36.1 °C)     Pulse: 53     Resp: 16     BP: (!) 164/72(SN will inform Dr. Cynthia Arias)     Blood Sugar 139    Post treatment Vital Signs  Temp: 97.3 °F (36.3 °C)  Pulse: 52  Resp: 20  BP: (!) 143/63(Will report to )  Blood Sugar 129    Assessment        Physical Exam:  General Appearance:  alert and oriented to person, place and time, well-developed and well-nourished, in no acute distress    Pre Tympanic Membrane Assessment:  tympanic membranes intact bilaterally, normal color, normal light reflex bilaterally    Post Tympanic Membrane Assessment:  Right: Normal(per Pt.)  Left: Normal(per Pt.)    Pulmonary/Chest:  clear to auscultation bilaterally- no wheezes, rales or rhonchi, normal air movement, no respiratory distress    Cardiovascular:  regular rate and rhythm, no murmurs rubs or gallops    Chamber #: [id number]       Treatment Start Time: 0810     Pressure Reached Time: 0820  JOHANNA : 2  Number of Air Breaks:  Treatment Status: No Air break      Decompression Time: 0950   Treatment End Time: 1001    Symptoms Noted During Treatment: None    TOTAL TIME AT DEPTH-90    TOTAL TIME IN CHAMBER-111    Adverse Event: no      I was present on these premises and immediately available to furnish assistance & direction throughout the procedure.        Plan          Iman Tavares is a 77 y.o. male  did successfully

## 2021-02-11 ENCOUNTER — HOSPITAL ENCOUNTER (OUTPATIENT)
Dept: HYPERBARIC MEDICINE | Age: 66
Discharge: HOME | End: 2021-02-11
Payer: MEDICARE

## 2021-02-11 VITALS
HEART RATE: 43 BPM | SYSTOLIC BLOOD PRESSURE: 139 MMHG | TEMPERATURE: 97.4 F | RESPIRATION RATE: 20 BRPM | DIASTOLIC BLOOD PRESSURE: 61 MMHG

## 2021-02-11 DIAGNOSIS — M72.6 NECROTIZING FASCIITIS (HCC): ICD-10-CM

## 2021-02-11 DIAGNOSIS — T81.89XA NONHEALING SURGICAL WOUND, INITIAL ENCOUNTER: Primary | ICD-10-CM

## 2021-02-11 LAB
GLUCOSE BLD-MCNC: 110 MG/DL (ref 70–99)
GLUCOSE BLD-MCNC: 143 MG/DL (ref 70–99)

## 2021-02-11 PROCEDURE — G0277 HBOT, FULL BODY CHAMBER, 30M: HCPCS

## 2021-02-11 PROCEDURE — 82962 GLUCOSE BLOOD TEST: CPT

## 2021-02-11 NOTE — PROGRESS NOTES
[facility],4Th Floor  Hyperbaric Oxygen Therapy   Progress Note      NAME: Kelin Tavares   MEDICAL RECORD NUMBER:  [id number]  AGE: 77 y.o. GENDER: male  : 1955  EPISODE DATE:  2021     Subjective     HBO Treatment Number: 15 out of Total Treatments: 30    HBO Diagnosis:     Indications: Necrotizing Fasciitis (Necrotizing Soft Tissue Infection)(perineum)        Safety checks performed prior to treatment. See doc flowsheets for documentation. Objective           Recent Labs     21  0754 21  1011   POCGLU 143* 110*       Pre treatment Vital Signs       Temp: 96.9 °F (36.1 °C)     Pulse: 50     Resp: 20     BP: (!) 142/67(SN will inform )     Blood Sugar 143    Post treatment Vital Signs  Temp: 97.4 °F (36.3 °C)  Pulse: (!) 43(after rest;no s/s reported)  Resp: 20  BP: 139/61  Blood Sugar 110    Assessment        Physical Exam:  General Appearance:  alert and oriented to person, place and time, well-developed and well-nourished, in no acute distress and alert and oriented to person, place and time    Pre Tympanic Membrane Assessment:  tympanic membranes intact bilaterally, normal color    Post Tympanic Membrane Assessment:  Right: Normal(per Pt.)  Left: Normal(per Pt.)    Pulmonary/Chest:  clear to auscultation bilaterally- no wheezes, rales or rhonchi, normal air movement, no respiratory distress and wheezing present-     Cardiovascular:  normal, regular rate and rhythm    Chamber #: [id number]       Treatment Start Time: 0813     Pressure Reached Time: 0823  JOHANNA : 2  Number of Air Breaks:  Treatment Status: No Air break      Decompression Time: 0953   Treatment End Time: 1004    Symptoms Noted During Treatment: None    TOTAL TIME AT DEPTH-90    TOTAL TIME IN CHAMBER-111    Adverse Event: no      I was present on these premises and immediately available to furnish assistance & direction throughout the procedure.        Plan          Kelin Tavares is a 77 y.o. male  did successfully complete today's hyperbaric oxygen treatment at [address] [place] [address] Nostolizabeth Lopez. In my clinical judgement, ongoing HBO therapy is  necessary at this time, given a threat to patient function, limb or life from the current condition. Supervision and attendance of Hyperbaric Oxygen Therapy provided. Continue HBO treatment as outlined in the treatment plan. Hyperbaric Oxygen: Adi Ott [employer] tolerated Treatment Number: 15 well today without complications.      Electronically signed by Robina Dietrich MD on 2/11/2021 at 11:13 AM

## 2021-02-12 ENCOUNTER — HOSPITAL ENCOUNTER (OUTPATIENT)
Dept: HYPERBARIC MEDICINE | Age: 66
Discharge: HOME | End: 2021-02-12
Payer: MEDICARE

## 2021-02-12 VITALS
TEMPERATURE: 97.1 F | SYSTOLIC BLOOD PRESSURE: 157 MMHG | HEART RATE: 45 BPM | DIASTOLIC BLOOD PRESSURE: 74 MMHG | RESPIRATION RATE: 20 BRPM

## 2021-02-12 DIAGNOSIS — M72.6 NECROTIZING FASCIITIS (HCC): ICD-10-CM

## 2021-02-12 DIAGNOSIS — T81.89XA NONHEALING SURGICAL WOUND, INITIAL ENCOUNTER: Primary | ICD-10-CM

## 2021-02-12 LAB
GLUCOSE BLD-MCNC: 136 MG/DL (ref 70–99)
GLUCOSE BLD-MCNC: 145 MG/DL (ref 70–99)

## 2021-02-12 PROCEDURE — G0277 HBOT, FULL BODY CHAMBER, 30M: HCPCS

## 2021-02-12 PROCEDURE — 82962 GLUCOSE BLOOD TEST: CPT

## 2021-02-12 ASSESSMENT — PAIN DESCRIPTION - LOCATION: LOCATION: PERINEUM

## 2021-02-12 ASSESSMENT — PAIN SCALES - GENERAL: PAINLEVEL_OUTOF10: 3

## 2021-02-12 NOTE — PROGRESS NOTES
[address] [facility],4Th Floor  Hyperbaric Oxygen Therapy   Progress Note      NAME: Geovanny Tavares   MEDICAL RECORD NUMBER:  [id number]  AGE: 77 y.o. GENDER: male  : 1955  EPISODE DATE:  2021     Subjective     HBO Treatment Number: 16 out of Total Treatments: 30    HBO Diagnosis:     Indications: Necrotizing Fasciitis (Necrotizing Soft Tissue Infection)(perineum)        Safety checks performed prior to treatment. See doc flowsheets for documentation. Objective           Recent Labs     21  0759 21  1011   POCGLU 145* 136*       Pre treatment Vital Signs       Temp: 97 °F (36.1 °C)     Pulse: 64     Resp: 20     BP: (!) 155/75(SN will inform SANTIAGO Dean)     Blood Sugar 145    Post treatment Vital Signs  Temp: 97.1 °F (36.2 °C)  Pulse: (!) 45(After resting in the chamber; no dizziness reported)  Resp: 20  BP: (!) 157/74(SN will inform SANTIAGO Dean)  Blood Sugar 136    Assessment        Physical Exam:  General Appearance:  alert and oriented to person, place and time, well-developed and well-nourished, in no acute distress    Pre Tympanic Membrane Assessment:  tympanic membranes intact bilaterally    Post Tympanic Membrane Assessment:  Right: Normal(per Pt.)  Left: Normal(per Pt.)    Pulmonary/Chest:  clear to auscultation bilaterally- no wheezes, rales or rhonchi, normal air movement, no respiratory distress    Cardiovascular:  normal, regular rate and rhythm    Chamber #: [id number]       Treatment Start Time: 0815     Pressure Reached Time: 0825  JOHANNA : 2  Number of Air Breaks:  Treatment Status: No Air break      Decompression Time: 0955   Treatment End Time: 1006    Symptoms Noted During Treatment: None    Adverse Event: no      I was present on these premises and immediately available to furnish assistance & direction throughout the procedure.        Plan          Geovanny Tavares is a 77 y.o. male  did successfully complete today's hyperbaric oxygen treatment at [facility]. In my clinical judgement, ongoing HBO therapy is  necessary at this time, given a threat to patient function, limb or life from the current condition. Supervision and attendance of Hyperbaric Oxygen Therapy provided. Continue HBO treatment as outlined in the treatment plan. Hyperbaric Oxygen: Lavon Gowers [employer] tolerated Treatment Number: 16 well today without complications.      Electronically signed by FAITH Christensen CNP on 2/12/2021 at 11:15 AM

## 2021-02-15 ENCOUNTER — HOSPITAL ENCOUNTER (OUTPATIENT)
Dept: HYPERBARIC MEDICINE | Age: 66
Discharge: HOME | End: 2021-02-15
Payer: MEDICARE

## 2021-02-15 ENCOUNTER — HOSPITAL ENCOUNTER (OUTPATIENT)
Dept: WOUND CARE | Age: 66
Discharge: HOME | End: 2021-02-15
Payer: MEDICARE

## 2021-02-15 VITALS
HEART RATE: 60 BPM | SYSTOLIC BLOOD PRESSURE: 170 MMHG | RESPIRATION RATE: 20 BRPM | DIASTOLIC BLOOD PRESSURE: 75 MMHG | TEMPERATURE: 97.1 F

## 2021-02-15 DIAGNOSIS — I89.0 LYMPHEDEMA OF BOTH LOWER EXTREMITIES: ICD-10-CM

## 2021-02-15 DIAGNOSIS — M72.6 NECROTIZING FASCIITIS (HCC): ICD-10-CM

## 2021-02-15 DIAGNOSIS — T81.89XA NONHEALING SURGICAL WOUND, INITIAL ENCOUNTER: ICD-10-CM

## 2021-02-15 DIAGNOSIS — I87.8 VENOUS STASIS OF BOTH LOWER EXTREMITIES: ICD-10-CM

## 2021-02-15 DIAGNOSIS — T81.89XA NONHEALING SURGICAL WOUND, INITIAL ENCOUNTER: Primary | ICD-10-CM

## 2021-02-15 DIAGNOSIS — S81.802A TRAUMATIC OPEN WOUND OF LEFT LOWER LEG, INITIAL ENCOUNTER: Primary | ICD-10-CM

## 2021-02-15 LAB
GLUCOSE BLD-MCNC: 141 MG/DL (ref 70–99)
GLUCOSE BLD-MCNC: 160 MG/DL (ref 70–99)

## 2021-02-15 PROCEDURE — 99183 HYPERBARIC OXYGEN THERAPY: CPT | Performed by: NURSE PRACTITIONER

## 2021-02-15 PROCEDURE — 11045 DBRDMT SUBQ TISS EACH ADDL: CPT | Performed by: NURSE PRACTITIONER

## 2021-02-15 PROCEDURE — 11042 DBRDMT SUBQ TIS 1ST 20SQCM/<: CPT

## 2021-02-15 PROCEDURE — 82962 GLUCOSE BLOOD TEST: CPT

## 2021-02-15 PROCEDURE — G0277 HBOT, FULL BODY CHAMBER, 30M: HCPCS

## 2021-02-15 PROCEDURE — 11045 DBRDMT SUBQ TISS EACH ADDL: CPT

## 2021-02-15 PROCEDURE — 11042 DBRDMT SUBQ TIS 1ST 20SQCM/<: CPT | Performed by: NURSE PRACTITIONER

## 2021-02-15 RX ORDER — GINSENG 100 MG
CAPSULE ORAL ONCE
Status: CANCELLED | OUTPATIENT
Start: 2021-02-15 | End: 2021-02-15

## 2021-02-15 RX ORDER — LIDOCAINE 50 MG/G
OINTMENT TOPICAL ONCE
Status: CANCELLED | OUTPATIENT
Start: 2021-02-15 | End: 2021-02-15

## 2021-02-15 RX ORDER — BETAMETHASONE DIPROPIONATE 0.05 %
OINTMENT (GRAM) TOPICAL ONCE
Status: CANCELLED | OUTPATIENT
Start: 2021-02-15 | End: 2021-02-15

## 2021-02-15 RX ORDER — GENTAMICIN SULFATE 1 MG/G
OINTMENT TOPICAL ONCE
Status: DISCONTINUED | OUTPATIENT
Start: 2021-02-15 | End: 2021-02-16 | Stop reason: HOSPADM

## 2021-02-15 RX ORDER — GENTAMICIN SULFATE 1 MG/G
OINTMENT TOPICAL ONCE
Status: CANCELLED | OUTPATIENT
Start: 2021-02-15 | End: 2021-02-15

## 2021-02-15 RX ORDER — BACITRACIN ZINC AND POLYMYXIN B SULFATE 500; 1000 [USP'U]/G; [USP'U]/G
OINTMENT TOPICAL ONCE
Status: CANCELLED | OUTPATIENT
Start: 2021-02-15 | End: 2021-02-15

## 2021-02-15 RX ORDER — LIDOCAINE HYDROCHLORIDE 40 MG/ML
SOLUTION TOPICAL ONCE
Status: CANCELLED | OUTPATIENT
Start: 2021-02-15 | End: 2021-02-15

## 2021-02-15 RX ORDER — BACITRACIN, NEOMYCIN, POLYMYXIN B 400; 3.5; 5 [USP'U]/G; MG/G; [USP'U]/G
OINTMENT TOPICAL ONCE
Status: CANCELLED | OUTPATIENT
Start: 2021-02-15 | End: 2021-02-15

## 2021-02-15 RX ORDER — CLOBETASOL PROPIONATE 0.5 MG/G
OINTMENT TOPICAL ONCE
Status: CANCELLED | OUTPATIENT
Start: 2021-02-15 | End: 2021-02-15

## 2021-02-15 RX ORDER — LIDOCAINE HYDROCHLORIDE 20 MG/ML
JELLY TOPICAL ONCE
Status: CANCELLED | OUTPATIENT
Start: 2021-02-15 | End: 2021-02-15

## 2021-02-15 RX ORDER — LIDOCAINE 50 MG/G
OINTMENT TOPICAL ONCE
Status: DISCONTINUED | OUTPATIENT
Start: 2021-02-15 | End: 2021-02-16 | Stop reason: HOSPADM

## 2021-02-15 RX ORDER — LIDOCAINE 40 MG/G
CREAM TOPICAL ONCE
Status: CANCELLED | OUTPATIENT
Start: 2021-02-15 | End: 2021-02-15

## 2021-02-15 ASSESSMENT — PAIN DESCRIPTION - LOCATION: LOCATION: PERINEUM

## 2021-02-15 ASSESSMENT — PAIN SCALES - GENERAL: PAINLEVEL_OUTOF10: 3

## 2021-02-15 ASSESSMENT — PAIN DESCRIPTION - ONSET: ONSET: ON-GOING

## 2021-02-15 NOTE — PROGRESS NOTES
Wound Care Center Progress Note With Procedure    Gabino Tavares  AGE: 77 y.o. GENDER: male  : 1955  EPISODE DATE:  2/15/2021     Subjective:     Chief Complaint   Patient presents with    Wound Check     scrotum         HISTORY of PRESENT ILLNESS     Ti Holder a 72 y. o. male who presents today for wound evaluation of Acute non-healing surgical and necrotizing fascitis ulcer(s) of the rectal and perirectal area.  The ulcer is of marked severity.  The underlying cause of the wound is nonhealing surgical post rectal and perirectal incision and drain related to necrotizing fascitis performed by Dr. Clau Samuels 2020.   Wound Pain Timing/Severity: waxing and waning, moderate  Quality of pain: aching, tender  Severity of pain:  3 / 10   Modifying Factors: venous stasis, lymphedema, diabetes, poor glucose control, obesity, smoking and anticoagulation therapy  Associated Signs/Symptoms: edema, erythema, drainage and pain        PAST MEDICAL HISTORY        Diagnosis Date    CHF (congestive heart failure) (HCC)     Chronic ulcer of left leg with fat layer exposed ([address] 2016    Chronic ulcer of right leg with fat layer exposed ([address] 2016    Chronic ulcer of right leg with fat layer exposed ([address] 2016    Diabetes mellitus ([address]     Hypertension     PVD (peripheral vascular disease) ([address] 2016    Type 2 diabetes mellitus with diabetic peripheral angiopathy without gangrene, without long-term current use of insulin ([address] 2016    Type 2 diabetes mellitus with diabetic peripheral angiopathy without gangrene, without long-term current use of insulin ([address] 2016    Venous stasis ulcer of both lower extremities without varicose veins ([address] 2016    WD-Traumatic open wound of left lower leg, complicated by diabetes and venous insufficiency 10/8/2020    WD-Ulcer of shin, left, with fat layer exposed ([address] 2016       PAST SURGICAL HISTORY    Past Surgical History:   Procedure Laterality Date    RECTAL SURGERY N/A 11/24/2020    RECTAL PERIRECTAL INCISION AND DRAINAGE performed by Jacobo Mcintyre MD at [facility]    Family History   Problem Relation Age of Onset    Asthma Mother     Breast Cancer Mother     Cancer Mother     Diabetes Mother     High Blood Pressure Mother     Cancer Father     Early Death Brother     Arthritis Paternal Grandmother        SOCIAL HISTORY    Social History     Tobacco Use    Smoking status: Current Every Day Smoker     Packs/day: 1.00     Years: 60.00     Pack years: 60.00     Types: Cigarettes    Smokeless tobacco: Never Used    Tobacco comment: healing    Substance Use Topics    Alcohol use: Never     Frequency: Never    Drug use: No       ALLERGIES    No Known Allergies    MEDICATIONS    Current Outpatient Medications on File Prior to Encounter   Medication Sig Dispense Refill    magnesium oxide (MAG-OX) 400 MG tablet Take 1 tablet by mouth daily 30 tablet 1    rivaroxaban (XARELTO) 15 MG TABS tablet Take 1 tablet by mouth daily 42 tablet 1    metoprolol succinate (TOPROL XL) 25 MG extended release tablet Take 1 tablet by mouth 2 times daily 30 tablet 3    amLODIPine (NORVASC) 10 MG tablet Take 1 tablet by mouth nightly 30 tablet 3    Latanoprost 0.005 % EMUL Apply to eye daily      glipiZIDE (GLUCOTROL) 5 MG tablet Take 5 mg by mouth 2 times daily (before meals)      potassium chloride (KLOR-CON M) 20 MEQ extended release tablet Take 20 mEq by mouth daily      ALPRAZolam (XANAX) 0.5 MG tablet Take 0.5 mg by mouth nightly. Shade Schlatter ipratropium-albuterol (DUONEB) 0.5-2.5 (3) MG/3ML SOLN nebulizer solution Inhale 1 vial into the lungs every 4 hours      albuterol sulfate  (90 Base) MCG/ACT inhaler Inhale 2 puffs into the lungs every 6 hours as needed for Wheezing      aspirin EC 81 MG EC tablet Take 1 tablet by mouth daily 30 tablet 3    HYDROcodone-acetaminophen (NORCO) 7.5-325 MG per tablet Take 1 tablet by mouth 3 times daily .  metFORMIN (GLUCOPHAGE) 1000 MG tablet Take 1,000 mg by mouth 2 times daily (with meals)      lisinopril (PRINIVIL;ZESTRIL) 20 MG tablet Take 40 mg by mouth daily       tamsulosin (FLOMAX) 0.4 MG capsule Take 0.4 mg by mouth daily      finasteride (PROSCAR) 5 MG tablet Take 5 mg by mouth daily      nystatin (MYCOSTATIN) 193309 UNIT/GM powder Apply 3 times daily and as needed to scrotum for moisture control. 30 g 1    amiodarone (CORDARONE) 200 MG tablet Take 1 tablet by mouth daily 30 tablet 0    ibuprofen (ADVIL;MOTRIN) 600 MG tablet Take 1 tablet by mouth every 6 hours as needed for Pain 30 tablet 0     No current facility-administered medications on file prior to encounter. REVIEW OF SYSTEMS    Pertinent items are noted in HPI. Constitutional: Negative for systemic symptoms including fever, chills and malaise. Objective: There were no vitals taken for this visit. PHYSICAL EXAM    General: The patient is in no acute distress. Mental status:  Patient is appropriate, is  oriented to place and plan of care.   Dermatologic exam: Visual inspection of the periwound reveals the skin to be normal in turgor and texture  Wound exam: see wound description below in procedure note      Assessment:     Problem List Items Addressed This Visit     WD-Venous stasis of both lower extremities    Relevant Medications    lidocaine (XYLOCAINE) 5 % ointment (Start on 2/15/2021 11:15 AM)    gentamicin (GARAMYCIN) 0.1 % ointment (Start on 2/15/2021 11:15 AM)    WD-Lymphedema of both lower extremities    Relevant Medications    lidocaine (XYLOCAINE) 5 % ointment (Start on 2/15/2021 11:15 AM)    gentamicin (GARAMYCIN) 0.1 % ointment (Start on 2/15/2021 11:15 AM)    WD-Traumatic open wound of left lower leg, complicated by diabetes and venous insufficiency - Primary    Relevant Orders    Supply: Wound Cleanser    Supply: Wound Dressings    Supply: Cover and Secure    HBO-Nonhealing surgical wound groin & buttock, initial encounter    Relevant Medications    lidocaine (XYLOCAINE) 5 % ointment (Start on 2/15/2021 11:15 AM)    gentamicin (GARAMYCIN) 0.1 % ointment (Start on 2/15/2021 11:15 AM)    HBO-Necrotizing fasciitis (HCC)    Relevant Medications    lidocaine (XYLOCAINE) 5 % ointment (Start on 2/15/2021 11:15 AM)    gentamicin (GARAMYCIN) 0.1 % ointment (Start on 2/15/2021 11:15 AM)        Procedure Note    Indications:  Based on my examination of this patient's wound(s) today, sharp excision into necrotic subcutaneous tissue is required to promote healing and evaluate the extent of previous healing. Performed by: FAITH Mello CNP    Consent obtained: Yes    Time out taken:  Yes    Pain Control: Anesthetic  Anesthetic: 4% Lidocaine Liquid Topical        Debridement:Excisional Debridement    Using curette the wound(s) was/were sharply debrided down through and including the removal of subcutaneous tissue. Devitalized Tissue Debrided:  slough and exudate    Pre Debridement Measurements:  Are located in the Wound Documentation Flow Sheet    All active wounds listed below with today's date are evaluated  Wound(s)    debrided this date include # : 2     Post  Debridement Measurements:  Wound 11/25/20 Scrotum #2 Perineum (Active)   Wound Image   02/15/21 1041   Wound Etiology Surgical 02/08/21 1136   Dressing Status New dressing applied 02/08/21 1229   Wound Cleansed Soap and water 02/15/21 1041   Dressing/Treatment ABD; Alginate with Ag;Moisture barrier;Collagen 01/18/21 1213   Offloading for Diabetic Foot Ulcers Other (comment) 02/08/21 1136   Wound Length (cm) 19.5 cm 02/15/21 1041   Wound Width (cm) 4 cm 02/15/21 1041   Wound Depth (cm) 0.5 cm 02/15/21 1041   Wound Surface Area (cm^2) 78 cm^2 02/15/21 1041   Change in Wound Size % (l*w) 37.6 02/15/21 1041   Wound Volume (cm^3) 39 cm^3 02/15/21 1041   Wound Healing % 94 02/15/21 1041   Post-Procedure Length (cm) 19.5 cm 02/15/21 1054   Post-Procedure Width (cm) 4 cm 02/15/21 1054   Post-Procedure Depth (cm) 0.5 cm 02/15/21 1054   Post-Procedure Surface Area (cm^2) 78 cm^2 02/15/21 1054   Post-Procedure Volume (cm^3) 39 cm^3 02/15/21 1054   Distance Tunneling (cm) 0 cm 02/15/21 1041   Tunneling Position ___ O'Clock 0 02/15/21 1041   Undermining Starts ___ O'Clock 0 02/15/21 1041   Undermining Ends___ O'Clock 0 02/15/21 1041   Undermining Maxium Distance (cm) 00 02/15/21 1041   Wound Assessment Pink/red;Granulation tissue 02/08/21 1136   Drainage Amount Moderate 02/15/21 1041   Drainage Description Serosanguinous 02/15/21 1041   Odor None 02/15/21 1041   Merary-wound Assessment Intact 02/15/21 1041   Margins Defined edges 02/15/21 1041   Wound Thickness Description not for Pressure Injury Full thickness 02/15/21 1041   Number of days: 82       Percent of Wound(s) Debrided: approximately 100%    Total  Area  Debrided:  78 sq cm     Bleeding:  Minimal    Hemostasis Achieved:  by pressure    Procedural Pain:  0  / 10     Post Procedural Pain:  0 / 10     Response to treatment:  Well tolerated by patient. Status of wound progress and description from last visit: Slightly larger today, wound bed is clean with good epithelization. Continue regimen and hyperbaric oxygen therapy. Plan:       Discharge Instructions       PHYSICIAN ORDERS AND DISCHARGE INSTRUCTIONS     NOTE: Upon discharge from the [address], you will receive a patient experience survey.  We would be grateful if you would take the time to fill this survey out.     Wound care order history:                 ANDREW's   Right       Left               Date:              Cultures: 1/15/21               Grafts:                Antibiotics:           Continuing wound care orders and information:                 Residence:  Home              Continue home health care with: [facility]              Your wound-care supplies will be provided by:      Wound cleansing:               AA not scrub or use excessive force.              Wash hands with soap and water before and after dressing changes.             FHGGX to applying a clean dressing, cleanse wound with normal saline, wound cleanser, or mild soap and water.               Ask the physician or nurse before getting the wound(s) wet in a shower     Daily Wound management:              Keep weight off wounds and reposition every 2 hours.              Avoid standing for long periods of time.              Apply wraps/stockings in AM and remove at bedtime.              If swelling is present, elevate legs to the level of the heart or above for 30 minutes 4-5 times a day and/or when sitting.                                      When taking antibiotics take entire prescription as ordered by physician do not stop taking until medicine is all gone.                                                      Orders for this week:  2/15/21     Left Lower leg-- Healed         Perineum -  Wash with hibicleanse and water. Rinse with saline. Pat dry with 4x4. Apply Gentamycin and Stimulin powder to base of wound   Apply desitin to periwound. Nystatin powder to groin. Cover with Ag+ alginate, and ABD pad and secure with paper tape. [place] to Change Daily and with bowel movements.            Follow up with Dahlia HENDERSON at the wound care center in 1 week on Monday   Call [phone] for any questions or concerns.   Date__________   Time__________        Treatment Note      Written Patient Dismissal Instructions Given            Electronically signed by FAITH Mcelroy CNP on 2/15/2021 at 11:13 AM

## 2021-02-15 NOTE — PROGRESS NOTES
[address] [facility],4Th Floor  Hyperbaric Oxygen Therapy   Progress Note      NAME: Sushil Tavares   MEDICAL RECORD NUMBER:  [id number]  AGE: 77 y.o. GENDER: male  : 1955  EPISODE DATE:  2/15/2021     Subjective     HBO Treatment Number: 17 out of Total Treatments: 30    HBO Diagnosis:     Indications: Necrotizing Fasciitis (Necrotizing Soft Tissue Infection)(Perineum)        Safety checks performed prior to treatment. See doc flowsheets for documentation. Objective           Recent Labs     02/15/21  0823   POCGLU 160*       Pre treatment Vital Signs       Temp: 97.6 °F (36.4 °C)     Pulse: 66     Resp: 20     BP: (!) 168/73(SN will report to SANTIAGO Villagomez)     Blood Sugar 160    Post treatment Vital Signs  Temp: 97.1 °F (36.2 °C)  Pulse: 60  Resp: 20  BP: (!) 170/75(SN to inform SANTIAGO Villagomez)  Blood Sugar 141    Assessment        Physical Exam:  General Appearance:  alert and oriented to person, place and time, well-developed and well-nourished, in no acute distress    Pre Tympanic Membrane Assessment:  tympanic membranes intact bilaterally    Post Tympanic Membrane Assessment:  Right: Normal(per Pt.)  Left: Normal(per Pt.)    Pulmonary/Chest:  clear to auscultation bilaterally- no wheezes, rales or rhonchi, normal air movement, no respiratory distress    Cardiovascular:  normal, regular rate and rhythm    Chamber #: [id number]       Treatment Start Time: 0830(Treatment started late; patient arrived late due to weather)     Pressure Reached Time: 0840  JOHANNA : 2  Number of Air Breaks:  Treatment Status: No Air break      Decompression Time: 0955   Treatment End Time: 1007    Symptoms Noted During Treatment: None    Adverse Event: no      Total time in chamber: 97  Minutes at depth: 75    I was present on these premises and immediately available to furnish assistance & direction throughout the procedure.        Plan          Sushil Tavares is a 77 y.o. male  did successfully complete today's hyperbaric oxygen treatment at [address] [place] [address] Narendra Lopez. In my clinical judgement, ongoing HBO therapy is  necessary at this time, given a threat to patient function, limb or life from the current condition. Supervision and attendance of Hyperbaric Oxygen Therapy provided. Continue HBO treatment as outlined in the treatment plan. Hyperbaric Oxygen: Baron Allen [employer] tolerated Treatment Number: 17 well today without complications.      Electronically signed by FAITH Du CNP on 2/15/2021 at 4:11 PM

## 2021-02-17 ENCOUNTER — HOSPITAL ENCOUNTER (OUTPATIENT)
Dept: HYPERBARIC MEDICINE | Age: 66
Discharge: HOME | End: 2021-02-17
Payer: MEDICARE

## 2021-02-17 VITALS
SYSTOLIC BLOOD PRESSURE: 166 MMHG | HEART RATE: 46 BPM | TEMPERATURE: 98 F | DIASTOLIC BLOOD PRESSURE: 76 MMHG | RESPIRATION RATE: 16 BRPM

## 2021-02-17 DIAGNOSIS — T81.89XA NONHEALING SURGICAL WOUND, INITIAL ENCOUNTER: Primary | ICD-10-CM

## 2021-02-17 DIAGNOSIS — M72.6 NECROTIZING FASCIITIS (HCC): ICD-10-CM

## 2021-02-17 LAB
GLUCOSE BLD-MCNC: 130 MG/DL (ref 70–99)
GLUCOSE BLD-MCNC: 144 MG/DL (ref 70–99)

## 2021-02-17 PROCEDURE — G0277 HBOT, FULL BODY CHAMBER, 30M: HCPCS

## 2021-02-17 PROCEDURE — 82962 GLUCOSE BLOOD TEST: CPT

## 2021-02-17 ASSESSMENT — PAIN DESCRIPTION - PAIN TYPE: TYPE: CHRONIC PAIN

## 2021-02-17 ASSESSMENT — PAIN DESCRIPTION - LOCATION: LOCATION: PERINEUM

## 2021-02-17 NOTE — PROGRESS NOTES
[address] [facility],4Th Floor  Hyperbaric Oxygen Therapy   Progress Note      NAME: Charlie Tavares   MEDICAL RECORD NUMBER:  [id number]  AGE: 77 y.o. GENDER: male  : 1955  EPISODE DATE:  2021     Subjective     HBO Treatment Number: 18 out of Total Treatments: 30    HBO Diagnosis:     Indications: Necrotizing Fasciitis (Necrotizing Soft Tissue Infection)(perineum)        Safety checks performed prior to treatment. See doc flowsheets for documentation. Objective           Recent Labs     21  0758 21  1005   POCGLU 144* 130*       Pre treatment Vital Signs       Temp: 97.5 °F (36.4 °C)     Pulse: 53     Resp: 16     BP: (!) 145/82(Pt. did not report S/S.  SN will inform )     Blood Sugar 144    Post treatment Vital Signs  Temp: 98 °F (36.7 °C)  Pulse: (!) 46  Resp: 16  BP: (!) 166/76(SN will inform Dr. Arash Banks; Pt. instructed to talk to his PCP about adjusting B/P meds.)  Blood Sugar 130    Assessment        Physical Exam:  General Appearance:  alert and oriented to person, place and time, well-developed and well-nourished, in no acute distress    Pre Tympanic Membrane Assessment:  tympanic membranes intact bilaterally, normal color, normal light reflex bilaterally    Post Tympanic Membrane Assessment:  Right: Normal(per Pt.)  Left: Normal(per Pt.)    Pulmonary/Chest:  clear to auscultation bilaterally- no wheezes, rales or rhonchi, normal air movement, no respiratory distress    Cardiovascular:  regular rate and rhythm, no murmurs rubs or gallops    Chamber #: [id number]       Treatment Start Time: 0809     Pressure Reached Time: 0820  JOHANNA : 2  Number of Air Breaks:  Treatment Status: No Air break      Decompression Time: 0950   Treatment End Time: 1001    Symptoms Noted During Treatment: None    TOTAL TIME AT DEPTH-90    TOTAL TIME IN CHAMBER-112    Adverse Event: no      I was present on these premises and immediately available to furnish assistance & direction throughout the procedure. Plan          Maeve Tavares is a 77 y.o. male  did successfully complete today's hyperbaric oxygen treatment at [address] [place] [address]. In my clinical judgement, ongoing HBO therapy is  necessary at this time, given a threat to patient function, limb or life from the current condition. Supervision and attendance of Hyperbaric Oxygen Therapy provided. Continue HBO treatment as outlined in the treatment plan. Hyperbaric Oxygen: Maeve Tavares tolerated Treatment Number: 18 well today without complications.      Electronically signed by Tatyana Elaine MD on 2/17/2021 at 11:42 AM

## 2021-02-18 ENCOUNTER — HOSPITAL ENCOUNTER (OUTPATIENT)
Dept: HYPERBARIC MEDICINE | Age: 66
Discharge: HOME | End: 2021-02-18
Payer: MEDICARE

## 2021-02-18 VITALS
TEMPERATURE: 97.9 F | HEART RATE: 47 BPM | SYSTOLIC BLOOD PRESSURE: 156 MMHG | DIASTOLIC BLOOD PRESSURE: 68 MMHG | RESPIRATION RATE: 20 BRPM

## 2021-02-18 DIAGNOSIS — T81.89XA NONHEALING SURGICAL WOUND, INITIAL ENCOUNTER: Primary | ICD-10-CM

## 2021-02-18 DIAGNOSIS — M72.6 NECROTIZING FASCIITIS (HCC): ICD-10-CM

## 2021-02-18 LAB
GLUCOSE BLD-MCNC: 119 MG/DL (ref 70–99)
GLUCOSE BLD-MCNC: 149 MG/DL (ref 70–99)

## 2021-02-18 PROCEDURE — 82962 GLUCOSE BLOOD TEST: CPT

## 2021-02-18 PROCEDURE — G0277 HBOT, FULL BODY CHAMBER, 30M: HCPCS

## 2021-02-18 ASSESSMENT — PAIN SCALES - GENERAL: PAINLEVEL_OUTOF10: 5

## 2021-02-18 ASSESSMENT — PAIN DESCRIPTION - ONSET: ONSET: ON-GOING

## 2021-02-18 ASSESSMENT — PAIN DESCRIPTION - LOCATION: LOCATION: PERINEUM

## 2021-02-18 NOTE — PROGRESS NOTES
[address] [facility]  Hyperbaric Oxygen Therapy   Progress Note      NAME: Carolyn Tavares   MEDICAL RECORD NUMBER:  [id number]  AGE: 77 y.o. GENDER: male  : 1955  EPISODE DATE:  2021     Subjective     HBO Treatment Number: 19 out of Total Treatments: 30    HBO Diagnosis:     Indications: Necrotizing Fasciitis (Necrotizing Soft Tissue Infection)(Perineum)        Safety checks performed prior to treatment. See doc flowsheets for documentation. Objective           Recent Labs     21  0803 21  1008   POCGLU 149* 119*       Pre treatment Vital Signs       Temp: 98 °F (36.7 °C)     Pulse: 50     Resp: 20     BP: (!) 152/73(Will report to Apple Hebert CNP)     Blood Sugar 149    Post treatment Vital Signs  Temp: 97.9 °F (36.6 °C)  Pulse: (!) 47(NO s/s reported)  Resp: 20  BP: (!) 156/68(SN will report to Apple Hebert CNP)  Blood Sugar 119    Assessment        Physical Exam:  General Appearance:  alert and oriented to person, place and time, well-developed and well-nourished, in no acute distress    Pre Tympanic Membrane Assessment:  tympanic membranes intact bilaterally    Post Tympanic Membrane Assessment:  Right: Normal(per Pt.)  Left: Normal(per Pt.)    Pulmonary/Chest:  clear to auscultation bilaterally- no wheezes, rales or rhonchi, normal air movement, no respiratory distress    Cardiovascular:  normal, regular rate and rhythm    Chamber #: [id number]       Treatment Start Time: 0809     Pressure Reached Time: 0919  JOHANNA : 2  Number of Air Breaks:  Treatment Status: No Air break      Decompression Time: 0949   Treatment End Time: 1000    Symptoms Noted During Treatment: None    Adverse Event: no      I was present on these premises and immediately available to furnish assistance & direction throughout the procedure. Plan          Carolyn Tavares is a 77 y.o. male  did successfully complete today's hyperbaric oxygen treatment at [address] [place] [address] [employer].     In my clinical judgement, ongoing HBO therapy is  necessary at this time, given a threat to patient function, limb or life from the current condition. Supervision and attendance of Hyperbaric Oxygen Therapy provided. Continue HBO treatment as outlined in the treatment plan. Hyperbaric Oxygen: Adi Ott [employer] tolerated Treatment Number: 19 well today without complications.      Electronically signed by FAITH Matt CNP on 2/18/2021 at 11:36 AM

## 2021-02-19 ENCOUNTER — HOSPITAL ENCOUNTER (OUTPATIENT)
Dept: HYPERBARIC MEDICINE | Age: 66
Discharge: HOME | End: 2021-02-19
Payer: MEDICARE

## 2021-02-19 VITALS
DIASTOLIC BLOOD PRESSURE: 71 MMHG | RESPIRATION RATE: 20 BRPM | HEART RATE: 52 BPM | TEMPERATURE: 97.1 F | SYSTOLIC BLOOD PRESSURE: 120 MMHG

## 2021-02-19 DIAGNOSIS — M72.6 NECROTIZING FASCIITIS (HCC): ICD-10-CM

## 2021-02-19 DIAGNOSIS — T81.89XA NONHEALING SURGICAL WOUND, INITIAL ENCOUNTER: Primary | ICD-10-CM

## 2021-02-19 LAB
GLUCOSE BLD-MCNC: 137 MG/DL (ref 70–99)
GLUCOSE BLD-MCNC: 142 MG/DL (ref 70–99)

## 2021-02-19 PROCEDURE — G0277 HBOT, FULL BODY CHAMBER, 30M: HCPCS

## 2021-02-19 PROCEDURE — 82962 GLUCOSE BLOOD TEST: CPT

## 2021-02-19 ASSESSMENT — PAIN DESCRIPTION - FREQUENCY: FREQUENCY: CONTINUOUS

## 2021-02-19 ASSESSMENT — PAIN DESCRIPTION - LOCATION: LOCATION: PERINEUM

## 2021-02-19 ASSESSMENT — PAIN SCALES - GENERAL: PAINLEVEL_OUTOF10: 6

## 2021-02-19 ASSESSMENT — PAIN DESCRIPTION - ORIENTATION
ORIENTATION: LEFT
ORIENTATION: LEFT

## 2021-02-19 ASSESSMENT — PAIN DESCRIPTION - DESCRIPTORS: DESCRIPTORS: TENDER

## 2021-02-19 ASSESSMENT — PAIN - FUNCTIONAL ASSESSMENT
PAIN_FUNCTIONAL_ASSESSMENT: PREVENTS OR INTERFERES SOME ACTIVE ACTIVITIES AND ADLS
PAIN_FUNCTIONAL_ASSESSMENT: PREVENTS OR INTERFERES SOME ACTIVE ACTIVITIES AND ADLS

## 2021-02-19 ASSESSMENT — PAIN DESCRIPTION - ONSET: ONSET: ON-GOING

## 2021-02-19 ASSESSMENT — PAIN DESCRIPTION - PROGRESSION: CLINICAL_PROGRESSION: NOT CHANGED

## 2021-02-19 NOTE — PROGRESS NOTES
[address] [facility],4Th Floor  Hyperbaric Oxygen Therapy   Progress Note      NAME: Brien Tavares   MEDICAL RECORD NUMBER:  [id number]  AGE: 77 y.o. GENDER: male  : 1955  EPISODE DATE:  2021     Subjective     HBO Treatment Number: 20 out of Total Treatments: 30    HBO Diagnosis:     Indications: Necrotizing Fasciitis (Necrotizing Soft Tissue Infection)(left perinum)        Safety checks performed prior to treatment. See doc flowsheets for documentation. Objective           Recent Labs     21  0745 21  1002   POCGLU 142* 137*       Pre treatment Vital Signs       Temp: 97.3 °F (36.3 °C)     Pulse: 53     Resp: 20     BP: (!) 143/71     Blood Sugar 142    Post treatment Vital Signs  Temp: 97.3 °F (36.3 °C)  Pulse: 53  Resp: 20  BP: (!) 143/71  Blood Sugar 137    Assessment        Physical Exam:  General Appearance:  alert and oriented to person, place and time, well-developed and well-nourished, in no acute distress and alert and oriented to person, place and time    Pre Tympanic Membrane Assessment:  tympanic membranes intact bilaterally    Post Tympanic Membrane Assessment:          Pulmonary/Chest:  clear to auscultation bilaterally- no wheezes, rales or rhonchi, normal air movement, no respiratory distress    Cardiovascular:  normal, regular rate and rhythm    Chamber #: [id number]       Treatment Start Time: 0807     Pressure Reached Time: 0815  JOHANNA : 2  Number of Air Breaks:  Treatment Status: No Air break      Decompression Time: 0945   Treatment End Time: 2274    Symptoms Noted During Treatment: None    Adverse Event: no      I was present on these premises and immediately available to furnish assistance & direction throughout the procedure. Plan          Brien Tavares is a 77 y.o. male  did successfully complete today's hyperbaric oxygen treatment at [address] [place] [address].     In my clinical judgement, ongoing HBO therapy is  necessary at this time, given a threat to patient function, limb or life from the current condition. Supervision and attendance of Hyperbaric Oxygen Therapy provided. Continue HBO treatment as outlined in the treatment plan. Hyperbaric Oxygen: Bing Tavares tolerated Treatment Number: 20 well today without complications.      Electronically signed by FAITH Martinez CNP on 2/19/2021 at 10:22 AM

## 2021-02-22 ENCOUNTER — HOSPITAL ENCOUNTER (OUTPATIENT)
Dept: WOUND CARE | Age: 66
Discharge: HOME | End: 2021-02-22
Payer: MEDICARE

## 2021-02-22 ENCOUNTER — HOSPITAL ENCOUNTER (OUTPATIENT)
Dept: HYPERBARIC MEDICINE | Age: 66
Discharge: HOME | End: 2021-02-22
Payer: MEDICARE

## 2021-02-22 VITALS
HEART RATE: 54 BPM | TEMPERATURE: 98.2 F | DIASTOLIC BLOOD PRESSURE: 61 MMHG | SYSTOLIC BLOOD PRESSURE: 152 MMHG | RESPIRATION RATE: 20 BRPM

## 2021-02-22 DIAGNOSIS — I89.0 LYMPHEDEMA OF BOTH LOWER EXTREMITIES: ICD-10-CM

## 2021-02-22 DIAGNOSIS — M72.6 NECROTIZING FASCIITIS (HCC): ICD-10-CM

## 2021-02-22 DIAGNOSIS — S81.802A TRAUMATIC OPEN WOUND OF LEFT LOWER LEG, INITIAL ENCOUNTER: Primary | ICD-10-CM

## 2021-02-22 DIAGNOSIS — T81.89XA NONHEALING SURGICAL WOUND, INITIAL ENCOUNTER: ICD-10-CM

## 2021-02-22 DIAGNOSIS — T81.89XA NONHEALING SURGICAL WOUND, INITIAL ENCOUNTER: Primary | ICD-10-CM

## 2021-02-22 DIAGNOSIS — I87.8 VENOUS STASIS OF BOTH LOWER EXTREMITIES: ICD-10-CM

## 2021-02-22 LAB
GLUCOSE BLD-MCNC: 112 MG/DL (ref 70–99)
GLUCOSE BLD-MCNC: 144 MG/DL (ref 70–99)

## 2021-02-22 PROCEDURE — 82962 GLUCOSE BLOOD TEST: CPT

## 2021-02-22 PROCEDURE — 11042 DBRDMT SUBQ TIS 1ST 20SQCM/<: CPT

## 2021-02-22 PROCEDURE — 11042 DBRDMT SUBQ TIS 1ST 20SQCM/<: CPT | Performed by: NURSE PRACTITIONER

## 2021-02-22 PROCEDURE — 99183 HYPERBARIC OXYGEN THERAPY: CPT | Performed by: NURSE PRACTITIONER

## 2021-02-22 PROCEDURE — G0277 HBOT, FULL BODY CHAMBER, 30M: HCPCS

## 2021-02-22 PROCEDURE — 11045 DBRDMT SUBQ TISS EACH ADDL: CPT | Performed by: NURSE PRACTITIONER

## 2021-02-22 PROCEDURE — 11045 DBRDMT SUBQ TISS EACH ADDL: CPT

## 2021-02-22 RX ORDER — LIDOCAINE HYDROCHLORIDE 20 MG/ML
JELLY TOPICAL ONCE
Status: CANCELLED | OUTPATIENT
Start: 2021-02-22 | End: 2021-02-22

## 2021-02-22 RX ORDER — GENTAMICIN SULFATE 1 MG/G
OINTMENT TOPICAL ONCE
Status: CANCELLED | OUTPATIENT
Start: 2021-02-22 | End: 2021-02-22

## 2021-02-22 RX ORDER — LIDOCAINE 40 MG/G
CREAM TOPICAL ONCE
Status: CANCELLED | OUTPATIENT
Start: 2021-02-22 | End: 2021-02-22

## 2021-02-22 RX ORDER — BACITRACIN, NEOMYCIN, POLYMYXIN B 400; 3.5; 5 [USP'U]/G; MG/G; [USP'U]/G
OINTMENT TOPICAL ONCE
Status: CANCELLED | OUTPATIENT
Start: 2021-02-22 | End: 2021-02-22

## 2021-02-22 RX ORDER — CLOBETASOL PROPIONATE 0.5 MG/G
OINTMENT TOPICAL ONCE
Status: CANCELLED | OUTPATIENT
Start: 2021-02-22 | End: 2021-02-22

## 2021-02-22 RX ORDER — BETAMETHASONE DIPROPIONATE 0.05 %
OINTMENT (GRAM) TOPICAL ONCE
Status: CANCELLED | OUTPATIENT
Start: 2021-02-22 | End: 2021-02-22

## 2021-02-22 RX ORDER — GINSENG 100 MG
CAPSULE ORAL ONCE
Status: CANCELLED | OUTPATIENT
Start: 2021-02-22 | End: 2021-02-22

## 2021-02-22 RX ORDER — LIDOCAINE 50 MG/G
OINTMENT TOPICAL ONCE
Status: CANCELLED | OUTPATIENT
Start: 2021-02-22 | End: 2021-02-22

## 2021-02-22 RX ORDER — LIDOCAINE HYDROCHLORIDE 40 MG/ML
SOLUTION TOPICAL ONCE
Status: CANCELLED | OUTPATIENT
Start: 2021-02-22 | End: 2021-02-22

## 2021-02-22 RX ORDER — LIDOCAINE HYDROCHLORIDE 40 MG/ML
SOLUTION TOPICAL ONCE
Status: DISCONTINUED | OUTPATIENT
Start: 2021-02-22 | End: 2021-02-23 | Stop reason: HOSPADM

## 2021-02-22 RX ORDER — BACITRACIN ZINC AND POLYMYXIN B SULFATE 500; 1000 [USP'U]/G; [USP'U]/G
OINTMENT TOPICAL ONCE
Status: CANCELLED | OUTPATIENT
Start: 2021-02-22 | End: 2021-02-22

## 2021-02-22 RX ORDER — GENTAMICIN SULFATE 1 MG/G
OINTMENT TOPICAL ONCE
Status: DISCONTINUED | OUTPATIENT
Start: 2021-02-22 | End: 2021-02-23 | Stop reason: HOSPADM

## 2021-02-22 ASSESSMENT — PAIN DESCRIPTION - FREQUENCY: FREQUENCY: INTERMITTENT

## 2021-02-22 ASSESSMENT — PAIN DESCRIPTION - LOCATION: LOCATION: BACK

## 2021-02-22 ASSESSMENT — PAIN DESCRIPTION - ONSET: ONSET: ON-GOING

## 2021-02-22 ASSESSMENT — PAIN DESCRIPTION - ORIENTATION: ORIENTATION: MID;LOWER

## 2021-02-22 ASSESSMENT — PAIN SCALES - GENERAL: PAINLEVEL_OUTOF10: 6

## 2021-02-22 NOTE — PROGRESS NOTES
Wound Care Center Progress Note With Procedure    Gabino Tavares  AGE: 77 y.o. GENDER: male  : 1955  EPISODE DATE:  2021     Subjective:     Chief Complaint   Patient presents with    Wound Check     perineum         HISTORY of PRESENT ILLNESS     Suhas centeno 72 y. o. male who presents today for wound evaluation of Acute non-healing surgical and necrotizing fascitis ulcer(s) of the rectal and perirectal area.  The ulcer is of marked severity.  The underlying cause of the wound is nonhealing surgical post rectal and perirectal incision and drain related to necrotizing fascitis performed by Dr. Tiffani Zhang 2020.   Wound Pain Timing/Severity: waxing and waning, moderate  Quality of pain: aching, tender  Severity of pain:  2 / 10   Modifying Factors: venous stasis, lymphedema, diabetes, poor glucose control, obesity, smoking and anticoagulation therapy  Associated Signs/Symptoms: edema, erythema, drainage and pain        PAST MEDICAL HISTORY        Diagnosis Date    CHF (congestive heart failure) (HCC)     Chronic ulcer of left leg with fat layer exposed ([address] 2016    Chronic ulcer of right leg with fat layer exposed ([address] 2016    Chronic ulcer of right leg with fat layer exposed ([address] 2016    Diabetes mellitus ([address]     Hypertension     PVD (peripheral vascular disease) ([address] 2016    Type 2 diabetes mellitus with diabetic peripheral angiopathy without gangrene, without long-term current use of insulin ([address] 2016    Type 2 diabetes mellitus with diabetic peripheral angiopathy without gangrene, without long-term current use of insulin ([address] 2016    Venous stasis ulcer of both lower extremities without varicose veins ([address] 2016    WD-Traumatic open wound of left lower leg, complicated by diabetes and venous insufficiency 10/8/2020    WD-Ulcer of shin, left, with fat layer exposed ([address] 2016       PAST SURGICAL HISTORY    Past Surgical History:   Procedure Laterality Date    RECTAL SURGERY N/A 11/24/2020    RECTAL PERIRECTAL INCISION AND DRAINAGE performed by Tono De Souza MD [facility] [place] History   Problem Relation Age of Onset    Asthma Mother     Breast Cancer Mother     Cancer Mother     Diabetes Mother     High Blood Pressure Mother     Cancer Father     Early Death Brother     Arthritis Paternal Grandmother        SOCIAL HISTORY    Social History     Tobacco Use    Smoking status: Current Every Day Smoker     Packs/day: 1.00     Years: 60.00     Pack years: 60.00     Types: Cigarettes    Smokeless tobacco: Never Used    Tobacco comment: healing    Substance Use Topics    Alcohol use: Never     Frequency: Never    Drug use: No       ALLERGIES    No Known Allergies    MEDICATIONS    Current Outpatient Medications on File Prior to Encounter   Medication Sig Dispense Refill    nystatin (MYCOSTATIN) 712949 UNIT/GM powder Apply 3 times daily and as needed to scrotum for moisture control. 30 g 1    magnesium oxide (MAG-OX) 400 MG tablet Take 1 tablet by mouth daily 30 tablet 1    amiodarone (CORDARONE) 200 MG tablet Take 1 tablet by mouth daily 30 tablet 0    rivaroxaban (XARELTO) 15 MG TABS tablet Take 1 tablet by mouth daily 42 tablet 1    metoprolol succinate (TOPROL XL) 25 MG extended release tablet Take 1 tablet by mouth 2 times daily 30 tablet 3    amLODIPine (NORVASC) 10 MG tablet Take 1 tablet by mouth nightly 30 tablet 3    Latanoprost 0.005 % EMUL Apply to eye daily      ibuprofen (ADVIL;MOTRIN) 600 MG tablet Take 1 tablet by mouth every 6 hours as needed for Pain 30 tablet 0    glipiZIDE (GLUCOTROL) 5 MG tablet Take 5 mg by mouth 2 times daily (before meals)      potassium chloride (KLOR-CON M) 20 MEQ extended release tablet Take 20 mEq by mouth daily      ALPRAZolam (XANAX) 0.5 MG tablet Take 0.5 mg by mouth nightly. Matt Elizondo ipratropium-albuterol (DUONEB) 0.5-2.5 (3) MG/3ML SOLN nebulizer solution Inhale 1 vial into the lungs every 4 hours      albuterol sulfate  (90 Base) MCG/ACT inhaler Inhale 2 puffs into the lungs every 6 hours as needed for Wheezing      aspirin EC 81 MG EC tablet Take 1 tablet by mouth daily 30 tablet 3    HYDROcodone-acetaminophen (NORCO) 7.5-325 MG per tablet Take 1 tablet by mouth 3 times daily .  metFORMIN (GLUCOPHAGE) 1000 MG tablet Take 1,000 mg by mouth 2 times daily (with meals)      lisinopril (PRINIVIL;ZESTRIL) 20 MG tablet Take 40 mg by mouth daily       tamsulosin (FLOMAX) 0.4 MG capsule Take 0.4 mg by mouth daily      finasteride (PROSCAR) 5 MG tablet Take 5 mg by mouth daily       No current facility-administered medications on file prior to encounter. REVIEW OF SYSTEMS    Pertinent items are noted in HPI. Constitutional: Negative for systemic symptoms including fever, chills and malaise. Objective: There were no vitals taken for this visit. PHYSICAL EXAM    General: The patient is in no acute distress. Mental status:  Patient is appropriate, is  oriented to place and plan of care.   Dermatologic exam: Visual inspection of the periwound reveals the skin to be normal in turgor and texture  Wound exam: see wound description below in procedure note      Assessment:     Problem List Items Addressed This Visit     WD-Venous stasis of both lower extremities    Relevant Medications    lidocaine (XYLOCAINE) 4 % external solution    gentamicin (GARAMYCIN) 0.1 % ointment    WD-Lymphedema of both lower extremities    Relevant Medications    lidocaine (XYLOCAINE) 4 % external solution    gentamicin (GARAMYCIN) 0.1 % ointment    WD-Traumatic open wound of left lower leg, complicated by diabetes and venous insufficiency - Primary    Relevant Orders    Supply: Wound Cleanser    Supply: Wound Dressings    Supply: Cover and Secure    HBO-Nonhealing surgical wound groin & buttock, initial encounter    Relevant Medications lidocaine (XYLOCAINE) 4 % external solution    gentamicin (GARAMYCIN) 0.1 % ointment    HBO-Necrotizing fasciitis (HCC)    Relevant Medications    lidocaine (XYLOCAINE) 4 % external solution    gentamicin (GARAMYCIN) 0.1 % ointment        Procedure Note    Indications:  Based on my examination of this patient's wound(s) today, sharp excision into necrotic subcutaneous tissue is required to promote healing and evaluate the extent of previous healing. Performed by: FAITH Pro CNP    Consent obtained: Yes    Time out taken:  Yes    Pain Control: Anesthetic  Anesthetic: 4% Lidocaine Liquid Topical     Debridement:Excisional Debridement    Using curette the wound(s) was/were sharply debrided down through and including the removal of subcutaneous tissue. Devitalized Tissue Debrided:  slough and exudate    Pre Debridement Measurements:  Are located in the Wound Documentation Flow Sheet    All active wounds listed below with today's date are evaluated  Wound(s)    debrided this date include # : 2     Post  Debridement Measurements:  Wound 11/25/20 Scrotum #2 Perineum (Active)   Wound Image   02/15/21 1041   Wound Etiology Surgical 02/22/21 1054   Dressing Status New dressing applied 02/22/21 1133   Wound Cleansed Soap and water 02/15/21 1041   Dressing/Treatment ABD; Alginate with Ag;Moisture barrier;Collagen 01/18/21 1213   Offloading for Diabetic Foot Ulcers Other (comment) 02/08/21 1136   Wound Length (cm) 16.5 cm 02/22/21 1054   Wound Width (cm) 2.5 cm 02/22/21 1054   Wound Depth (cm) 0.1 cm 02/22/21 1054   Wound Surface Area (cm^2) 41.25 cm^2 02/22/21 1054   Change in Wound Size % (l*w) 67 02/22/21 1054   Wound Volume (cm^3) 4.12 cm^3 02/22/21 1054   Wound Healing % 99 02/22/21 1054   Post-Procedure Length (cm) 16.5 cm 02/22/21 1105   Post-Procedure Width (cm) 2.5 cm 02/22/21 1105   Post-Procedure Depth (cm) 0.1 cm 02/22/21 1105   Post-Procedure Surface Area (cm^2) 41.25 cm^2 02/22/21 1105 Post-Procedure Volume (cm^3) 4.12 cm^3 02/22/21 1105   Distance Tunneling (cm) 0 cm 02/22/21 1054   Tunneling Position ___ O'Clock 0 02/22/21 1054   Undermining Starts ___ O'Clock 0 02/22/21 1054   Undermining Ends___ O'Clock 0 02/22/21 1054   Undermining Maxium Distance (cm) 0 02/22/21 1054   Wound Assessment Pink/red;Granulation tissue 02/08/21 1136   Drainage Amount Moderate 02/22/21 1054   Drainage Description Yellow 02/22/21 1054   Odor None 02/22/21 1054   Merary-wound Assessment Intact 02/22/21 1054   Margins Defined edges 02/22/21 1054   Wound Thickness Description not for Pressure Injury Full thickness 02/22/21 1054   Number of days: 89       Percent of Wound(s) Debrided: approximately 100%    Total  Area  Debrided:  41.25 sq cm     Bleeding:  Minimal    Hemostasis Achieved:  by pressure    Procedural Pain:  0  / 10     Post Procedural Pain:  0 / 10     Response to treatment:  Well tolerated by patient. Status of wound progress and description from last visit: Improving nicely, continue regimen. Tolerating hyperbaric oxygen therapy without side effects. Plan:       Discharge Instructions       PHYSICIAN ORDERS AND DISCHARGE INSTRUCTIONS     NOTE: Upon discharge from the [address], you will receive a patient experience survey. We would be grateful if you would take the time to fill this survey out.     Wound care order history:                 ANDREW's   Right       Left               Date:              Cultures: 1/15/21               Grafts:                Antibiotics:           Continuing wound care orders and information:                 Residence:  Home              Continue home health care with: [facility]              Your wound-care supplies will be provided by:      Wound cleansing:               YS not scrub or use excessive force.              Wash hands with soap and water before and after dressing changes.               NIAND to applying a clean dressing, cleanse wound with normal saline, wound cleanser, or mild soap and water.               Ask the physician or nurse before getting the wound(s) wet in a shower     Daily Wound management:              Keep weight off wounds and reposition every 2 hours.              Avoid standing for long periods of time.              Apply wraps/stockings in AM and remove at bedtime.              If swelling is present, elevate legs to the level of the heart or above for 30 minutes 4-5 times a day and/or when sitting.                                      When taking antibiotics take entire prescription as ordered by physician do not stop taking until medicine is all gone.                                                      Orders for this week:  2/22/21     Left Lower leg-- Healed         Perineum -  Wash with hibicleanse and water. Rinse with saline. Pat dry with 4x4. Apply Gentamycin and Stimulin powder to base of wound   Apply desitin to periwound. Nystatin powder to groin. Cover with Ag+ alginate, and ABD pad and secure with paper tape. [place] to Change Daily and with bowel movements.            Follow up with Dahlia HENDERSON at the wound care center in 1 week on Monday   Call [phone] for any questions or concerns.   Date__________   Time__________        Treatment Note Wound 11/25/20 Scrotum #2 Perineum-Dressing/Treatment: (gentamicin stimulin powder ag alginate abd destiin)    Written Patient Dismissal Instructions Given            Electronically signed by FAITH Leonardo CNP on 2/22/2021 at 12:21 PM

## 2021-02-22 NOTE — PROGRESS NOTES
[address] [facility],[address]  Hyperbaric Oxygen Therapy   Progress Note      NAME: Debby Tavares   MEDICAL RECORD NUMBER:  [id number]  AGE: 77 y.o. GENDER: male  : 1955  EPISODE DATE:  2021     Subjective     HBO Treatment Number: 21 out of Total Treatments: 30    HBO Diagnosis:     Indications: Necrotizing Fasciitis (Necrotizing Soft Tissue Infection)(perineum)        Safety checks performed prior to treatment. See doc flowsheets for documentation. Objective           Recent Labs     21  0750 21  1028   POCGLU 144* 112*       Pre treatment Vital Signs       Temp: 97.5 °F (36.4 °C)     Pulse: 57     Resp: 20     BP: (!) 153/87(SN will inform SANTIAGO Villagomez)     Blood Sugar 144    Post treatment Vital Signs  Temp: 98.2 °F (36.8 °C)  Pulse: 54  Resp: 20  BP: (!) 152/61(SN will report to SANTIAGO Villagomez)  Blood Sugar 112    Assessment        Physical Exam:  General Appearance:  alert and oriented to person, place and time, well-developed and well-nourished, in no acute distress    Pre Tympanic Membrane Assessment:  tympanic membranes intact bilaterally    Post Tympanic Membrane Assessment:  Right: Normal(per Pt.)  Left: Normal(per Pt.)    Pulmonary/Chest:  clear to auscultation bilaterally- no wheezes, rales or rhonchi, normal air movement, no respiratory distress    Cardiovascular:  normal, regular rate and rhythm    Chamber #: [id number]       Treatment Start Time: 0815     Pressure Reached Time: 0826  JOHANNA : 2  Number of Air Breaks:  Treatment Status: No Air break      Decompression Time: 0956   Treatment End Time: 1007    Symptoms Noted During Treatment: None    Adverse Event: no    Total time in chamber:112  Minutes at depth: 90    I was present on these premises and immediately available to furnish assistance & direction throughout the procedure.        Plan          Debby Tavares is a 77 y.o. male  did successfully complete today's hyperbaric oxygen treatment at [facility] [address] [facility]. In my clinical judgement, ongoing HBO therapy is  necessary at this time, given a threat to patient function, limb or life from the current condition. Supervision and attendance of Hyperbaric Oxygen Therapy provided. Continue HBO treatment as outlined in the treatment plan. Hyperbaric Oxygen: Johann Tavares tolerated Treatment Number: 21 well today without complications.      Electronically signed by FAITH Tan CNP on 2/22/2021 at 5:23 PM

## 2021-02-23 ENCOUNTER — HOSPITAL ENCOUNTER (OUTPATIENT)
Dept: HYPERBARIC MEDICINE | Age: 66
Discharge: HOME | End: 2021-02-23
Payer: MEDICARE

## 2021-02-23 VITALS
HEART RATE: 56 BPM | SYSTOLIC BLOOD PRESSURE: 165 MMHG | DIASTOLIC BLOOD PRESSURE: 73 MMHG | TEMPERATURE: 97.5 F | RESPIRATION RATE: 16 BRPM

## 2021-02-23 DIAGNOSIS — M72.6 NECROTIZING FASCIITIS (HCC): ICD-10-CM

## 2021-02-23 DIAGNOSIS — T81.89XA NONHEALING SURGICAL WOUND, INITIAL ENCOUNTER: Primary | ICD-10-CM

## 2021-02-23 LAB
GLUCOSE BLD-MCNC: 118 MG/DL (ref 70–99)
GLUCOSE BLD-MCNC: 137 MG/DL (ref 70–99)

## 2021-02-23 PROCEDURE — 82962 GLUCOSE BLOOD TEST: CPT

## 2021-02-23 PROCEDURE — G0277 HBOT, FULL BODY CHAMBER, 30M: HCPCS

## 2021-02-23 PROCEDURE — 99183 HYPERBARIC OXYGEN THERAPY: CPT | Performed by: NURSE PRACTITIONER

## 2021-02-23 ASSESSMENT — PAIN DESCRIPTION - ONSET: ONSET: ON-GOING

## 2021-02-23 ASSESSMENT — PAIN - FUNCTIONAL ASSESSMENT: PAIN_FUNCTIONAL_ASSESSMENT: PREVENTS OR INTERFERES SOME ACTIVE ACTIVITIES AND ADLS

## 2021-02-23 ASSESSMENT — PAIN DESCRIPTION - PAIN TYPE: TYPE: ACUTE PAIN

## 2021-02-23 ASSESSMENT — PAIN DESCRIPTION - PROGRESSION: CLINICAL_PROGRESSION: NOT CHANGED

## 2021-02-23 ASSESSMENT — PAIN DESCRIPTION - DESCRIPTORS: DESCRIPTORS: ACHING

## 2021-02-23 NOTE — PROGRESS NOTES
[facility]  Hyperbaric Oxygen Therapy   Progress Note      NAME: Lavon Gowers Coffee   MEDICAL RECORD NUMBER:  [id number]  AGE: 77 y.o. GENDER: male  : 1955  EPISODE DATE:  2021     Subjective     HBO Treatment Number: 22 out of Total Treatments: 30    HBO Diagnosis:     Indications: Necrotizing Fasciitis (Necrotizing Soft Tissue Infection)(perineum)        Safety checks performed prior to treatment. See doc flowsheets for documentation. Objective           Recent Labs     21  0756 21  1023   POCGLU 137* 118*       Pre treatment Vital Signs       Temp: 97.2 °F (36.2 °C)     Pulse: 54     Resp: 16     BP: 132/67     Blood Sugar 137    Post treatment Vital Signs  Temp: 97.5 °F (36.4 °C)  Pulse: 56  Resp: 16  BP: (!) 165/73(SN will inform SANTIAGO Villagomez)  Blood Sugar 118    Assessment        Physical Exam:  General Appearance:  alert and oriented to person, place and time, well-developed and well-nourished, in no acute distress    Pre Tympanic Membrane Assessment:  tympanic membranes intact bilaterally    Post Tympanic Membrane Assessment:  Right: Normal(per Pt.)  Left: Normal(per Pt.)    Pulmonary/Chest:  clear to auscultation bilaterally- no wheezes, rales or rhonchi, normal air movement, no respiratory distress    Cardiovascular:  normal, regular rate and rhythm    Chamber #: [id number]       Treatment Start Time: 0810     Pressure Reached Time: 0820  JOHANNA : 2  Number of Air Breaks:  Treatment Status: No Air break      Decompression Time: 0950   Treatment End Time: 1000    Symptoms Noted During Treatment: None    Adverse Event: no    Total time in chamber: 110  Minutes at depth: 90    I was present on these premises and immediately available to furnish assistance & direction throughout the procedure. Plan          Lavon Gowers Coffee is a 77 y.o. male  did successfully complete today's hyperbaric oxygen treatment at [address] [place] [address] [place].     In my clinical judgement, ongoing HBO therapy is  necessary at this time, given a threat to patient function, limb or life from the current condition. Supervision and attendance of Hyperbaric Oxygen Therapy provided. Continue HBO treatment as outlined in the treatment plan. Hyperbaric Oxygen: Iman Tavares tolerated Treatment Number: 22 well today without complications.      Electronically signed by FAITH Brown CNP on 2/23/2021 at 5:55 PM

## 2021-02-24 ENCOUNTER — HOSPITAL ENCOUNTER (OUTPATIENT)
Dept: HYPERBARIC MEDICINE | Age: 66
Discharge: HOME | End: 2021-02-24
Payer: MEDICARE

## 2021-02-24 VITALS
HEART RATE: 46 BPM | DIASTOLIC BLOOD PRESSURE: 76 MMHG | RESPIRATION RATE: 16 BRPM | TEMPERATURE: 98.2 F | SYSTOLIC BLOOD PRESSURE: 153 MMHG

## 2021-02-24 DIAGNOSIS — M72.6 NECROTIZING FASCIITIS (HCC): ICD-10-CM

## 2021-02-24 DIAGNOSIS — T81.89XA NONHEALING SURGICAL WOUND, INITIAL ENCOUNTER: Primary | ICD-10-CM

## 2021-02-24 LAB
GLUCOSE BLD-MCNC: 118 MG/DL (ref 70–99)
GLUCOSE BLD-MCNC: 152 MG/DL (ref 70–99)

## 2021-02-24 PROCEDURE — G0277 HBOT, FULL BODY CHAMBER, 30M: HCPCS

## 2021-02-24 PROCEDURE — 82962 GLUCOSE BLOOD TEST: CPT

## 2021-02-24 PROCEDURE — 99183 HYPERBARIC OXYGEN THERAPY: CPT | Performed by: NURSE PRACTITIONER

## 2021-02-25 ENCOUNTER — HOSPITAL ENCOUNTER (OUTPATIENT)
Dept: HYPERBARIC MEDICINE | Age: 66
Discharge: HOME | End: 2021-02-25
Payer: MEDICARE

## 2021-02-25 VITALS
TEMPERATURE: 97.3 F | HEART RATE: 43 BPM | DIASTOLIC BLOOD PRESSURE: 61 MMHG | RESPIRATION RATE: 16 BRPM | SYSTOLIC BLOOD PRESSURE: 144 MMHG

## 2021-02-25 DIAGNOSIS — M72.6 NECROTIZING FASCIITIS (HCC): ICD-10-CM

## 2021-02-25 DIAGNOSIS — T81.89XA NONHEALING SURGICAL WOUND, INITIAL ENCOUNTER: Primary | ICD-10-CM

## 2021-02-25 LAB
GLUCOSE BLD-MCNC: 118 MG/DL (ref 70–99)
GLUCOSE BLD-MCNC: 137 MG/DL (ref 70–99)

## 2021-02-25 PROCEDURE — 82962 GLUCOSE BLOOD TEST: CPT

## 2021-02-25 PROCEDURE — G0277 HBOT, FULL BODY CHAMBER, 30M: HCPCS

## 2021-02-25 ASSESSMENT — PAIN DESCRIPTION - ONSET: ONSET: ON-GOING

## 2021-02-25 ASSESSMENT — PAIN SCALES - GENERAL: PAINLEVEL_OUTOF10: 2

## 2021-02-25 ASSESSMENT — PAIN DESCRIPTION - DESCRIPTORS: DESCRIPTORS: ACHING

## 2021-02-25 ASSESSMENT — PAIN DESCRIPTION - PROGRESSION: CLINICAL_PROGRESSION: NOT CHANGED

## 2021-02-25 ASSESSMENT — PAIN DESCRIPTION - LOCATION: LOCATION: BACK

## 2021-02-25 ASSESSMENT — PAIN DESCRIPTION - ORIENTATION: ORIENTATION: LOWER;MID

## 2021-02-25 ASSESSMENT — PAIN DESCRIPTION - FREQUENCY: FREQUENCY: INTERMITTENT

## 2021-02-25 NOTE — PROGRESS NOTES
[address] [facility],4[address]  Hyperbaric Oxygen Therapy   Progress Note      NAME: Geovanny Tavares   MEDICAL RECORD NUMBER:  [id number]  AGE: 77 y.o. GENDER: male  : 1955  EPISODE DATE:  2021     Subjective     HBO Treatment Number: 24 out of Total Treatments: 30    HBO Diagnosis:     Indications: Necrotizing Fasciitis (Necrotizing Soft Tissue Infection)(perineum)        Safety checks performed prior to treatment. See doc flowsheets for documentation. Objective           Recent Labs     21  0758 21  1038   POCGLU 137* 118*       Pre treatment Vital Signs       Temp: 97.7 °F (36.5 °C)     Pulse: 51     Resp: 16     BP: (!) 147/65(SN will inform )     Blood Sugar 137    Post treatment Vital Signs  Temp: 97.3 °F (36.3 °C)  Pulse: (!) 43  Resp: 16  BP: (!) 144/61(will inform Dr. La Allison)  Blood Sugar 118    Assessment        Physical Exam:  General Appearance:  alert and oriented to person, place and time, well-developed and well-nourished, in no acute distress and alert and oriented to person, place and time    Pre Tympanic Membrane Assessment:  tympanic membranes intact bilaterally, normal color    Post Tympanic Membrane Assessment:  Right: Normal(per Pt.)  Left: Normal(per Pt.)    Pulmonary/Chest:  clear to auscultation bilaterally- no wheezes, rales or rhonchi, normal air movement, no respiratory distress and no chest wall tenderness    Cardiovascular:  normal, regular rate and rhythm    Chamber #: [id number]       Treatment Start Time: 0817     Pressure Reached Time: 0829  JOHANNA : 2  Number of Air Breaks:  Treatment Status: No Air break      Decompression Time: 0959   Treatment End Time: 1009    Symptoms Noted During Treatment: None    TOTAL TIME AT DEPTH-90    TOTAL TIME IN CHAMBER-112    Adverse Event: no      I was present on these premises and immediately available to furnish assistance & direction throughout the procedure.        Plan          Geovanny Tavares is a 77 y.o. male did successfully complete today's hyperbaric oxygen treatment at [address] [place] [address] Narendra Lopez. In my clinical judgement, ongoing HBO therapy is  necessary at this time, given a threat to patient function, limb or life from the current condition. Supervision and attendance of Hyperbaric Oxygen Therapy provided. Continue HBO treatment as outlined in the treatment plan. Hyperbaric Oxygen: Ly Jorge Tavares tolerated Treatment Number: 24 well today without complications.      Electronically signed by Chapis Martell MD on 2/25/2021 at 12:04 PM

## 2021-02-26 ENCOUNTER — HOSPITAL ENCOUNTER (OUTPATIENT)
Dept: HYPERBARIC MEDICINE | Age: 66
Discharge: HOME | End: 2021-02-26
Payer: MEDICARE

## 2021-02-26 VITALS
HEART RATE: 53 BPM | TEMPERATURE: 97.2 F | SYSTOLIC BLOOD PRESSURE: 145 MMHG | RESPIRATION RATE: 16 BRPM | DIASTOLIC BLOOD PRESSURE: 67 MMHG

## 2021-02-26 DIAGNOSIS — M72.6 NECROTIZING FASCIITIS (HCC): ICD-10-CM

## 2021-02-26 DIAGNOSIS — T81.89XA NONHEALING SURGICAL WOUND, INITIAL ENCOUNTER: Primary | ICD-10-CM

## 2021-02-26 LAB
GLUCOSE BLD-MCNC: 124 MG/DL (ref 70–99)
GLUCOSE BLD-MCNC: 195 MG/DL (ref 70–99)

## 2021-02-26 PROCEDURE — G0277 HBOT, FULL BODY CHAMBER, 30M: HCPCS

## 2021-02-26 PROCEDURE — 82962 GLUCOSE BLOOD TEST: CPT

## 2021-02-26 ASSESSMENT — PAIN DESCRIPTION - FREQUENCY: FREQUENCY: INTERMITTENT

## 2021-02-26 ASSESSMENT — PAIN DESCRIPTION - DESCRIPTORS: DESCRIPTORS: ACHING

## 2021-02-26 ASSESSMENT — PAIN SCALES - GENERAL: PAINLEVEL_OUTOF10: 4

## 2021-02-26 ASSESSMENT — PAIN DESCRIPTION - LOCATION: LOCATION: BACK

## 2021-02-26 ASSESSMENT — PAIN DESCRIPTION - PROGRESSION: CLINICAL_PROGRESSION: NOT CHANGED

## 2021-02-26 ASSESSMENT — PAIN DESCRIPTION - PAIN TYPE: TYPE: ACUTE PAIN

## 2021-03-01 ENCOUNTER — HOSPITAL ENCOUNTER (OUTPATIENT)
Dept: WOUND CARE | Age: 66
Discharge: HOME | End: 2021-03-01
Payer: MEDICARE

## 2021-03-01 ENCOUNTER — HOSPITAL ENCOUNTER (OUTPATIENT)
Dept: HYPERBARIC MEDICINE | Age: 66
Discharge: HOME | End: 2021-03-01
Payer: MEDICARE

## 2021-03-01 VITALS
DIASTOLIC BLOOD PRESSURE: 78 MMHG | RESPIRATION RATE: 20 BRPM | SYSTOLIC BLOOD PRESSURE: 144 MMHG | TEMPERATURE: 97.1 F | HEART RATE: 50 BPM

## 2021-03-01 DIAGNOSIS — T81.89XA NONHEALING SURGICAL WOUND, INITIAL ENCOUNTER: ICD-10-CM

## 2021-03-01 DIAGNOSIS — M72.6 NECROTIZING FASCIITIS (HCC): ICD-10-CM

## 2021-03-01 DIAGNOSIS — T81.89XA NONHEALING SURGICAL WOUND, INITIAL ENCOUNTER: Primary | ICD-10-CM

## 2021-03-01 DIAGNOSIS — S81.802A TRAUMATIC OPEN WOUND OF LEFT LOWER LEG, INITIAL ENCOUNTER: Primary | ICD-10-CM

## 2021-03-01 DIAGNOSIS — I89.0 LYMPHEDEMA OF BOTH LOWER EXTREMITIES: ICD-10-CM

## 2021-03-01 DIAGNOSIS — I87.8 VENOUS STASIS OF BOTH LOWER EXTREMITIES: ICD-10-CM

## 2021-03-01 LAB
GLUCOSE BLD-MCNC: 141 MG/DL (ref 70–99)
GLUCOSE BLD-MCNC: 146 MG/DL (ref 70–99)

## 2021-03-01 PROCEDURE — G0277 HBOT, FULL BODY CHAMBER, 30M: HCPCS

## 2021-03-01 PROCEDURE — 11045 DBRDMT SUBQ TISS EACH ADDL: CPT | Performed by: NURSE PRACTITIONER

## 2021-03-01 PROCEDURE — 11042 DBRDMT SUBQ TIS 1ST 20SQCM/<: CPT | Performed by: NURSE PRACTITIONER

## 2021-03-01 PROCEDURE — 99183 HYPERBARIC OXYGEN THERAPY: CPT | Performed by: NURSE PRACTITIONER

## 2021-03-01 PROCEDURE — 11042 DBRDMT SUBQ TIS 1ST 20SQCM/<: CPT

## 2021-03-01 PROCEDURE — 11045 DBRDMT SUBQ TISS EACH ADDL: CPT

## 2021-03-01 PROCEDURE — 82962 GLUCOSE BLOOD TEST: CPT

## 2021-03-01 RX ORDER — BACITRACIN ZINC AND POLYMYXIN B SULFATE 500; 1000 [USP'U]/G; [USP'U]/G
OINTMENT TOPICAL ONCE
Status: CANCELLED | OUTPATIENT
Start: 2021-03-01 | End: 2021-03-01

## 2021-03-01 RX ORDER — GENTAMICIN SULFATE 1 MG/G
OINTMENT TOPICAL ONCE
Status: CANCELLED | OUTPATIENT
Start: 2021-03-01 | End: 2021-03-01

## 2021-03-01 RX ORDER — LIDOCAINE HYDROCHLORIDE 40 MG/ML
SOLUTION TOPICAL ONCE
Status: CANCELLED | OUTPATIENT
Start: 2021-03-01 | End: 2021-03-01

## 2021-03-01 RX ORDER — LIDOCAINE 50 MG/G
OINTMENT TOPICAL ONCE
Status: CANCELLED | OUTPATIENT
Start: 2021-03-01 | End: 2021-03-01

## 2021-03-01 RX ORDER — BETAMETHASONE DIPROPIONATE 0.05 %
OINTMENT (GRAM) TOPICAL ONCE
Status: CANCELLED | OUTPATIENT
Start: 2021-03-01 | End: 2021-03-01

## 2021-03-01 RX ORDER — GINSENG 100 MG
CAPSULE ORAL ONCE
Status: CANCELLED | OUTPATIENT
Start: 2021-03-01 | End: 2021-03-01

## 2021-03-01 RX ORDER — LIDOCAINE HYDROCHLORIDE 20 MG/ML
JELLY TOPICAL ONCE
Status: CANCELLED | OUTPATIENT
Start: 2021-03-01 | End: 2021-03-01

## 2021-03-01 RX ORDER — GENTAMICIN SULFATE 1 MG/G
OINTMENT TOPICAL ONCE
Status: DISCONTINUED | OUTPATIENT
Start: 2021-03-01 | End: 2021-03-02 | Stop reason: HOSPADM

## 2021-03-01 RX ORDER — CLOBETASOL PROPIONATE 0.5 MG/G
OINTMENT TOPICAL ONCE
Status: CANCELLED | OUTPATIENT
Start: 2021-03-01 | End: 2021-03-01

## 2021-03-01 RX ORDER — LIDOCAINE 50 MG/G
OINTMENT TOPICAL ONCE
Status: DISCONTINUED | OUTPATIENT
Start: 2021-03-01 | End: 2021-03-02 | Stop reason: HOSPADM

## 2021-03-01 RX ORDER — BACITRACIN, NEOMYCIN, POLYMYXIN B 400; 3.5; 5 [USP'U]/G; MG/G; [USP'U]/G
OINTMENT TOPICAL ONCE
Status: CANCELLED | OUTPATIENT
Start: 2021-03-01 | End: 2021-03-01

## 2021-03-01 RX ORDER — LIDOCAINE 40 MG/G
CREAM TOPICAL ONCE
Status: CANCELLED | OUTPATIENT
Start: 2021-03-01 | End: 2021-03-01

## 2021-03-01 ASSESSMENT — PAIN DESCRIPTION - PAIN TYPE: TYPE: ACUTE PAIN

## 2021-03-01 ASSESSMENT — PAIN DESCRIPTION - FREQUENCY: FREQUENCY: INTERMITTENT

## 2021-03-01 ASSESSMENT — PAIN DESCRIPTION - ONSET: ONSET: ON-GOING

## 2021-03-01 NOTE — PROGRESS NOTES
Procedure Laterality Date    RECTAL SURGERY N/A 11/24/2020    RECTAL PERIRECTAL INCISION AND DRAINAGE performed by Keely Manzo MD at [address] [facility] [address]    Family History   Problem Relation Age of Onset    Asthma Mother     Breast Cancer Mother     Cancer Mother     Diabetes Mother     High Blood Pressure Mother     Cancer Father     Early Death Brother     Arthritis Paternal Grandmother        SOCIAL HISTORY    Social History     Tobacco Use    Smoking status: Current Every Day Smoker     Packs/day: 1.00     Years: 60.00     Pack years: 60.00     Types: Cigarettes    Smokeless tobacco: Never Used    Tobacco comment: healing    Substance Use Topics    Alcohol use: Never     Frequency: Never    Drug use: No       ALLERGIES    No Known Allergies    MEDICATIONS    Current Outpatient Medications on File Prior to Encounter   Medication Sig Dispense Refill    magnesium oxide (MAG-OX) 400 MG tablet Take 1 tablet by mouth daily 30 tablet 1    rivaroxaban (XARELTO) 15 MG TABS tablet Take 1 tablet by mouth daily 42 tablet 1    metoprolol succinate (TOPROL XL) 25 MG extended release tablet Take 1 tablet by mouth 2 times daily 30 tablet 3    amLODIPine (NORVASC) 10 MG tablet Take 1 tablet by mouth nightly 30 tablet 3    Latanoprost 0.005 % EMUL Apply to eye daily      glipiZIDE (GLUCOTROL) 5 MG tablet Take 5 mg by mouth 2 times daily (before meals)      potassium chloride (KLOR-CON M) 20 MEQ extended release tablet Take 20 mEq by mouth daily      ALPRAZolam (XANAX) 0.5 MG tablet Take 0.5 mg by mouth nightly. Jens Hsieh ipratropium-albuterol (DUONEB) 0.5-2.5 (3) MG/3ML SOLN nebulizer solution Inhale 1 vial into the lungs every 4 hours      aspirin EC 81 MG EC tablet Take 1 tablet by mouth daily 30 tablet 3    HYDROcodone-acetaminophen (NORCO) 7.5-325 MG per tablet Take 1 tablet by mouth 3 times daily .       metFORMIN (GLUCOPHAGE) 1000 MG tablet Take 1,000 mg by mouth 2 times daily (with ointment    gentamicin (GARAMYCIN) 0.1 % ointment    HBO-Necrotizing fasciitis (HCC)    Relevant Medications    lidocaine (XYLOCAINE) 5 % ointment    gentamicin (GARAMYCIN) 0.1 % ointment        Procedure Note    Indications:  Based on my examination of this patient's wound(s) today, sharp excision into necrotic subcutaneous tissue is required to promote healing and evaluate the extent of previous healing. Performed by: FAITH Pro CNP    Consent obtained: Yes    Time out taken:  Yes    Pain Control: Anesthetic  Anesthetic: 5% Lidocaine Ointment Topical     Debridement:Excisional Debridement    Using curette the wound(s) was/were sharply debrided down through and including the removal of subcutaneous tissue. Devitalized Tissue Debrided:  slough and exudate    Pre Debridement Measurements:  Are located in the Wound Documentation Flow Sheet    All active wounds listed below with today's date are evaluated  Wound(s)    debrided this date include # : 1     Post  Debridement Measurements:  Wound 11/25/20 Scrotum #2 Perineum (Active)   Wound Image   03/01/21 1042   Wound Etiology Surgical 03/01/21 1042   Dressing Status New dressing applied;Clean;Dry; Intact 03/01/21 1147   Wound Cleansed Soap and water 03/01/21 1042   Dressing/Treatment ABD; Alginate with Ag;Moisture barrier;Collagen 01/18/21 1213   Offloading for Diabetic Foot Ulcers Other (comment) 02/08/21 1136   Wound Length (cm) 16.5 cm 03/01/21 1042   Wound Width (cm) 2.2 cm 03/01/21 1042   Wound Depth (cm) 0.1 cm 03/01/21 1042   Wound Surface Area (cm^2) 36.3 cm^2 03/01/21 1042   Change in Wound Size % (l*w) 70.96 03/01/21 1042   Wound Volume (cm^3) 3.63 cm^3 03/01/21 1042   Wound Healing % 99 03/01/21 1042   Post-Procedure Length (cm) 16.5 cm 03/01/21 1101   Post-Procedure Width (cm) 2.2 cm 03/01/21 1101   Post-Procedure Depth (cm) 0.1 cm 03/01/21 1101   Post-Procedure Surface Area (cm^2) 36.3 cm^2 03/01/21 1101   Post-Procedure Volume (cm^3) 3.63 cm^3 03/01/21 1101   Distance Tunneling (cm) 0 cm 03/01/21 1042   Tunneling Position ___ O'Clock 0 03/01/21 1042   Undermining Starts ___ O'Clock 00 03/01/21 1042   Undermining Ends___ O'Clock 0 03/01/21 1042   Undermining Maxium Distance (cm) 0 03/01/21 1042   Wound Assessment Pink/red;Granulation tissue 02/08/21 1136   Drainage Amount Moderate 03/01/21 1042   Drainage Description Yellow 03/01/21 1042   Odor None 03/01/21 1042   Merary-wound Assessment Intact 03/01/21 1042   Margins Defined edges 03/01/21 1042   Wound Thickness Description not for Pressure Injury Full thickness 03/01/21 1042   Number of days: 96       Percent of Wound(s) Debrided: approximately 100%    Total  Area  Debrided:  36.3 sq cm     Bleeding:  Minimal    Hemostasis Achieved:  by pressure    Procedural Pain:  0  / 10     Post Procedural Pain:  0 / 10     Response to treatment:  Well tolerated by patient. Status of wound progress and description from last visit: Improving, continue regimen. The patient has benefited greatly from hyperbaric therapy, tolerating hyperbaric treatment without difficulty or ill effects. He will complete 30 hyperbaric sessions 3/5/2021 and would benefit from another 10 sessions at which time the wound should almost be healed. Plan:       Discharge Instructions       PHYSICIAN ORDERS AND DISCHARGE INSTRUCTIONS     NOTE: Upon discharge from the [address], you will receive a patient experience survey.  We would be grateful if you would take the time to fill this survey out.     Wound care order history:                 ANDREW's   Right       Left               Date:              Cultures: 1/15/21               Grafts:                Antibiotics:           Continuing wound care orders and information:                 Residence:  Home              Continue home health care with: [facility]              Your wound-care supplies will be provided by:      Wound cleansing:               DP not scrub or use excessive force.              Wash hands with soap and water before and after dressing changes.             EQRVF to applying a clean dressing, cleanse wound with normal saline, wound cleanser, or mild soap and water.               Ask the physician or nurse before getting the wound(s) wet in a shower     Daily Wound management:              Keep weight off wounds and reposition every 2 hours.              Avoid standing for long periods of time.              Apply wraps/stockings in AM and remove at bedtime.              If swelling is present, elevate legs to the level of the heart or above for 30 minutes 4-5 times a day and/or when sitting.                                      When taking antibiotics take entire prescription as ordered by physician do not stop taking until medicine is all gone.                                                      Orders for this week:  3/1/21     Left Lower leg-- Healed         Perineum -  Wash with hibicleanse and water. Rinse with saline. Pat dry with 4x4. Apply Gentamycin and Stimulin powder to base of wound   Apply desitin to periwound. Nystatin powder to groin. Cover with Ag+ alginate, and ABD pad and secure with paper tape. [place] to Change Daily and with bowel movements.            Follow up with Dahlia HENDERSON at the wound care center in 1 week on Monday   Call [phone] for any questions or concerns.   Date__________   Time__________        Treatment Note Wound 11/25/20 Scrotum #2 Perineum-Dressing/Treatment: (gent, stimulen, calcium algainte, ABD paper tape )    Written Patient Dismissal Instructions Given            Electronically signed by FAITH Rico CNP on 3/1/2021 at 12:35 PM

## 2021-03-01 NOTE — PROGRESS NOTES
[facility]  Hyperbaric Oxygen Therapy   Progress Note      NAME: Shy Tavares   MEDICAL RECORD NUMBER:  [id number]  AGE: 77 y.o. GENDER: male  : 1955  EPISODE DATE:  2021     Subjective     HBO Treatment Number: 25 out of Total Treatments: 30    HBO Diagnosis:     Indications: Necrotizing Fasciitis (Necrotizing Soft Tissue Infection)(perineum)        Safety checks performed prior to treatment. See doc flowsheets for documentation. Objective           Recent Labs     21  1025 21  0804   POCGLU 124* 141*       Pre treatment Vital Signs       Temp: 97.4 °F (36.3 °C)     Pulse: 51     Resp: 16     BP: (!) 172/79     Blood Sugar 195    Post treatment Vital Signs  Temp: 97.2 °F (36.2 °C)  Pulse: 53  Resp: 16  BP: (!) 145/67  Blood Sugar 124    Assessment        Physical Exam:  General Appearance:  alert and oriented to person, place and time, well-developed and well-nourished, in no acute distress    Pre Tympanic Membrane Assessment:  tympanic membranes intact bilaterally, normal color    Post Tympanic Membrane Assessment:  Right: Normal(per Pt.)  Left: Normal(per Pt.)    Pulmonary/Chest:  clear to auscultation bilaterally- no wheezes, rales or rhonchi, normal air movement, no respiratory distress and no chest wall tenderness    Cardiovascular:  normal, regular rate and rhythm    Chamber #: [id number]       Treatment Start Time: 0811     Pressure Reached Time: 0822  JOHANNA : 2  Number of Air Breaks:  Treatment Status: No Air break      Decompression Time: 8104   Treatment End Time: 1004    Symptoms Noted During Treatment: None    Adverse Event: no      I was present on these premises and immediately available to furnish assistance & direction throughout the procedure. Plan          Shy Tavares is a 77 y.o. male  did successfully complete today's hyperbaric oxygen treatment at [address] [place] [address] [facility]     In my clinical judgement, ongoing HBO therapy is  necessary at this time, given a threat to patient function, limb or life from the current condition. Supervision and attendance of Hyperbaric Oxygen Therapy provided. Continue HBO treatment as outlined in the treatment plan. Hyperbaric Oxygen: Adi Tavares tolerated Treatment Number: 25 well today without complications.      Electronically signed by FAITH Matt CNP on 2/26/2021 at 8:26 AM

## 2021-03-01 NOTE — PROGRESS NOTES
[address] [facility]  Hyperbaric Oxygen Therapy   Progress Note      NAME: Debby Tavares   MEDICAL RECORD NUMBER:  [id number]  AGE: 77 y.o. GENDER: male  : 1955  EPISODE DATE:  3/1/2021     Subjective     HBO Treatment Number: 26 out of Total Treatments: 30    HBO Diagnosis:     Indications: Necrotizing Fasciitis (Necrotizing Soft Tissue Infection)(perineum)        Safety checks performed prior to treatment. See doc flowsheets for documentation. Objective           Recent Labs     21  0804 21  1022   POCGLU 141* 146*       Pre treatment Vital Signs       Temp: 97.8 °F (36.6 °C)     Pulse: 55     Resp: 20     BP: 124/64     Blood Sugar 141    Post treatment Vital Signs  Temp: 97.1 °F (36.2 °C)  Pulse: 50  Resp: 20  BP: (!) 144/78(SN will inform SANTIAGO Villagomez)  Blood Sugar 146    Assessment        Physical Exam:  General Appearance:  alert and oriented to person, place and time, well-developed and well-nourished, in no acute distress    Pre Tympanic Membrane Assessment:  tympanic membranes intact bilaterally    Post Tympanic Membrane Assessment:  Right: Normal(per Pt.)  Left: Normal(per Pt.)    Pulmonary/Chest:  clear to auscultation bilaterally- no wheezes, rales or rhonchi, normal air movement, no respiratory distress    Cardiovascular:  normal, regular rate and rhythm    Chamber #: [id number]       Treatment Start Time: 0813     Pressure Reached Time: 0825  JOHANNA : 2  Number of Air Breaks:  Treatment Status: No Air break      Decompression Time: 0946   Treatment End Time: 09    Symptoms Noted During Treatment: None    Adverse Event: no    Total time in chamber: 104  Minutes at depth: 81    I was present on these premises and immediately available to furnish assistance & direction throughout the procedure. Plan          Debby Tavares is a 77 y.o. male  did successfully complete today's hyperbaric oxygen treatment at [address] [place] [address] [employer].     In my clinical judgement, ongoing HBO therapy is  necessary at this time, given a threat to patient function, limb or life from the current condition. Supervision and attendance of Hyperbaric Oxygen Therapy provided. Continue HBO treatment as outlined in the treatment plan. Hyperbaric Oxygen: Maeve Tavares tolerated Treatment Number: 26 well today without complications.      Electronically signed by FAITH Allen CNP on 3/1/2021 at 12:53 PM

## 2021-03-02 ENCOUNTER — HOSPITAL ENCOUNTER (OUTPATIENT)
Dept: HYPERBARIC MEDICINE | Age: 66
Discharge: HOME | End: 2021-03-02
Payer: MEDICARE

## 2021-03-02 VITALS
DIASTOLIC BLOOD PRESSURE: 73 MMHG | HEART RATE: 47 BPM | TEMPERATURE: 97.4 F | SYSTOLIC BLOOD PRESSURE: 156 MMHG | RESPIRATION RATE: 16 BRPM

## 2021-03-02 DIAGNOSIS — M72.6 NECROTIZING FASCIITIS (HCC): ICD-10-CM

## 2021-03-02 DIAGNOSIS — T81.89XA NONHEALING SURGICAL WOUND, INITIAL ENCOUNTER: Primary | ICD-10-CM

## 2021-03-02 LAB
GLUCOSE BLD-MCNC: 119 MG/DL (ref 70–99)
GLUCOSE BLD-MCNC: 138 MG/DL (ref 70–99)

## 2021-03-02 PROCEDURE — G0277 HBOT, FULL BODY CHAMBER, 30M: HCPCS

## 2021-03-02 PROCEDURE — 82962 GLUCOSE BLOOD TEST: CPT

## 2021-03-02 PROCEDURE — 99183 HYPERBARIC OXYGEN THERAPY: CPT | Performed by: NURSE PRACTITIONER

## 2021-03-02 ASSESSMENT — PAIN DESCRIPTION - ONSET: ONSET: ON-GOING

## 2021-03-02 ASSESSMENT — PAIN DESCRIPTION - LOCATION: LOCATION: PERINEUM

## 2021-03-02 ASSESSMENT — PAIN DESCRIPTION - DESCRIPTORS: DESCRIPTORS: BURNING

## 2021-03-02 NOTE — PROGRESS NOTES
[address] [facility],[address]  Hyperbaric Oxygen Therapy   Progress Note      NAME: Virgil Tavares   MEDICAL RECORD NUMBER:  [id number]  AGE: 77 y.o. GENDER: male  : 1955  EPISODE DATE:  2021     Subjective     HBO Treatment Number: 23 out of Total Treatments: 30    HBO Diagnosis:     Indications: Necrotizing Fasciitis (Necrotizing Soft Tissue Infection)(perineum)        Safety checks performed prior to treatment. See doc flowsheets for documentation. Objective           Recent Labs     2021  0810 2021  1033   POCGLU 152 118       Pre treatment Vital Signs       Temp: 98.4 °F (36.9 °C)     Pulse: 56     Resp: 16     BP: (!) 143/75(SN will inform SANTIAGO Villagomez)     Blood Sugar 152    Post treatment Vital Signs  Temp: 98.2 °F (36.8 °C)  Pulse: (!) 46(Pt. does not report complaints with low pulse)  Resp: 16  BP: (!) 153/76(SN will report to )  Blood Sugar 118    Assessment        Physical Exam:  General Appearance:  alert and oriented to person, place and time, well-developed and well-nourished, in no acute distress    Pre Tympanic Membrane Assessment:  tympanic membranes intact bilaterally    Post Tympanic Membrane Assessment:  Right: Normal(per Pt.)  Left: Normal(per Pt.)    Pulmonary/Chest:  clear to auscultation bilaterally- no wheezes, rales or rhonchi, normal air movement, no respiratory distress    Cardiovascular:  normal, regular rate and rhythm    Chamber #: [id number]       Treatment Start Time: 0816     Pressure Reached Time: 0826  JOHANNA : 2  Number of Air Breaks:  Treatment Status: No Air break      Decompression Time: 0956   Treatment End Time: 1007    Symptoms Noted During Treatment: None    Adverse Event: no      I was present on these premises and immediately available to furnish assistance & direction throughout the procedure.        Plan          Virgil Tavares is a 77 y.o. male  did successfully complete today's hyperbaric oxygen treatment at [facility]) [facility],  [address]. In my clinical judgement, ongoing HBO therapy is  necessary at this time, given a threat to patient function, limb or life from the current condition. Supervision and attendance of Hyperbaric Oxygen Therapy provided. Continue HBO treatment as outlined in the treatment plan. Hyperbaric Oxygen: Brien Tavares tolerated Treatment Number: 23 well today without complications.      Electronically signed by FAITH Huggins CNP on 2/24/2021 at 3:57 PM

## 2021-03-02 NOTE — PROGRESS NOTES
[facility]  Hyperbaric Oxygen Therapy   Progress Note      NAME: Tyshawn Tavares   MEDICAL RECORD NUMBER:  [id number]  AGE: 77 y.o. GENDER: male  : 1955  EPISODE DATE:  3/2/2021     Subjective     HBO Treatment Number: 27 out of Total Treatments: 30    HBO Diagnosis:     Indications: Necrotizing Fasciitis (Necrotizing Soft Tissue Infection)(Perineum)        Safety checks performed prior to treatment. See doc flowsheets for documentation. Objective           Recent Labs     21  0757 21  1005   POCGLU 138* 119*       Pre treatment Vital Signs       Temp: 98.5 °F (36.9 °C)     Pulse: 54     Resp: 18     BP: (!) 146/59(SN will inform SANTIAGO Villagomez)     Blood Sugar 138    Post treatment Vital Signs  Temp: 97.4 °F (36.3 °C)  Pulse: (!) 47  Resp: 16  BP: (!) 156/73(SN will inform SANTIAGO Villagomez)  Blood Sugar 119    Assessment        Physical Exam:  General Appearance:  alert and oriented to person, place and time, well-developed and well-nourished, in no acute distress    Pre Tympanic Membrane Assessment:  tympanic membranes intact bilaterally    Post Tympanic Membrane Assessment:  Right: Normal(per Pt.)  Left: Normal(per Pt.)    Pulmonary/Chest:  clear to auscultation bilaterally- no wheezes, rales or rhonchi, normal air movement, no respiratory distress    Cardiovascular:  normal, regular rate and rhythm    Chamber #: [id number]       Treatment Start Time: 0808     Pressure Reached Time: 0820  JOHANNA : 2  Number of Air Breaks:  Treatment Status: No Air break      Decompression Time: 0950   Treatment End Time: 1001    Symptoms Noted During Treatment: None    Adverse Event: no      Total time in chamber: 113  Minutes at depth: 90    I was present on these premises and immediately available to furnish assistance & direction throughout the procedure.        Plan          Tyshawn Tavares is a 77 y.o. male  did successfully complete today's hyperbaric oxygen treatment at [facility],  [address]. In my clinical judgement, ongoing HBO therapy is  necessary at this time, given a threat to patient function, limb or life from the current condition. Supervision and attendance of Hyperbaric Oxygen Therapy provided. Continue HBO treatment as outlined in the treatment plan. Hyperbaric Oxygen: Carolyn Tavares tolerated Treatment Number: 27 well today without complications.      Electronically signed by FAITH James CNP on 3/2/2021 at 5:58 PM

## 2021-03-03 ENCOUNTER — HOSPITAL ENCOUNTER (OUTPATIENT)
Dept: HYPERBARIC MEDICINE | Age: 66
Discharge: HOME | End: 2021-03-03
Payer: MEDICARE

## 2021-03-03 VITALS
DIASTOLIC BLOOD PRESSURE: 70 MMHG | RESPIRATION RATE: 16 BRPM | SYSTOLIC BLOOD PRESSURE: 164 MMHG | HEART RATE: 51 BPM | TEMPERATURE: 97.5 F

## 2021-03-03 DIAGNOSIS — M72.6 NECROTIZING FASCIITIS (HCC): ICD-10-CM

## 2021-03-03 DIAGNOSIS — T81.89XA NONHEALING SURGICAL WOUND, INITIAL ENCOUNTER: Primary | ICD-10-CM

## 2021-03-03 LAB
GLUCOSE BLD-MCNC: 115 MG/DL (ref 70–99)
GLUCOSE BLD-MCNC: 159 MG/DL (ref 70–99)

## 2021-03-03 PROCEDURE — 82962 GLUCOSE BLOOD TEST: CPT

## 2021-03-03 PROCEDURE — G0277 HBOT, FULL BODY CHAMBER, 30M: HCPCS

## 2021-03-03 ASSESSMENT — PAIN DESCRIPTION - PAIN TYPE: TYPE: CHRONIC PAIN

## 2021-03-03 ASSESSMENT — PAIN DESCRIPTION - DESCRIPTORS: DESCRIPTORS: BURNING

## 2021-03-03 ASSESSMENT — PAIN - FUNCTIONAL ASSESSMENT: PAIN_FUNCTIONAL_ASSESSMENT: PREVENTS OR INTERFERES SOME ACTIVE ACTIVITIES AND ADLS

## 2021-03-03 ASSESSMENT — PAIN DESCRIPTION - FREQUENCY: FREQUENCY: INTERMITTENT

## 2021-03-03 ASSESSMENT — PAIN DESCRIPTION - PROGRESSION: CLINICAL_PROGRESSION: GRADUALLY IMPROVING

## 2021-03-03 ASSESSMENT — PAIN DESCRIPTION - LOCATION: LOCATION: PERINEUM

## 2021-03-03 NOTE — PROGRESS NOTES
complete today's hyperbaric oxygen treatment at [address] [place] [address] [facility] Lopez. In my clinical judgement, ongoing HBO therapy is  necessary at this time, given a threat to patient function, limb or life from the current condition. Supervision and attendance of Hyperbaric Oxygen Therapy provided. Continue HBO treatment as outlined in the treatment plan. Hyperbaric Oxygen: Cade Adriana Coffee tolerated Treatment Number: 28 well today without complications.      Electronically signed by James Ornelas MD on 3/3/2021 at 12:33 PM

## 2021-03-04 ENCOUNTER — HOSPITAL ENCOUNTER (OUTPATIENT)
Dept: HYPERBARIC MEDICINE | Age: 66
Discharge: HOME | End: 2021-03-04
Payer: MEDICARE

## 2021-03-04 VITALS
HEART RATE: 54 BPM | DIASTOLIC BLOOD PRESSURE: 72 MMHG | TEMPERATURE: 97.2 F | SYSTOLIC BLOOD PRESSURE: 157 MMHG | RESPIRATION RATE: 16 BRPM

## 2021-03-04 DIAGNOSIS — M72.6 NECROTIZING FASCIITIS (HCC): ICD-10-CM

## 2021-03-04 DIAGNOSIS — T81.89XA NONHEALING SURGICAL WOUND, INITIAL ENCOUNTER: Primary | ICD-10-CM

## 2021-03-04 LAB
GLUCOSE BLD-MCNC: 124 MG/DL (ref 70–99)
GLUCOSE BLD-MCNC: 203 MG/DL (ref 70–99)

## 2021-03-04 PROCEDURE — G0277 HBOT, FULL BODY CHAMBER, 30M: HCPCS

## 2021-03-04 PROCEDURE — 82962 GLUCOSE BLOOD TEST: CPT

## 2021-03-04 ASSESSMENT — PAIN DESCRIPTION - PROGRESSION: CLINICAL_PROGRESSION: GRADUALLY IMPROVING

## 2021-03-04 ASSESSMENT — PAIN DESCRIPTION - FREQUENCY: FREQUENCY: INTERMITTENT

## 2021-03-04 ASSESSMENT — PAIN DESCRIPTION - LOCATION: LOCATION: PERINEUM

## 2021-03-04 ASSESSMENT — PAIN DESCRIPTION - ONSET: ONSET: ON-GOING

## 2021-03-04 NOTE — PROGRESS NOTES
[address] [facility],[address]  Hyperbaric Oxygen Therapy   Progress Note      NAME: Megan Tavares   MEDICAL RECORD NUMBER:  [id number]  AGE: 77 y.o. GENDER: male  : 1955  EPISODE DATE:  3/4/2021     Subjective     HBO Treatment Number: 29 out of Total Treatments: 30    HBO Diagnosis:     Indications: Necrotizing Fasciitis (Necrotizing Soft Tissue Infection)(perineum)        Safety checks performed prior to treatment. See doc flowsheets for documentation. Objective           Recent Labs     21  0740 21  1026   POCGLU 203* 124*       Pre treatment Vital Signs       Temp: 97.7 °F (36.5 °C)     Pulse: 53     Resp: 16     BP: (!) 145/66(Will inform )     Blood Sugar 203    Post treatment Vital Signs  Temp: 97.2 °F (36.2 °C)  Pulse: 54  Resp: 16  BP: (!) 157/72(Will inform )  Blood Sugar 124    Assessment        Physical Exam:  General Appearance:  alert and oriented to person, place and time, well-developed and well-nourished, in no acute distress and alert and oriented to person, place and time    Pre Tympanic Membrane Assessment:  tympanic membranes intact bilaterally, normal color    Post Tympanic Membrane Assessment:  Right: Normal(per Pt.)  Left: Normal(per Pt.)    Pulmonary/Chest:  clear to auscultation bilaterally- no wheezes, rales or rhonchi, normal air movement, no respiratory distress and no chest wall tenderness    Cardiovascular:  normal, regular rate and rhythm    Chamber #: [id number]       Treatment Start Time: 0816     Pressure Reached Time: 0827  JOHANNA : 2  Number of Air Breaks:  Treatment Status: No Air break      Decompression Time: 0957   Treatment End Time: 1007    Symptoms Noted During Treatment: None    TOTAL TIME AT DEPTH-90    TOTAL TIME IN [id number]    Adverse Event: no      I was present on these premises and immediately available to furnish assistance & direction throughout the procedure.        Plan          Megan Tavares is a 77 y.o. male  did successfully complete today's hyperbaric oxygen treatment at [address] [place] [address] Narendra Lopez. In my clinical judgement, ongoing HBO therapy is  necessary at this time, given a threat to patient function, limb or life from the current condition. Supervision and attendance of Hyperbaric Oxygen Therapy provided. Continue HBO treatment as outlined in the treatment plan. Hyperbaric Oxygen: Melinda Tavares tolerated Treatment Number: 29 well today without complications.      Electronically signed by Rogelio Jones MD on 3/4/2021 at 12:06 PM

## 2021-03-05 ENCOUNTER — HOSPITAL ENCOUNTER (OUTPATIENT)
Dept: HYPERBARIC MEDICINE | Age: 66
Discharge: HOME | End: 2021-03-05
Payer: MEDICARE

## 2021-03-05 VITALS
DIASTOLIC BLOOD PRESSURE: 74 MMHG | RESPIRATION RATE: 16 BRPM | SYSTOLIC BLOOD PRESSURE: 161 MMHG | TEMPERATURE: 97.3 F | HEART RATE: 41 BPM

## 2021-03-05 DIAGNOSIS — M72.6 NECROTIZING FASCIITIS (HCC): ICD-10-CM

## 2021-03-05 DIAGNOSIS — T81.89XA NONHEALING SURGICAL WOUND, INITIAL ENCOUNTER: Primary | ICD-10-CM

## 2021-03-05 LAB
GLUCOSE BLD-MCNC: 124 MG/DL (ref 70–99)
GLUCOSE BLD-MCNC: 125 MG/DL (ref 70–99)

## 2021-03-05 PROCEDURE — G0277 HBOT, FULL BODY CHAMBER, 30M: HCPCS

## 2021-03-05 PROCEDURE — 82962 GLUCOSE BLOOD TEST: CPT

## 2021-03-05 ASSESSMENT — PAIN SCALES - GENERAL: PAINLEVEL_OUTOF10: 2

## 2021-03-05 ASSESSMENT — PAIN DESCRIPTION - ONSET: ONSET: ON-GOING

## 2021-03-05 NOTE — PROGRESS NOTES
today's hyperbaric oxygen treatment at [address] [place] [address] Narendra Lopez. In my clinical judgement, ongoing HBO therapy is  necessary at this time, given a threat to patient function, limb or life from the current condition. Supervision and attendance of Hyperbaric Oxygen Therapy provided. Continue HBO treatment as outlined in the treatment plan. Hyperbaric Oxygen: Henrietta Tavares tolerated Treatment Number: 30 well today without complications.      Electronically signed by FAITH Krueger CNP on 3/5/2021 at 12:27 PM

## 2021-03-08 ENCOUNTER — HOSPITAL ENCOUNTER (OUTPATIENT)
Dept: WOUND CARE | Age: 66
Discharge: HOME | End: 2021-03-08
Payer: MEDICARE

## 2021-03-08 ENCOUNTER — HOSPITAL ENCOUNTER (OUTPATIENT)
Dept: HYPERBARIC MEDICINE | Age: 66
Discharge: HOME | End: 2021-03-08
Payer: MEDICARE

## 2021-03-08 VITALS
SYSTOLIC BLOOD PRESSURE: 156 MMHG | DIASTOLIC BLOOD PRESSURE: 81 MMHG | RESPIRATION RATE: 16 BRPM | TEMPERATURE: 96.7 F | HEART RATE: 47 BPM

## 2021-03-08 DIAGNOSIS — S81.802A TRAUMATIC OPEN WOUND OF LEFT LOWER LEG, INITIAL ENCOUNTER: Primary | ICD-10-CM

## 2021-03-08 DIAGNOSIS — T81.89XA NONHEALING SURGICAL WOUND, INITIAL ENCOUNTER: ICD-10-CM

## 2021-03-08 DIAGNOSIS — I89.0 LYMPHEDEMA OF BOTH LOWER EXTREMITIES: ICD-10-CM

## 2021-03-08 DIAGNOSIS — M72.6 NECROTIZING FASCIITIS (HCC): ICD-10-CM

## 2021-03-08 DIAGNOSIS — T81.89XA NONHEALING SURGICAL WOUND, INITIAL ENCOUNTER: Primary | ICD-10-CM

## 2021-03-08 DIAGNOSIS — I87.8 VENOUS STASIS OF BOTH LOWER EXTREMITIES: ICD-10-CM

## 2021-03-08 LAB
GLUCOSE BLD-MCNC: 118 MG/DL (ref 70–99)
GLUCOSE BLD-MCNC: 147 MG/DL (ref 70–99)

## 2021-03-08 PROCEDURE — 82962 GLUCOSE BLOOD TEST: CPT

## 2021-03-08 PROCEDURE — 11042 DBRDMT SUBQ TIS 1ST 20SQCM/<: CPT | Performed by: NURSE PRACTITIONER

## 2021-03-08 PROCEDURE — 11045 DBRDMT SUBQ TISS EACH ADDL: CPT

## 2021-03-08 PROCEDURE — 99183 HYPERBARIC OXYGEN THERAPY: CPT | Performed by: NURSE PRACTITIONER

## 2021-03-08 PROCEDURE — 11045 DBRDMT SUBQ TISS EACH ADDL: CPT | Performed by: NURSE PRACTITIONER

## 2021-03-08 PROCEDURE — G0277 HBOT, FULL BODY CHAMBER, 30M: HCPCS

## 2021-03-08 PROCEDURE — 11042 DBRDMT SUBQ TIS 1ST 20SQCM/<: CPT

## 2021-03-08 RX ORDER — LIDOCAINE 40 MG/G
CREAM TOPICAL ONCE
Status: CANCELLED | OUTPATIENT
Start: 2021-03-08 | End: 2021-03-08

## 2021-03-08 RX ORDER — BACITRACIN ZINC AND POLYMYXIN B SULFATE 500; 1000 [USP'U]/G; [USP'U]/G
OINTMENT TOPICAL ONCE
Status: CANCELLED | OUTPATIENT
Start: 2021-03-08 | End: 2021-03-08

## 2021-03-08 RX ORDER — BETAMETHASONE DIPROPIONATE 0.05 %
OINTMENT (GRAM) TOPICAL ONCE
Status: CANCELLED | OUTPATIENT
Start: 2021-03-08 | End: 2021-03-08

## 2021-03-08 RX ORDER — CLOBETASOL PROPIONATE 0.5 MG/G
OINTMENT TOPICAL ONCE
Status: CANCELLED | OUTPATIENT
Start: 2021-03-08 | End: 2021-03-08

## 2021-03-08 RX ORDER — LIDOCAINE HYDROCHLORIDE 20 MG/ML
JELLY TOPICAL ONCE
Status: CANCELLED | OUTPATIENT
Start: 2021-03-08 | End: 2021-03-08

## 2021-03-08 RX ORDER — GENTAMICIN SULFATE 1 MG/G
OINTMENT TOPICAL ONCE
Status: DISCONTINUED | OUTPATIENT
Start: 2021-03-08 | End: 2021-03-09 | Stop reason: HOSPADM

## 2021-03-08 RX ORDER — LIDOCAINE 50 MG/G
OINTMENT TOPICAL ONCE
Status: CANCELLED | OUTPATIENT
Start: 2021-03-08 | End: 2021-03-08

## 2021-03-08 RX ORDER — GINSENG 100 MG
CAPSULE ORAL ONCE
Status: CANCELLED | OUTPATIENT
Start: 2021-03-08 | End: 2021-03-08

## 2021-03-08 RX ORDER — BACITRACIN, NEOMYCIN, POLYMYXIN B 400; 3.5; 5 [USP'U]/G; MG/G; [USP'U]/G
OINTMENT TOPICAL ONCE
Status: CANCELLED | OUTPATIENT
Start: 2021-03-08 | End: 2021-03-08

## 2021-03-08 RX ORDER — GENTAMICIN SULFATE 1 MG/G
OINTMENT TOPICAL ONCE
Status: CANCELLED | OUTPATIENT
Start: 2021-03-08 | End: 2021-03-08

## 2021-03-08 RX ORDER — LIDOCAINE HYDROCHLORIDE 40 MG/ML
SOLUTION TOPICAL ONCE
Status: CANCELLED | OUTPATIENT
Start: 2021-03-08 | End: 2021-03-08

## 2021-03-08 ASSESSMENT — PAIN SCALES - GENERAL: PAINLEVEL_OUTOF10: 3

## 2021-03-08 ASSESSMENT — PAIN DESCRIPTION - ONSET: ONSET: GRADUAL

## 2021-03-08 ASSESSMENT — PAIN DESCRIPTION - PAIN TYPE
TYPE: CHRONIC PAIN
TYPE: CHRONIC PAIN

## 2021-03-08 ASSESSMENT — PAIN DESCRIPTION - DESCRIPTORS: DESCRIPTORS: BURNING

## 2021-03-08 ASSESSMENT — PAIN DESCRIPTION - PROGRESSION: CLINICAL_PROGRESSION: GRADUALLY IMPROVING

## 2021-03-08 NOTE — PROGRESS NOTES
Wound Care Center Progress Note With Procedure    Gabino Tavares  AGE: 77 y.o. GENDER: male  : 1955  EPISODE DATE:  3/8/2021     Subjective:     Chief Complaint   Patient presents with    Wound Check         HISTORY of PRESENT ILLNESS     Suhas centeno 72 y. o. male who presents today for wound evaluation of Acute non-healing surgical and necrotizing fascitis ulcer(s) of the rectal and perirectal area.  The ulcer is of marked severity.  The underlying cause of the wound is nonhealing surgical post rectal and perirectal incision and drain related to necrotizing fascitis performed by Dr. Tiffani Zhang 2020.   Wound Pain Timing/Severity: waxing and waning, moderate  Quality of pain: aching, tender  Severity of pain:  2 / 10   Modifying Factors: venous stasis, lymphedema, diabetes, poor glucose control, obesity, smoking and anticoagulation therapy  Associated Signs/Symptoms: edema, erythema, drainage and pain        PAST MEDICAL HISTORY        Diagnosis Date    CHF (congestive heart failure) (HCC)     Chronic ulcer of left leg with fat layer exposed ([address] 2016    Chronic ulcer of right leg with fat layer exposed ([address] 2016    Chronic ulcer of right leg with fat layer exposed ([address] 2016    Diabetes mellitus ([address]     Hypertension     PVD (peripheral vascular disease) ([address] 2016    Type 2 diabetes mellitus with diabetic peripheral angiopathy without gangrene, without long-term current use of insulin ([address] 2016    Type 2 diabetes mellitus with diabetic peripheral angiopathy without gangrene, without long-term current use of insulin ([address] 2016    Venous stasis ulcer of both lower extremities without varicose veins ([address] 2016    WD-Traumatic open wound of left lower leg, complicated by diabetes and venous insufficiency 10/8/2020    WD-Ulcer of shin, left, with fat layer exposed ([address] 2016       PAST SURGICAL HISTORY    Past Surgical History: Procedure Laterality Date    RECTAL SURGERY N/A 11/24/2020    RECTAL PERIRECTAL INCISION AND DRAINAGE performed by Jimbo John MD at [address] History   Problem Relation Age of Onset    Asthma Mother     Breast Cancer Mother     Cancer Mother     Diabetes Mother     High Blood Pressure Mother     Cancer Father     Early Death Brother     Arthritis Paternal Grandmother        SOCIAL HISTORY    Social History     Tobacco Use    Smoking status: Current Every Day Smoker     Packs/day: 1.00     Years: 60.00     Pack years: 60.00     Types: Cigarettes    Smokeless tobacco: Never Used    Tobacco comment: healing    Substance Use Topics    Alcohol use: Never     Frequency: Never    Drug use: No       ALLERGIES    No Known Allergies    MEDICATIONS    Current Outpatient Medications on File Prior to Encounter   Medication Sig Dispense Refill    nystatin (MYCOSTATIN) 982030 UNIT/GM powder Apply 3 times daily and as needed to scrotum for moisture control. 30 g 1    magnesium oxide (MAG-OX) 400 MG tablet Take 1 tablet by mouth daily 30 tablet 1    rivaroxaban (XARELTO) 15 MG TABS tablet Take 1 tablet by mouth daily 42 tablet 1    metoprolol succinate (TOPROL XL) 25 MG extended release tablet Take 1 tablet by mouth 2 times daily 30 tablet 3    amLODIPine (NORVASC) 10 MG tablet Take 1 tablet by mouth nightly 30 tablet 3    Latanoprost 0.005 % EMUL Apply to eye daily      ibuprofen (ADVIL;MOTRIN) 600 MG tablet Take 1 tablet by mouth every 6 hours as needed for Pain 30 tablet 0    glipiZIDE (GLUCOTROL) 5 MG tablet Take 5 mg by mouth 2 times daily (before meals)      potassium chloride (KLOR-CON M) 20 MEQ extended release tablet Take 20 mEq by mouth daily      ALPRAZolam (XANAX) 0.5 MG tablet Take 0.5 mg by mouth nightly. Armidaa Boast ipratropium-albuterol (DUONEB) 0.5-2.5 (3) MG/3ML SOLN nebulizer solution Inhale 1 vial into the lungs every 4 hours      albuterol sulfate HFA 108 (90 Base) MCG/ACT inhaler Inhale 2 puffs into the lungs every 6 hours as needed for Wheezing      aspirin EC 81 MG EC tablet Take 1 tablet by mouth daily 30 tablet 3    HYDROcodone-acetaminophen (NORCO) 7.5-325 MG per tablet Take 1 tablet by mouth 3 times daily .  metFORMIN (GLUCOPHAGE) 1000 MG tablet Take 1,000 mg by mouth 2 times daily (with meals)      lisinopril (PRINIVIL;ZESTRIL) 20 MG tablet Take 40 mg by mouth daily       tamsulosin (FLOMAX) 0.4 MG capsule Take 0.4 mg by mouth daily      finasteride (PROSCAR) 5 MG tablet Take 5 mg by mouth daily      amiodarone (CORDARONE) 200 MG tablet Take 1 tablet by mouth daily 30 tablet 0     No current facility-administered medications on file prior to encounter. REVIEW OF SYSTEMS    Pertinent items are noted in HPI. Constitutional: Negative for systemic symptoms including fever, chills and malaise. Objective: There were no vitals taken for this visit. PHYSICAL EXAM    General: The patient is in no acute distress. Mental status:  Patient is appropriate, is  oriented to place and plan of care.   Dermatologic exam: Visual inspection of the periwound reveals the skin to be normal in turgor and texture  Wound exam: see wound description below in procedure note      Assessment:     Problem List Items Addressed This Visit     WD-Venous stasis of both lower extremities    Relevant Medications    gentamicin (GARAMYCIN) 0.1 % ointment (Start on 3/8/2021 12:00 PM)    WD-Lymphedema of both lower extremities    Relevant Medications    gentamicin (GARAMYCIN) 0.1 % ointment (Start on 3/8/2021 12:00 PM)    WD-Traumatic open wound of left lower leg, complicated by diabetes and venous insufficiency - Primary    Relevant Orders    Supply: Wound Cleanser    Supply: Wound Dressings    Supply: Cover and Secure    HBO-Nonhealing surgical wound groin & buttock, initial encounter    Relevant Medications    gentamicin (GARAMYCIN) 0.1 % ointment (Start on 3/8/2021 12:00 PM)    HBO-Necrotizing fasciitis (HCC)    Relevant Medications    gentamicin (GARAMYCIN) 0.1 % ointment (Start on 3/8/2021 12:00 PM)        Procedure Note    Indications:  Based on my examination of this patient's wound(s) today, sharp excision into necrotic subcutaneous tissue is required to promote healing and evaluate the extent of previous healing. Performed by: FAITH Allen CNP    Consent obtained: Yes    Time out taken:  Yes    Pain Control: Anesthetic  Anesthetic: 4% Lidocaine Liquid Topical     Debridement:Excisional Debridement    Using curette the wound(s) was/were sharply debrided down through and including the removal of subcutaneous tissue. Devitalized Tissue Debrided:  slough and exudate    Pre Debridement Measurements:  Are located in the Wound Documentation Flow Sheet    All active wounds listed below with today's date are evaluated  Wound(s)    debrided this date include # : 2     Post  Debridement Measurements:  Wound 11/25/20 Scrotum #2 Perineum (Active)   Wound Image   03/01/21 1042   Wound Etiology Surgical 03/08/21 1116   Dressing Status New dressing applied;Clean;Dry; Intact 03/01/21 1147   Wound Cleansed Soap and water 03/08/21 1116   Dressing/Treatment ABD; Alginate with Ag;Moisture barrier;Collagen 01/18/21 1213   Offloading for Diabetic Foot Ulcers Other (comment) 03/08/21 1116   Wound Length (cm) 17 cm 03/08/21 1116   Wound Width (cm) 3 cm 03/08/21 1116   Wound Depth (cm) 0.1 cm 03/08/21 1116   Wound Surface Area (cm^2) 51 cm^2 03/08/21 1116   Change in Wound Size % (l*w) 59.2 03/08/21 1116   Wound Volume (cm^3) 5.1 cm^3 03/08/21 1116   Wound Healing % 99 03/08/21 1116   Post-Procedure Length (cm) 17 cm 03/08/21 1135   Post-Procedure Width (cm) 3 cm 03/08/21 1135   Post-Procedure Depth (cm) 0.1 cm 03/08/21 1135   Post-Procedure Surface Area (cm^2) 51 cm^2 03/08/21 1135   Post-Procedure Volume (cm^3) 5.1 cm^3 03/08/21 1135   Distance Tunneling (cm) 0 cm 03/08/21 1116   Tunneling Position ___ O'Clock 0 03/08/21 1116   Undermining Starts ___ O'Clock 0 03/08/21 1116   Undermining Ends___ O'Clock 0 03/08/21 1116   Undermining Maxium Distance (cm) 0 03/08/21 1116   Wound Assessment Granulation tissue 03/08/21 1116   Drainage Amount Moderate 03/08/21 1116   Drainage Description Serosanguinous 03/08/21 1116   Odor None 03/08/21 1116   Merary-wound Assessment Intact 03/08/21 1116   Margins Defined edges 03/08/21 1116   Wound Thickness Description not for Pressure Injury Full thickness 03/08/21 1116   Number of days: 103       Percent of Wound(s) Debrided: approximately 100%    Total  Area  Debrided:  51 sq cm     Bleeding:  Minimal    Hemostasis Achieved:  by pressure    Procedural Pain:  0  / 10     Post Procedural Pain:  0 / 10     Response to treatment:  Well tolerated by patient. Status of wound progress and description from last visit: Stable, continue regimen. The patient continues to benefit from hyperbaric therapy, and is tolerating hyperbaric treatment without difficulty or ill effects. He hasl complete 30 hyperbaric sessions as of 3/5/2021 and will start 10 more sessions 3/8/2021. Plan:       Discharge Instructions       PHYSICIAN ORDERS AND DISCHARGE INSTRUCTIONS     NOTE: Upon discharge from the [address], you will receive a patient experience survey.  We would be grateful if you would take the time to fill this survey out.     Wound care order history:                 ANDREW's   Right       Left               Date:              Cultures: 1/15/21               Grafts:                Antibiotics:           Continuing wound care orders and information:                 Residence:  Home              Continue home health care with: [facility]              Your wound-care supplies will be provided by:      Wound cleansing:               GE not scrub or use excessive force.              Wash hands with soap and water before and after dressing changes.             XMYXV to applying a clean dressing, cleanse wound with normal saline, wound cleanser, or mild soap and water.               Ask the physician or nurse before getting the wound(s) wet in a shower     Daily Wound management:              Keep weight off wounds and reposition every 2 hours.              Avoid standing for long periods of time.              Apply wraps/stockings in AM and remove at bedtime.              If swelling is present, elevate legs to the level of the heart or above for 30 minutes 4-5 times a day and/or when sitting.                                      When taking antibiotics take entire prescription as ordered by physician do not stop taking until medicine is all gone.                                                      Orders for this week:  3/8/21     Left Lower leg-- Healed         Perineum -  Wash with hibicleanse and water. Rinse with saline. Pat dry with 4x4. Apply Gentamycin and Stimulin powder to base of wound   Apply desitin to periwound. Nystatin powder to groin. Cover with Ag+ alginate, and ABD pad and secure with paper tape. Maceo to Change Daily and with bowel movements.            Follow up with Dahlia HENDERSON at the wound care center in 1 week on Monday   Call [phone] for any questions or concerns.   Date__________   Time__________        Treatment Note      Written Patient Dismissal Instructions Given            Electronically signed by FAITH Bermeo CNP on 3/8/2021 at 11:49 AM

## 2021-03-09 ENCOUNTER — HOSPITAL ENCOUNTER (OUTPATIENT)
Dept: HYPERBARIC MEDICINE | Age: 66
Discharge: HOME | End: 2021-03-09
Payer: MEDICARE

## 2021-03-09 VITALS
TEMPERATURE: 97.2 F | SYSTOLIC BLOOD PRESSURE: 132 MMHG | HEART RATE: 46 BPM | DIASTOLIC BLOOD PRESSURE: 72 MMHG | RESPIRATION RATE: 16 BRPM

## 2021-03-09 DIAGNOSIS — T81.89XA NONHEALING SURGICAL WOUND, INITIAL ENCOUNTER: Primary | ICD-10-CM

## 2021-03-09 DIAGNOSIS — M72.6 NECROTIZING FASCIITIS (HCC): ICD-10-CM

## 2021-03-09 LAB
GLUCOSE BLD-MCNC: 108 MG/DL (ref 70–99)
GLUCOSE BLD-MCNC: 125 MG/DL (ref 70–99)

## 2021-03-09 PROCEDURE — 99183 HYPERBARIC OXYGEN THERAPY: CPT | Performed by: NURSE PRACTITIONER

## 2021-03-09 PROCEDURE — G0277 HBOT, FULL BODY CHAMBER, 30M: HCPCS

## 2021-03-09 PROCEDURE — 82962 GLUCOSE BLOOD TEST: CPT

## 2021-03-09 ASSESSMENT — PAIN DESCRIPTION - LOCATION: LOCATION: PERINEUM

## 2021-03-09 ASSESSMENT — PAIN - FUNCTIONAL ASSESSMENT: PAIN_FUNCTIONAL_ASSESSMENT: PREVENTS OR INTERFERES SOME ACTIVE ACTIVITIES AND ADLS

## 2021-03-09 ASSESSMENT — PAIN DESCRIPTION - PAIN TYPE: TYPE: CHRONIC PAIN

## 2021-03-09 NOTE — PROGRESS NOTES
[facility]  Hyperbaric Oxygen Therapy   Progress Note      NAME: Raissa Tavares   MEDICAL RECORD NUMBER:  [id number]  AGE: 77 y.o. GENDER: male  : 1955  EPISODE DATE:  3/9/2021     Subjective     HBO Treatment Number: 32 out of Total Treatments: 40    HBO Diagnosis:     Indications: Necrotizing Fasciitis (Necrotizing Soft Tissue Infection)(perineum)        Safety checks performed prior to treatment. See doc flowsheets for documentation. Objective           Recent Labs     21  0756 21  1020   POCGLU 125* 108*       Pre treatment Vital Signs       Temp: 97.7 °F (36.5 °C)     Pulse: 54     Resp: 16     BP: (!) 152/77(SN will inform SANTIAGO Villagomez)     Blood Sugar 125    Post treatment Vital Signs  Temp: 97.2 °F (36.2 °C)  Pulse: (!) 46(no c/o voiced with low pulse)  Resp: 16  BP: 132/72  Blood Sugar 108    Assessment        Physical Exam:  General Appearance:  alert and oriented to person, place and time, well-developed and well-nourished, in no acute distress    Pre Tympanic Membrane Assessment:  tympanic membranes intact bilaterally    Post Tympanic Membrane Assessment:  Right: Normal(per Pt.)  Left: Normal(per Pt.)    Pulmonary/Chest:  clear to auscultation bilaterally- no wheezes, rales or rhonchi, normal air movement, no respiratory distress    Cardiovascular:  normal, regular rate and rhythm    Chamber #: [id number]       Treatment Start Time: 0805     Pressure Reached Time: 0815  JOHANNA : 2  Number of Air Breaks:  Treatment Status: No Air break      Decompression Time: 0945   Treatment End Time: 5352    Symptoms Noted During Treatment: None    Adverse Event: no      Total time in chamber: 110  Minutes at depth: 90    I was present on these premises and immediately available to furnish assistance & direction throughout the procedure.        Plan          Raissa Tavares is a 77 y.o. male  did successfully complete today's hyperbaric oxygen treatment at [facility] ([facility])

## 2021-03-10 ENCOUNTER — HOSPITAL ENCOUNTER (OUTPATIENT)
Dept: HYPERBARIC MEDICINE | Age: 66
Discharge: HOME | End: 2021-03-10
Payer: MEDICARE

## 2021-03-10 VITALS
TEMPERATURE: 97 F | RESPIRATION RATE: 16 BRPM | HEART RATE: 51 BPM | SYSTOLIC BLOOD PRESSURE: 156 MMHG | DIASTOLIC BLOOD PRESSURE: 82 MMHG

## 2021-03-10 DIAGNOSIS — M72.6 NECROTIZING FASCIITIS (HCC): ICD-10-CM

## 2021-03-10 DIAGNOSIS — T81.89XA NONHEALING SURGICAL WOUND, INITIAL ENCOUNTER: Primary | ICD-10-CM

## 2021-03-10 LAB
GLUCOSE BLD-MCNC: 109 MG/DL (ref 70–99)
GLUCOSE BLD-MCNC: 94 MG/DL (ref 70–99)

## 2021-03-10 PROCEDURE — G0277 HBOT, FULL BODY CHAMBER, 30M: HCPCS

## 2021-03-10 PROCEDURE — 82962 GLUCOSE BLOOD TEST: CPT

## 2021-03-10 ASSESSMENT — PAIN DESCRIPTION - LOCATION: LOCATION: PERINEUM

## 2021-03-10 ASSESSMENT — PAIN DESCRIPTION - ONSET: ONSET: GRADUAL

## 2021-03-10 ASSESSMENT — PAIN DESCRIPTION - DESCRIPTORS: DESCRIPTORS: BURNING

## 2021-03-10 NOTE — PROGRESS NOTES
[facility]  Hyperbaric Oxygen Therapy   Progress Note      NAME: Patsy Tavares   MEDICAL RECORD NUMBER:  [id number]  AGE: 77 y.o. GENDER: male  : 1955  EPISODE DATE:  3/10/2021     Subjective     HBO Treatment Number: 33 out of Total Treatments: 40    HBO Diagnosis:     Indications: Necrotizing Fasciitis (Necrotizing Soft Tissue Infection)(perineum)        Safety checks performed prior to treatment. See doc flowsheets for documentation. Objective           Recent Labs     03/10/21  0757 03/10/21  1034   POCGLU 109* 94       Pre treatment Vital Signs       Temp: 97.6 °F (36.4 °C)     Pulse: 56     Resp: 16     BP: (!) 164/75(Dr. Pierre will be informed; no s/s Hypertension reported)     Blood Sugar 33    Post treatment Vital Signs  Temp: 97 °F (36.1 °C)  Pulse: 51  Resp: 16  BP: (!) 156/82(SN will inform )  Blood Sugar 40    Assessment        Physical Exam:  General Appearance:  alert and oriented to person, place and time, well-developed and well-nourished, in no acute distress    Pre Tympanic Membrane Assessment:  tympanic membranes intact bilaterally, normal color, normal light reflex bilaterally    Post Tympanic Membrane Assessment:  Right: Normal(per Pt.)  Left: Normal(per Pt.)    Pulmonary/Chest:  clear to auscultation bilaterally- no wheezes, rales or rhonchi, normal air movement, no respiratory distress    Cardiovascular:  regular rate and rhythm, no murmurs rubs or gallops    Chamber #: [id number]       Treatment Start Time: 0804     Pressure Reached Time: 0813  JOHANNA : 2  Number of Air Breaks:  Treatment Status: No Air break      Decompression Time: 09   Treatment End Time: 5467    Symptoms Noted During Treatment: None    TOTAL TIME AT DEPTH-90    TOTAL TIME IN CHAMBER-109    Adverse Event: no      I was present on these premises and immediately available to furnish assistance & direction throughout the procedure. Plan          Patsy Tavares is a 77 y.o. male  did successfully complete today's hyperbaric oxygen treatment at [address] [place] [address] [employer]. In my clinical judgement, ongoing HBO therapy is  necessary at this time, given a threat to patient function, limb or life from the current condition. Supervision and attendance of Hyperbaric Oxygen Therapy provided. Continue HBO treatment as outlined in the treatment plan. Hyperbaric Oxygen: Yulisa [employer] tolerated Treatment Number: 33 well today without complications.      Electronically signed by Anjana Blancas MD on 3/10/2021 at 12:33 PM

## 2021-03-11 ENCOUNTER — HOSPITAL ENCOUNTER (OUTPATIENT)
Dept: HYPERBARIC MEDICINE | Age: 66
Discharge: HOME | End: 2021-03-11
Payer: MEDICARE

## 2021-03-11 VITALS
TEMPERATURE: 97.5 F | SYSTOLIC BLOOD PRESSURE: 165 MMHG | DIASTOLIC BLOOD PRESSURE: 81 MMHG | RESPIRATION RATE: 16 BRPM | HEART RATE: 53 BPM

## 2021-03-11 DIAGNOSIS — M72.6 NECROTIZING FASCIITIS (HCC): ICD-10-CM

## 2021-03-11 DIAGNOSIS — T81.89XA NONHEALING SURGICAL WOUND, INITIAL ENCOUNTER: Primary | ICD-10-CM

## 2021-03-11 LAB
GLUCOSE BLD-MCNC: 125 MG/DL (ref 70–99)
GLUCOSE BLD-MCNC: 95 MG/DL (ref 70–99)

## 2021-03-11 PROCEDURE — G0277 HBOT, FULL BODY CHAMBER, 30M: HCPCS

## 2021-03-11 PROCEDURE — 82962 GLUCOSE BLOOD TEST: CPT

## 2021-03-11 ASSESSMENT — PAIN - FUNCTIONAL ASSESSMENT: PAIN_FUNCTIONAL_ASSESSMENT: PREVENTS OR INTERFERES SOME ACTIVE ACTIVITIES AND ADLS

## 2021-03-11 ASSESSMENT — PAIN DESCRIPTION - LOCATION: LOCATION: PERINEUM

## 2021-03-11 ASSESSMENT — PAIN DESCRIPTION - DESCRIPTORS: DESCRIPTORS: BURNING

## 2021-03-11 ASSESSMENT — PAIN DESCRIPTION - PAIN TYPE: TYPE: CHRONIC PAIN

## 2021-03-11 ASSESSMENT — PAIN SCALES - GENERAL: PAINLEVEL_OUTOF10: 2

## 2021-03-11 NOTE — PROGRESS NOTES
[facility],[address]  Hyperbaric Oxygen Therapy   Progress Note      NAME: Merlyn Tavares   MEDICAL RECORD NUMBER:  [id number]  AGE: 77 y.o. GENDER: male  : 1955  EPISODE DATE:  3/11/2021     Subjective     HBO Treatment Number: 34 out of Total Treatments: 40    HBO Diagnosis:     Indications: Necrotizing Fasciitis (Necrotizing Soft Tissue Infection)(Perineum)        Safety checks performed prior to treatment. See doc flowsheets for documentation.     Objective           Recent Labs     21  0753 21  1021   POCGLU 125* 95       Pre treatment Vital Signs       Temp: 97.4 °F (36.3 °C)     Pulse: 57     Resp: 16     BP: (!) 156/79(SN will inform )     Blood Sugar 125    Post treatment Vital Signs  Temp: 97.5 °F (36.4 °C)  Pulse: 53  Resp: 16  BP: (!) 165/81(SN will inform Dr. Remy Borges)  Blood Sugar 95    Assessment        Physical Exam:  General Appearance:  alert and oriented to person, place and time, well-developed and well-nourished, in no acute distress, alert and oriented to person, place and time, well-developed and well nourished, in no acute distress and alert    Pre Tympanic Membrane Assessment:  tympanic membranes intact bilaterally, normal color    Post Tympanic Membrane Assessment:  Right: Normal(per Pt.)  Left: Normal(Per Pt.)    Pulmonary/Chest:  clear to auscultation bilaterally- no wheezes, rales or rhonchi, normal air movement, no respiratory distress and no chest wall tenderness    Cardiovascular:  normal, regular rate and rhythm    Chamber #: [id number]       Treatment Start Time: 0810     Pressure Reached Time: 0820  JOHANNA : 2  Number of Air Breaks:  Treatment Status: No Air break      Decompression Time: 0950   Treatment End Time: 1000    Symptoms Noted During Treatment: None    TOTAL TIME AT DEPTH-90    TOTAL TIME IN CHAMBER-110    Adverse Event: no      I was present on these premises and immediately available to furnish assistance & direction throughout the procedure. Plan          Baron Tavares is a 77 y.o. male  did successfully complete today's hyperbaric oxygen treatment at [address] [place] [address]. In my clinical judgement, ongoing HBO therapy is  necessary at this time, given a threat to patient function, limb or life from the current condition. Supervision and attendance of Hyperbaric Oxygen Therapy provided. Continue HBO treatment as outlined in the treatment plan. Hyperbaric Oxygen: Baron Tavares tolerated Treatment Number: 34 well today without complications.      Electronically signed by Jerry Pino MD on 3/11/2021 at 11:43 AM

## 2021-03-12 ENCOUNTER — HOSPITAL ENCOUNTER (OUTPATIENT)
Dept: HYPERBARIC MEDICINE | Age: 66
Discharge: HOME | End: 2021-03-12
Payer: MEDICARE

## 2021-03-12 VITALS
TEMPERATURE: 97.3 F | DIASTOLIC BLOOD PRESSURE: 75 MMHG | RESPIRATION RATE: 16 BRPM | HEART RATE: 55 BPM | SYSTOLIC BLOOD PRESSURE: 164 MMHG

## 2021-03-12 DIAGNOSIS — T81.89XA NONHEALING SURGICAL WOUND, INITIAL ENCOUNTER: Primary | ICD-10-CM

## 2021-03-12 DIAGNOSIS — M72.6 NECROTIZING FASCIITIS (HCC): ICD-10-CM

## 2021-03-12 LAB
GLUCOSE BLD-MCNC: 132 MG/DL (ref 70–99)
GLUCOSE BLD-MCNC: 154 MG/DL (ref 70–99)

## 2021-03-12 PROCEDURE — G0277 HBOT, FULL BODY CHAMBER, 30M: HCPCS

## 2021-03-12 PROCEDURE — 82962 GLUCOSE BLOOD TEST: CPT

## 2021-03-12 ASSESSMENT — PAIN DESCRIPTION - PROGRESSION: CLINICAL_PROGRESSION: GRADUALLY IMPROVING

## 2021-03-12 ASSESSMENT — PAIN DESCRIPTION - ONSET: ONSET: GRADUAL

## 2021-03-12 ASSESSMENT — PAIN DESCRIPTION - DESCRIPTORS: DESCRIPTORS: BURNING

## 2021-03-12 ASSESSMENT — PAIN - FUNCTIONAL ASSESSMENT: PAIN_FUNCTIONAL_ASSESSMENT: PREVENTS OR INTERFERES SOME ACTIVE ACTIVITIES AND ADLS

## 2021-03-12 ASSESSMENT — PAIN DESCRIPTION - FREQUENCY: FREQUENCY: INTERMITTENT

## 2021-03-12 NOTE — PROGRESS NOTES
HBO therapy is  necessary at this time, given a threat to patient function, limb or life from the current condition. Supervision and attendance of Hyperbaric Oxygen Therapy provided. Continue HBO treatment as outlined in the treatment plan. Hyperbaric Oxygen: Debby Sotero Tavares tolerated Treatment Number: 35 well today without complications.      Electronically signed by FAITH Muller CNP on 3/12/2021 at 12:13 PM

## 2021-03-15 ENCOUNTER — HOSPITAL ENCOUNTER (OUTPATIENT)
Dept: WOUND CARE | Age: 66
Discharge: HOME | End: 2021-03-15
Payer: MEDICARE

## 2021-03-15 ENCOUNTER — HOSPITAL ENCOUNTER (OUTPATIENT)
Dept: HYPERBARIC MEDICINE | Age: 66
Discharge: HOME | End: 2021-03-15
Payer: MEDICARE

## 2021-03-15 VITALS
SYSTOLIC BLOOD PRESSURE: 152 MMHG | TEMPERATURE: 96.9 F | RESPIRATION RATE: 20 BRPM | DIASTOLIC BLOOD PRESSURE: 77 MMHG | HEART RATE: 54 BPM

## 2021-03-15 DIAGNOSIS — T81.89XA NONHEALING SURGICAL WOUND, INITIAL ENCOUNTER: Primary | ICD-10-CM

## 2021-03-15 DIAGNOSIS — I87.8 VENOUS STASIS OF BOTH LOWER EXTREMITIES: ICD-10-CM

## 2021-03-15 DIAGNOSIS — M72.6 NECROTIZING FASCIITIS (HCC): ICD-10-CM

## 2021-03-15 DIAGNOSIS — S81.802A TRAUMATIC OPEN WOUND OF LEFT LOWER LEG, INITIAL ENCOUNTER: Primary | ICD-10-CM

## 2021-03-15 DIAGNOSIS — T81.89XA NONHEALING SURGICAL WOUND, INITIAL ENCOUNTER: ICD-10-CM

## 2021-03-15 DIAGNOSIS — I89.0 LYMPHEDEMA OF BOTH LOWER EXTREMITIES: ICD-10-CM

## 2021-03-15 LAB
GLUCOSE BLD-MCNC: 114 MG/DL (ref 70–99)
GLUCOSE BLD-MCNC: 140 MG/DL (ref 70–99)

## 2021-03-15 PROCEDURE — 11042 DBRDMT SUBQ TIS 1ST 20SQCM/<: CPT | Performed by: NURSE PRACTITIONER

## 2021-03-15 PROCEDURE — 11042 DBRDMT SUBQ TIS 1ST 20SQCM/<: CPT

## 2021-03-15 PROCEDURE — 82962 GLUCOSE BLOOD TEST: CPT

## 2021-03-15 PROCEDURE — 99183 HYPERBARIC OXYGEN THERAPY: CPT | Performed by: NURSE PRACTITIONER

## 2021-03-15 PROCEDURE — G0277 HBOT, FULL BODY CHAMBER, 30M: HCPCS

## 2021-03-15 PROCEDURE — 11045 DBRDMT SUBQ TISS EACH ADDL: CPT

## 2021-03-15 PROCEDURE — 11045 DBRDMT SUBQ TISS EACH ADDL: CPT | Performed by: NURSE PRACTITIONER

## 2021-03-15 RX ORDER — LIDOCAINE 50 MG/G
OINTMENT TOPICAL ONCE
Status: CANCELLED | OUTPATIENT
Start: 2021-03-15 | End: 2021-03-15

## 2021-03-15 RX ORDER — LIDOCAINE HYDROCHLORIDE 20 MG/ML
JELLY TOPICAL ONCE
Status: CANCELLED | OUTPATIENT
Start: 2021-03-15 | End: 2021-03-15

## 2021-03-15 RX ORDER — BETAMETHASONE DIPROPIONATE 0.05 %
OINTMENT (GRAM) TOPICAL ONCE
Status: CANCELLED | OUTPATIENT
Start: 2021-03-15 | End: 2021-03-15

## 2021-03-15 RX ORDER — BACITRACIN, NEOMYCIN, POLYMYXIN B 400; 3.5; 5 [USP'U]/G; MG/G; [USP'U]/G
OINTMENT TOPICAL ONCE
Status: CANCELLED | OUTPATIENT
Start: 2021-03-15 | End: 2021-03-15

## 2021-03-15 RX ORDER — BACITRACIN ZINC AND POLYMYXIN B SULFATE 500; 1000 [USP'U]/G; [USP'U]/G
OINTMENT TOPICAL ONCE
Status: CANCELLED | OUTPATIENT
Start: 2021-03-15 | End: 2021-03-15

## 2021-03-15 RX ORDER — GINSENG 100 MG
CAPSULE ORAL ONCE
Status: CANCELLED | OUTPATIENT
Start: 2021-03-15 | End: 2021-03-15

## 2021-03-15 RX ORDER — CLOBETASOL PROPIONATE 0.5 MG/G
OINTMENT TOPICAL ONCE
Status: CANCELLED | OUTPATIENT
Start: 2021-03-15 | End: 2021-03-15

## 2021-03-15 RX ORDER — LIDOCAINE HYDROCHLORIDE 40 MG/ML
SOLUTION TOPICAL ONCE
Status: CANCELLED | OUTPATIENT
Start: 2021-03-15 | End: 2021-03-15

## 2021-03-15 RX ORDER — GENTAMICIN SULFATE 1 MG/G
OINTMENT TOPICAL ONCE
Status: CANCELLED | OUTPATIENT
Start: 2021-03-15 | End: 2021-03-15

## 2021-03-15 RX ORDER — LIDOCAINE 40 MG/G
CREAM TOPICAL ONCE
Status: CANCELLED | OUTPATIENT
Start: 2021-03-15 | End: 2021-03-15

## 2021-03-15 RX ORDER — GENTAMICIN SULFATE 1 MG/G
OINTMENT TOPICAL ONCE
Status: DISCONTINUED | OUTPATIENT
Start: 2021-03-15 | End: 2021-03-16 | Stop reason: HOSPADM

## 2021-03-15 RX ORDER — LIDOCAINE HYDROCHLORIDE 40 MG/ML
SOLUTION TOPICAL ONCE
Status: DISCONTINUED | OUTPATIENT
Start: 2021-03-15 | End: 2021-03-16 | Stop reason: HOSPADM

## 2021-03-15 ASSESSMENT — PAIN DESCRIPTION - LOCATION: LOCATION: PERINEUM

## 2021-03-15 ASSESSMENT — PAIN DESCRIPTION - DESCRIPTORS: DESCRIPTORS: BURNING

## 2021-03-15 ASSESSMENT — PAIN DESCRIPTION - PAIN TYPE: TYPE: CHRONIC PAIN

## 2021-03-15 ASSESSMENT — PAIN DESCRIPTION - FREQUENCY: FREQUENCY: INTERMITTENT

## 2021-03-15 NOTE — PROGRESS NOTES
[facility]  Hyperbaric Oxygen Therapy   Progress Note      NAME: Ellie Tavares   MEDICAL RECORD NUMBER:  [id number]  AGE: 77 y.o. GENDER: male  : 1955  EPISODE DATE:  3/15/2021     Subjective     HBO Treatment Number: 36 out of Total Treatments: 40    HBO Diagnosis:     Indications: Necrotizing Fasciitis (Necrotizing Soft Tissue Infection)(Perineum)        Safety checks performed prior to treatment. See doc flowsheets for documentation. Objective           Recent Labs     03/15/21  0751 03/15/21  1013   POCGLU 140* 114*       Pre treatment Vital Signs       Temp: 97.7 °F (36.5 °C)     Pulse: 50     Resp: 20     BP: (!) 155/76(SN will inform SANTIAGO Villagomez)     Blood Sugar 140    Post treatment Vital Signs  Temp: 96.9 °F (36.1 °C)  Pulse: 54  Resp: 20  BP: (!) 152/77(SN will inform SANTIAGO Villagomez)  Blood Sugar 114    Assessment        Physical Exam:  General Appearance:  alert and oriented to person, place and time, well-developed and well-nourished, in no acute distress    Pre Tympanic Membrane Assessment:  tympanic membranes intact bilaterally    Post Tympanic Membrane Assessment:  Right: Normal(Per Pt.)  Left: Normal(Per Pt.)    Pulmonary/Chest:  clear to auscultation bilaterally- no wheezes, rales or rhonchi, normal air movement, no respiratory distress    Cardiovascular:  normal, regular rate and rhythm    Chamber #: [id number]       Treatment Start Time: 0807     Pressure Reached Time: 0817  JOHANNA : 2  Number of Air Breaks:  Treatment Status: No Air break      Decompression Time: 0947   Treatment End Time: 9767    Symptoms Noted During Treatment: None    Adverse Event: no      Total time in chamber: 111  Minutes at depth: 90    I was present on these premises and immediately available to furnish assistance & direction throughout the procedure.        Plan          Ellie Tavares is a 77 y.o. male  did successfully complete today's hyperbaric oxygen treatment at [facility]. In my clinical judgement, ongoing HBO therapy is  necessary at this time, given a threat to patient function, limb or life from the current condition. Supervision and attendance of Hyperbaric Oxygen Therapy provided. Continue HBO treatment as outlined in the treatment plan. Hyperbaric Oxygen: Deidre Tavares tolerated Treatment Number: 36 well today without complications.      Electronically signed by FAITH Serrato CNP on 3/15/2021 at 5:33 PM

## 2021-03-15 NOTE — PROGRESS NOTES
Wound Care Center Progress Note With Procedure    Gabino Tavares  AGE: 77 y.o. GENDER: male  : 1955  EPISODE DATE:  3/15/2021     Subjective:     Chief Complaint   Patient presents with    Wound Check     perineum         HISTORY of PRESENT ILLNESS        [place] cent[place] 72 y. o. male who presents today for wound evaluation of Acute non-healing surgical and necrotizing fascitis ulcer(s) of the rectal and perirectal area.  The ulcer is of moderate severity. The underlying cause of the wound is nonhealing surgical post rectal and perirectal incision and drain related to necrotizing fascitis performed by Dr. Loraine Jesus 2020.   Wound Pain Timing/Severity: waxing and waning, moderate  Quality of pain: aching, tender  Severity of pain:  1 / 10   Modifying Factors: venous stasis, lymphedema, diabetes, poor glucose control, obesity, smoking and anticoagulation therapy  Associated Signs/Symptoms: edema, erythema, drainage and pain        PAST MEDICAL HISTORY        Diagnosis Date    CHF (congestive heart failure) (HCC)     Chronic ulcer of left leg with fat layer exposed ([address] 2016    Chronic ulcer of right leg with fat layer exposed ([address] 2016    Chronic ulcer of right leg with fat layer exposed ([address] 2016    Diabetes mellitus ([address]     Hypertension     PVD (peripheral vascular disease) ([address] 2016    Type 2 diabetes mellitus with diabetic peripheral angiopathy without gangrene, without long-term current use of insulin ([address] 2016    Type 2 diabetes mellitus with diabetic peripheral angiopathy without gangrene, without long-term current use of insulin ([address] 2016    Venous stasis ulcer of both lower extremities without varicose veins ([address] 2016    WD-Traumatic open wound of left lower leg, complicated by diabetes and venous insufficiency 10/8/2020    WD-Ulcer of shin, left, with fat layer exposed ([address] 2016       PAST SURGICAL HISTORY    Past Surgical tamsulosin (FLOMAX) 0.4 MG capsule Take 0.4 mg by mouth daily      finasteride (PROSCAR) 5 MG tablet Take 5 mg by mouth daily      nystatin (MYCOSTATIN) 507786 UNIT/GM powder Apply 3 times daily and as needed to scrotum for moisture control. 30 g 1    amiodarone (CORDARONE) 200 MG tablet Take 1 tablet by mouth daily 30 tablet 0    ibuprofen (ADVIL;MOTRIN) 600 MG tablet Take 1 tablet by mouth every 6 hours as needed for Pain 30 tablet 0    albuterol sulfate  (90 Base) MCG/ACT inhaler Inhale 2 puffs into the lungs every 6 hours as needed for Wheezing      HYDROcodone-acetaminophen (NORCO) 7.5-325 MG per tablet Take 1 tablet by mouth 3 times daily . No current facility-administered medications on file prior to encounter. REVIEW OF SYSTEMS    Pertinent items are noted in HPI. Constitutional: Negative for systemic symptoms including fever, chills and malaise. Objective: There were no vitals taken for this visit. PHYSICAL EXAM    General: The patient is in no acute distress. Mental status:  Patient is appropriate, is  oriented to place and plan of care.   Dermatologic exam: Visual inspection of the periwound reveals the skin to be normal in turgor and texture  Wound exam: see wound description below in procedure note      Assessment:     Problem List Items Addressed This Visit     WD-Venous stasis of both lower extremities    Relevant Medications    lidocaine (XYLOCAINE) 4 % external solution    gentamicin (GARAMYCIN) 0.1 % ointment (Start on 3/15/2021 12:00 PM)    WD-Lymphedema of both lower extremities    Relevant Medications    lidocaine (XYLOCAINE) 4 % external solution    gentamicin (GARAMYCIN) 0.1 % ointment (Start on 3/15/2021 12:00 PM)    WD-Traumatic open wound of left lower leg, complicated by diabetes and venous insufficiency - Primary    Relevant Orders    Supply: Wound Cleanser    Supply: Wound Dressings    Supply: Cover and Secure    HBO-Nonhealing surgical wound groin & buttock, initial encounter    Relevant Medications    lidocaine (XYLOCAINE) 4 % external solution    gentamicin (GARAMYCIN) 0.1 % ointment (Start on 3/15/2021 12:00 PM)    HBO-Necrotizing fasciitis (HCC)    Relevant Medications    lidocaine (XYLOCAINE) 4 % external solution    gentamicin (GARAMYCIN) 0.1 % ointment (Start on 3/15/2021 12:00 PM)        Procedure Note    Indications:  Based on my examination of this patient's wound(s) today, sharp excision into necrotic subcutaneous tissue is required to promote healing and evaluate the extent of previous healing. Performed by: FAITH Puentes CNP    Consent obtained: Yes    Time out taken:  Yes    Pain Control: Anesthetic  Anesthetic: 4% Lidocaine Liquid Topical        Debridement:Excisional Debridement    Using curette the wound(s) was/were sharply debrided down through and including the removal of subcutaneous tissue. Devitalized Tissue Debrided:  slough and exudate    Pre Debridement Measurements:  Are located in the Wound Documentation Flow Sheet    All active wounds listed below with today's date are evaluated  Wound(s)    debrided this date include # : 1     Post  Debridement Measurements:  Wound 11/25/20 Scrotum #2 Perineum (Active)   Wound Image   03/15/21 1029   Wound Etiology Surgical 03/15/21 1029   Dressing Status New dressing applied;Clean;Dry; Intact 03/08/21 1202   Wound Cleansed Soap and water 03/15/21 1029   Dressing/Treatment ABD; Alginate with Ag;Moisture barrier;Collagen 01/18/21 1213   Offloading for Diabetic Foot Ulcers Other (comment) 03/08/21 1116   Wound Length (cm) 17 cm 03/15/21 1029   Wound Width (cm) 2.5 cm 03/15/21 1029   Wound Depth (cm) 0.1 cm 03/15/21 1029   Wound Surface Area (cm^2) 42.5 cm^2 03/15/21 1029   Change in Wound Size % (l*w) 66 03/15/21 1029   Wound Volume (cm^3) 4.25 cm^3 03/15/21 1029   Wound Healing % 99 03/15/21 1029   Post-Procedure Length (cm) 17 cm 03/15/21 1129   Post-Procedure Width (cm) 2.5 cm 03/15/21 1129   Post-Procedure Depth (cm) 0.1 cm 03/15/21 1129   Post-Procedure Surface Area (cm^2) 42.5 cm^2 03/15/21 1129   Post-Procedure Volume (cm^3) 4.25 cm^3 03/15/21 1129   Distance Tunneling (cm) 0 cm 03/15/21 1029   Tunneling Position ___ O'Clock 0 03/15/21 1029   Undermining Starts ___ O'Clock 0 03/15/21 1029   Undermining Ends___ O'Clock 00 03/15/21 1029   Undermining Maxium Distance (cm) 0 03/08/21 1116   Wound Assessment Granulation tissue 03/08/21 1116   Drainage Amount Moderate 03/15/21 1029   Drainage Description Serosanguinous 03/15/21 1029   Odor None 03/15/21 1029   Merary-wound Assessment Intact 03/15/21 1029   Margins Defined edges 03/15/21 1029   Wound Thickness Description not for Pressure Injury Full thickness 03/15/21 1029   Number of days: 109       Percent of Wound(s) Debrided: approximately 100%    Total  Area  Debrided:  42.5 sq cm     Bleeding:  Minimal    Hemostasis Achieved:  by pressure    Procedural Pain:  0  / 10     Post Procedural Pain:  0 / 10     Response to treatment:  Well tolerated by patient. Status of wound progress and description from last visit: Improving, continue regimen. The patient continues to benefit from hyperbaric therapy, and is tolerating hyperbaric treatment without difficulty or ill effects. Plan:       Discharge Instructions       PHYSICIAN ORDERS AND DISCHARGE INSTRUCTIONS     NOTE: Upon discharge from the [address], you will receive a patient experience survey.  We would be grateful if you would take the time to fill this survey out.     Wound care order history:                 ANDREW's   Right       Left               Date:              Cultures: 1/15/21               Grafts:                Antibiotics:           Continuing wound care orders and information:                 Residence:  Home              Continue home health care with: [facility]              Your wound-care supplies will be provided by:      Wound cleansing:               FADIA not scrub or use excessive force.              Wash hands with soap and water before and after dressing changes.             NQOIO to applying a clean dressing, cleanse wound with normal saline, wound cleanser, or mild soap and water.               Ask the physician or nurse before getting the wound(s) wet in a shower     Daily Wound management:              Keep weight off wounds and reposition every 2 hours.              Avoid standing for long periods of time.              Apply wraps/stockings in AM and remove at bedtime.              If swelling is present, elevate legs to the level of the heart or above for 30 minutes 4-5 times a day and/or when sitting.                                      When taking antibiotics take entire prescription as ordered by physician do not stop taking until medicine is all gone.                                                      Orders for this week:  3/15/21     Left Lower leg-- Healed         Perineum -  Wash with hibicleanse and water. Rinse with saline. Pat dry with 4x4. Apply Gentamycin and Stimulin powder to base of wound   Apply desitin to periwound. Nystatin powder to groin. Cover with Ag+ alginate, and ABD pad and secure with paper tape. [place] to Change Daily and with bowel movements.            Follow up with Dahlia HENDERSON at the wound care center in 1 week on Monday   Call [phone] for any questions or concerns.   Date__________   Time________        Treatment Note      Written Patient Dismissal Instructions Given            Electronically signed by FAITH Anderson CNP on 3/15/2021 at 11:45 AM

## 2021-03-16 ENCOUNTER — HOSPITAL ENCOUNTER (OUTPATIENT)
Dept: HYPERBARIC MEDICINE | Age: 66
Discharge: HOME | End: 2021-03-16
Payer: MEDICARE

## 2021-03-16 VITALS
HEART RATE: 55 BPM | DIASTOLIC BLOOD PRESSURE: 77 MMHG | TEMPERATURE: 98.2 F | SYSTOLIC BLOOD PRESSURE: 183 MMHG | RESPIRATION RATE: 16 BRPM

## 2021-03-16 DIAGNOSIS — T81.89XA NONHEALING SURGICAL WOUND, INITIAL ENCOUNTER: Primary | ICD-10-CM

## 2021-03-16 DIAGNOSIS — M72.6 NECROTIZING FASCIITIS (HCC): ICD-10-CM

## 2021-03-16 LAB
GLUCOSE BLD-MCNC: 113 MG/DL (ref 70–99)
GLUCOSE BLD-MCNC: 151 MG/DL (ref 70–99)

## 2021-03-16 PROCEDURE — 99183 HYPERBARIC OXYGEN THERAPY: CPT | Performed by: NURSE PRACTITIONER

## 2021-03-16 PROCEDURE — G0277 HBOT, FULL BODY CHAMBER, 30M: HCPCS

## 2021-03-16 PROCEDURE — 82962 GLUCOSE BLOOD TEST: CPT

## 2021-03-16 ASSESSMENT — PAIN DESCRIPTION - DESCRIPTORS: DESCRIPTORS: BURNING

## 2021-03-16 ASSESSMENT — PAIN DESCRIPTION - ONSET: ONSET: GRADUAL

## 2021-03-16 ASSESSMENT — PAIN SCALES - GENERAL: PAINLEVEL_OUTOF10: 2

## 2021-03-16 ASSESSMENT — PAIN DESCRIPTION - PROGRESSION: CLINICAL_PROGRESSION: GRADUALLY IMPROVING

## 2021-03-16 ASSESSMENT — PAIN DESCRIPTION - LOCATION: LOCATION: PERINEUM

## 2021-03-16 NOTE — PROGRESS NOTES
[facility]  Hyperbaric Oxygen Therapy   Progress Note      NAME: Zohaib Tavares   MEDICAL RECORD NUMBER:  [id number]  AGE: 77 y.o. GENDER: male  : 1955  EPISODE DATE:  3/16/2021     Subjective     HBO Treatment Number: 37 out of Total Treatments: 40    HBO Diagnosis:     Indications: Necrotizing Fasciitis (Necrotizing Soft Tissue Infection)(Perineum)        Safety checks performed prior to treatment. See doc flowsheets for documentation. Objective           Recent Labs     21  0752 21  1010   POCGLU 151* 113*       Pre treatment Vital Signs       Temp: 98.1 °F (36.7 °C)     Pulse: (!) 48(Pt. does not report s/s with low pulse reading)     Resp: 18     BP: (!) 146/73(SN will inform SANTIAGO Villagomez)     Blood Sugar 151    Post treatment Vital Signs  Temp: 98.2 °F (36.8 °C)  Pulse: 55  Resp: 16  BP: (!) 183/77(SN will inform SANTIAGO Villagomez)  Blood Sugar 113    Assessment        Physical Exam:  General Appearance:  alert and oriented to person, place and time, well-developed and well-nourished, in no acute distress    Pre Tympanic Membrane Assessment:  tympanic membranes intact bilaterally    Post Tympanic Membrane Assessment:  Right: Normal(per Pt.)  Left: Normal(per Pt.)    Pulmonary/Chest:  clear to auscultation bilaterally- no wheezes, rales or rhonchi, normal air movement, no respiratory distress    Cardiovascular:  normal, regular rate and rhythm    Chamber #: [id number]       Treatment Start Time: 0800     Pressure Reached Time: 0811  JOHANNA : 2  Number of Air Breaks:  Treatment Status: No Air break      Decompression Time: 09   Treatment End Time: 2688    Symptoms Noted During Treatment: None    Adverse Event: no      Total time in chamber: 112  Minutes at depth: 90    I was present on these premises and immediately available to furnish assistance & direction throughout the procedure.        Plan          Zohaib Tavares is a 77 y.o. male  did successfully complete today's hyperbaric oxygen treatment at [address] [place] [address] Narendra Lopez. In my clinical judgement, ongoing HBO therapy is  necessary at this time, given a threat to patient function, limb or life from the current condition. Supervision and attendance of Hyperbaric Oxygen Therapy provided. Continue HBO treatment as outlined in the treatment plan. Hyperbaric Oxygen: Yulisa Rodriguez [employer] tolerated Treatment Number: 37 well today without complications.      Electronically signed by FAITH Garza CNP on 3/16/2021 at 6:09 PM

## 2021-03-17 ENCOUNTER — HOSPITAL ENCOUNTER (OUTPATIENT)
Dept: HYPERBARIC MEDICINE | Age: 66
Discharge: HOME | End: 2021-03-17
Payer: MEDICARE

## 2021-03-17 VITALS
TEMPERATURE: 98.5 F | SYSTOLIC BLOOD PRESSURE: 149 MMHG | RESPIRATION RATE: 20 BRPM | DIASTOLIC BLOOD PRESSURE: 75 MMHG | HEART RATE: 48 BPM

## 2021-03-17 DIAGNOSIS — M72.6 NECROTIZING FASCIITIS (HCC): ICD-10-CM

## 2021-03-17 DIAGNOSIS — T81.89XA NONHEALING SURGICAL WOUND, INITIAL ENCOUNTER: Primary | ICD-10-CM

## 2021-03-17 LAB
GLUCOSE BLD-MCNC: 107 MG/DL (ref 70–99)
GLUCOSE BLD-MCNC: 108 MG/DL (ref 70–99)

## 2021-03-17 PROCEDURE — 82962 GLUCOSE BLOOD TEST: CPT

## 2021-03-17 PROCEDURE — G0277 HBOT, FULL BODY CHAMBER, 30M: HCPCS

## 2021-03-17 ASSESSMENT — PAIN DESCRIPTION - PAIN TYPE: TYPE: CHRONIC PAIN

## 2021-03-17 ASSESSMENT — PAIN DESCRIPTION - PROGRESSION: CLINICAL_PROGRESSION: GRADUALLY IMPROVING

## 2021-03-17 NOTE — PROGRESS NOTES
[facility] AT [place]  Hyperbaric Oxygen Therapy   Progress Note      NAME: Cade Tavares   MEDICAL RECORD NUMBER:  [id number]  AGE: 77 y.o. GENDER: male  : 1955  EPISODE DATE:  3/17/2021     Subjective     HBO Treatment Number: 38 out of Total Treatments: 40    HBO Diagnosis:     Indications: Necrotizing Fasciitis (Necrotizing Soft Tissue Infection)(Perineum)        Safety checks performed prior to treatment. See doc flowsheets for documentation. Objective           Recent Labs     21  0747 21  0953   POCGLU 107* 108*       Pre treatment Vital Signs       Temp: 98.4 °F (36.9 °C)     Pulse: 52     Resp: 20     BP: 139/68     Blood Sugar 107    Post treatment Vital Signs  Temp: 98.5 °F (36.9 °C)  Pulse: (!) 48(No S/S reported)  Resp: 20  BP: (!) 149/75(SN will inform )  Blood Sugar 108    Assessment        Physical Exam:  General Appearance:  alert and oriented to person, place and time, well-developed and well-nourished, in no acute distress    Pre Tympanic Membrane Assessment:  tympanic membranes intact bilaterally, normal color, normal light reflex bilaterally    Post Tympanic Membrane Assessment:  Right: Normal(Per Pt.)  Left: Normal(Per Pt.)    Pulmonary/Chest:  clear to auscultation bilaterally- no wheezes, rales or rhonchi, normal air movement, no respiratory distress    Cardiovascular:  regular rate and rhythm, no murmurs rubs or gallops    Chamber #: [id number]       Treatment Start Time: 0800     Pressure Reached Time: 0810  JOHANNA : 2  Number of Air Breaks:  Treatment Status: No Air break      Decompression Time: 09   Treatment End Time: 0951    Symptoms Noted During Treatment: None    TOTAL TIME AT DEPTH-90    TOTAL TIME IN CHAMBER-111    Adverse Event: no      I was present on these premises and immediately available to furnish assistance & direction throughout the procedure.        Plan          Cade Tavares is a 77 y.o. male  did successfully complete today's hyperbaric oxygen treatment at [address] [place] [address] Narendra Lopez. In my clinical judgement, ongoing HBO therapy is  necessary at this time, given a threat to patient function, limb or life from the current condition. Supervision and attendance of Hyperbaric Oxygen Therapy provided. Continue HBO treatment as outlined in the treatment plan. Hyperbaric Oxygen: Gerry Tavares tolerated Treatment Number: 38 well today without complications.      Electronically signed by Ugo Freed MD on 3/17/2021 at 1:12 PM

## 2021-03-18 ENCOUNTER — HOSPITAL ENCOUNTER (OUTPATIENT)
Dept: HYPERBARIC MEDICINE | Age: 66
Discharge: HOME | End: 2021-03-18
Payer: MEDICARE

## 2021-03-18 VITALS
DIASTOLIC BLOOD PRESSURE: 73 MMHG | TEMPERATURE: 97.6 F | SYSTOLIC BLOOD PRESSURE: 151 MMHG | RESPIRATION RATE: 18 BRPM | HEART RATE: 57 BPM

## 2021-03-18 DIAGNOSIS — T81.89XA NONHEALING SURGICAL WOUND, INITIAL ENCOUNTER: Primary | ICD-10-CM

## 2021-03-18 DIAGNOSIS — M72.6 NECROTIZING FASCIITIS (HCC): ICD-10-CM

## 2021-03-18 LAB
GLUCOSE BLD-MCNC: 110 MG/DL (ref 70–99)
GLUCOSE BLD-MCNC: 120 MG/DL (ref 70–99)

## 2021-03-18 PROCEDURE — G0277 HBOT, FULL BODY CHAMBER, 30M: HCPCS

## 2021-03-18 PROCEDURE — 82962 GLUCOSE BLOOD TEST: CPT

## 2021-03-18 ASSESSMENT — PAIN DESCRIPTION - ONSET: ONSET: GRADUAL

## 2021-03-18 ASSESSMENT — PAIN DESCRIPTION - PROGRESSION: CLINICAL_PROGRESSION: GRADUALLY IMPROVING

## 2021-03-18 ASSESSMENT — PAIN DESCRIPTION - PAIN TYPE: TYPE: CHRONIC PAIN

## 2021-03-18 ASSESSMENT — PAIN DESCRIPTION - DESCRIPTORS: DESCRIPTORS: BURNING

## 2021-03-18 ASSESSMENT — PAIN DESCRIPTION - FREQUENCY: FREQUENCY: INTERMITTENT

## 2021-03-18 NOTE — PROGRESS NOTES
[facility] AT [place]  Hyperbaric Oxygen Therapy   Progress Note      NAME: Iman Tavares   MEDICAL RECORD NUMBER:  [id number]  AGE: 77 y.o. GENDER: male  : 1955  EPISODE DATE:  3/18/2021     Subjective     HBO Treatment Number: 39 out of Total Treatments: 40    HBO Diagnosis:     Indications: Necrotizing Fasciitis (Necrotizing Soft Tissue Infection)(Perineum)        Safety checks performed prior to treatment. See doc flowsheets for documentation.     Objective           Recent Labs     21  0749 21  1024   POCGLU 120* 110*       Pre treatment Vital Signs       Temp: 98.1 °F (36.7 °C)     Pulse: 59     Resp: 18     BP: 136/70     Blood Sugar 120    Post treatment Vital Signs  Temp: 97.6 °F (36.4 °C)  Pulse: 57  Resp: 18  BP: (!) 151/73(SN will inform )  Blood Sugar 110    Assessment        Physical Exam:  General Appearance:  alert and oriented to person, place and time, well-developed and well-nourished, in no acute distress, alert and oriented to person, place and time, well-developed and well nourished and in no acute distress    Pre Tympanic Membrane Assessment:  tympanic membranes intact bilaterally, normal color    Post Tympanic Membrane Assessment:  Right: Normal(Per Pt.)  Left: Normal(per Pt.)    Pulmonary/Chest:  clear to auscultation bilaterally- no wheezes, rales or rhonchi, normal air movement, no respiratory distress and no chest wall tenderness    Cardiovascular:  normal, regular rate and rhythm, no murmurs, rubs, or gallops    Chamber #: [id number]       Treatment Start Time: 0817     Pressure Reached Time: 0828  JOHANNA : 2  Number of Air Breaks:  Treatment Status: No Air break      Decompression Time: 8100   Treatment End Time: 1009    Symptoms Noted During Treatment: None    TOTAL TIME AT DEPTH-90    TOTAL TIME IN CHAMBER-112    Adverse Event: no      I was present on these premises and immediately available to furnish assistance & direction throughout the procedure. Plan          Geovanny Tavares is a 77 y.o. male  did successfully complete today's hyperbaric oxygen treatment at [address] [place] [address]. In my clinical judgement, ongoing HBO therapy is  necessary at this time, given a threat to patient function, limb or life from the current condition. Supervision and attendance of Hyperbaric Oxygen Therapy provided. Continue HBO treatment as outlined in the treatment plan. Hyperbaric Oxygen: Geovanny Tavares tolerated Treatment Number: 39 well today without complications.      Electronically signed by Nikolay De Guzman MD on 3/18/2021 at 11:58 AM

## 2021-03-19 ENCOUNTER — HOSPITAL ENCOUNTER (OUTPATIENT)
Dept: HYPERBARIC MEDICINE | Age: 66
Discharge: HOME | End: 2021-03-19
Payer: MEDICARE

## 2021-03-19 VITALS
DIASTOLIC BLOOD PRESSURE: 58 MMHG | RESPIRATION RATE: 18 BRPM | HEART RATE: 44 BPM | SYSTOLIC BLOOD PRESSURE: 128 MMHG | TEMPERATURE: 97.5 F

## 2021-03-19 DIAGNOSIS — T81.89XA NONHEALING SURGICAL WOUND, INITIAL ENCOUNTER: Primary | ICD-10-CM

## 2021-03-19 DIAGNOSIS — M72.6 NECROTIZING FASCIITIS (HCC): ICD-10-CM

## 2021-03-19 LAB
GLUCOSE BLD-MCNC: 134 MG/DL (ref 70–99)
GLUCOSE BLD-MCNC: 155 MG/DL (ref 70–99)

## 2021-03-19 PROCEDURE — 82962 GLUCOSE BLOOD TEST: CPT

## 2021-03-19 PROCEDURE — G0277 HBOT, FULL BODY CHAMBER, 30M: HCPCS

## 2021-03-19 ASSESSMENT — PAIN DESCRIPTION - ONSET: ONSET: GRADUAL

## 2021-03-19 ASSESSMENT — PAIN SCALES - GENERAL: PAINLEVEL_OUTOF10: 2

## 2021-03-19 ASSESSMENT — PAIN DESCRIPTION - FREQUENCY: FREQUENCY: INTERMITTENT

## 2021-03-19 ASSESSMENT — PAIN DESCRIPTION - DESCRIPTORS: DESCRIPTORS: BURNING

## 2021-03-19 ASSESSMENT — PAIN DESCRIPTION - PROGRESSION: CLINICAL_PROGRESSION: GRADUALLY IMPROVING

## 2021-03-19 NOTE — PROGRESS NOTES
[facility],[address]  Hyperbaric Oxygen Therapy   Progress Note      NAME: Gerry Tavares   MEDICAL RECORD NUMBER:  [id number]  AGE: 77 y.o. GENDER: male  : 1955  EPISODE DATE:  3/19/2021     Subjective     HBO Treatment Number: 40 out of Total Treatments: 40    HBO Diagnosis:     Indications: Necrotizing Fasciitis (Necrotizing Soft Tissue Infection)(Perineum)        Safety checks performed prior to treatment. See doc flowsheets for documentation. Objective           Recent Labs     21  0754 21  1014   POCGLU 155* 134*       Pre treatment Vital Signs       Temp: 97.5 °F (36.4 °C)     Pulse: 50     Resp: 18     BP: (!) 160/73(SN will inform SANTIAGO Dean)     Blood Sugar 155    Post treatment Vital Signs  Temp: 97.5 °F (36.4 °C)  Pulse: (!) 44(Pt. normally has lower pulse readings)  Resp: 18  BP: (!) 128/58(SN will inform SANTIAGO Dean)  Blood Sugar 134    Assessment        Physical Exam:  General Appearance:  alert and oriented to person, place and time, well-developed and well-nourished, in no acute distress    Pre Tympanic Membrane Assessment:  tympanic membranes intact bilaterally    Post Tympanic Membrane Assessment:  Right: Normal(Per Pt.)  Left: Normal(Per Pt.)    Pulmonary/Chest:  clear to auscultation bilaterally- no wheezes, rales or rhonchi, normal air movement, no respiratory distress    Cardiovascular:  normal    Chamber #: [id number]       Treatment Start Time: 0808     Pressure Reached Time: 0820  JOHANNA : 2  Number of Air Breaks:  Treatment Status: No Air break      Decompression Time: 0950   Treatment End Time: 1001    Symptoms Noted During Treatment: None    Adverse Event: no      I was present on these premises and immediately available to furnish assistance & direction throughout the procedure. Plan          Gerry Tavares is a 77 y.o. male  did successfully complete today's hyperbaric oxygen treatment at [address] [place] [address].     In my clinical judgement, ongoing HBO therapy is  necessary at this time, given a threat to patient function, limb or life from the current condition. Supervision and attendance of Hyperbaric Oxygen Therapy provided. Continue HBO treatment as outlined in the treatment plan. Hyperbaric Oxygen: Raissa Tavares tolerated Treatment Number: 40 well today without complications.      Electronically signed by FAITH Singletary CNP on 3/19/2021 at 12:19 PM

## 2021-03-22 ENCOUNTER — HOSPITAL ENCOUNTER (OUTPATIENT)
Dept: WOUND CARE | Age: 66
Discharge: HOME | End: 2021-03-22
Payer: MEDICARE

## 2021-03-22 VITALS
DIASTOLIC BLOOD PRESSURE: 55 MMHG | SYSTOLIC BLOOD PRESSURE: 136 MMHG | TEMPERATURE: 97.3 F | HEART RATE: 59 BPM | RESPIRATION RATE: 18 BRPM

## 2021-03-22 DIAGNOSIS — S81.802A TRAUMATIC OPEN WOUND OF LEFT LOWER LEG, INITIAL ENCOUNTER: Primary | ICD-10-CM

## 2021-03-22 DIAGNOSIS — M72.6 NECROTIZING FASCIITIS (HCC): ICD-10-CM

## 2021-03-22 DIAGNOSIS — T81.89XA NONHEALING SURGICAL WOUND, INITIAL ENCOUNTER: ICD-10-CM

## 2021-03-22 DIAGNOSIS — I89.0 LYMPHEDEMA OF BOTH LOWER EXTREMITIES: ICD-10-CM

## 2021-03-22 DIAGNOSIS — I87.8 VENOUS STASIS OF BOTH LOWER EXTREMITIES: ICD-10-CM

## 2021-03-22 PROCEDURE — 11042 DBRDMT SUBQ TIS 1ST 20SQCM/<: CPT

## 2021-03-22 PROCEDURE — 11042 DBRDMT SUBQ TIS 1ST 20SQCM/<: CPT | Performed by: NURSE PRACTITIONER

## 2021-03-22 RX ORDER — LIDOCAINE 40 MG/G
CREAM TOPICAL ONCE
Status: CANCELLED | OUTPATIENT
Start: 2021-03-22 | End: 2021-03-22

## 2021-03-22 RX ORDER — BACITRACIN ZINC AND POLYMYXIN B SULFATE 500; 1000 [USP'U]/G; [USP'U]/G
OINTMENT TOPICAL ONCE
Status: CANCELLED | OUTPATIENT
Start: 2021-03-22 | End: 2021-03-22

## 2021-03-22 RX ORDER — LIDOCAINE HYDROCHLORIDE 40 MG/ML
SOLUTION TOPICAL ONCE
Status: DISCONTINUED | OUTPATIENT
Start: 2021-03-22 | End: 2021-03-23 | Stop reason: HOSPADM

## 2021-03-22 RX ORDER — LIDOCAINE 50 MG/G
OINTMENT TOPICAL ONCE
Status: CANCELLED | OUTPATIENT
Start: 2021-03-22 | End: 2021-03-22

## 2021-03-22 RX ORDER — LIDOCAINE HYDROCHLORIDE 20 MG/ML
JELLY TOPICAL ONCE
Status: CANCELLED | OUTPATIENT
Start: 2021-03-22 | End: 2021-03-22

## 2021-03-22 RX ORDER — BACITRACIN, NEOMYCIN, POLYMYXIN B 400; 3.5; 5 [USP'U]/G; MG/G; [USP'U]/G
OINTMENT TOPICAL ONCE
Status: CANCELLED | OUTPATIENT
Start: 2021-03-22 | End: 2021-03-22

## 2021-03-22 RX ORDER — GINSENG 100 MG
CAPSULE ORAL ONCE
Status: CANCELLED | OUTPATIENT
Start: 2021-03-22 | End: 2021-03-22

## 2021-03-22 RX ORDER — CLOBETASOL PROPIONATE 0.5 MG/G
OINTMENT TOPICAL ONCE
Status: CANCELLED | OUTPATIENT
Start: 2021-03-22 | End: 2021-03-22

## 2021-03-22 RX ORDER — GENTAMICIN SULFATE 1 MG/G
OINTMENT TOPICAL ONCE
Status: CANCELLED | OUTPATIENT
Start: 2021-03-22 | End: 2021-03-22

## 2021-03-22 RX ORDER — GENTAMICIN SULFATE 1 MG/G
OINTMENT TOPICAL
Qty: 30 G | Refills: 3 | Status: SHIPPED | OUTPATIENT
Start: 2021-03-22 | End: 2021-05-11 | Stop reason: SDUPTHER

## 2021-03-22 RX ORDER — CLOTRIMAZOLE AND BETAMETHASONE DIPROPIONATE 10; .64 MG/G; MG/G
CREAM TOPICAL
Qty: 45 G | Refills: 2 | Status: SHIPPED | OUTPATIENT
Start: 2021-03-22

## 2021-03-22 RX ORDER — LIDOCAINE HYDROCHLORIDE 40 MG/ML
SOLUTION TOPICAL ONCE
Status: CANCELLED | OUTPATIENT
Start: 2021-03-22 | End: 2021-03-22

## 2021-03-22 RX ORDER — GENTAMICIN SULFATE 1 MG/G
OINTMENT TOPICAL ONCE
Status: DISCONTINUED | OUTPATIENT
Start: 2021-03-22 | End: 2021-03-23 | Stop reason: HOSPADM

## 2021-03-22 RX ORDER — BETAMETHASONE DIPROPIONATE 0.05 %
OINTMENT (GRAM) TOPICAL ONCE
Status: CANCELLED | OUTPATIENT
Start: 2021-03-22 | End: 2021-03-22

## 2021-03-22 ASSESSMENT — PAIN DESCRIPTION - DESCRIPTORS: DESCRIPTORS: BURNING

## 2021-03-22 ASSESSMENT — PAIN DESCRIPTION - FREQUENCY: FREQUENCY: INTERMITTENT

## 2021-03-22 ASSESSMENT — PAIN DESCRIPTION - ORIENTATION: ORIENTATION: MID;LOWER

## 2021-03-22 ASSESSMENT — PAIN DESCRIPTION - PROGRESSION: CLINICAL_PROGRESSION: GRADUALLY IMPROVING

## 2021-03-22 ASSESSMENT — PAIN DESCRIPTION - LOCATION: LOCATION: PERINEUM

## 2021-03-22 ASSESSMENT — PAIN DESCRIPTION - PAIN TYPE: TYPE: CHRONIC PAIN

## 2021-03-22 NOTE — PROGRESS NOTES
Discharge Instructions for  Hyperbaric Oxygen Therapy  [address] [place] [address]  [address]  Telephone number [phone]      You have been treated with 100% oxygen in the Hyperbaric Chamber at the [address] [place] [address]. There are usually no adverse effects or problems which follow this treatment. However, for comfort and safety, we strongly recommend these guidelines are followed:     It is perfectly normal to feel tired after a hyperbaric treatment. This tiredness should go away 2 to 3 days after your last treatment.  Avoid heavy exertion, exercise or other unnecessary activity if you are feeling tired.  Some people develop a feeling of fullness or stuffiness in their ears. This is a result of a small amount of fluid build-up in the middle ear. You may take an over the counter decongestant if approved by your doctor. Follow the instructions in the medicine package for the amount to take. If this problem lasts for more than 48 hours, please call your personal doctor. You also may call or return to the [address] [place] [address] and have a Hyperbaric doctor examine you.  In rare cases, patients may have so called late effects after the treatments. Please call the [address] [place] [address] Road if you have any of these symptoms:    [x]Headache that is not relieved by over the counter pain medicine. [x]Changes in vision. During the course of many hyperbaric treatments (20 or more) some patients may complain of a temporary problem with sharp focus on distant objects. This is a result of changes in the shape of the lens. Your vision should return to normal in 6 to 8 weeks. [x]Nausea or vomiting. .  [x]Clumsiness, difficulty walking or numbness and tingling of your arms and legs. [x]If you are on prescription medicines from your personal doctor, you may continue to take these as directed. Wound Care Center Information:     If you have questions or develop any of the symptoms mentioned, please contact the [facility] at [address] [place] [address] Place 8:00 am - 5:00 pm. If you need help outside these hours and cannot wait until we are again available, contact your PCP or go to the hospital emergency room. Patient Signature:_______________________Date:_________Time:________    [] Patient unable to sign Discharge Instructions given to ECF/Transportation/POA    The information contained in the After Visit Summary has been reviewed with me, the patient and/or responsible adult, by my health care provider(s). I had the opportunity to ask questions regarding this information.   I have elected to receive  [x]  After Visit Summary        Nurse Signature:_______________________Date:_________Time:_________    Electronically signed by Mandi Ma LPN on 9/02/8628 at 6:85 PM

## 2021-03-29 ENCOUNTER — HOSPITAL ENCOUNTER (OUTPATIENT)
Dept: WOUND CARE | Age: 66
Discharge: HOME | End: 2021-03-29
Payer: MEDICARE

## 2021-03-29 VITALS
DIASTOLIC BLOOD PRESSURE: 56 MMHG | SYSTOLIC BLOOD PRESSURE: 142 MMHG | HEART RATE: 64 BPM | TEMPERATURE: 93.6 F | RESPIRATION RATE: 18 BRPM

## 2021-03-29 DIAGNOSIS — S81.802A TRAUMATIC OPEN WOUND OF LEFT LOWER LEG, INITIAL ENCOUNTER: Primary | ICD-10-CM

## 2021-03-29 DIAGNOSIS — T81.89XA NONHEALING SURGICAL WOUND, INITIAL ENCOUNTER: ICD-10-CM

## 2021-03-29 DIAGNOSIS — I87.8 VENOUS STASIS OF BOTH LOWER EXTREMITIES: ICD-10-CM

## 2021-03-29 DIAGNOSIS — I89.0 LYMPHEDEMA OF BOTH LOWER EXTREMITIES: ICD-10-CM

## 2021-03-29 DIAGNOSIS — M72.6 NECROTIZING FASCIITIS (HCC): ICD-10-CM

## 2021-03-29 PROCEDURE — 11042 DBRDMT SUBQ TIS 1ST 20SQCM/<: CPT

## 2021-03-29 PROCEDURE — 11042 DBRDMT SUBQ TIS 1ST 20SQCM/<: CPT | Performed by: NURSE PRACTITIONER

## 2021-03-29 RX ORDER — LIDOCAINE HYDROCHLORIDE 20 MG/ML
JELLY TOPICAL ONCE
Status: CANCELLED | OUTPATIENT
Start: 2021-03-29 | End: 2021-03-29

## 2021-03-29 RX ORDER — GENTAMICIN SULFATE 1 MG/G
OINTMENT TOPICAL ONCE
Status: CANCELLED | OUTPATIENT
Start: 2021-03-29 | End: 2021-03-29

## 2021-03-29 RX ORDER — LIDOCAINE HYDROCHLORIDE 40 MG/ML
SOLUTION TOPICAL ONCE
Status: DISCONTINUED | OUTPATIENT
Start: 2021-03-29 | End: 2021-03-30 | Stop reason: HOSPADM

## 2021-03-29 RX ORDER — BACITRACIN ZINC AND POLYMYXIN B SULFATE 500; 1000 [USP'U]/G; [USP'U]/G
OINTMENT TOPICAL ONCE
Status: CANCELLED | OUTPATIENT
Start: 2021-03-29 | End: 2021-03-29

## 2021-03-29 RX ORDER — BACITRACIN, NEOMYCIN, POLYMYXIN B 400; 3.5; 5 [USP'U]/G; MG/G; [USP'U]/G
OINTMENT TOPICAL ONCE
Status: CANCELLED | OUTPATIENT
Start: 2021-03-29 | End: 2021-03-29

## 2021-03-29 RX ORDER — GINSENG 100 MG
CAPSULE ORAL ONCE
Status: CANCELLED | OUTPATIENT
Start: 2021-03-29 | End: 2021-03-29

## 2021-03-29 RX ORDER — LIDOCAINE HYDROCHLORIDE 40 MG/ML
SOLUTION TOPICAL ONCE
Status: CANCELLED | OUTPATIENT
Start: 2021-03-29 | End: 2021-03-29

## 2021-03-29 RX ORDER — LIDOCAINE 40 MG/G
CREAM TOPICAL ONCE
Status: CANCELLED | OUTPATIENT
Start: 2021-03-29 | End: 2021-03-29

## 2021-03-29 RX ORDER — LIDOCAINE 50 MG/G
OINTMENT TOPICAL ONCE
Status: CANCELLED | OUTPATIENT
Start: 2021-03-29 | End: 2021-03-29

## 2021-03-29 RX ORDER — GENTAMICIN SULFATE 1 MG/G
OINTMENT TOPICAL ONCE
Status: DISCONTINUED | OUTPATIENT
Start: 2021-03-29 | End: 2021-03-30 | Stop reason: HOSPADM

## 2021-03-29 RX ORDER — CLOBETASOL PROPIONATE 0.5 MG/G
OINTMENT TOPICAL ONCE
Status: CANCELLED | OUTPATIENT
Start: 2021-03-29 | End: 2021-03-29

## 2021-03-29 RX ORDER — BETAMETHASONE DIPROPIONATE 0.05 %
OINTMENT (GRAM) TOPICAL ONCE
Status: CANCELLED | OUTPATIENT
Start: 2021-03-29 | End: 2021-03-29

## 2021-03-29 ASSESSMENT — PAIN DESCRIPTION - FREQUENCY: FREQUENCY: INTERMITTENT

## 2021-03-29 ASSESSMENT — PAIN - FUNCTIONAL ASSESSMENT: PAIN_FUNCTIONAL_ASSESSMENT: PREVENTS OR INTERFERES SOME ACTIVE ACTIVITIES AND ADLS

## 2021-03-29 ASSESSMENT — PAIN DESCRIPTION - PAIN TYPE: TYPE: CHRONIC PAIN

## 2021-03-29 ASSESSMENT — PAIN DESCRIPTION - DESCRIPTORS: DESCRIPTORS: BURNING

## 2021-03-29 ASSESSMENT — PAIN DESCRIPTION - ORIENTATION: ORIENTATION: MID;LOWER

## 2021-03-29 ASSESSMENT — PAIN DESCRIPTION - ONSET: ONSET: ON-GOING

## 2021-03-29 NOTE — PROGRESS NOTES
Procedure Laterality Date    RECTAL SURGERY N/A 11/24/2020    RECTAL PERIRECTAL INCISION AND DRAINAGE performed by Favian Currie MD at [facility] History   Problem Relation Age of Onset    Asthma Mother     Breast Cancer Mother     Cancer Mother     Diabetes Mother     High Blood Pressure Mother     Cancer Father     Early Death Brother     Arthritis Paternal Grandmother        SOCIAL HISTORY    Social History     Tobacco Use    Smoking status: Current Every Day Smoker     Packs/day: 1.00     Years: 60.00     Pack years: 60.00     Types: Cigarettes    Smokeless tobacco: Never Used    Tobacco comment: healing    Substance Use Topics    Alcohol use: Never     Frequency: Never    Drug use: No       ALLERGIES    No Known Allergies    MEDICATIONS    Current Outpatient Medications on File Prior to Encounter   Medication Sig Dispense Refill    clotrimazole-betamethasone (LOTRISONE) 1-0.05 % cream Apply topically 2 times daily to excoriation under abdominal fold and groin area. 45 g 2    gentamicin (GARAMYCIN) 0.1 % ointment Apply topically 2-3 times daily with each dressing change for one month 30 g 3    magnesium oxide (MAG-OX) 400 MG tablet Take 1 tablet by mouth daily 30 tablet 1    rivaroxaban (XARELTO) 15 MG TABS tablet Take 1 tablet by mouth daily 42 tablet 1    metoprolol succinate (TOPROL XL) 25 MG extended release tablet Take 1 tablet by mouth 2 times daily 30 tablet 3    amLODIPine (NORVASC) 10 MG tablet Take 1 tablet by mouth nightly 30 tablet 3    Latanoprost 0.005 % EMUL Apply to eye daily      ibuprofen (ADVIL;MOTRIN) 600 MG tablet Take 1 tablet by mouth every 6 hours as needed for Pain 30 tablet 0    glipiZIDE (GLUCOTROL) 5 MG tablet Take 5 mg by mouth 2 times daily (before meals)      potassium chloride (KLOR-CON M) 20 MEQ extended release tablet Take 20 mEq by mouth daily      ALPRAZolam (XANAX) 0.5 MG tablet Take 0.5 mg by mouth nightly. Rosanne Begum ipratropium-albuterol (DUONEB) 0.5-2.5 (3) MG/3ML SOLN nebulizer solution Inhale 1 vial into the lungs every 4 hours      albuterol sulfate  (90 Base) MCG/ACT inhaler Inhale 2 puffs into the lungs every 6 hours as needed for Wheezing      aspirin EC 81 MG EC tablet Take 1 tablet by mouth daily 30 tablet 3    HYDROcodone-acetaminophen (NORCO) 7.5-325 MG per tablet Take 1 tablet by mouth 3 times daily .  metFORMIN (GLUCOPHAGE) 1000 MG tablet Take 1,000 mg by mouth 2 times daily (with meals)      lisinopril (PRINIVIL;ZESTRIL) 20 MG tablet Take 40 mg by mouth daily       tamsulosin (FLOMAX) 0.4 MG capsule Take 0.4 mg by mouth daily      finasteride (PROSCAR) 5 MG tablet Take 5 mg by mouth daily      amiodarone (CORDARONE) 200 MG tablet Take 1 tablet by mouth daily 30 tablet 0     No current facility-administered medications on file prior to encounter. REVIEW OF SYSTEMS    Pertinent items are noted in HPI. Constitutional: Negative for systemic symptoms including fever, chills and malaise. Objective:      BP (!) 142/56   Pulse 64   Temp 93.6 °F (34.2 °C) (Temporal)   Resp 18     PHYSICAL EXAM    General: The patient is in no acute distress. Mental status:  Patient is appropriate, is  oriented to place and plan of care.   Dermatologic exam: Visual inspection of the periwound reveals the skin to be normal in turgor and texture  Wound exam: see wound description below in procedure note      Assessment:     Problem List Items Addressed This Visit     WD-Venous stasis of both lower extremities    Relevant Medications    lidocaine (XYLOCAINE) 4 % external solution    gentamicin (GARAMYCIN) 0.1 % ointment    WD-Lymphedema of both lower extremities    Relevant Medications    lidocaine (XYLOCAINE) 4 % external solution    gentamicin (GARAMYCIN) 0.1 % ointment    WD-Traumatic open wound of left lower leg, complicated by diabetes and venous insufficiency - Primary    Relevant Orders    Supply: Wound Cleanser    Supply: Wound Dressings    Supply: Cover and Secure    HBO-Nonhealing surgical wound groin & buttock, initial encounter    Relevant Medications    lidocaine (XYLOCAINE) 4 % external solution    gentamicin (GARAMYCIN) 0.1 % ointment    HBO-Necrotizing fasciitis (HCC)    Relevant Medications    lidocaine (XYLOCAINE) 4 % external solution    gentamicin (GARAMYCIN) 0.1 % ointment        Procedure Note    Indications:  Based on my examination of this patient's wound(s) today, sharp excision into necrotic subcutaneous tissue is required to promote healing and evaluate the extent of previous healing. Performed by: FAITH Serrato CNP    Consent obtained: Yes    Time out taken:  Yes    Pain Control: Anesthetic  Anesthetic: 4% Lidocaine Liquid Topical     Debridement:Excisional Debridement    Using curette the wound(s) was/were sharply debrided down through and including the removal of subcutaneous tissue. Devitalized Tissue Debrided:  slough and exudate    Pre Debridement Measurements:  Are located in the Wound Documentation Flow Sheet    All active wounds listed below with today's date are evaluated  Wound(s)    debrided this date include # : 1     Post  Debridement Measurements:  Wound 11/25/20 Scrotum #2 Perineum (Active)   Wound Image   03/29/21 1055   Wound Etiology Surgical 03/29/21 1055   Dressing Status New dressing applied;Clean;Dry; Intact 03/15/21 1147   Wound Cleansed Soap and water 03/29/21 1055   Dressing/Treatment ABD; Alginate with Ag;Moisture barrier;Collagen 01/18/21 1213   Offloading for Diabetic Foot Ulcers Other (comment) 03/29/21 1055   Wound Length (cm) 10 cm 03/29/21 1055   Wound Width (cm) 1 cm 03/29/21 1055   Wound Depth (cm) 0.1 cm 03/29/21 1055   Wound Surface Area (cm^2) 10 cm^2 03/29/21 1055   Change in Wound Size % (l*w) 92 03/29/21 1055   Wound Volume (cm^3) 1 cm^3 03/29/21 1055   Wound Healing % 100 03/29/21 1055   Post-Procedure Length (cm) 10 cm 03/29/21 1126   Post-Procedure Width (cm) 1 cm 03/29/21 1126   Post-Procedure Depth (cm) 0.1 cm 03/29/21 1126   Post-Procedure Surface Area (cm^2) 10 cm^2 03/29/21 1126   Post-Procedure Volume (cm^3) 1 cm^3 03/29/21 1126   Distance Tunneling (cm) 0 cm 03/29/21 1055   Tunneling Position ___ O'Clock 0 03/29/21 1055   Undermining Starts ___ O'Clock 0 03/29/21 1055   Undermining Ends___ O'Clock 0 03/29/21 1055   Undermining Maxium Distance (cm) 0 03/29/21 1055   Wound Assessment Granulation tissue 03/29/21 1055   Drainage Amount Moderate 03/29/21 1055   Drainage Description Serosanguinous 03/29/21 1055   Odor None 03/29/21 1055   Merary-wound Assessment Intact 03/29/21 1055   Margins Defined edges 03/29/21 1055   Wound Thickness Description not for Pressure Injury Full thickness 03/29/21 1055   Number of days: 124       Percent of Wound(s) Debrided: approximately 100%    Total  Area  Debrided:  10 sq cm     Bleeding:  Minimal    Hemostasis Achieved:  by pressure    Procedural Pain:  0  / 10     Post Procedural Pain:  0 / 10     Response to treatment:  Well tolerated by patient. Status of wound progress and description from last visit: Improving, continue regimen. Plan:       Discharge Instructions       PHYSICIAN ORDERS AND DISCHARGE INSTRUCTIONS     NOTE: Upon discharge from the [address], you will receive a patient experience survey.  We would be grateful if you would take the time to fill this survey out.     Wound care order history:                 ANDREW's   Right       Left               Date:              Cultures: 1/15/21               Grafts:                Antibiotics:           Continuing wound care orders and information:                 Residence:  Home              Continue home health care with: [facility]              Your wound-care supplies will be provided by:      Wound cleansing:               AI not scrub or use excessive force.              Wash hands with soap and water before and after dressing changes.             QMHCY to applying a clean dressing, cleanse wound with normal saline, wound cleanser, or mild soap and water.               Ask the physician or nurse before getting the wound(s) wet in a shower     Daily Wound management:              Keep weight off wounds and reposition every 2 hours.              Avoid standing for long periods of time.              Apply wraps/stockings in AM and remove at bedtime.              If swelling is present, elevate legs to the level of the heart or above for 30 minutes 4-5 times a day and/or when sitting.                                      When taking antibiotics take entire prescription as ordered by physician do not stop taking until medicine is all gone.                                                      Orders for this week:  3/29/21     Left Lower leg-- Healed         Perineum -  Wash with hibicleanse and water. Rinse with saline. Pat dry with 4x4. Apply Gentamycin and Stimulin powder to base of wound   Apply desitin to periwound. Nystatin powder to groin. Cover with Ag+ alginate, and ABD pad and secure with paper tape. [place] to Change Daily and with bowel movements.            Follow up with Dahlia HENDERSON at the wound care center in 1 week on Monday   Call [phone] for any questions or concerns.   Date__________   Time________        Treatment Note Wound 11/25/20 Scrotum #2 Perineum-Dressing/Treatment: (Stimulin, gentamicin, desitin to rupert, ag ca alg, abd, tape)    Written Patient Dismissal Instructions Given            Electronically signed by FAITH Brown CNP on 3/29/2021 at 1:43 PM

## 2021-04-05 ENCOUNTER — HOSPITAL ENCOUNTER (OUTPATIENT)
Dept: WOUND CARE | Age: 66
Discharge: HOME | End: 2021-04-05
Payer: MEDICARE

## 2021-04-05 VITALS
DIASTOLIC BLOOD PRESSURE: 58 MMHG | TEMPERATURE: 98.7 F | HEART RATE: 70 BPM | SYSTOLIC BLOOD PRESSURE: 168 MMHG | RESPIRATION RATE: 18 BRPM

## 2021-04-05 DIAGNOSIS — S81.802A TRAUMATIC OPEN WOUND OF LEFT LOWER LEG, INITIAL ENCOUNTER: Primary | ICD-10-CM

## 2021-04-05 DIAGNOSIS — M72.6 NECROTIZING FASCIITIS (HCC): ICD-10-CM

## 2021-04-05 DIAGNOSIS — I89.0 LYMPHEDEMA OF BOTH LOWER EXTREMITIES: ICD-10-CM

## 2021-04-05 DIAGNOSIS — I87.8 VENOUS STASIS OF BOTH LOWER EXTREMITIES: ICD-10-CM

## 2021-04-05 DIAGNOSIS — T81.89XA NONHEALING SURGICAL WOUND, INITIAL ENCOUNTER: ICD-10-CM

## 2021-04-05 PROCEDURE — 11042 DBRDMT SUBQ TIS 1ST 20SQCM/<: CPT | Performed by: NURSE PRACTITIONER

## 2021-04-05 PROCEDURE — 11042 DBRDMT SUBQ TIS 1ST 20SQCM/<: CPT

## 2021-04-05 RX ORDER — LIDOCAINE HYDROCHLORIDE 40 MG/ML
SOLUTION TOPICAL ONCE
Status: DISCONTINUED | OUTPATIENT
Start: 2021-04-05 | End: 2021-04-06 | Stop reason: HOSPADM

## 2021-04-05 RX ORDER — LIDOCAINE 40 MG/G
CREAM TOPICAL ONCE
Status: CANCELLED | OUTPATIENT
Start: 2021-04-05 | End: 2021-04-05

## 2021-04-05 RX ORDER — LIDOCAINE HYDROCHLORIDE 20 MG/ML
JELLY TOPICAL ONCE
Status: CANCELLED | OUTPATIENT
Start: 2021-04-05 | End: 2021-04-05

## 2021-04-05 RX ORDER — LIDOCAINE HYDROCHLORIDE 40 MG/ML
SOLUTION TOPICAL ONCE
Status: CANCELLED | OUTPATIENT
Start: 2021-04-05 | End: 2021-04-05

## 2021-04-05 RX ORDER — GENTAMICIN SULFATE 1 MG/G
OINTMENT TOPICAL ONCE
Status: CANCELLED | OUTPATIENT
Start: 2021-04-05 | End: 2021-04-05

## 2021-04-05 RX ORDER — BETAMETHASONE DIPROPIONATE 0.05 %
OINTMENT (GRAM) TOPICAL ONCE
Status: CANCELLED | OUTPATIENT
Start: 2021-04-05 | End: 2021-04-05

## 2021-04-05 RX ORDER — BACITRACIN, NEOMYCIN, POLYMYXIN B 400; 3.5; 5 [USP'U]/G; MG/G; [USP'U]/G
OINTMENT TOPICAL ONCE
Status: CANCELLED | OUTPATIENT
Start: 2021-04-05 | End: 2021-04-05

## 2021-04-05 RX ORDER — GINSENG 100 MG
CAPSULE ORAL ONCE
Status: CANCELLED | OUTPATIENT
Start: 2021-04-05 | End: 2021-04-05

## 2021-04-05 RX ORDER — CLOBETASOL PROPIONATE 0.5 MG/G
OINTMENT TOPICAL ONCE
Status: CANCELLED | OUTPATIENT
Start: 2021-04-05 | End: 2021-04-05

## 2021-04-05 RX ORDER — BACITRACIN ZINC AND POLYMYXIN B SULFATE 500; 1000 [USP'U]/G; [USP'U]/G
OINTMENT TOPICAL ONCE
Status: CANCELLED | OUTPATIENT
Start: 2021-04-05 | End: 2021-04-05

## 2021-04-05 RX ORDER — LIDOCAINE 50 MG/G
OINTMENT TOPICAL ONCE
Status: CANCELLED | OUTPATIENT
Start: 2021-04-05 | End: 2021-04-05

## 2021-04-05 NOTE — PROGRESS NOTES
Wound Care Center Progress Note With Procedure    Gabino Tavares  AGE: 77 y.o. GENDER: male  : 1955  EPISODE DATE:  2021     Subjective:     Chief Complaint   Patient presents with    Wound Check     scrotum          HISTORY of PRESENT ILLNESS     Rickie Ear a 72 y. o. male who presents today for wound evaluation of Acute non-healing surgical and necrotizing fascitis ulcer(s) of the rectal and perirectal area.  The ulcer is of moderate severity. The underlying cause of the wound is nonhealing surgical post rectal and perirectal incision and drain related to necrotizing fascitis performed by Dr. Ysabel Lebron 2020.   Wound Pain Timing/Severity: waxing and waning, moderate  Quality of pain: aching, tender  Severity of pain:  1 / 10   Modifying Factors: venous stasis, lymphedema, diabetes, poor glucose control, obesity, smoking and anticoagulation therapy  Associated Signs/Symptoms: edema, erythema, drainage and pain        PAST MEDICAL HISTORY        Diagnosis Date    CHF (congestive heart failure) (HCC)     Chronic ulcer of left leg with fat layer exposed ([address] 2016    Chronic ulcer of right leg with fat layer exposed ([address] 2016    Chronic ulcer of right leg with fat layer exposed ([address] 2016    Diabetes mellitus ([address]     Hypertension     PVD (peripheral vascular disease) ([address] 2016    Type 2 diabetes mellitus with diabetic peripheral angiopathy without gangrene, without long-term current use of insulin ([address] 2016    Type 2 diabetes mellitus with diabetic peripheral angiopathy without gangrene, without long-term current use of insulin ([address] 2016    Venous stasis ulcer of both lower extremities without varicose veins ([address] 2016    WD-Traumatic open wound of left lower leg, complicated by diabetes and venous insufficiency 10/8/2020    WD-Ulcer of shin, left, with fat layer exposed ([address] 2016       PAST SURGICAL HISTORY    Past Surgical hours      albuterol sulfate  (90 Base) MCG/ACT inhaler Inhale 2 puffs into the lungs every 6 hours as needed for Wheezing      aspirin EC 81 MG EC tablet Take 1 tablet by mouth daily 30 tablet 3    HYDROcodone-acetaminophen (NORCO) 7.5-325 MG per tablet Take 1 tablet by mouth 3 times daily .  metFORMIN (GLUCOPHAGE) 1000 MG tablet Take 1,000 mg by mouth 2 times daily (with meals)      lisinopril (PRINIVIL;ZESTRIL) 20 MG tablet Take 40 mg by mouth daily       tamsulosin (FLOMAX) 0.4 MG capsule Take 0.4 mg by mouth daily      finasteride (PROSCAR) 5 MG tablet Take 5 mg by mouth daily      amiodarone (CORDARONE) 200 MG tablet Take 1 tablet by mouth daily 30 tablet 0     No current facility-administered medications on file prior to encounter. REVIEW OF SYSTEMS    Pertinent items are noted in HPI. Constitutional: Negative for systemic symptoms including fever, chills and malaise. Objective:      BP (!) 168/58   Pulse 70   Temp 98.7 °F (37.1 °C) (Temporal)   Resp 18     PHYSICAL EXAM    General: The patient is in no acute distress. Mental status:  Patient is appropriate, is  oriented to place and plan of care.   Dermatologic exam: Visual inspection of the periwound reveals the skin to be normal in turgor and texture  Wound exam: see wound description below in procedure note      Assessment:     Problem List Items Addressed This Visit     WD-Venous stasis of both lower extremities    Relevant Medications    lidocaine (XYLOCAINE) 4 % external solution (Start on 4/5/2021 11:00 AM)    WD-Lymphedema of both lower extremities    Relevant Medications    lidocaine (XYLOCAINE) 4 % external solution (Start on 4/5/2021 11:00 AM)    WD-Traumatic open wound of left lower leg, complicated by diabetes and venous insufficiency - Primary    Relevant Orders    Supply: Wound Cleanser    Supply: Wound Dressings    Supply: Cover and Secure    HBO-Nonhealing surgical wound groin & buttock, initial encounter    Relevant Medications    lidocaine (XYLOCAINE) 4 % external solution (Start on 4/5/2021 11:00 AM)    HBO-Necrotizing fasciitis (HCC)    Relevant Medications    lidocaine (XYLOCAINE) 4 % external solution (Start on 4/5/2021 11:00 AM)        Procedure Note    Indications:  Based on my examination of this patient's wound(s) today, sharp excision into necrotic subcutaneous tissue is required to promote healing and evaluate the extent of previous healing. Performed by: FAITH Block CNP    Consent obtained: Yes    Time out taken:  Yes    Pain Control: Anesthetic  Anesthetic: 4% Lidocaine Liquid Topical     Debridement:Excisional Debridement    Using curette the wound(s) was/were sharply debrided down through and including the removal of subcutaneous tissue. Devitalized Tissue Debrided:  slough and exudate    Pre Debridement Measurements:  Are located in the Wound Documentation Flow Sheet    All active wounds listed below with today's date are evaluated  Wound(s)    debrided this date include # : 2     Post  Debridement Measurements:  Wound 11/25/20 Scrotum #2 Perineum (Active)   Wound Image   03/29/21 1055   Wound Etiology Surgical 04/05/21 1023   Dressing Status New dressing applied;Clean;Dry; Intact 03/15/21 1147   Wound Cleansed Soap and water 04/05/21 1023   Dressing/Treatment ABD; Alginate with Ag;Moisture barrier;Collagen 01/18/21 1213   Offloading for Diabetic Foot Ulcers No offloading required 04/05/21 1023   Wound Length (cm) 6.5 cm 04/05/21 1023   Wound Width (cm) 0.5 cm 04/05/21 1023   Wound Depth (cm) 0.1 cm 04/05/21 1023   Wound Surface Area (cm^2) 3.25 cm^2 04/05/21 1023   Change in Wound Size % (l*w) 97.4 04/05/21 1023   Wound Volume (cm^3) 0.32 cm^3 04/05/21 1023   Wound Healing % 100 04/05/21 1023   Post-Procedure Length (cm) 6.5 cm 04/05/21 1037   Post-Procedure Width (cm) 0.5 cm 04/05/21 1037   Post-Procedure Depth (cm) 0.1 cm 04/05/21 1037   Post-Procedure Surface Area water.               Ask the physician or nurse before getting the wound(s) wet in a shower     Daily Wound management:              Keep weight off wounds and reposition every 2 hours.              Avoid standing for long periods of time.              Apply wraps/stockings in AM and remove at bedtime.              If swelling is present, elevate legs to the level of the heart or above for 30 minutes 4-5 times a day and/or when sitting.                                      When taking antibiotics take entire prescription as ordered by physician do not stop taking until medicine is all gone.                                                      Orders for this week:  4/05/21     Left Lower leg-- Healed         Perineum -  Wash with hibicleanse and water. Rinse with saline. Pat dry with 4x4. Apply Gentamycin and Stimulin powder to base of wound   Apply desitin to periwound. Nystatin powder to groin. Cover with Ag+ alginate, and ABD pad and secure with paper tape. [place] to Change Daily and with bowel movements.            Follow up with Dahlia HENDERSON at the wound care center in 1 week on Monday   Call [phone] for any questions or concerns.   Date__________   Time________        Treatment Note      Written Patient Dismissal Instructions Given            Electronically signed by FAITH Kaplan CNP on 4/5/2021 at 10:43 AM

## 2021-04-12 ENCOUNTER — HOSPITAL ENCOUNTER (OUTPATIENT)
Dept: WOUND CARE | Age: 66
Discharge: HOME | End: 2021-04-12
Payer: MEDICARE

## 2021-04-12 DIAGNOSIS — I89.0 LYMPHEDEMA OF BOTH LOWER EXTREMITIES: ICD-10-CM

## 2021-04-12 DIAGNOSIS — S81.802A TRAUMATIC OPEN WOUND OF LEFT LOWER LEG, INITIAL ENCOUNTER: Primary | ICD-10-CM

## 2021-04-12 DIAGNOSIS — I87.8 VENOUS STASIS OF BOTH LOWER EXTREMITIES: ICD-10-CM

## 2021-04-12 DIAGNOSIS — M72.6 NECROTIZING FASCIITIS (HCC): ICD-10-CM

## 2021-04-12 DIAGNOSIS — T81.89XA NONHEALING SURGICAL WOUND, INITIAL ENCOUNTER: ICD-10-CM

## 2021-04-12 PROCEDURE — 11042 DBRDMT SUBQ TIS 1ST 20SQCM/<: CPT

## 2021-04-12 PROCEDURE — 11042 DBRDMT SUBQ TIS 1ST 20SQCM/<: CPT | Performed by: NURSE PRACTITIONER

## 2021-04-12 RX ORDER — BACITRACIN, NEOMYCIN, POLYMYXIN B 400; 3.5; 5 [USP'U]/G; MG/G; [USP'U]/G
OINTMENT TOPICAL ONCE
Status: CANCELLED | OUTPATIENT
Start: 2021-04-12 | End: 2021-04-12

## 2021-04-12 RX ORDER — BETAMETHASONE DIPROPIONATE 0.05 %
OINTMENT (GRAM) TOPICAL ONCE
Status: CANCELLED | OUTPATIENT
Start: 2021-04-12 | End: 2021-04-12

## 2021-04-12 RX ORDER — LIDOCAINE HYDROCHLORIDE 40 MG/ML
SOLUTION TOPICAL ONCE
Status: DISCONTINUED | OUTPATIENT
Start: 2021-04-12 | End: 2021-04-13 | Stop reason: HOSPADM

## 2021-04-12 RX ORDER — LIDOCAINE 40 MG/G
CREAM TOPICAL ONCE
Status: CANCELLED | OUTPATIENT
Start: 2021-04-12 | End: 2021-04-12

## 2021-04-12 RX ORDER — BACITRACIN ZINC AND POLYMYXIN B SULFATE 500; 1000 [USP'U]/G; [USP'U]/G
OINTMENT TOPICAL ONCE
Status: CANCELLED | OUTPATIENT
Start: 2021-04-12 | End: 2021-04-12

## 2021-04-12 RX ORDER — LIDOCAINE HYDROCHLORIDE 20 MG/ML
JELLY TOPICAL ONCE
Status: CANCELLED | OUTPATIENT
Start: 2021-04-12 | End: 2021-04-12

## 2021-04-12 RX ORDER — CLOBETASOL PROPIONATE 0.5 MG/G
OINTMENT TOPICAL ONCE
Status: CANCELLED | OUTPATIENT
Start: 2021-04-12 | End: 2021-04-12

## 2021-04-12 RX ORDER — GENTAMICIN SULFATE 1 MG/G
OINTMENT TOPICAL ONCE
Status: CANCELLED | OUTPATIENT
Start: 2021-04-12 | End: 2021-04-12

## 2021-04-12 RX ORDER — LIDOCAINE HYDROCHLORIDE 40 MG/ML
SOLUTION TOPICAL ONCE
Status: CANCELLED | OUTPATIENT
Start: 2021-04-12 | End: 2021-04-12

## 2021-04-12 RX ORDER — GENTAMICIN SULFATE 1 MG/G
OINTMENT TOPICAL ONCE
Status: DISCONTINUED | OUTPATIENT
Start: 2021-04-12 | End: 2021-04-13 | Stop reason: HOSPADM

## 2021-04-12 RX ORDER — LIDOCAINE 50 MG/G
OINTMENT TOPICAL ONCE
Status: CANCELLED | OUTPATIENT
Start: 2021-04-12 | End: 2021-04-12

## 2021-04-12 RX ORDER — GINSENG 100 MG
CAPSULE ORAL ONCE
Status: CANCELLED | OUTPATIENT
Start: 2021-04-12 | End: 2021-04-12

## 2021-04-12 NOTE — PROGRESS NOTES
Wound Care Center Progress Note With Procedure    Gabino Tavares  AGE: 77 y.o. GENDER: male  : 1955  EPISODE DATE:  2021     Subjective:     Chief Complaint   Patient presents with    Wound Check     groin         HISTORY of PRESENT ILLNESS     Yashira centeno 72 y. o. male who presents today for wound evaluation of Acute non-healing surgical and necrotizing fascitis ulcer(s) of the rectal and perirectal area.  The ulcer is of moderate severity. The underlying cause of the wound is nonhealing surgical post rectal and perirectal incision and drain related to necrotizing fascitis performed by Dr. Adelina Yanez 2020.   Wound Pain Timing/Severity: none  Quality of pain: N/A  Severity of pain:  0 / 10   Modifying Factors: venous stasis, lymphedema, diabetes, poor glucose control, obesity, smoking and anticoagulation therapy  Associated Signs/Symptoms: edema, erythema, drainage and pain    PAST MEDICAL HISTORY        Diagnosis Date    CHF (congestive heart failure) (HCC)     Chronic ulcer of left leg with fat layer exposed ([address] 2016    Chronic ulcer of right leg with fat layer exposed ([address] 2016    Chronic ulcer of right leg with fat layer exposed ([address] 2016    Diabetes mellitus ([address]     Hypertension     PVD (peripheral vascular disease) ([address] 2016    Type 2 diabetes mellitus with diabetic peripheral angiopathy without gangrene, without long-term current use of insulin ([address] 2016    Type 2 diabetes mellitus with diabetic peripheral angiopathy without gangrene, without long-term current use of insulin ([address] 2016    Venous stasis ulcer of both lower extremities without varicose veins ([address] 2016    WD-Traumatic open wound of left lower leg, complicated by diabetes and venous insufficiency 10/8/2020    WD-Ulcer of shin, left, with fat layer exposed ([address] 2016       PAST SURGICAL HISTORY    Past Surgical History:   Procedure Laterality Date    RECTAL SURGERY N/A 11/24/2020    RECTAL PERIRECTAL INCISION AND DRAINAGE performed by Canelo Dean MD at [facility] History   Problem Relation Age of Onset    Asthma Mother     Breast Cancer Mother     Cancer Mother     Diabetes Mother     High Blood Pressure Mother     Cancer Father     Early Death Brother     Arthritis Paternal Grandmother        SOCIAL HISTORY    Social History     Tobacco Use    Smoking status: Current Every Day Smoker     Packs/day: 1.00     Years: 60.00     Pack years: 60.00     Types: Cigarettes    Smokeless tobacco: Never Used    Tobacco comment: healing    Substance Use Topics    Alcohol use: Never     Frequency: Never    Drug use: No       ALLERGIES    No Known Allergies    MEDICATIONS    Current Outpatient Medications on File Prior to Encounter   Medication Sig Dispense Refill    clotrimazole-betamethasone (LOTRISONE) 1-0.05 % cream Apply topically 2 times daily to excoriation under abdominal fold and groin area. 45 g 2    magnesium oxide (MAG-OX) 400 MG tablet Take 1 tablet by mouth daily 30 tablet 1    rivaroxaban (XARELTO) 15 MG TABS tablet Take 1 tablet by mouth daily 42 tablet 1    metoprolol succinate (TOPROL XL) 25 MG extended release tablet Take 1 tablet by mouth 2 times daily 30 tablet 3    amLODIPine (NORVASC) 10 MG tablet Take 1 tablet by mouth nightly 30 tablet 3    Latanoprost 0.005 % EMUL Apply to eye daily      glipiZIDE (GLUCOTROL) 5 MG tablet Take 5 mg by mouth 2 times daily (before meals)      potassium chloride (KLOR-CON M) 20 MEQ extended release tablet Take 20 mEq by mouth daily      ALPRAZolam (XANAX) 0.5 MG tablet Take 0.5 mg by mouth nightly. Lawrence Molina ipratropium-albuterol (DUONEB) 0.5-2.5 (3) MG/3ML SOLN nebulizer solution Inhale 1 vial into the lungs every 4 hours      albuterol sulfate  (90 Base) MCG/ACT inhaler Inhale 2 puffs into the lungs every 6 hours as needed for Wheezing      HYDROcodone-acetaminophen (NORCO) 7.5-325 MG per tablet Take 1 tablet by mouth 3 times daily .  metFORMIN (GLUCOPHAGE) 1000 MG tablet Take 1,000 mg by mouth 2 times daily (with meals)      lisinopril (PRINIVIL;ZESTRIL) 20 MG tablet Take 40 mg by mouth daily       tamsulosin (FLOMAX) 0.4 MG capsule Take 0.4 mg by mouth daily      finasteride (PROSCAR) 5 MG tablet Take 5 mg by mouth daily      amiodarone (CORDARONE) 200 MG tablet Take 1 tablet by mouth daily 30 tablet 0    ibuprofen (ADVIL;MOTRIN) 600 MG tablet Take 1 tablet by mouth every 6 hours as needed for Pain 30 tablet 0    aspirin EC 81 MG EC tablet Take 1 tablet by mouth daily 30 tablet 3     No current facility-administered medications on file prior to encounter. REVIEW OF SYSTEMS    Pertinent items are noted in HPI. Constitutional: Negative for systemic symptoms including fever, chills and malaise. Objective: There were no vitals taken for this visit. PHYSICAL EXAM    General: The patient is in no acute distress. Mental status:  Patient is appropriate, is  oriented to place and plan of care.   Dermatologic exam: Visual inspection of the periwound reveals the skin to be normal in turgor and texture  Wound exam: see wound description below in procedure note      Assessment:     Problem List Items Addressed This Visit     WD-Venous stasis of both lower extremities    Relevant Medications    lidocaine (XYLOCAINE) 4 % external solution    gentamicin (GARAMYCIN) 0.1 % ointment    WD-Lymphedema of both lower extremities    Relevant Medications    lidocaine (XYLOCAINE) 4 % external solution    gentamicin (GARAMYCIN) 0.1 % ointment    WD-Traumatic open wound of left lower leg, complicated by diabetes and venous insufficiency - Primary    Relevant Orders    Supply: Wound Cleanser    Supply: Wound Dressings    Supply: Cover and Secure    HBO-Nonhealing surgical wound groin & buttock, initial encounter    Relevant Medications lidocaine (XYLOCAINE) 4 % external solution    gentamicin (GARAMYCIN) 0.1 % ointment    HBO-Necrotizing fasciitis (HCC)    Relevant Medications    lidocaine (XYLOCAINE) 4 % external solution    gentamicin (GARAMYCIN) 0.1 % ointment        Procedure Note    Indications:  Based on my examination of this patient's wound(s) today, sharp excision into necrotic subcutaneous tissue is required to promote healing and evaluate the extent of previous healing. Performed by: FAITH Durant CNP    Consent obtained: Yes    Time out taken:  Yes    Pain Control: Anesthetic  Anesthetic: 4% Lidocaine Liquid Topical     Debridement:Excisional Debridement    Using curette the wound(s) was/were sharply debrided down through and including the removal of subcutaneous tissue. Devitalized Tissue Debrided:  slough and exudate    Pre Debridement Measurements:  Are located in the Wound Documentation Flow Sheet    All active wounds listed below with today's date are evaluated  Wound(s)    debrided this date include # : 2     Post  Debridement Measurements:  Wound 11/25/20 Scrotum #2 Perineum (Active)   Wound Image   03/29/21 1055   Wound Etiology Surgical 04/12/21 1031   Dressing Status New dressing applied;Clean;Dry; Intact 04/12/21 1100   Wound Cleansed Cleansed with saline 04/12/21 1031   Dressing/Treatment ABD; Alginate with Ag;Moisture barrier;Collagen 01/18/21 1213   Offloading for Diabetic Foot Ulcers No offloading required 04/12/21 1031   Wound Length (cm) 5 cm 04/12/21 1031   Wound Width (cm) 0.4 cm 04/12/21 1031   Wound Depth (cm) 0.1 cm 04/12/21 1031   Wound Surface Area (cm^2) 2 cm^2 04/12/21 1031   Change in Wound Size % (l*w) 98.4 04/12/21 1031   Wound Volume (cm^3) 0.2 cm^3 04/12/21 1031   Wound Healing % 100 04/12/21 1031   Post-Procedure Length (cm) 5 cm 04/12/21 1038   Post-Procedure Width (cm) 0.4 cm 04/12/21 1038   Post-Procedure Depth (cm) 0.1 cm 04/12/21 1038   Post-Procedure Surface Area (cm^2) 2 cm^2 04/12/21 1038   Post-Procedure Volume (cm^3) 0.2 cm^3 04/12/21 1038   Distance Tunneling (cm) 0 cm 04/12/21 1031   Tunneling Position ___ O'Clock 0 04/12/21 1031   Undermining Starts ___ O'Clock 0 04/12/21 1031   Undermining Ends___ O'Clock 0 04/12/21 1031   Undermining Maxium Distance (cm) 0 04/12/21 1031   Wound Assessment Granulation tissue 04/12/21 1031   Drainage Amount Moderate 04/12/21 1031   Drainage Description Yellow 04/12/21 1031   Odor None 04/12/21 1031   Merary-wound Assessment Intact 04/12/21 1031   Margins Defined edges; Unattached edges 04/12/21 1031   Wound Thickness Description not for Pressure Injury Full thickness 04/12/21 1031   Number of days: 138       Percent of Wound(s) Debrided: approximately 100%    Total  Area  Debrided:  2 sq cm     Bleeding:  Minimal    Hemostasis Achieved:  by pressure    Procedural Pain:  0  / 10     Post Procedural Pain:  0 / 10     Response to treatment:  Well tolerated by patient. Status of wound progress and description from last visit: Almost healed, continue regimen. Plan:       Discharge Instructions       PHYSICIAN ORDERS AND DISCHARGE INSTRUCTIONS     NOTE: Upon discharge from the [address], you will receive a patient experience survey. We would be grateful if you would take the time to fill this survey out.     Wound care order history:                 ANDREW's   Right       Left               Date:              Cultures: 1/15/21               Grafts:                Antibiotics:           Continuing wound care orders and information:                 Residence:  Home              Continue home health care with: [facility]              Your wound-care supplies will be provided by:      Wound cleansing:               KR not scrub or use excessive force.              Wash hands with soap and water before and after dressing changes.             IYKDK to applying a clean dressing, cleanse wound with normal saline, wound cleanser, or mild soap and water.

## 2021-04-19 ENCOUNTER — HOSPITAL ENCOUNTER (OUTPATIENT)
Dept: WOUND CARE | Age: 66
Discharge: HOME | End: 2021-04-19
Payer: MEDICARE

## 2021-04-19 VITALS
TEMPERATURE: 98.6 F | HEART RATE: 63 BPM | RESPIRATION RATE: 20 BRPM | DIASTOLIC BLOOD PRESSURE: 62 MMHG | SYSTOLIC BLOOD PRESSURE: 166 MMHG

## 2021-04-19 DIAGNOSIS — T81.89XA NONHEALING SURGICAL WOUND, INITIAL ENCOUNTER: ICD-10-CM

## 2021-04-19 DIAGNOSIS — S81.802A TRAUMATIC OPEN WOUND OF LEFT LOWER LEG, INITIAL ENCOUNTER: Primary | ICD-10-CM

## 2021-04-19 DIAGNOSIS — I89.0 LYMPHEDEMA OF BOTH LOWER EXTREMITIES: ICD-10-CM

## 2021-04-19 DIAGNOSIS — I87.8 VENOUS STASIS OF BOTH LOWER EXTREMITIES: ICD-10-CM

## 2021-04-19 DIAGNOSIS — M72.6 NECROTIZING FASCIITIS (HCC): ICD-10-CM

## 2021-04-19 PROCEDURE — 11042 DBRDMT SUBQ TIS 1ST 20SQCM/<: CPT

## 2021-04-19 PROCEDURE — 11042 DBRDMT SUBQ TIS 1ST 20SQCM/<: CPT | Performed by: NURSE PRACTITIONER

## 2021-04-19 RX ORDER — BACITRACIN ZINC AND POLYMYXIN B SULFATE 500; 1000 [USP'U]/G; [USP'U]/G
OINTMENT TOPICAL ONCE
Status: CANCELLED | OUTPATIENT
Start: 2021-04-19 | End: 2021-04-19

## 2021-04-19 RX ORDER — CLOBETASOL PROPIONATE 0.5 MG/G
OINTMENT TOPICAL ONCE
Status: CANCELLED | OUTPATIENT
Start: 2021-04-19 | End: 2021-04-19

## 2021-04-19 RX ORDER — LIDOCAINE 40 MG/G
CREAM TOPICAL ONCE
Status: CANCELLED | OUTPATIENT
Start: 2021-04-19 | End: 2021-04-19

## 2021-04-19 RX ORDER — LIDOCAINE HYDROCHLORIDE 20 MG/ML
JELLY TOPICAL ONCE
Status: CANCELLED | OUTPATIENT
Start: 2021-04-19 | End: 2021-04-19

## 2021-04-19 RX ORDER — BACITRACIN, NEOMYCIN, POLYMYXIN B 400; 3.5; 5 [USP'U]/G; MG/G; [USP'U]/G
OINTMENT TOPICAL ONCE
Status: DISCONTINUED | OUTPATIENT
Start: 2021-04-19 | End: 2021-04-20 | Stop reason: HOSPADM

## 2021-04-19 RX ORDER — LIDOCAINE HYDROCHLORIDE 40 MG/ML
SOLUTION TOPICAL ONCE
Status: CANCELLED | OUTPATIENT
Start: 2021-04-19 | End: 2021-04-19

## 2021-04-19 RX ORDER — BACITRACIN, NEOMYCIN, POLYMYXIN B 400; 3.5; 5 [USP'U]/G; MG/G; [USP'U]/G
OINTMENT TOPICAL ONCE
Status: CANCELLED | OUTPATIENT
Start: 2021-04-19 | End: 2021-04-19

## 2021-04-19 RX ORDER — LIDOCAINE 50 MG/G
OINTMENT TOPICAL ONCE
Status: CANCELLED | OUTPATIENT
Start: 2021-04-19 | End: 2021-04-19

## 2021-04-19 RX ORDER — BETAMETHASONE DIPROPIONATE 0.05 %
OINTMENT (GRAM) TOPICAL ONCE
Status: CANCELLED | OUTPATIENT
Start: 2021-04-19 | End: 2021-04-19

## 2021-04-19 RX ORDER — GENTAMICIN SULFATE 1 MG/G
OINTMENT TOPICAL ONCE
Status: CANCELLED | OUTPATIENT
Start: 2021-04-19 | End: 2021-04-19

## 2021-04-19 RX ORDER — GINSENG 100 MG
CAPSULE ORAL ONCE
Status: CANCELLED | OUTPATIENT
Start: 2021-04-19 | End: 2021-04-19

## 2021-04-19 NOTE — PROGRESS NOTES
Multilayer Compression Wrap   (Not Unna) Below the Knee    NAME:  Librada Cranker Coffee  YOB: 1955  MEDICAL RECORD NUMBER:  [id number]  DATE:  4/19/2021    Multilayer compression wrap: Removed old Multilayer wrap if indicated and wash leg with mild soap/water. Applied moisturizing agent to dry skin as needed. Applied primary and secondary dressing as ordered. Applied multilayered dressing below the knee to left lower leg. Instructed patient/caregiver not to remove dressing and to keep it clean and dry. Instructed patient/caregiver on complications to report to provider, such as pain, numbness in toes, heavy drainage, and slippage of dressing. Instructed patient on purpose of compression dressing and on activity and exercise recommendations.       Electronically signed by Lb Shah LPN on 7/72/9632 at 03:07 AM

## 2021-04-19 NOTE — PROGRESS NOTES
Wound Care Center Progress Note With Procedure    Gabino Tavares  AGE: 77 y.o. GENDER: male  : 1955  EPISODE DATE:  2021     Subjective:     Chief Complaint   Patient presents with    Wound Check     perineum         HISTORY of PRESENT ILLNESS     Kassie centeno 72 y. o. male who presents today for wound evaluation of his chronic non-healing surgical and necrotizing fascitis ulcer(s) of the rectal and perirectal area. He also has a new wound to the left lower leg related to venous and lymphedema.  The ulcer is of mild severity. The underlying cause of the wound is nonhealing surgical post rectal and perirectal incision and drain related to necrotizing fascitis performed by Dr. Alba Cohen 2020.   Wound Pain Timing/Severity: none  Quality of pain: N/A  Severity of pain:  0 / 10   Modifying Factors: venous stasis, lymphedema, diabetes, poor glucose control, obesity, smoking and anticoagulation therapy  Associated Signs/Symptoms: edema, erythema, drainage and pain        PAST MEDICAL HISTORY        Diagnosis Date    CHF (congestive heart failure) (HCC)     Chronic ulcer of left leg with fat layer exposed ([address] 2016    Chronic ulcer of right leg with fat layer exposed ([address] 2016    Chronic ulcer of right leg with fat layer exposed ([address] 2016    Diabetes mellitus ([address]     Hypertension     PVD (peripheral vascular disease) ([address] 2016    Type 2 diabetes mellitus with diabetic peripheral angiopathy without gangrene, without long-term current use of insulin ([address] 2016    Type 2 diabetes mellitus with diabetic peripheral angiopathy without gangrene, without long-term current use of insulin ([address] 2016    Venous stasis ulcer of both lower extremities without varicose veins ([address] 2016    WD-Traumatic open wound of left lower leg, complicated by diabetes and venous insufficiency 10/8/2020    WD-Ulcer of shin, left, with fat layer exposed ([address] 2016 PAST SURGICAL HISTORY    Past Surgical History:   Procedure Laterality Date    RECTAL SURGERY N/A 11/24/2020    RECTAL PERIRECTAL INCISION AND DRAINAGE performed by Kinza Multani MD at [address] [facility]    Family History   Problem Relation Age of Onset    Asthma Mother     Breast Cancer Mother     Cancer Mother     Diabetes Mother     High Blood Pressure Mother     Cancer Father     Early Death Brother     Arthritis Paternal Grandmother        SOCIAL HISTORY    Social History     Tobacco Use    Smoking status: Current Every Day Smoker     Packs/day: 1.00     Years: 60.00     Pack years: 60.00     Types: Cigarettes    Smokeless tobacco: Never Used    Tobacco comment: healing    Substance Use Topics    Alcohol use: Never     Frequency: Never    Drug use: No       ALLERGIES    No Known Allergies    MEDICATIONS    Current Outpatient Medications on File Prior to Encounter   Medication Sig Dispense Refill    magnesium oxide (MAG-OX) 400 MG tablet Take 1 tablet by mouth daily 30 tablet 1    rivaroxaban (XARELTO) 15 MG TABS tablet Take 1 tablet by mouth daily 42 tablet 1    amLODIPine (NORVASC) 10 MG tablet Take 1 tablet by mouth nightly 30 tablet 3    Latanoprost 0.005 % EMUL Apply to eye daily      ibuprofen (ADVIL;MOTRIN) 600 MG tablet Take 1 tablet by mouth every 6 hours as needed for Pain 30 tablet 0    glipiZIDE (GLUCOTROL) 5 MG tablet Take 5 mg by mouth 2 times daily (before meals)      potassium chloride (KLOR-CON M) 20 MEQ extended release tablet Take 20 mEq by mouth daily      ALPRAZolam (XANAX) 0.5 MG tablet Take 0.5 mg by mouth nightly. Dot Bras ipratropium-albuterol (DUONEB) 0.5-2.5 (3) MG/3ML SOLN nebulizer solution Inhale 1 vial into the lungs every 4 hours      albuterol sulfate  (90 Base) MCG/ACT inhaler Inhale 2 puffs into the lungs every 6 hours as needed for Wheezing      aspirin EC 81 MG EC tablet Take 1 tablet by mouth daily 30 tablet 3    wound groin & buttock, initial encounter    Relevant Medications    neomycin-bacitracin-polymyxin (NEOSPORIN) ointment    HBO-Necrotizing fasciitis (HCC)    Relevant Medications    neomycin-bacitracin-polymyxin (NEOSPORIN) ointment        Procedure Note    Indications:  Based on my examination of this patient's wound(s) today, sharp excision into necrotic subcutaneous tissue is required to promote healing and evaluate the extent of previous healing. Performed by: FAITH Tomas CNP    Consent obtained: Yes    Time out taken:  Yes    Pain Control: Anesthetic  Anesthetic: 4% Lidocaine Liquid Topical     Debridement:Excisional Debridement    Using curette the wound(s) was/were sharply debrided down through and including the removal of subcutaneous tissue. Devitalized Tissue Debrided:  slough and exudate    Pre Debridement Measurements:  Are located in the Wound Documentation Flow Sheet    All active wounds listed below with today's date are evaluated  Wound(s)    debrided this date include # : 2    Post  Debridement Measurements:  Wound 11/25/20 Scrotum #2 Perineum (Active)   Wound Image   04/19/21 1115   Wound Etiology Surgical 04/19/21 1115   Dressing Status New dressing applied;Clean;Dry; Intact 04/19/21 1127   Wound Cleansed Cleansed with saline 04/19/21 1115   Dressing/Treatment ABD; Alginate with Ag;Moisture barrier;Collagen 01/18/21 1213   Offloading for Diabetic Foot Ulcers No offloading required 04/12/21 1031   Wound Length (cm) 2 cm 04/19/21 1115   Wound Width (cm) 0.2 cm 04/19/21 1115   Wound Depth (cm) 0.1 cm 04/19/21 1115   Wound Surface Area (cm^2) 0.4 cm^2 04/19/21 1115   Change in Wound Size % (l*w) 99.68 04/19/21 1115   Wound Volume (cm^3) 0.04 cm^3 04/19/21 1115   Wound Healing % 100 04/19/21 1115   Post-Procedure Length (cm) 2 cm 04/19/21 1120   Post-Procedure Width (cm) 0.2 cm 04/19/21 1120   Post-Procedure Depth (cm) 0.1 cm 04/19/21 1120   Post-Procedure Surface Area (cm^2) 0.4 cm^2 04/19/21 1120   Post-Procedure Volume (cm^3) 0.04 cm^3 04/19/21 1120   Distance Tunneling (cm) 0 cm 04/19/21 1115   Tunneling Position ___ O'Clock 0 04/19/21 1115   Undermining Starts ___ O'Clock 0 04/19/21 1115   Undermining Ends___ O'Clock 0 04/19/21 1115   Undermining Maxium Distance (cm) 0 04/19/21 1115   Wound Assessment Granulation tissue 04/12/21 1031   Drainage Amount Moderate 04/19/21 1115   Drainage Description Yellow 04/19/21 1115   Odor None 04/19/21 1115   Merary-wound Assessment Maceration 04/19/21 1115   Margins Defined edges 04/19/21 1115   Wound Thickness Description not for Pressure Injury Full thickness 04/19/21 1115   Number of days: 145       Percent of Wound(s) Debrided: approximately 100%    Total  Area  Debrided:  20 sq cm     Bleeding:  Minimal    Hemostasis Achieved:  by pressure    Procedural Pain:  0  / 10     Post Procedural Pain:  0 / 10     Response to treatment:  Well tolerated by patient. Status of wound progress and description from last visit: Scrotal and perineum wound almost healed, continue regimen. He has a new wound left lower leg will restart compression. Plan:       Discharge Instructions       PHYSICIAN ORDERS AND DISCHARGE INSTRUCTIONS     NOTE: Upon discharge from the [address], you will receive a patient experience survey. We would be grateful if you would take the time to fill this survey out.     Wound care order history:                 ANDREW's   Right       Left               Date:              Cultures: 1/15/21               Grafts:                Antibiotics:           Continuing wound care orders and information:                 Residence:  Home              Continue home health care with: [facility]              Your wound-care supplies will be provided by:      Wound cleansing:               BU not scrub or use excessive force.              Wash hands with soap and water before and after dressing changes.               XQXEG to applying a clean dressing, cleanse wound with normal saline, wound cleanser, or mild soap and water.               Ask the physician or nurse before getting the wound(s) wet in a shower     Daily Wound management:              Keep weight off wounds and reposition every 2 hours.              Avoid standing for long periods of time.              Apply wraps/stockings in AM and remove at bedtime.              If swelling is present, elevate legs to the level of the heart or above for 30 minutes 4-5 times a day and/or when sitting.                                      When taking antibiotics take entire prescription as ordered by physician do not stop taking until medicine is all gone.                                                      Orders for this week:  4/19/21     Left Lower leg-- Clean with soap and water, pat dry. Apply Stimulin powder to wound bed, cover with ABD. Wrap with Coban 2. Leave in place for 1 week.          Perineum -  Wash with hibicleanse and water. Rinse with saline. Pat dry with 4x4. Apply Gentamycin and Stimulin powder to base of wound   Apply desitin to periwound. Nystatin powder to groin. Cover with Ag+ alginate, and ABD pad and secure with paper tape. [place] to Change Daily and with bowel movements.            Follow up with Dahlia HENDERSON at the wound care center in 1 week on Monday   Call [phone] for any questions or concerns.   Date__________   Time________        Treatment Note Wound 11/25/20 Scrotum #2 Perineum-Dressing/Treatment: (gent, stimulen, caclium algainte, abd)    Written Patient Dismissal Instructions Given            Electronically signed by FAITH Gallegos CNP on 4/19/2021 at 12:15 PM

## 2021-04-26 ENCOUNTER — HOSPITAL ENCOUNTER (OUTPATIENT)
Dept: WOUND CARE | Age: 66
Discharge: HOME | End: 2021-04-26
Payer: MEDICARE

## 2021-04-26 VITALS
SYSTOLIC BLOOD PRESSURE: 148 MMHG | HEART RATE: 66 BPM | TEMPERATURE: 97.9 F | RESPIRATION RATE: 18 BRPM | DIASTOLIC BLOOD PRESSURE: 62 MMHG

## 2021-04-26 DIAGNOSIS — T81.89XA NONHEALING SURGICAL WOUND, INITIAL ENCOUNTER: ICD-10-CM

## 2021-04-26 DIAGNOSIS — M72.6 NECROTIZING FASCIITIS (HCC): ICD-10-CM

## 2021-04-26 DIAGNOSIS — I87.8 VENOUS STASIS OF BOTH LOWER EXTREMITIES: ICD-10-CM

## 2021-04-26 DIAGNOSIS — I89.0 LYMPHEDEMA OF BOTH LOWER EXTREMITIES: ICD-10-CM

## 2021-04-26 DIAGNOSIS — S81.802A TRAUMATIC OPEN WOUND OF LEFT LOWER LEG, INITIAL ENCOUNTER: Primary | ICD-10-CM

## 2021-04-26 PROCEDURE — 99213 OFFICE O/P EST LOW 20 MIN: CPT | Performed by: NURSE PRACTITIONER

## 2021-04-26 PROCEDURE — 99213 OFFICE O/P EST LOW 20 MIN: CPT

## 2021-04-26 RX ORDER — LIDOCAINE 50 MG/G
OINTMENT TOPICAL ONCE
Status: DISCONTINUED | OUTPATIENT
Start: 2021-04-26 | End: 2021-04-27 | Stop reason: HOSPADM

## 2021-04-26 RX ORDER — BETAMETHASONE DIPROPIONATE 0.05 %
OINTMENT (GRAM) TOPICAL ONCE
Status: CANCELLED | OUTPATIENT
Start: 2021-04-26 | End: 2021-04-26

## 2021-04-26 RX ORDER — LIDOCAINE 40 MG/G
CREAM TOPICAL ONCE
Status: CANCELLED | OUTPATIENT
Start: 2021-04-26 | End: 2021-04-26

## 2021-04-26 RX ORDER — GINSENG 100 MG
CAPSULE ORAL ONCE
Status: CANCELLED | OUTPATIENT
Start: 2021-04-26 | End: 2021-04-26

## 2021-04-26 RX ORDER — BACITRACIN, NEOMYCIN, POLYMYXIN B 400; 3.5; 5 [USP'U]/G; MG/G; [USP'U]/G
OINTMENT TOPICAL ONCE
Status: CANCELLED | OUTPATIENT
Start: 2021-04-26 | End: 2021-04-26

## 2021-04-26 RX ORDER — BACITRACIN ZINC AND POLYMYXIN B SULFATE 500; 1000 [USP'U]/G; [USP'U]/G
OINTMENT TOPICAL ONCE
Status: CANCELLED | OUTPATIENT
Start: 2021-04-26 | End: 2021-04-26

## 2021-04-26 RX ORDER — LIDOCAINE HYDROCHLORIDE 40 MG/ML
SOLUTION TOPICAL ONCE
Status: CANCELLED | OUTPATIENT
Start: 2021-04-26 | End: 2021-04-26

## 2021-04-26 RX ORDER — LIDOCAINE 50 MG/G
OINTMENT TOPICAL ONCE
Status: CANCELLED | OUTPATIENT
Start: 2021-04-26 | End: 2021-04-26

## 2021-04-26 RX ORDER — LIDOCAINE HYDROCHLORIDE 20 MG/ML
JELLY TOPICAL ONCE
Status: CANCELLED | OUTPATIENT
Start: 2021-04-26 | End: 2021-04-26

## 2021-04-26 RX ORDER — GENTAMICIN SULFATE 1 MG/G
OINTMENT TOPICAL ONCE
Status: CANCELLED | OUTPATIENT
Start: 2021-04-26 | End: 2021-04-26

## 2021-04-26 RX ORDER — CLOBETASOL PROPIONATE 0.5 MG/G
OINTMENT TOPICAL ONCE
Status: CANCELLED | OUTPATIENT
Start: 2021-04-26 | End: 2021-04-26

## 2021-04-26 ASSESSMENT — PAIN DESCRIPTION - PAIN TYPE: TYPE: CHRONIC PAIN

## 2021-04-26 ASSESSMENT — PAIN - FUNCTIONAL ASSESSMENT: PAIN_FUNCTIONAL_ASSESSMENT: PREVENTS OR INTERFERES SOME ACTIVE ACTIVITIES AND ADLS

## 2021-04-26 ASSESSMENT — PAIN SCALES - GENERAL: PAINLEVEL_OUTOF10: 4

## 2021-04-26 ASSESSMENT — PAIN DESCRIPTION - DESCRIPTORS: DESCRIPTORS: BURNING

## 2021-04-26 ASSESSMENT — PAIN DESCRIPTION - LOCATION: LOCATION: PERINEUM

## 2021-04-26 NOTE — PROGRESS NOTES
Wound Care Center Progress Note       Dalila Tavares  AGE: 77 y.o. GENDER: male  : 1955  TODAY'S DATE:  2021        Subjective:     Chief Complaint   Patient presents with    Wound Check         HISTORY of PRESENT ILLNESS    Arianna Osborne a 72 y. o. male who presents today for wound evaluation of his chronic non-healing surgical and necrotizing fascitis ulcer(s) of the rectal and perirectal area. He also has a newer wound to the left lower leg related to venous and lymphedema.  The ulcer is of mild severity. The underlying cause of the wound is nonhealing surgical post rectal and perirectal incision and drain related to necrotizing fascitis performed by Dr. Janet Negron 2020.   Wound Pain Timing/Severity: none  Quality of pain: N/A  Severity of pain:  0 / 10   Modifying Factors: venous stasis, lymphedema, diabetes, poor glucose control, obesity, smoking and anticoagulation therapy  Associated Signs/Symptoms: edema, erythema, drainage and pain        PAST MEDICAL HISTORY        Diagnosis Date    CHF (congestive heart failure) (HCC)     Chronic ulcer of left leg with fat layer exposed ([address] 2016    Chronic ulcer of right leg with fat layer exposed ([address] 2016    Chronic ulcer of right leg with fat layer exposed ([address] 2016    Diabetes mellitus ([address]     Hypertension     PVD (peripheral vascular disease) ([address] 2016    Type 2 diabetes mellitus with diabetic peripheral angiopathy without gangrene, without long-term current use of insulin ([address] 2016    Type 2 diabetes mellitus with diabetic peripheral angiopathy without gangrene, without long-term current use of insulin ([address] 2016    Venous stasis ulcer of both lower extremities without varicose veins ([address] 2016    WD-Traumatic open wound of left lower leg, complicated by diabetes and venous insufficiency 10/8/2020    WD-Ulcer of shin, left, with fat layer exposed ([address] 2016       PAST SURGICAL HISTORY    Past Surgical History:   Procedure Laterality Date    RECTAL SURGERY N/A 11/24/2020    RECTAL PERIRECTAL INCISION AND DRAINAGE performed by Lawrence Bray MD at [facility] History   Problem Relation Age of Onset    Asthma Mother     Breast Cancer Mother     Cancer Mother     Diabetes Mother     High Blood Pressure Mother     Cancer Father     Early Death Brother     Arthritis Paternal Grandmother        SOCIAL HISTORY    Social History     Tobacco Use    Smoking status: Current Every Day Smoker     Packs/day: 1.00     Years: 60.00     Pack years: 60.00     Types: Cigarettes    Smokeless tobacco: Never Used    Tobacco comment: healing    Substance Use Topics    Alcohol use: Never     Frequency: Never    Drug use: No       ALLERGIES    No Known Allergies    MEDICATIONS    Current Outpatient Medications on File Prior to Encounter   Medication Sig Dispense Refill    clotrimazole-betamethasone (LOTRISONE) 1-0.05 % cream Apply topically 2 times daily to excoriation under abdominal fold and groin area. 45 g 2    magnesium oxide (MAG-OX) 400 MG tablet Take 1 tablet by mouth daily 30 tablet 1    rivaroxaban (XARELTO) 15 MG TABS tablet Take 1 tablet by mouth daily 42 tablet 1    metoprolol succinate (TOPROL XL) 25 MG extended release tablet Take 1 tablet by mouth 2 times daily 30 tablet 3    amLODIPine (NORVASC) 10 MG tablet Take 1 tablet by mouth nightly 30 tablet 3    Latanoprost 0.005 % EMUL Apply to eye daily      ibuprofen (ADVIL;MOTRIN) 600 MG tablet Take 1 tablet by mouth every 6 hours as needed for Pain 30 tablet 0    glipiZIDE (GLUCOTROL) 5 MG tablet Take 5 mg by mouth 2 times daily (before meals)      potassium chloride (KLOR-CON M) 20 MEQ extended release tablet Take 20 mEq by mouth daily      ALPRAZolam (XANAX) 0.5 MG tablet Take 0.5 mg by mouth nightly. Kamini Diamond ipratropium-albuterol (DUONEB) 0.5-2.5 (3) MG/3ML SOLN nebulizer solution Inhale 1 vial into the lungs every 4 hours      albuterol sulfate  (90 Base) MCG/ACT inhaler Inhale 2 puffs into the lungs every 6 hours as needed for Wheezing      aspirin EC 81 MG EC tablet Take 1 tablet by mouth daily 30 tablet 3    HYDROcodone-acetaminophen (NORCO) 7.5-325 MG per tablet Take 1 tablet by mouth 3 times daily .  metFORMIN (GLUCOPHAGE) 1000 MG tablet Take 1,000 mg by mouth 2 times daily (with meals)      lisinopril (PRINIVIL;ZESTRIL) 20 MG tablet Take 40 mg by mouth daily       tamsulosin (FLOMAX) 0.4 MG capsule Take 0.4 mg by mouth daily      finasteride (PROSCAR) 5 MG tablet Take 5 mg by mouth daily      amiodarone (CORDARONE) 200 MG tablet Take 1 tablet by mouth daily 30 tablet 0     No current facility-administered medications on file prior to encounter. REVIEW OF SYSTEMS    Pertinent items are noted in HPI. Constitutional: Negative for systemic symptoms including fever, chills and malaise. Objective:      BP (!) 148/62   Pulse 66   Temp 97.9 °F (36.6 °C)   Resp 18     PHYSICAL EXAM    General: The patient is in no acute distress. Mental status:  Patient is appropriate, is  oriented to place and plan of care. Dermatologic exam: Visual inspection of the periwound reveals the skin to be normal in turgor and texture. Wound exam:  see wound description below     All active wounds listed below with today's date are evaluated      Wound 11/25/20 Scrotum #2 Perineum (Active)   Wound Image   04/19/21 1115   Wound Etiology Surgical 04/26/21 1033   Dressing Status New dressing applied;Clean;Dry; Intact 04/19/21 1127   Wound Cleansed Cleansed with saline 04/26/21 1033   Dressing/Treatment ABD; Alginate with Ag;Moisture barrier;Collagen 01/18/21 1213   Offloading for Diabetic Foot Ulcers No offloading required 04/26/21 1033   Wound Length (cm) 2.2 cm 04/26/21 1033   Wound Width (cm) 0.2 cm 04/26/21 1033   Wound Depth (cm) 0.1 cm 04/26/21 1033   Wound Surface Area (cm^2) 0.44 cm^2 04/26/21 1033   Change in Wound Size % (l*w) 99.65 04/26/21 1033   Wound Volume (cm^3) 0.04 cm^3 04/26/21 1033   Wound Healing % 100 04/26/21 1033   Post-Procedure Length (cm) 2 cm 04/19/21 1120   Post-Procedure Width (cm) 0.2 cm 04/19/21 1120   Post-Procedure Depth (cm) 0.1 cm 04/19/21 1120   Post-Procedure Surface Area (cm^2) 0.4 cm^2 04/19/21 1120   Post-Procedure Volume (cm^3) 0.04 cm^3 04/19/21 1120   Distance Tunneling (cm) 0 cm 04/26/21 1033   Tunneling Position ___ O'Clock 0 04/26/21 1033   Undermining Starts ___ O'Clock 0 04/26/21 1033   Undermining Ends___ O'Clock 0 04/26/21 1033   Undermining Maxium Distance (cm) 0 04/26/21 1033   Wound Assessment Granulation tissue 04/26/21 1033   Drainage Amount Moderate 04/26/21 1033   Drainage Description Yellow 04/26/21 1033   Odor None 04/26/21 1033   Merary-wound Assessment Maceration 04/26/21 1033   Margins Defined edges 04/26/21 1033   Wound Thickness Description not for Pressure Injury Full thickness 04/26/21 1033   Number of days: 152       Wound 04/26/21 Pretibial Left #3 Left shin (Active)   Wound Cleansed Cleansed with saline 04/26/21 1033   Wound Length (cm) 2.2 cm 04/26/21 1033   Wound Width (cm) 2.3 cm 04/26/21 1033   Wound Depth (cm) 0.1 cm 04/26/21 1033   Wound Surface Area (cm^2) 5.06 cm^2 04/26/21 1033   Wound Volume (cm^3) 0.51 cm^3 04/26/21 1033   Distance Tunneling (cm) 0 cm 04/26/21 1033   Tunneling Position ___ O'Clock 0 04/26/21 1033   Undermining Starts ___ O'Clock 0 04/26/21 1033   Undermining Ends___ O'Clock 0 04/26/21 1033   Undermining Maxium Distance (cm) 0 04/26/21 1033   Wound Assessment Granulation tissue;Slough 04/26/21 1033   Drainage Amount Moderate 04/26/21 1033   Drainage Description Serosanguinous 04/26/21 1033   Odor None 04/26/21 1033   Merary-wound Assessment Intact 04/26/21 1033   Margins Attached edges; Defined edges 04/26/21 1033   Wound Thickness Description not for Pressure Injury Full thickness 04/26/21 1033   Number of days: 0       Assessment:       Problem List Items Addressed This Visit     WD-Venous stasis of both lower extremities    Relevant Medications    lidocaine (XYLOCAINE) 5 % ointment    WD-Lymphedema of both lower extremities    Relevant Medications    lidocaine (XYLOCAINE) 5 % ointment    WD-Traumatic open wound of left lower leg, complicated by diabetes and venous insufficiency - Primary    Relevant Orders    Supply: Wound Cleanser    Supply: Wound Dressings    Supply: Cover and Secure    HBO-Nonhealing surgical wound groin & buttock, initial encounter    Relevant Medications    lidocaine (XYLOCAINE) 5 % ointment    HBO-Necrotizing fasciitis (HCC)    Relevant Medications    lidocaine (XYLOCAINE) 5 % ointment          Status of wound progress and description from last visit:  Scrotal and perineum wound almost healed, continue regimen. Compression improving  wound left lower leg.     Plan:     Discharge Instructions       PHYSICIAN ORDERS AND DISCHARGE INSTRUCTIONS     NOTE: Upon discharge from the [address], you will receive a patient experience survey. We would be grateful if you would take the time to fill this survey out.     Wound care order history:                 ANDREW's   Right       Left               Date:              Cultures: 1/15/21               Grafts:                Antibiotics:           Continuing wound care orders and information:                 Residence:  Home              Continue home health care with: [facility]              Your wound-care supplies will be provided by:      Wound cleansing:               YW not scrub or use excessive force.              Wash hands with soap and water before and after dressing changes.               COMXI to applying a clean dressing, cleanse wound with normal saline, wound cleanser, or mild soap and water.               Ask the physician or nurse before getting the wound(s) wet in a shower     Daily Wound management:              Keep weight off wounds and

## 2021-04-26 NOTE — PLAN OF CARE
Problem: Pain:  Goal: Pain level will decrease  Description: Pain level will decrease  Outcome: Ongoing  Goal: Control of acute pain  Description: Control of acute pain  Outcome: Ongoing  Goal: Control of chronic pain  Description: Control of chronic pain  Outcome: Ongoing     Problem: Pain:  Goal: Pain level will decrease  Description: Pain level will decrease  Outcome: Ongoing  Goal: Control of acute pain  Description: Control of acute pain  Outcome: Ongoing  Goal: Control of chronic pain  Description: Control of chronic pain  Outcome: Ongoing

## 2021-05-03 ENCOUNTER — HOSPITAL ENCOUNTER (OUTPATIENT)
Dept: WOUND CARE | Age: 66
Discharge: HOME | End: 2021-05-03
Payer: MEDICARE

## 2021-05-03 VITALS
SYSTOLIC BLOOD PRESSURE: 115 MMHG | DIASTOLIC BLOOD PRESSURE: 52 MMHG | TEMPERATURE: 97.6 F | HEART RATE: 68 BPM | RESPIRATION RATE: 18 BRPM

## 2021-05-03 DIAGNOSIS — I87.8 VENOUS STASIS OF BOTH LOWER EXTREMITIES: ICD-10-CM

## 2021-05-03 DIAGNOSIS — S81.802A TRAUMATIC OPEN WOUND OF LEFT LOWER LEG, INITIAL ENCOUNTER: Primary | ICD-10-CM

## 2021-05-03 DIAGNOSIS — M72.6 NECROTIZING FASCIITIS (HCC): ICD-10-CM

## 2021-05-03 DIAGNOSIS — I89.0 LYMPHEDEMA OF BOTH LOWER EXTREMITIES: ICD-10-CM

## 2021-05-03 DIAGNOSIS — T81.89XA NONHEALING SURGICAL WOUND, INITIAL ENCOUNTER: ICD-10-CM

## 2021-05-03 PROCEDURE — 11042 DBRDMT SUBQ TIS 1ST 20SQCM/<: CPT

## 2021-05-03 PROCEDURE — 11042 DBRDMT SUBQ TIS 1ST 20SQCM/<: CPT | Performed by: NURSE PRACTITIONER

## 2021-05-03 RX ORDER — CLOBETASOL PROPIONATE 0.5 MG/G
OINTMENT TOPICAL ONCE
Status: CANCELLED | OUTPATIENT
Start: 2021-05-03 | End: 2021-05-03

## 2021-05-03 RX ORDER — LIDOCAINE 50 MG/G
OINTMENT TOPICAL ONCE
Status: DISCONTINUED | OUTPATIENT
Start: 2021-05-03 | End: 2021-05-04 | Stop reason: HOSPADM

## 2021-05-03 RX ORDER — LIDOCAINE 40 MG/G
CREAM TOPICAL ONCE
Status: CANCELLED | OUTPATIENT
Start: 2021-05-03 | End: 2021-05-03

## 2021-05-03 RX ORDER — LIDOCAINE 50 MG/G
OINTMENT TOPICAL ONCE
Status: CANCELLED | OUTPATIENT
Start: 2021-05-03 | End: 2021-05-03

## 2021-05-03 RX ORDER — LIDOCAINE HYDROCHLORIDE 20 MG/ML
JELLY TOPICAL ONCE
Status: CANCELLED | OUTPATIENT
Start: 2021-05-03 | End: 2021-05-03

## 2021-05-03 RX ORDER — BACITRACIN, NEOMYCIN, POLYMYXIN B 400; 3.5; 5 [USP'U]/G; MG/G; [USP'U]/G
OINTMENT TOPICAL ONCE
Status: CANCELLED | OUTPATIENT
Start: 2021-05-03 | End: 2021-05-03

## 2021-05-03 RX ORDER — BETAMETHASONE DIPROPIONATE 0.05 %
OINTMENT (GRAM) TOPICAL ONCE
Status: CANCELLED | OUTPATIENT
Start: 2021-05-03 | End: 2021-05-03

## 2021-05-03 RX ORDER — GENTAMICIN SULFATE 1 MG/G
OINTMENT TOPICAL ONCE
Status: CANCELLED | OUTPATIENT
Start: 2021-05-03 | End: 2021-05-03

## 2021-05-03 RX ORDER — LIDOCAINE HYDROCHLORIDE 40 MG/ML
SOLUTION TOPICAL ONCE
Status: CANCELLED | OUTPATIENT
Start: 2021-05-03 | End: 2021-05-03

## 2021-05-03 RX ORDER — GINSENG 100 MG
CAPSULE ORAL ONCE
Status: CANCELLED | OUTPATIENT
Start: 2021-05-03 | End: 2021-05-03

## 2021-05-03 RX ORDER — GENTAMICIN SULFATE 1 MG/G
OINTMENT TOPICAL ONCE
Status: DISCONTINUED | OUTPATIENT
Start: 2021-05-03 | End: 2021-05-04 | Stop reason: HOSPADM

## 2021-05-03 RX ORDER — BACITRACIN ZINC AND POLYMYXIN B SULFATE 500; 1000 [USP'U]/G; [USP'U]/G
OINTMENT TOPICAL ONCE
Status: CANCELLED | OUTPATIENT
Start: 2021-05-03 | End: 2021-05-03

## 2021-05-03 ASSESSMENT — PAIN DESCRIPTION - PROGRESSION: CLINICAL_PROGRESSION: GRADUALLY IMPROVING

## 2021-05-03 ASSESSMENT — PAIN DESCRIPTION - LOCATION: LOCATION: PERINEUM

## 2021-05-03 ASSESSMENT — PAIN DESCRIPTION - PAIN TYPE: TYPE: CHRONIC PAIN

## 2021-05-03 ASSESSMENT — PAIN DESCRIPTION - FREQUENCY: FREQUENCY: INTERMITTENT

## 2021-05-03 NOTE — PROGRESS NOTES
Wound Care Center Progress Note With Procedure    Gabino Tavares  AGE: 77 y.o. GENDER: male  : 1955  EPISODE DATE:  5/3/2021     Subjective:     Chief Complaint   Patient presents with    Wound Check         HISTORY of PRESENT ILLNESS     Stephanie Hahn a 72 y. o. male who presents today for wound evaluation of his chronic non-healing surgical and necrotizing fascitis ulcer(s) of the rectal and perirectal area. The left lower leg is healed. The ulcer is of mild severity. The underlying cause of the wound is nonhealing surgical post rectal and perirectal incision and drain related to necrotizing fascitis performed by Dr. Stefania Duran 2020.   Wound Pain Timing/Severity: none  Quality of pain: N/A  Severity of pain:  0 / 10   Modifying Factors: venous stasis, lymphedema, diabetes, poor glucose control, obesity, smoking and anticoagulation therapy  Associated Signs/Symptoms: edema, erythema, drainage and pain        PAST MEDICAL HISTORY        Diagnosis Date    CHF (congestive heart failure) (HCC)     Chronic ulcer of left leg with fat layer exposed ([address] 2016    Chronic ulcer of right leg with fat layer exposed ([address] 2016    Chronic ulcer of right leg with fat layer exposed ([address] 2016    Diabetes mellitus ([address]     Hypertension     PVD (peripheral vascular disease) ([address] 2016    Type 2 diabetes mellitus with diabetic peripheral angiopathy without gangrene, without long-term current use of insulin ([address] 2016    Type 2 diabetes mellitus with diabetic peripheral angiopathy without gangrene, without long-term current use of insulin ([address] 2016    Venous stasis ulcer of both lower extremities without varicose veins ([address] 2016    WD-Traumatic open wound of left lower leg, complicated by diabetes and venous insufficiency 10/8/2020    WD-Ulcer of shin, left, with fat layer exposed ([address] 2016       PAST SURGICAL HISTORY    Past Surgical History:   Procedure Laterality Date    RECTAL SURGERY N/A 2020    RECTAL PERIRECTAL INCISION AND DRAINAGE performed by David Salinas MD at [facility] History   Problem Relation Age of Onset    Asthma Mother     Breast Cancer Mother     Cancer Mother     Diabetes Mother     High Blood Pressure Mother     Cancer Father     Early Death Brother     Arthritis Paternal Grandmother        SOCIAL HISTORY    Social History     Tobacco Use    Smoking status: Current Every Day Smoker     Packs/day: 1.00     Years: 60.00     Pack years: 60.00     Types: Cigarettes    Smokeless tobacco: Never Used    Tobacco comment: healing    Substance Use Topics    Alcohol use: Never     Frequency: Never    Drug use: No       ALLERGIES    No Known Allergies    MEDICATIONS    Current Outpatient Medications on File Prior to Encounter   Medication Sig Dispense Refill    clotrimazole-betamethasone (LOTRISONE) 1-0.05 % cream Apply topically 2 times daily to excoriation under abdominal fold and groin area. 45 g 2    magnesium oxide (MAG-OX) 400 MG tablet Take 1 tablet by mouth daily 30 tablet 1    rivaroxaban (XARELTO) 15 MG TABS tablet Take 1 tablet by mouth daily 42 tablet 1    metoprolol succinate (TOPROL XL) 25 MG extended release tablet Take 1 tablet by mouth 2 times daily 30 tablet 3    amLODIPine (NORVASC) 10 MG tablet Take 1 tablet by mouth nightly 30 tablet 3    Latanoprost 0.005 % EMUL Apply to eye daily      ibuprofen (ADVIL;MOTRIN) 600 MG tablet Take 1 tablet by mouth every 6 hours as needed for Pain 30 tablet 0    glipiZIDE (GLUCOTROL) 5 MG tablet Take 5 mg by mouth 2 times daily (before meals)      potassium chloride (KLOR-CON M) 20 MEQ extended release tablet Take 20 mEq by mouth daily      ALPRAZolam (XANAX) 0.5 MG tablet Take 0.5 mg by mouth nightly. Yo Muhammad ipratropium-albuterol (DUONEB) 0.5-2.5 (3) MG/3ML SOLN nebulizer solution Inhale 1 vial into the lungs every 4 hours      albuterol sulfate  (90 Base) MCG/ACT inhaler Inhale 2 puffs into the lungs every 6 hours as needed for Wheezing      aspirin EC 81 MG EC tablet Take 1 tablet by mouth daily 30 tablet 3    HYDROcodone-acetaminophen (NORCO) 7.5-325 MG per tablet Take 1 tablet by mouth 3 times daily .  metFORMIN (GLUCOPHAGE) 1000 MG tablet Take 1,000 mg by mouth 2 times daily (with meals)      lisinopril (PRINIVIL;ZESTRIL) 20 MG tablet Take 40 mg by mouth daily       tamsulosin (FLOMAX) 0.4 MG capsule Take 0.4 mg by mouth daily      finasteride (PROSCAR) 5 MG tablet Take 5 mg by mouth daily      amiodarone (CORDARONE) 200 MG tablet Take 1 tablet by mouth daily 30 tablet 0     No current facility-administered medications on file prior to encounter. REVIEW OF SYSTEMS    Pertinent items are noted in HPI. Constitutional: Negative for systemic symptoms including fever, chills and malaise. Objective:      BP (!) 115/52   Pulse 68   Temp 97.6 °F (36.4 °C)   Resp 18     PHYSICAL EXAM    General: The patient is in no acute distress. Mental status:  Patient is appropriate, is  oriented to place and plan of care.   Dermatologic exam: Visual inspection of the periwound reveals the skin to be normal in turgor and texture  Wound exam: see wound description below in procedure note      Assessment:     Problem List Items Addressed This Visit     WD-Venous stasis of both lower extremities    Relevant Medications    lidocaine (XYLOCAINE) 5 % ointment    gentamicin (GARAMYCIN) 0.1 % ointment    WD-Lymphedema of both lower extremities    Relevant Medications    lidocaine (XYLOCAINE) 5 % ointment    gentamicin (GARAMYCIN) 0.1 % ointment    WD-Traumatic open wound of left lower leg, complicated by diabetes and venous insufficiency - Primary    Relevant Orders    Supply: Wound Cleanser    Supply: Wound Dressings    Supply: Cover and Secure    HBO-Nonhealing surgical wound groin & buttock, initial encounter    Relevant Medications Post-Procedure Volume (cm^3) 0.04 cm^3 04/19/21 1120   Distance Tunneling (cm) 0 cm 05/03/21 1107   Tunneling Position ___ O'Clock 0 05/03/21 1107   Undermining Starts ___ O'Clock 0 05/03/21 1107   Undermining Ends___ O'Clock 0 05/03/21 1107   Undermining Maxium Distance (cm) 0 05/03/21 1107   Wound Assessment Pink/red 05/03/21 1107   Drainage Amount Moderate 05/03/21 1107   Drainage Description Serosanguinous 05/03/21 1107   Odor None 05/03/21 1107   Merary-wound Assessment Maceration 05/03/21 1107   Margins Defined edges; Unattached edges 05/03/21 1107   Wound Thickness Description not for Pressure Injury Full thickness 05/03/21 1107   Number of days: 159       Percent of Wound(s) Debrided: approximately 100%    Total  Area  Debrided:  1.05 sq cm     Bleeding:  Minimal    Hemostasis Achieved:  by pressure    Procedural Pain:  0  / 10     Post Procedural Pain:  0 / 10     Response to treatment:  Well tolerated by patient. Status of wound progress and description from last visit: Improving, the left lower leg is healed, the perineum is almost healed, continue regimen. Plan:       Discharge Instructions       PHYSICIAN ORDERS AND DISCHARGE INSTRUCTIONS     NOTE: Upon discharge from the [address], you will receive a patient experience survey. We would be grateful if you would take the time to fill this survey out.     Wound care order history:                 ANDREW's   Right       Left               Date:              Cultures: 1/15/21               Grafts:                Antibiotics:           Continuing wound care orders and information:                 Residence:  Home              Continue home health care with: [facility]              Your wound-care supplies will be provided by:      Wound cleansing:               GG not scrub or use excessive force.              Wash hands with soap and water before and after dressing changes.               TEDGM to applying a clean dressing, cleanse wound with normal saline, wound cleanser, or mild soap and water.               Ask the physician or nurse before getting the wound(s) wet in a shower     Daily Wound management:              Keep weight off wounds and reposition every 2 hours.              Avoid standing for long periods of time.              Apply wraps/stockings in AM and remove at bedtime.              If swelling is present, elevate legs to the level of the heart or above for 30 minutes 4-5 times a day and/or when sitting.                                      When taking antibiotics take entire prescription as ordered by physician do not stop taking until medicine is all gone.                                                      Orders for this week:  5/3/21              Perineum - Diaz Goes with hibicleanse and water. Rinse with saline. Pat dry with 4x4. Apply Gentamycin and Stimulin powder to base of wound   Apply desitin to periwound. Nystatin powder to groin. Cover with Ag+ alginate, and ABD pad and secure with paper tape. Lorenzo to Change Daily and with bowel movements.            Follow up with Dahlia HENDERSON at the wound care center in 1 week on Monday   Call [phone] for any questions or concerns.   Date__________   Time________        Treatment Note Wound 11/25/20 Scrotum #2 Perineum-Dressing/Treatment: (Desitin to rupert, Gent & stimulen, silver alg, ABD, tape)    Written Patient Dismissal Instructions Given            Electronically signed by FAITH Laird CNP on 5/3/2021 at 12:14 PM

## 2021-05-03 NOTE — PLAN OF CARE
Problem: Pain:  Goal: Pain level will decrease  Description: Pain level will decrease  5/3/2021 1159 by Bandar Whittington RN  Outcome: Ongoing  5/3/2021 1159 by Bandar Whittington RN  Outcome: Ongoing  Goal: Control of acute pain  Description: Control of acute pain  5/3/2021 1159 by Bandar Whittington RN  Outcome: Ongoing  5/3/2021 1159 by Bandar Whittington RN  Outcome: Ongoing  Goal: Control of chronic pain  Description: Control of chronic pain  5/3/2021 1159 by Bandar Whittington RN  Outcome: Ongoing  5/3/2021 1159 by Bandar Whittington RN  Outcome: Ongoing     Problem: Wound:  Goal: Will show signs of wound healing; wound closure and no evidence of infection  Description: Will show signs of wound healing; wound closure and no evidence of infection  Outcome: Ongoing

## 2021-05-10 ENCOUNTER — HOSPITAL ENCOUNTER (OUTPATIENT)
Dept: WOUND CARE | Age: 66
Discharge: HOME | End: 2021-05-10
Payer: MEDICARE

## 2021-05-10 VITALS
TEMPERATURE: 96.5 F | HEART RATE: 70 BPM | RESPIRATION RATE: 18 BRPM | SYSTOLIC BLOOD PRESSURE: 128 MMHG | DIASTOLIC BLOOD PRESSURE: 60 MMHG

## 2021-05-10 DIAGNOSIS — M72.6 NECROTIZING FASCIITIS (HCC): ICD-10-CM

## 2021-05-10 DIAGNOSIS — I87.8 VENOUS STASIS OF BOTH LOWER EXTREMITIES: ICD-10-CM

## 2021-05-10 DIAGNOSIS — T81.89XA NONHEALING SURGICAL WOUND, INITIAL ENCOUNTER: ICD-10-CM

## 2021-05-10 DIAGNOSIS — I89.0 LYMPHEDEMA OF BOTH LOWER EXTREMITIES: ICD-10-CM

## 2021-05-10 DIAGNOSIS — S81.802A TRAUMATIC OPEN WOUND OF LEFT LOWER LEG, INITIAL ENCOUNTER: Primary | ICD-10-CM

## 2021-05-10 PROCEDURE — 11042 DBRDMT SUBQ TIS 1ST 20SQCM/<: CPT | Performed by: NURSE PRACTITIONER

## 2021-05-10 PROCEDURE — 11042 DBRDMT SUBQ TIS 1ST 20SQCM/<: CPT

## 2021-05-10 RX ORDER — LIDOCAINE 50 MG/G
OINTMENT TOPICAL ONCE
Status: CANCELLED | OUTPATIENT
Start: 2021-05-10 | End: 2021-05-10

## 2021-05-10 RX ORDER — GINSENG 100 MG
CAPSULE ORAL ONCE
Status: CANCELLED | OUTPATIENT
Start: 2021-05-10 | End: 2021-05-10

## 2021-05-10 RX ORDER — LIDOCAINE HYDROCHLORIDE 20 MG/ML
JELLY TOPICAL ONCE
Status: CANCELLED | OUTPATIENT
Start: 2021-05-10 | End: 2021-05-10

## 2021-05-10 RX ORDER — CLOBETASOL PROPIONATE 0.5 MG/G
OINTMENT TOPICAL ONCE
Status: CANCELLED | OUTPATIENT
Start: 2021-05-10 | End: 2021-05-10

## 2021-05-10 RX ORDER — LIDOCAINE 40 MG/G
CREAM TOPICAL ONCE
Status: CANCELLED | OUTPATIENT
Start: 2021-05-10 | End: 2021-05-10

## 2021-05-10 RX ORDER — BACITRACIN, NEOMYCIN, POLYMYXIN B 400; 3.5; 5 [USP'U]/G; MG/G; [USP'U]/G
OINTMENT TOPICAL ONCE
Status: CANCELLED | OUTPATIENT
Start: 2021-05-10 | End: 2021-05-10

## 2021-05-10 RX ORDER — GENTAMICIN SULFATE 1 MG/G
OINTMENT TOPICAL ONCE
Status: DISCONTINUED | OUTPATIENT
Start: 2021-05-10 | End: 2021-05-11 | Stop reason: HOSPADM

## 2021-05-10 RX ORDER — LIDOCAINE HYDROCHLORIDE 40 MG/ML
SOLUTION TOPICAL ONCE
Status: CANCELLED | OUTPATIENT
Start: 2021-05-10 | End: 2021-05-10

## 2021-05-10 RX ORDER — BACITRACIN ZINC AND POLYMYXIN B SULFATE 500; 1000 [USP'U]/G; [USP'U]/G
OINTMENT TOPICAL ONCE
Status: CANCELLED | OUTPATIENT
Start: 2021-05-10 | End: 2021-05-10

## 2021-05-10 RX ORDER — BETAMETHASONE DIPROPIONATE 0.05 %
OINTMENT (GRAM) TOPICAL ONCE
Status: CANCELLED | OUTPATIENT
Start: 2021-05-10 | End: 2021-05-10

## 2021-05-10 RX ORDER — GENTAMICIN SULFATE 1 MG/G
OINTMENT TOPICAL ONCE
Status: CANCELLED | OUTPATIENT
Start: 2021-05-10 | End: 2021-05-10

## 2021-05-10 ASSESSMENT — PAIN - FUNCTIONAL ASSESSMENT: PAIN_FUNCTIONAL_ASSESSMENT: PREVENTS OR INTERFERES SOME ACTIVE ACTIVITIES AND ADLS

## 2021-05-10 ASSESSMENT — PAIN SCALES - GENERAL: PAINLEVEL_OUTOF10: 3

## 2021-05-10 ASSESSMENT — PAIN DESCRIPTION - DESCRIPTORS: DESCRIPTORS: SORE

## 2021-05-10 ASSESSMENT — PAIN DESCRIPTION - PROGRESSION: CLINICAL_PROGRESSION: NOT CHANGED

## 2021-05-10 ASSESSMENT — PAIN DESCRIPTION - ORIENTATION: ORIENTATION: LOWER;MID

## 2021-05-10 ASSESSMENT — PAIN DESCRIPTION - FREQUENCY: FREQUENCY: INTERMITTENT

## 2021-05-10 ASSESSMENT — PAIN DESCRIPTION - ONSET: ONSET: ON-GOING

## 2021-05-10 ASSESSMENT — PAIN DESCRIPTION - PAIN TYPE: TYPE: CHRONIC PAIN

## 2021-05-10 NOTE — PLAN OF CARE
Problem: Pain:  Goal: Pain level will decrease  Description: Pain level will decrease  Outcome: Ongoing  Goal: Control of acute pain  Description: Control of acute pain  Outcome: Ongoing  Goal: Control of chronic pain  Description: Control of chronic pain  Outcome: Ongoing     Problem: Wound:  Goal: Will show signs of wound healing; wound closure and no evidence of infection  Description: Will show signs of wound healing; wound closure and no evidence of infection  Outcome: Ongoing     Problem: Wound:  Intervention: Assess pain status  Note: See flowsheet  Intervention: Assess wound size, appearance and drainage  Note: See flowsheet

## 2021-05-10 NOTE — PROGRESS NOTES
Procedure Laterality Date    RECTAL SURGERY N/A 11/24/2020    RECTAL PERIRECTAL INCISION AND DRAINAGE performed by Macario Zhao MD at [facility] History   Problem Relation Age of Onset    Asthma Mother     Breast Cancer Mother     Cancer Mother     Diabetes Mother     High Blood Pressure Mother     Cancer Father     Early Death Brother     Arthritis Paternal Grandmother        SOCIAL HISTORY    Social History     Tobacco Use    Smoking status: Current Every Day Smoker     Packs/day: 1.00     Years: 60.00     Pack years: 60.00     Types: Cigarettes    Smokeless tobacco: Never Used    Tobacco comment: healing    Substance Use Topics    Alcohol use: Never     Frequency: Never    Drug use: No       ALLERGIES    No Known Allergies    MEDICATIONS    Current Outpatient Medications on File Prior to Encounter   Medication Sig Dispense Refill    clotrimazole-betamethasone (LOTRISONE) 1-0.05 % cream Apply topically 2 times daily to excoriation under abdominal fold and groin area. 45 g 2    magnesium oxide (MAG-OX) 400 MG tablet Take 1 tablet by mouth daily 30 tablet 1    rivaroxaban (XARELTO) 15 MG TABS tablet Take 1 tablet by mouth daily 42 tablet 1    metoprolol succinate (TOPROL XL) 25 MG extended release tablet Take 1 tablet by mouth 2 times daily 30 tablet 3    amLODIPine (NORVASC) 10 MG tablet Take 1 tablet by mouth nightly 30 tablet 3    Latanoprost 0.005 % EMUL Apply to eye daily      ibuprofen (ADVIL;MOTRIN) 600 MG tablet Take 1 tablet by mouth every 6 hours as needed for Pain 30 tablet 0    glipiZIDE (GLUCOTROL) 5 MG tablet Take 5 mg by mouth 2 times daily (before meals)      potassium chloride (KLOR-CON M) 20 MEQ extended release tablet Take 20 mEq by mouth daily      ALPRAZolam (XANAX) 0.5 MG tablet Take 0.5 mg by mouth nightly. Bridgett Riedel ipratropium-albuterol (DUONEB) 0.5-2.5 (3) MG/3ML SOLN nebulizer solution Inhale 1 vial into the lungs every 4 hours  albuterol sulfate  (90 Base) MCG/ACT inhaler Inhale 2 puffs into the lungs every 6 hours as needed for Wheezing      aspirin EC 81 MG EC tablet Take 1 tablet by mouth daily 30 tablet 3    HYDROcodone-acetaminophen (NORCO) 7.5-325 MG per tablet Take 1 tablet by mouth 3 times daily .  metFORMIN (GLUCOPHAGE) 1000 MG tablet Take 1,000 mg by mouth 2 times daily (with meals)      lisinopril (PRINIVIL;ZESTRIL) 20 MG tablet Take 40 mg by mouth daily       tamsulosin (FLOMAX) 0.4 MG capsule Take 0.4 mg by mouth daily      finasteride (PROSCAR) 5 MG tablet Take 5 mg by mouth daily      amiodarone (CORDARONE) 200 MG tablet Take 1 tablet by mouth daily 30 tablet 0     No current facility-administered medications on file prior to encounter. REVIEW OF SYSTEMS    Pertinent items are noted in HPI. Constitutional: Negative for systemic symptoms including fever, chills and malaise. Objective:      /60   Pulse 70   Temp 96.5 °F (35.8 °C) (Temporal)   Resp 18     PHYSICAL EXAM    General: The patient is in no acute distress. Mental status:  Patient is appropriate, is  oriented to place and plan of care.   Dermatologic exam: Visual inspection of the periwound reveals the skin to be normal in turgor and texture  Wound exam: see wound description below in procedure note      Assessment:     Problem List Items Addressed This Visit     WD-Venous stasis of both lower extremities    Relevant Medications    gentamicin (GARAMYCIN) 0.1 % ointment    WD-Lymphedema of both lower extremities    Relevant Medications    gentamicin (GARAMYCIN) 0.1 % ointment    WD-Traumatic open wound of left lower leg, complicated by diabetes and venous insufficiency - Primary    Relevant Orders    Supply: Wound Cleanser    Supply: Wound Dressings    Supply: Cover and Secure    HBO-Nonhealing surgical wound groin & buttock, initial encounter    Relevant Medications    gentamicin (GARAMYCIN) 0.1 % ointment HBO-Necrotizing fasciitis (HCC)    Relevant Medications    gentamicin (GARAMYCIN) 0.1 % ointment        Procedure Note    Indications:  Based on my examination of this patient's wound(s) today, sharp excision into necrotic subcutaneous tissue is required to promote healing and evaluate the extent of previous healing. Performed by: FAITH Sanders CNP    Consent obtained: Yes    Time out taken:  Yes    Pain Control: Anesthetic  Anesthetic: 4% Lidocaine Liquid Topical        Debridement:Excisional Debridement    Using curette the wound(s) was/were sharply debrided down through and including the removal of subcutaneous tissue. Devitalized Tissue Debrided:  slough and exudate    Pre Debridement Measurements:  Are located in the Wound Documentation Flow Sheet    All active wounds listed below with today's date are evaluated  Wound(s)    debrided this date include # : 2     Post  Debridement Measurements:  Wound 11/25/20 Scrotum #2 Perineum (Active)   Wound Image   05/03/21 1107   Wound Etiology Surgical 05/10/21 1057   Dressing Status Clean;Dry; Intact; New dressing applied 05/10/21 1140   Wound Cleansed Soap and water 05/10/21 1057   Dressing/Treatment Collagen;ABD; Alginate with Ag 05/10/21 1140   Offloading for Diabetic Foot Ulcers No offloading required 05/03/21 1107   Wound Length (cm) 1.6 cm 05/10/21 1057   Wound Width (cm) 0.2 cm 05/10/21 1057   Wound Depth (cm) 0.1 cm 05/10/21 1057   Wound Surface Area (cm^2) 0.32 cm^2 05/10/21 1057   Change in Wound Size % (l*w) 99.74 05/10/21 1057   Wound Volume (cm^3) 0.03 cm^3 05/10/21 1057   Wound Healing % 100 05/10/21 1057   Post-Procedure Length (cm) 1.6 cm 05/10/21 1112   Post-Procedure Width (cm) 0.2 cm 05/10/21 1112   Post-Procedure Depth (cm) 0.1 cm 05/10/21 1112   Post-Procedure Surface Area (cm^2) 0.32 cm^2 05/10/21 1112   Post-Procedure Volume (cm^3) 0.03 cm^3 05/10/21 1112   Distance Tunneling (cm) 0 cm 05/10/21 1057   Tunneling Position ___ O'Clock 0 05/10/21 1057   Undermining Starts ___ O'Clock 0 05/10/21 1057   Undermining Ends___ O'Clock 0 05/10/21 1057   Undermining Maxium Distance (cm) 0 05/10/21 1057   Wound Assessment Granulation tissue 05/10/21 1057   Drainage Amount Small 05/10/21 1057   Drainage Description Serosanguinous 05/10/21 1057   Odor None 05/10/21 1057   Merary-wound Assessment Maceration 05/10/21 1057   Margins Defined edges; Unattached edges 05/10/21 1057   Wound Thickness Description not for Pressure Injury Full thickness 05/10/21 1057   Number of days: 166       Percent of Wound(s) Debrided: approximately 100%    Total  Area  Debrided:  0.32 sq cm     Bleeding:  Minimal    Hemostasis Achieved:  by pressure    Procedural Pain:  0  / 10     Post Procedural Pain:  0 / 10     Response to treatment:  Well tolerated by patient. Status of wound progress and description from last visit: Improving, almost healed, continue regimen. Plan:       Discharge Instructions       PHYSICIAN ORDERS AND DISCHARGE INSTRUCTIONS     NOTE: Upon discharge from the [address], you will receive a patient experience survey. We would be grateful if you would take the time to fill this survey out.     Wound care order history:                 ANDREW's   Right       Left               Date:              Cultures: 1/15/21               Grafts:                Antibiotics:           Continuing wound care orders and information:                 Residence:  Home              Continue home health care with: [facility]              Your wound-care supplies will be provided by:      Wound cleansing:               LX not scrub or use excessive force.              Wash hands with soap and water before and after dressing changes.               FFIDC to applying a clean dressing, cleanse wound with normal saline, wound cleanser, or mild soap and water.               Ask the physician or nurse before getting the wound(s) wet in a shower     Daily Wound

## 2021-05-11 RX ORDER — GENTAMICIN SULFATE 1 MG/G
OINTMENT TOPICAL
Qty: 30 G | Refills: 1 | Status: SHIPPED | OUTPATIENT
Start: 2021-05-11 | End: 2021-05-18

## 2021-05-17 ENCOUNTER — HOSPITAL ENCOUNTER (OUTPATIENT)
Dept: WOUND CARE | Age: 66
Discharge: HOME | End: 2021-05-17
Payer: MEDICARE

## 2021-05-17 VITALS
SYSTOLIC BLOOD PRESSURE: 155 MMHG | DIASTOLIC BLOOD PRESSURE: 62 MMHG | HEART RATE: 61 BPM | RESPIRATION RATE: 18 BRPM | TEMPERATURE: 97.1 F

## 2021-05-17 DIAGNOSIS — I89.0 LYMPHEDEMA OF BOTH LOWER EXTREMITIES: ICD-10-CM

## 2021-05-17 DIAGNOSIS — M72.6 NECROTIZING FASCIITIS (HCC): ICD-10-CM

## 2021-05-17 DIAGNOSIS — I87.8 VENOUS STASIS OF BOTH LOWER EXTREMITIES: ICD-10-CM

## 2021-05-17 DIAGNOSIS — S81.802A TRAUMATIC OPEN WOUND OF LEFT LOWER LEG, INITIAL ENCOUNTER: Primary | ICD-10-CM

## 2021-05-17 DIAGNOSIS — T81.89XA NONHEALING SURGICAL WOUND, INITIAL ENCOUNTER: ICD-10-CM

## 2021-05-17 PROCEDURE — 99212 OFFICE O/P EST SF 10 MIN: CPT

## 2021-05-17 RX ORDER — LIDOCAINE 50 MG/G
OINTMENT TOPICAL ONCE
Status: CANCELLED | OUTPATIENT
Start: 2021-05-17 | End: 2021-05-17

## 2021-05-17 RX ORDER — GINSENG 100 MG
CAPSULE ORAL ONCE
Status: CANCELLED | OUTPATIENT
Start: 2021-05-17 | End: 2021-05-17

## 2021-05-17 RX ORDER — LIDOCAINE HYDROCHLORIDE 20 MG/ML
JELLY TOPICAL ONCE
Status: CANCELLED | OUTPATIENT
Start: 2021-05-17 | End: 2021-05-17

## 2021-05-17 RX ORDER — BACITRACIN, NEOMYCIN, POLYMYXIN B 400; 3.5; 5 [USP'U]/G; MG/G; [USP'U]/G
OINTMENT TOPICAL ONCE
Status: CANCELLED | OUTPATIENT
Start: 2021-05-17 | End: 2021-05-17

## 2021-05-17 RX ORDER — CLOBETASOL PROPIONATE 0.5 MG/G
OINTMENT TOPICAL ONCE
Status: CANCELLED | OUTPATIENT
Start: 2021-05-17 | End: 2021-05-17

## 2021-05-17 RX ORDER — BETAMETHASONE DIPROPIONATE 0.05 %
OINTMENT (GRAM) TOPICAL ONCE
Status: CANCELLED | OUTPATIENT
Start: 2021-05-17 | End: 2021-05-17

## 2021-05-17 RX ORDER — LIDOCAINE HYDROCHLORIDE 40 MG/ML
SOLUTION TOPICAL ONCE
Status: CANCELLED | OUTPATIENT
Start: 2021-05-17 | End: 2021-05-17

## 2021-05-17 RX ORDER — GENTAMICIN SULFATE 1 MG/G
OINTMENT TOPICAL ONCE
Status: CANCELLED | OUTPATIENT
Start: 2021-05-17 | End: 2021-05-17

## 2021-05-17 RX ORDER — BACITRACIN ZINC AND POLYMYXIN B SULFATE 500; 1000 [USP'U]/G; [USP'U]/G
OINTMENT TOPICAL ONCE
Status: CANCELLED | OUTPATIENT
Start: 2021-05-17 | End: 2021-05-17

## 2021-05-17 RX ORDER — LIDOCAINE 40 MG/G
CREAM TOPICAL ONCE
Status: CANCELLED | OUTPATIENT
Start: 2021-05-17 | End: 2021-05-17

## 2021-05-17 RX ORDER — LIDOCAINE HYDROCHLORIDE 40 MG/ML
SOLUTION TOPICAL ONCE
Status: DISCONTINUED | OUTPATIENT
Start: 2021-05-17 | End: 2021-05-18 | Stop reason: HOSPADM

## 2021-05-17 ASSESSMENT — PAIN DESCRIPTION - DESCRIPTORS: DESCRIPTORS: SORE

## 2021-05-17 ASSESSMENT — PAIN DESCRIPTION - ONSET: ONSET: ON-GOING

## 2021-05-17 ASSESSMENT — PAIN DESCRIPTION - ORIENTATION: ORIENTATION: LOWER;MID

## 2021-05-17 ASSESSMENT — PAIN DESCRIPTION - FREQUENCY: FREQUENCY: INTERMITTENT

## 2021-05-17 ASSESSMENT — PAIN DESCRIPTION - PAIN TYPE: TYPE: CHRONIC PAIN

## 2021-05-17 ASSESSMENT — PAIN DESCRIPTION - LOCATION: LOCATION: PERINEUM

## 2021-05-17 ASSESSMENT — PAIN DESCRIPTION - PROGRESSION: CLINICAL_PROGRESSION: NOT CHANGED

## 2021-05-17 NOTE — PLAN OF CARE
Problem: Pain:  Goal: Pain level will decrease  Description: Pain level will decrease  Outcome: Completed  Goal: Control of acute pain  Description: Control of acute pain  Outcome: Completed  Goal: Control of chronic pain  Description: Control of chronic pain  Outcome: Completed     Problem: Wound:  Goal: Will show signs of wound healing; wound closure and no evidence of infection  Description: Will show signs of wound healing; wound closure and no evidence of infection  Outcome: Completed

## 2021-05-17 NOTE — PROGRESS NOTES
[facility] OR       FAMILY HISTORY    Family History   Problem Relation Age of Onset    Asthma Mother     Breast Cancer Mother     Cancer Mother     Diabetes Mother     High Blood Pressure Mother     Cancer Father     Early Death Brother     Arthritis Paternal Grandmother        SOCIAL HISTORY    Social History     Tobacco Use    Smoking status: Current Every Day Smoker     Packs/day: 1.00     Years: 60.00     Pack years: 60.00     Types: Cigarettes    Smokeless tobacco: Never Used    Tobacco comment: healing    Substance Use Topics    Alcohol use: Never    Drug use: No       ALLERGIES    No Known Allergies    MEDICATIONS    Current Outpatient Medications on File Prior to Encounter   Medication Sig Dispense Refill    gentamicin (GARAMYCIN) 0.1 % ointment Apply topically 2-3 times daily with each dressing change for one month 30 g 1    clotrimazole-betamethasone (LOTRISONE) 1-0.05 % cream Apply topically 2 times daily to excoriation under abdominal fold and groin area. 45 g 2    magnesium oxide (MAG-OX) 400 MG tablet Take 1 tablet by mouth daily 30 tablet 1    rivaroxaban (XARELTO) 15 MG TABS tablet Take 1 tablet by mouth daily 42 tablet 1    metoprolol succinate (TOPROL XL) 25 MG extended release tablet Take 1 tablet by mouth 2 times daily 30 tablet 3    amLODIPine (NORVASC) 10 MG tablet Take 1 tablet by mouth nightly 30 tablet 3    Latanoprost 0.005 % EMUL Apply to eye daily      ibuprofen (ADVIL;MOTRIN) 600 MG tablet Take 1 tablet by mouth every 6 hours as needed for Pain 30 tablet 0    glipiZIDE (GLUCOTROL) 5 MG tablet Take 5 mg by mouth 2 times daily (before meals)      potassium chloride (KLOR-CON M) 20 MEQ extended release tablet Take 20 mEq by mouth daily      ALPRAZolam (XANAX) 0.5 MG tablet Take 0.5 mg by mouth nightly. Tori Ice ipratropium-albuterol (DUONEB) 0.5-2.5 (3) MG/3ML SOLN nebulizer solution Inhale 1 vial into the lungs every 4 hours      albuterol sulfate  (90 Base) MCG/ACT inhaler Inhale 2 puffs into the lungs every 6 hours as needed for Wheezing      aspirin EC 81 MG EC tablet Take 1 tablet by mouth daily 30 tablet 3    HYDROcodone-acetaminophen (NORCO) 7.5-325 MG per tablet Take 1 tablet by mouth 3 times daily .  metFORMIN (GLUCOPHAGE) 1000 MG tablet Take 1,000 mg by mouth 2 times daily (with meals)      lisinopril (PRINIVIL;ZESTRIL) 20 MG tablet Take 40 mg by mouth daily       tamsulosin (FLOMAX) 0.4 MG capsule Take 0.4 mg by mouth daily      finasteride (PROSCAR) 5 MG tablet Take 5 mg by mouth daily      amiodarone (CORDARONE) 200 MG tablet Take 1 tablet by mouth daily 30 tablet 0     No current facility-administered medications on file prior to encounter. REVIEW OF SYSTEMS    Pertinent items are noted in HPI. Constitutional: Negative for systemic symptoms including fever, chills and malaise. Objective:      BP (!) 155/62   Pulse 61   Temp 97.1 °F (36.2 °C)   Resp 18     PHYSICAL EXAM      General: The patient is in no acute distress. Mental status:  Patient is appropriate, is  oriented to place and plan of care. Dermatologic exam: Visual inspection of the periwound reveals the skin to be normal in turgor and texture.   Wound exam:  see wound description below     All active wounds listed below with today's date are evaluated           Assessment:       Problem List Items Addressed This Visit     WD-Venous stasis of both lower extremities    Relevant Medications    lidocaine (XYLOCAINE) 4 % external solution (Start on 5/17/2021 11:45 AM)    WD-Lymphedema of both lower extremities    Relevant Medications    lidocaine (XYLOCAINE) 4 % external solution (Start on 5/17/2021 11:45 AM)    WD-Traumatic open wound of left lower leg, complicated by diabetes and venous insufficiency - Primary    Relevant Orders    Supply: Wound Cleanser    Supply: Cover and Secure    HBO-Nonhealing surgical wound groin & buttock, initial encounter    Relevant Medications dry with 4x4. Nystatin powder to groin. Discharged  Call  for any questions or concerns.   Date__________   Time________        Treatment Note      Written Patient Dismissal Instructions Given            Electronically signed by Josemanuel Lewis MD on 5/17/2021 at 11:33 AM

## 2021-07-05 ENCOUNTER — HOSPITAL ENCOUNTER (OUTPATIENT)
Age: 66
Discharge: HOME | End: 2021-07-05
Payer: MEDICARE

## 2021-07-05 LAB
ALBUMIN SERPL-MCNC: 4.3 GM/DL (ref 3.4–5)
ALP BLD-CCNC: 97 IU/L (ref 40–129)
ALT SERPL-CCNC: 27 U/L (ref 10–40)
ANION GAP SERPL CALCULATED.3IONS-SCNC: 14 MMOL/L (ref 4–16)
AST SERPL-CCNC: 30 IU/L (ref 15–37)
BASOPHILS ABSOLUTE: 0.1 K/CU MM
BASOPHILS RELATIVE PERCENT: 0.5 % (ref 0–1)
BILIRUB SERPL-MCNC: 0.3 MG/DL (ref 0–1)
BUN BLDV-MCNC: 37 MG/DL (ref 6–23)
CALCIUM SERPL-MCNC: 8.9 MG/DL (ref 8.3–10.6)
CHLORIDE BLD-SCNC: 102 MMOL/L (ref 99–110)
CHOLESTEROL: 154 MG/DL
CO2: 23 MMOL/L (ref 21–32)
CREAT SERPL-MCNC: 1.3 MG/DL (ref 0.9–1.3)
DIFFERENTIAL TYPE: ABNORMAL
EKG ATRIAL RATE: 60 BPM
EKG DIAGNOSIS: NORMAL
EKG P AXIS: 52 DEGREES
EKG P-R INTERVAL: 176 MS
EKG Q-T INTERVAL: 508 MS
EKG QRS DURATION: 154 MS
EKG QTC CALCULATION (BAZETT): 508 MS
EKG R AXIS: -77 DEGREES
EKG T AXIS: 6 DEGREES
EKG VENTRICULAR RATE: 60 BPM
EOSINOPHILS ABSOLUTE: 0.1 K/CU MM
EOSINOPHILS RELATIVE PERCENT: 0.7 % (ref 0–3)
ESTIMATED AVERAGE GLUCOSE: 146 MG/DL
GFR AFRICAN AMERICAN: >60 ML/MIN/1.73M2
GFR NON-AFRICAN AMERICAN: 55 ML/MIN/1.73M2
GLUCOSE BLD-MCNC: 132 MG/DL (ref 70–99)
HBA1C MFR BLD: 6.7 % (ref 4.2–6.3)
HCT VFR BLD CALC: 43.3 % (ref 42–52)
HDLC SERPL-MCNC: 54 MG/DL
HEMOGLOBIN: 12.7 GM/DL (ref 13.5–18)
IMMATURE NEUTROPHIL %: 0.4 % (ref 0–0.43)
LDL CHOLESTEROL DIRECT: 85 MG/DL
LYMPHOCYTES ABSOLUTE: 2.4 K/CU MM
LYMPHOCYTES RELATIVE PERCENT: 24.3 % (ref 24–44)
MCH RBC QN AUTO: 23.6 PG (ref 27–31)
MCHC RBC AUTO-ENTMCNC: 29.3 % (ref 32–36)
MCV RBC AUTO: 80.3 FL (ref 78–100)
MONOCYTES ABSOLUTE: 0.6 K/CU MM
MONOCYTES RELATIVE PERCENT: 6.3 % (ref 0–4)
NUCLEATED RBC %: 0 %
PDW BLD-RTO: 22.5 % (ref 11.7–14.9)
PLATELET # BLD: 165 K/CU MM (ref 140–440)
PMV BLD AUTO: 10.2 FL (ref 7.5–11.1)
POTASSIUM SERPL-SCNC: 5.1 MMOL/L (ref 3.5–5.1)
PROSTATE SPECIFIC ANTIGEN: 0.57 NG/ML (ref 0–4)
RBC # BLD: 5.39 M/CU MM (ref 4.6–6.2)
SEGMENTED NEUTROPHILS ABSOLUTE COUNT: 6.8 K/CU MM
SEGMENTED NEUTROPHILS RELATIVE PERCENT: 67.8 % (ref 36–66)
SODIUM BLD-SCNC: 139 MMOL/L (ref 135–145)
T4 FREE: 1.99 NG/DL (ref 0.9–1.8)
TOTAL IMMATURE NEUTOROPHIL: 0.04 K/CU MM
TOTAL NUCLEATED RBC: 0 K/CU MM
TOTAL PROTEIN: 7.5 GM/DL (ref 6.4–8.2)
TRIGL SERPL-MCNC: 55 MG/DL
TSH HIGH SENSITIVITY: 0.72 UIU/ML (ref 0.27–4.2)
VITAMIN B-12: 1595 PG/ML (ref 211–911)
VITAMIN D 25-HYDROXY: 23.35 NG/ML
WBC # BLD: 10 K/CU MM (ref 4–10.5)

## 2021-07-05 PROCEDURE — 93010 ELECTROCARDIOGRAM REPORT: CPT | Performed by: INTERNAL MEDICINE

## 2021-07-05 PROCEDURE — 82607 VITAMIN B-12: CPT

## 2021-07-05 PROCEDURE — 83721 ASSAY OF BLOOD LIPOPROTEIN: CPT

## 2021-07-05 PROCEDURE — 84443 ASSAY THYROID STIM HORMONE: CPT

## 2021-07-05 PROCEDURE — 83036 HEMOGLOBIN GLYCOSYLATED A1C: CPT

## 2021-07-05 PROCEDURE — 84439 ASSAY OF FREE THYROXINE: CPT

## 2021-07-05 PROCEDURE — 36415 COLL VENOUS BLD VENIPUNCTURE: CPT

## 2021-07-05 PROCEDURE — 80053 COMPREHEN METABOLIC PANEL: CPT

## 2021-07-05 PROCEDURE — 93005 ELECTROCARDIOGRAM TRACING: CPT | Performed by: NURSE PRACTITIONER

## 2021-07-05 PROCEDURE — 80061 LIPID PANEL: CPT

## 2021-07-05 PROCEDURE — 85025 COMPLETE CBC W/AUTO DIFF WBC: CPT

## 2021-07-05 PROCEDURE — 82306 VITAMIN D 25 HYDROXY: CPT

## 2021-07-05 PROCEDURE — G0103 PSA SCREENING: HCPCS

## 2021-08-30 ENCOUNTER — HOSPITAL ENCOUNTER (EMERGENCY)
Age: 66
Discharge: HOME | End: 2021-08-30
Payer: MEDICARE

## 2021-08-30 ENCOUNTER — APPOINTMENT (OUTPATIENT)
Dept: GENERAL RADIOLOGY | Age: 66
End: 2021-08-30
Payer: MEDICARE

## 2021-08-30 ENCOUNTER — APPOINTMENT (OUTPATIENT)
Dept: CT IMAGING | Age: 66
End: 2021-08-30
Payer: MEDICARE

## 2021-08-30 ENCOUNTER — APPOINTMENT (OUTPATIENT)
Dept: ULTRASOUND IMAGING | Age: 66
End: 2021-08-30
Payer: MEDICARE

## 2021-08-30 VITALS
BODY MASS INDEX: 35.55 KG/M2 | DIASTOLIC BLOOD PRESSURE: 88 MMHG | TEMPERATURE: 98.3 F | SYSTOLIC BLOOD PRESSURE: 135 MMHG | WEIGHT: 240 LBS | HEIGHT: 69 IN | OXYGEN SATURATION: 96 % | HEART RATE: 58 BPM | RESPIRATION RATE: 15 BRPM

## 2021-08-30 DIAGNOSIS — M79.671 RIGHT FOOT PAIN: ICD-10-CM

## 2021-08-30 DIAGNOSIS — M25.571 ACUTE RIGHT ANKLE PAIN: Primary | ICD-10-CM

## 2021-08-30 PROCEDURE — 73620 X-RAY EXAM OF FOOT: CPT

## 2021-08-30 PROCEDURE — 99283 EMERGENCY DEPT VISIT LOW MDM: CPT

## 2021-08-30 PROCEDURE — 93971 EXTREMITY STUDY: CPT

## 2021-08-30 PROCEDURE — 73700 CT LOWER EXTREMITY W/O DYE: CPT

## 2021-08-30 PROCEDURE — 73610 X-RAY EXAM OF ANKLE: CPT

## 2021-08-30 ASSESSMENT — PAIN SCALES - GENERAL: PAINLEVEL_OUTOF10: 10

## 2021-08-30 ASSESSMENT — PAIN DESCRIPTION - LOCATION: LOCATION: ANKLE;FOOT

## 2021-08-30 ASSESSMENT — PAIN DESCRIPTION - FREQUENCY: FREQUENCY: CONTINUOUS

## 2021-08-30 ASSESSMENT — PAIN DESCRIPTION - PAIN TYPE: TYPE: ACUTE PAIN

## 2021-08-30 NOTE — ED NOTES
Patient discharged to home at this time. Discharge instructions and follow up care discussed, patient voices understanding.       Abebe Jolly RN  08/30/21 6002

## 2021-08-30 NOTE — ED PROVIDER NOTES
EMERGENCY DEPARTMENT ENCOUNTER        PCP: FAITH Martinez - CNP    CHIEF COMPLAINT    Chief Complaint   Patient presents with    Ankle Pain     foot/ankle hit a the bed frame and pain persists causing difficulty walking         HPI    Mayda Tavares is a 77 y.o. male who presents with pain localized along the Right lateral and posterior heel. Patient states little over a week ago he accidentally hit his heel against the wheel of his bed. He states since that time he has had severe pain. He rates his pain 10/10, aching, and constant. Palpation, range of motion, and ambulation exacerbate his symptoms. He states he is taking Norco with little relief. He denies any redness, warmth, fevers, open wounds, or other complaints.     REVIEW OF SYSTEMS    General: No fever or chills  Skin: No lacerations or puncture wounds  Musculoskeletal: Complains of right foot pain, see HPI    All other review of systems are negative  See HPI and nursing notes for additional information     PAST MEDICAL & SURGICAL HISTORY    Past Medical History:   Diagnosis Date    CHF (congestive heart failure) ([address]     Chronic ulcer of left leg with fat layer exposed ([address] 12/12/2016    Chronic ulcer of right leg with fat layer exposed ([address] 12/12/2016    Chronic ulcer of right leg with fat layer exposed ([address] 12/12/2016    Diabetes mellitus ([address]     Hypertension     PVD (peripheral vascular disease) ([address] 12/12/2016    Type 2 diabetes mellitus with diabetic peripheral angiopathy without gangrene, without long-term current use of insulin ([address] 12/12/2016    Type 2 diabetes mellitus with diabetic peripheral angiopathy without gangrene, without long-term current use of insulin ([address] 12/12/2016    Venous stasis ulcer of both lower extremities without varicose veins ([address] 12/12/2016    WD-Traumatic open wound of left lower leg, complicated by diabetes and venous insufficiency 10/8/2020    WD-Ulcer of shin, left, with fat layer exposed ([facility] 12/12/2016     Past Surgical History:   Procedure Laterality Date    RECTAL SURGERY N/A 11/24/2020    RECTAL PERIRECTAL INCISION AND DRAINAGE performed by Dereck Jones MD at [address] [place] [address]    Current Outpatient Rx   Medication Sig Dispense Refill    clotrimazole-betamethasone (LOTRISONE) 1-0.05 % cream Apply topically 2 times daily to excoriation under abdominal fold and groin area. 45 g 2    magnesium oxide (MAG-OX) 400 MG tablet Take 1 tablet by mouth daily 30 tablet 1    amiodarone (CORDARONE) 200 MG tablet Take 1 tablet by mouth daily 30 tablet 0    rivaroxaban (XARELTO) 15 MG TABS tablet Take 1 tablet by mouth daily 42 tablet 1    metoprolol succinate (TOPROL XL) 25 MG extended release tablet Take 1 tablet by mouth 2 times daily 30 tablet 3    amLODIPine (NORVASC) 10 MG tablet Take 1 tablet by mouth nightly 30 tablet 3    Latanoprost 0.005 % EMUL Apply to eye daily      ibuprofen (ADVIL;MOTRIN) 600 MG tablet Take 1 tablet by mouth every 6 hours as needed for Pain 30 tablet 0    glipiZIDE (GLUCOTROL) 5 MG tablet Take 5 mg by mouth 2 times daily (before meals)      potassium chloride (KLOR-CON M) 20 MEQ extended release tablet Take 20 mEq by mouth daily      ALPRAZolam (XANAX) 0.5 MG tablet Take 0.5 mg by mouth nightly. Chepe Colvin ipratropium-albuterol (DUONEB) 0.5-2.5 (3) MG/3ML SOLN nebulizer solution Inhale 1 vial into the lungs every 4 hours      albuterol sulfate  (90 Base) MCG/ACT inhaler Inhale 2 puffs into the lungs every 6 hours as needed for Wheezing      aspirin EC 81 MG EC tablet Take 1 tablet by mouth daily 30 tablet 3    HYDROcodone-acetaminophen (NORCO) 7.5-325 MG per tablet Take 1 tablet by mouth 3 times daily .       metFORMIN (GLUCOPHAGE) 1000 MG tablet Take 1,000 mg by mouth 2 times daily (with meals)      lisinopril (PRINIVIL;ZESTRIL) 20 MG tablet Take 40 mg by mouth daily       tamsulosin (FLOMAX) 0.4 MG capsule Take 0.4 mg by mouth daily      finasteride (PROSCAR) 5 MG tablet Take 5 mg by mouth daily         ALLERGIES    No Known Allergies    SOCIAL & FAMILY HISTORY    Social History     Socioeconomic History    Marital status:      Spouse name: Not on file    Number of children: Not on file    Years of education: Not on file    Highest education level: Not on file   Occupational History    Not on file   Tobacco Use    Smoking status: Current Every Day Smoker     Packs/day: 1.00     Years: 60.00     Pack years: 60.00     Types: Cigarettes    Smokeless tobacco: Never Used    Tobacco comment: healing    Substance and Sexual Activity    Alcohol use: Never    Drug use: No    Sexual activity: Not on file   Other Topics Concern    Not on file   Social History Narrative    Not on file     Social Determinants of Health     Financial Resource Strain:     Difficulty of Paying Living Expenses:    Food Insecurity:     Worried About Running Out of Food in the Last Year:     Ran Out of Food in the Last Year:    Transportation Needs:     Lack of Transportation (Medical):      Lack of Transportation (Non-Medical):    Physical Activity:     Days of Exercise per Week:     Minutes of Exercise per Session:    Stress:     Feeling of Stress :    Social Connections:     Frequency of Communication with Friends and Family:     Frequency of Social Gatherings with Friends and Family:     Attends Mandaen Services:     Active Member of Clubs or Organizations:     Attends Club or Organization Meetings:     Marital Status:    Intimate Partner Violence:     Fear of Current or Ex-Partner:     Emotionally Abused:     Physically Abused:     Sexually Abused:      Family History   Problem Relation Age of Onset    Asthma Mother     Breast Cancer Mother     Cancer Mother     Diabetes Mother     High Blood Pressure Mother     Cancer Father     Early Death Brother     Arthritis Paternal Grandmother          PHYSICAL EXAM    VITAL SIGNS: PROCEDURES   SPLINT PLACEMENT    Postoperative shoe        ED COURSE & MEDICAL DECISION MAKING     68-year-old male is afebrile and appears nontoxic presents emergency department with complaints of right heel pain. X-ray of the right heel and lateral malleolus were obtained and did not show any acute findings. Patient was upset stating his pain was severe. He did have a moderate amount of swelling in his right lower extremity and an ultrasound was obtained to rule out DVT. Ultrasound did show edema, however no evidence of DVT. CT of the the right foot was obtained and showed no acute bony abnormality. It did show insertional Achilles tendinosis. The patient was instructed to follow-up with orthopedics. He was instructed to call tomorrow to schedule up an appointment. He was placed in a postop shoe. He does have a cane. He was instructed to weight-bear as tolerated. He was instructed to ice, elevate, take his home medications as prescribed, and return here with worsening symptoms. He verbalized understanding agrees with plan of care. I have low suspicion for infectious process, wound, acute fracture, dislocation, foreign body, or other complaints. Clinical  IMPRESSION    1. Acute right ankle pain    2. Right foot pain                Comment: Please note this report has been produced using speech recognition software and may contain errors related to that system including errors in grammar, punctuation, and spelling, as well as words and phrases that may be inappropriate. If there are any questions or concerns please feel free to contact the dictating provider for clarification.      FAITH Ramos - SANTIAGO  08/30/21 [id number]

## 2021-08-31 ENCOUNTER — OFFICE VISIT (OUTPATIENT)
Dept: ORTHOPEDIC SURGERY | Age: 66
End: 2021-08-31
Payer: MEDICARE

## 2021-08-31 VITALS — RESPIRATION RATE: 15 BRPM | BODY MASS INDEX: 35.55 KG/M2 | HEIGHT: 69 IN | WEIGHT: 240 LBS

## 2021-08-31 DIAGNOSIS — I87.2 VENOUS STASIS ULCER OF BOTH LOWER EXTREMITIES WITHOUT VARICOSE VEINS (HCC): ICD-10-CM

## 2021-08-31 DIAGNOSIS — L97.929 VENOUS STASIS ULCER OF BOTH LOWER EXTREMITIES WITHOUT VARICOSE VEINS (HCC): ICD-10-CM

## 2021-08-31 DIAGNOSIS — L97.919 VENOUS STASIS ULCER OF BOTH LOWER EXTREMITIES WITHOUT VARICOSE VEINS (HCC): ICD-10-CM

## 2021-08-31 DIAGNOSIS — I89.0 LYMPHEDEMA OF BOTH LOWER EXTREMITIES: ICD-10-CM

## 2021-08-31 DIAGNOSIS — M76.61 ACHILLES TENDINITIS OF RIGHT LOWER EXTREMITY: Primary | ICD-10-CM

## 2021-08-31 PROCEDURE — 3017F COLORECTAL CA SCREEN DOC REV: CPT | Performed by: ORTHOPAEDIC SURGERY

## 2021-08-31 PROCEDURE — 1123F ACP DISCUSS/DSCN MKR DOCD: CPT | Performed by: ORTHOPAEDIC SURGERY

## 2021-08-31 PROCEDURE — G8417 CALC BMI ABV UP PARAM F/U: HCPCS | Performed by: ORTHOPAEDIC SURGERY

## 2021-08-31 PROCEDURE — 4040F PNEUMOC VAC/ADMIN/RCVD: CPT | Performed by: ORTHOPAEDIC SURGERY

## 2021-08-31 PROCEDURE — 4004F PT TOBACCO SCREEN RCVD TLK: CPT | Performed by: ORTHOPAEDIC SURGERY

## 2021-08-31 PROCEDURE — G8428 CUR MEDS NOT DOCUMENT: HCPCS | Performed by: ORTHOPAEDIC SURGERY

## 2021-08-31 PROCEDURE — 99203 OFFICE O/P NEW LOW 30 MIN: CPT | Performed by: ORTHOPAEDIC SURGERY

## 2021-08-31 NOTE — PATIENT INSTRUCTIONS
Continue weight-bearing as tolerated. Continue range of motion exercises as instructed. Ice and elevate as needed. Tylenol or Motrin for pain.   Follow up as needed  continue to wear compression stockings

## 2021-08-31 NOTE — PROGRESS NOTES
8/31/2021   Chief Complaint   Patient presents with    Foot Pain     right foot     Ankle Pain     right ankle        History of Present Illness:                             Shelbi Tavares is a 77 y.o. male who presents today for evaluation of his right lower extremity pain and swelling. He did have an injury about 3 weeks ago when he struck his heel on the bed frame. Since that time he has had increased pain and swelling posterior aspect of his heel one-vessel aspect of the Achilles tendon. He does have a significant history of chronic venous insufficiency and lower extremity edema. He has been working with his cardiologist regarding this issue. His pain is worse with direct pressure over the posterior aspect of his heel and with pushoff activities of his ankle. The swelling has been worse since the injury. He has been trying to elevate and rest his foot and feels that it may be improving slightly over the last several days. Patient presents to the office today for right ankle/right foot pain. Pt states that about 3 weeks ago he hit his heel on the bed frame and has noticed increase pain since the injury. Pt states pain today is a 7/10 will increase with movement of the right foot/ankle. Pt states that he has constant swelling in the bilateral legs. Pt states he does take Vicodin to help with the pain with mild relief. Medical History  Patient's medications, allergies, past medical, surgical, social and family histories were reviewed and updated as appropriate.     Past Medical History:   Diagnosis Date    CHF (congestive heart failure) (HCC)     Chronic ulcer of left leg with fat layer exposed ([address] 12/12/2016    Chronic ulcer of right leg with fat layer exposed ([address] 12/12/2016    Chronic ulcer of right leg with fat layer exposed ([address] 12/12/2016    Diabetes mellitus ([address]     Hypertension     PVD (peripheral vascular disease) ([address] 12/12/2016    Type 2 diabetes mellitus with diabetic peripheral angiopathy without gangrene, without long-term current use of insulin ([facility] 12/12/2016    Type 2 diabetes mellitus with diabetic peripheral angiopathy without gangrene, without long-term current use of insulin ([address] 12/12/2016    Venous stasis ulcer of both lower extremities without varicose veins ([address] 12/12/2016    WD-Traumatic open wound of left lower leg, complicated by diabetes and venous insufficiency 10/8/2020    WD-Ulcer of shin, left, with fat layer exposed ([address] 12/12/2016     Past Surgical History:   Procedure Laterality Date    RECTAL SURGERY N/A 11/24/2020    RECTAL PERIRECTAL INCISION AND DRAINAGE performed by Courtney Hope MD at [address] History   Problem Relation Age of Onset    Asthma Mother     Breast Cancer Mother     Cancer Mother     Diabetes Mother     High Blood Pressure Mother     Cancer Father     Early Death Brother     Arthritis Paternal Grandmother      Social History     Socioeconomic History    Marital status:      Spouse name: Not on file    Number of children: Not on file    Years of education: Not on file    Highest education level: Not on file   Occupational History    Not on file   Tobacco Use    Smoking status: Current Every Day Smoker     Packs/day: 1.00     Years: 60.00     Pack years: 60.00     Types: Cigarettes    Smokeless tobacco: Never Used    Tobacco comment: healing    Substance and Sexual Activity    Alcohol use: Never    Drug use: No    Sexual activity: Not on file   Other Topics Concern    Not on file   Social History Narrative    Not on file     Social Determinants of Health     Financial Resource Strain:     Difficulty of Paying Living Expenses:    Food Insecurity:     Worried About Running Out of Food in the Last Year:     Ran Out of Food in the Last Year:    Transportation Needs:     Lack of Transportation (Medical):      Lack of Transportation (Non-Medical):    Physical Activity:     Days of Exercise per Week:     Minutes of Exercise per Session:    Stress:     Feeling of Stress :    Social Connections:     Frequency of Communication with Friends and Family:     Frequency of Social Gatherings with Friends and Family:     Attends Gnosticism Services:     Active Member of Clubs or Organizations:     Attends Club or Organization Meetings:     Marital Status:    Intimate Partner Violence:     Fear of Current or Ex-Partner:     Emotionally Abused:     Physically Abused:     Sexually Abused:      Current Outpatient Medications   Medication Sig Dispense Refill    clotrimazole-betamethasone (LOTRISONE) 1-0.05 % cream Apply topically 2 times daily to excoriation under abdominal fold and groin area. 45 g 2    magnesium oxide (MAG-OX) 400 MG tablet Take 1 tablet by mouth daily 30 tablet 1    amiodarone (CORDARONE) 200 MG tablet Take 1 tablet by mouth daily 30 tablet 0    rivaroxaban (XARELTO) 15 MG TABS tablet Take 1 tablet by mouth daily 42 tablet 1    metoprolol succinate (TOPROL XL) 25 MG extended release tablet Take 1 tablet by mouth 2 times daily 30 tablet 3    amLODIPine (NORVASC) 10 MG tablet Take 1 tablet by mouth nightly 30 tablet 3    Latanoprost 0.005 % EMUL Apply to eye daily      ibuprofen (ADVIL;MOTRIN) 600 MG tablet Take 1 tablet by mouth every 6 hours as needed for Pain 30 tablet 0    glipiZIDE (GLUCOTROL) 5 MG tablet Take 5 mg by mouth 2 times daily (before meals)      potassium chloride (KLOR-CON M) 20 MEQ extended release tablet Take 20 mEq by mouth daily      ALPRAZolam (XANAX) 0.5 MG tablet Take 0.5 mg by mouth nightly. Ean Ramandeep ipratropium-albuterol (DUONEB) 0.5-2.5 (3) MG/3ML SOLN nebulizer solution Inhale 1 vial into the lungs every 4 hours      albuterol sulfate  (90 Base) MCG/ACT inhaler Inhale 2 puffs into the lungs every 6 hours as needed for Wheezing      aspirin EC 81 MG EC tablet Take 1 tablet by mouth daily 30 tablet 3    HYDROcodone-acetaminophen (NORCO) 7.5-325 MG per tablet Take 1 tablet by mouth 3 times daily .  metFORMIN (GLUCOPHAGE) 1000 MG tablet Take 1,000 mg by mouth 2 times daily (with meals)      lisinopril (PRINIVIL;ZESTRIL) 20 MG tablet Take 40 mg by mouth daily       tamsulosin (FLOMAX) 0.4 MG capsule Take 0.4 mg by mouth daily      finasteride (PROSCAR) 5 MG tablet Take 5 mg by mouth daily       No current facility-administered medications for this visit. No Known Allergies      Review of Systems   Constitutional: Negative for chills and fever. HENT: Negative for congestion and sneezing. Eyes: Negative for pain and redness. Respiratory: Negative for chest tightness, shortness of breath and wheezing. Cardiovascular: Positive for leg swelling. Negative for chest pain and palpitations. Gastrointestinal: Negative for vomiting. Musculoskeletal: Positive for arthralgias. Skin: Negative for color change and rash. Neurological: Negative for weakness and numbness. Psychiatric/Behavioral: Negative for agitation. The patient is not nervous/anxious. Examination:  General Exam:  Vitals: Resp 15   Ht 5' 9\" (1.753 m)   Wt 240 lb (108.9 kg)   BMI 35.44 kg/m²    Physical Exam  Vitals and nursing note reviewed. Constitutional:       Appearance: Normal appearance. He is obese. HENT:      Head: Normocephalic and atraumatic. Eyes:      Conjunctiva/sclera: Conjunctivae normal.      Pupils: Pupils are equal, round, and reactive to light. Pulmonary:      Effort: Pulmonary effort is normal.   Musculoskeletal:      Cervical back: Normal range of motion. Right knee: No swelling, deformity or lacerations. Normal range of motion. No tenderness. Left ankle: No swelling, deformity, ecchymosis or lacerations. No tenderness. Normal range of motion. Normal pulse.       Left Achilles Tendon: Normal.      Comments: Right Lower Extremity:    There is moderate tenderness to palpation along the distal aspect of the Achilles tendon at its insertion site in the posterior aspect of the calcaneus. There is mild swelling  present along the distal aspect of the Achilles and retrocalcaneal bursa. There is moderate to severe lower extremity lymphedema with mild pitting presents over the lower leg region. There is a mild prominence of the calcaneal tuberosity which is exquisitely tender to palpation. Strength is 5 out of 5 with ankle dorsiflexion and plantarflexion as well as hindfoot eversion and inversion. There is pain reproduced at the Achilles tendon with resisted plantarflexion. Negative Bell test.  Unable to perform single heel rise. Ankle range of motion ankle dorsiflexion 10 degrees, plantarflexion 45 degrees. 2+ DP pulse with brisk cap refill. Skin is intact with no lesions or wounds. Sensation is intact light touch throughout. Skin:     General: Skin is warm and dry. Neurological:      Mental Status: He is alert and oriented to person, place, and time. Psychiatric:         Mood and Affect: Mood normal.         Behavior: Behavior normal.            Diagnostic testing:  X-ray images were reviewed by myself and discussed with the patient:  XR ANKLE RIGHT (MIN 3 VIEWS)    Result Date: 8/30/2021  EXAMINATION: THREE XRAY VIEWS OF THE RIGHT ANKLE 8/30/2021 8:31 am COMPARISON: None. HISTORY: ORDERING SYSTEM PROVIDED HISTORY: hit foot on bed frame TECHNOLOGIST PROVIDED HISTORY: Reason for exam:->hit foot on bed frame Reason for Exam: hit foot on bed frame 2x weeks ago FINDINGS: Bones: No acute fracture identified. Small corticated osseous fragment at the distal tip of the lateral malleolus suggest remote nondisplaced avulsion fracture. Moderate plantar calcaneal spurring. Calcifications noted of the distal Achilles tendon. Joints: Joint spaces maintained. Normal alignment. Soft tissues: Diffuse soft tissue edema. No acute fracture or malalignment of the right ankle.      XR FOOT RIGHT (2 VIEWS)    Result Date: 8/30/2021  EXAMINATION: TWO XRAY VIEWS OF THE RIGHT FOOT 8/30/2021 8:32 am COMPARISON: None. HISTORY: ORDERING SYSTEM PROVIDED HISTORY: hit foot on bed frame TECHNOLOGIST PROVIDED HISTORY: Reason for exam:->hit foot on bed frame Reason for Exam: hit foot on bed frame 2x weeks ago FINDINGS: Hallux valgus angulation was noted. Degenerative narrowing of all interphalangeal, right 1st metatarsal phalangeal and tarsal-metatarsal joints. Less than 1 cm plantar and Achilles tendon enthesophytes were noted. No right ankle effusion was noted. No fracture or periarticular erosion was seen. Metatarsus adductus was noted. The regional skeleton was osteopenic. Osteopenia without fracture or periarticular erosion. Metatarsus adductus. Hallux valgus angulation at the right 1st metatarsophalangeal joint. Degenerative narrowing of all interphalangeal, right 1st metatarsophalangeal and tarsal-metatarsal joints. CT FOOT RIGHT WO CONTRAST    Result Date: 8/30/2021  EXAMINATION: CT OF THE RIGHT FOOT WITHOUT CONTRAST 8/30/2021 11:33 am TECHNIQUE: CT of the right foot was performed without the administration of intravenous contrast.  Multiplanar reformatted images are provided for review. Dose modulation, iterative reconstruction, and/or weight based adjustment of the mA/kV was utilized to reduce the radiation dose to as low as reasonably achievable. COMPARISON: Right foot and right ankle x-rays earlier today.  HISTORY ORDERING SYSTEM PROVIDED HISTORY: severe heel pain after striking against bed two weeks ago, and hard time bearing weight, x-rays negative TECHNOLOGIST PROVIDED HISTORY: Reason for exam:->severe heel pain after striking against bed two weeks ago, and hard time bearing weight, x-rays negative Decision Support Exception - unselect if not a suspected or confirmed emergency medical condition->Emergency Medical Condition ([place]) Reason for Exam: severe heel pain after striking against bed two weeks ago, and hard time bearing weight, x-rays negative Acuity: Acute Mechanism of Injury: FOOT INJURY APPROX 1 WEEK AGO Relevant Medical/Surgical History: NONE FINDINGS: Bones: Diffuse bone demineralization. No acute fractures or dislocations. Small well-corticated ossification adjacent to the inferolateral malleolus, likely the sequela of prior remote injury. No suspicious focal bony lesions. No bony erosions or bony destructive changes. No periosteal reaction. Enthesopathy off the calcaneus at Achilles tendon and to a lesser extent plantar fascial attachments. Soft Tissue: Diffuse nonspecific subcutaneous edema. No focal fluid collections, radiopaque foreign bodies or soft tissue gas. Atherosclerotic vascular calcifications. Thickened distal Achilles tendon to include at calcaneal attachment compatible with insertional tendinosis. To the extent that they can be evaluated the visualized tendons appear to be grossly intact and appropriately located. Joint: Mild degenerative changes to the 1st MTP joint. Degenerative changes at the articulation of the hallux sesamoids with the 1st metatarsal head. Mild hallux valgus deformity. Other joints appear to be maintained. No joint effusions are noted. No intra-articular loose bodies. No evidence for an osteochondral lesion to the tibiotalar joint. No acute bony abnormality. Diffuse nonspecific subcutaneous edema. Insertional Achilles tendinosis. Mild degenerative changes to the 1st MTP joint along with mild hallux valgus deformity as well as degenerative change to the articulation of the hallux sesamoids with the 1st metatarsal head. Diffuse bone demineralization.      VL DUP LOWER EXTREMITY VENOUS RIGHT    Result Date: 8/30/2021  EXAMINATION: DUPLEX VENOUS ULTRASOUND OF THE RIGHT LOWER EXTREMITY, 8/30/2021 10:43 am TECHNIQUE: Duplex ultrasound using B-mode/gray scaled imaging and Doppler spectral analysis and color flow was obtained of the right lower extremity. COMPARISON: None. HISTORY: ORDERING SYSTEM PROVIDED HISTORY: pain, moderate swelling TECHNOLOGIST PROVIDED HISTORY: Reason for exam:->pain, moderate swelling Acuity: Acute Type of Exam: Initial FINDINGS: The visualized veins of the right lower extremity are patent and free of echogenic thrombus. The veins demonstrate good compressibility with normal color flow study and spectral analysis. There is edematous appearance to the right leg. 1.  Edematous appearance to the right leg but no evidence of DVT in the right lower extremity. Office Procedures:  No orders of the defined types were placed in this encounter. Assessment and Plan  1. Right Achilles insertional tendinitis    2. Right lower extremity lymphedema    3. Right lower extremity chronic venous insufficiency    X-ray and CT scan images were reviewed with the patient. We discussed the findings and I have reassured him that there is no structural damage such as a fracture or tendon tear. I recommended conservative treatments for his Achilles insertional tendinitis and foot pain. We discussed physical therapy but the patient is opted to defer in favor of a home stretching exercise program.    I recommended supportive shoes. The patient has an appointment upcoming with his podiatrist to have a new pair of diabetic shoes ordered. Continue with compression stockings for the lymphedema. Follow-up with the cardiologist regarding lower extremity swelling and circulation issues.     Follow-up here as needed    Electronically signed by Jennifer Jasmine MD on 8/31/2021 at 2:18 PM

## 2021-09-05 PROBLEM — M76.61 ACHILLES TENDINITIS OF RIGHT LOWER EXTREMITY: Status: ACTIVE | Noted: 2021-09-05

## 2021-09-05 ASSESSMENT — ENCOUNTER SYMPTOMS
SHORTNESS OF BREATH: 0
COLOR CHANGE: 0
CHEST TIGHTNESS: 0
VOMITING: 0
EYE REDNESS: 0
EYE PAIN: 0
WHEEZING: 0

## 2021-09-20 ENCOUNTER — HOSPITAL ENCOUNTER (OUTPATIENT)
Dept: WOUND CARE | Age: 66
Discharge: HOME | End: 2021-09-20
Payer: MEDICARE

## 2021-09-20 DIAGNOSIS — N49.2 SCROTAL ABSCESS: ICD-10-CM

## 2021-09-20 PROCEDURE — 99213 OFFICE O/P EST LOW 20 MIN: CPT | Performed by: NURSE PRACTITIONER

## 2021-09-20 PROCEDURE — 99213 OFFICE O/P EST LOW 20 MIN: CPT

## 2021-09-20 RX ORDER — CLOBETASOL PROPIONATE 0.5 MG/G
OINTMENT TOPICAL ONCE
Status: CANCELLED | OUTPATIENT
Start: 2021-09-20 | End: 2021-09-20

## 2021-09-20 RX ORDER — GENTAMICIN SULFATE 1 MG/G
OINTMENT TOPICAL ONCE
Status: CANCELLED | OUTPATIENT
Start: 2021-09-20 | End: 2021-09-20

## 2021-09-20 RX ORDER — LIDOCAINE HYDROCHLORIDE 40 MG/ML
SOLUTION TOPICAL ONCE
Status: CANCELLED | OUTPATIENT
Start: 2021-09-20 | End: 2021-09-20

## 2021-09-20 RX ORDER — BACITRACIN, NEOMYCIN, POLYMYXIN B 400; 3.5; 5 [USP'U]/G; MG/G; [USP'U]/G
OINTMENT TOPICAL ONCE
Status: CANCELLED | OUTPATIENT
Start: 2021-09-20 | End: 2021-09-20

## 2021-09-20 RX ORDER — SULFAMETHOXAZOLE AND TRIMETHOPRIM 800; 160 MG/1; MG/1
1 TABLET ORAL 2 TIMES DAILY
COMMUNITY

## 2021-09-20 RX ORDER — LIDOCAINE 50 MG/G
OINTMENT TOPICAL ONCE
Status: CANCELLED | OUTPATIENT
Start: 2021-09-20 | End: 2021-09-20

## 2021-09-20 RX ORDER — CEPHALEXIN 500 MG/1
250 CAPSULE ORAL 4 TIMES DAILY
COMMUNITY
End: 2021-09-27

## 2021-09-20 RX ORDER — LIDOCAINE HYDROCHLORIDE 20 MG/ML
JELLY TOPICAL ONCE
Status: CANCELLED | OUTPATIENT
Start: 2021-09-20 | End: 2021-09-20

## 2021-09-20 RX ORDER — GINSENG 100 MG
CAPSULE ORAL ONCE
Status: CANCELLED | OUTPATIENT
Start: 2021-09-20 | End: 2021-09-20

## 2021-09-20 RX ORDER — BETAMETHASONE DIPROPIONATE 0.05 %
OINTMENT (GRAM) TOPICAL ONCE
Status: CANCELLED | OUTPATIENT
Start: 2021-09-20 | End: 2021-09-20

## 2021-09-20 RX ORDER — BACITRACIN ZINC AND POLYMYXIN B SULFATE 500; 1000 [USP'U]/G; [USP'U]/G
OINTMENT TOPICAL ONCE
Status: CANCELLED | OUTPATIENT
Start: 2021-09-20 | End: 2021-09-20

## 2021-09-20 RX ORDER — LIDOCAINE 40 MG/G
CREAM TOPICAL ONCE
Status: CANCELLED | OUTPATIENT
Start: 2021-09-20 | End: 2021-09-20

## 2021-09-20 NOTE — PROGRESS NOTES
[facility], [place] Initial Visit      John Tavares  AGE: 77 y.o. GENDER: male  : 1955  EPISODE DATE:  2021   Referred by: Self (Patient is known to us)    Subjective:     CHIEF COMPLAINT abscess to scrotum     HISTORY of PRESENT ILLNESS      John Tavares is a 77 y.o. male who presents to the [address] [place] [address] for an initial visit for evaluation and treatment of Acute abscess  ulcer(s) of  scrotum. The condition is of mild severity. The ulcer has been present for 1 week. The underlying cause is thought to be an infected hair follicle or clogged pore or gland. The patients care to date has included a trip to the ER , where he was placed on keflex and bactrim. The patient has significant underlying medical conditions as below. Patient is a diabetic, and his treatment consists of PO medications. H also takes xarelto. He is a current everyday smoker. Patient is known to us for a large scrotal abscess, and he came immediately when he noticed a small, round, firm abscess forming. He states that this has improved greatly since taking these. There is no open wound to be seen, and no drainage can be extracted from abscess at this time.      Wound Pain Timing/Severity: waxing and waning  Quality of pain: throbbing  Severity of pain:  2 / 10   Modifying Factors: diabetes, obesity and smoking  Associated Signs/Symptoms: edema, erythema, drainage and pain        PAST MEDICAL HISTORY        Diagnosis Date    CHF (congestive heart failure) (HCC)     Chronic ulcer of left leg with fat layer exposed ([address] 2016    Chronic ulcer of right leg with fat layer exposed ([address] 2016    Chronic ulcer of right leg with fat layer exposed ([address] 2016    Diabetes mellitus ([address]     Hypertension     PVD (peripheral vascular disease) ([address] 2016    Type 2 diabetes mellitus with diabetic peripheral angiopathy without gangrene, without long-term current use of insulin ([address] 2016    Type 2 diabetes mellitus with diabetic peripheral angiopathy without gangrene, without long-term current use of insulin ([facility] 12/12/2016    Venous stasis ulcer of both lower extremities without varicose veins ([facility] 12/12/2016    WD-Traumatic open wound of left lower leg, complicated by diabetes and venous insufficiency 10/8/2020    WD-Ulcer of shin, left, with fat layer exposed ([facility] 12/12/2016       PAST SURGICAL HISTORY    Past Surgical History:   Procedure Laterality Date    RECTAL SURGERY N/A 11/24/2020    RECTAL PERIRECTAL INCISION AND DRAINAGE performed by Danita Joiner MD at [facility] History   Problem Relation Age of Onset    Asthma Mother     Breast Cancer Mother     Cancer Mother     Diabetes Mother     High Blood Pressure Mother     Cancer Father     Early Death Brother     Arthritis Paternal Grandmother        SOCIAL HISTORY    Social History     Tobacco Use    Smoking status: Current Every Day Smoker     Packs/day: 1.00     Years: 60.00     Pack years: 60.00     Types: Cigarettes    Smokeless tobacco: Never Used    Tobacco comment: educated    Substance Use Topics    Alcohol use: Never    Drug use: No       ALLERGIES    No Known Allergies    MEDICATIONS    Current Outpatient Medications on File Prior to Encounter   Medication Sig Dispense Refill    cephALEXin (KEFLEX) 500 MG capsule Take 250 mg by mouth 4 times daily      sulfamethoxazole-trimethoprim (BACTRIM DS;SEPTRA DS) 800-160 MG per tablet Take 1 tablet by mouth 2 times daily      clotrimazole-betamethasone (LOTRISONE) 1-0.05 % cream Apply topically 2 times daily to excoriation under abdominal fold and groin area.  45 g 2    magnesium oxide (MAG-OX) 400 MG tablet Take 1 tablet by mouth daily 30 tablet 1    rivaroxaban (XARELTO) 15 MG TABS tablet Take 1 tablet by mouth daily 42 tablet 1    metoprolol succinate (TOPROL XL) 25 MG extended release tablet Take 1 tablet by mouth 2 times daily 30 tablet 3    amLODIPine (NORVASC) 10 MG tablet Take 1 tablet by mouth nightly 30 tablet 3    Latanoprost 0.005 % EMUL Apply to eye daily      ibuprofen (ADVIL;MOTRIN) 600 MG tablet Take 1 tablet by mouth every 6 hours as needed for Pain 30 tablet 0    glipiZIDE (GLUCOTROL) 5 MG tablet Take 5 mg by mouth 2 times daily (before meals)      potassium chloride (KLOR-CON M) 20 MEQ extended release tablet Take 20 mEq by mouth daily      ALPRAZolam (XANAX) 0.5 MG tablet Take 0.5 mg by mouth nightly. Juliofrancoise Majano ipratropium-albuterol (DUONEB) 0.5-2.5 (3) MG/3ML SOLN nebulizer solution Inhale 1 vial into the lungs every 4 hours      albuterol sulfate  (90 Base) MCG/ACT inhaler Inhale 2 puffs into the lungs every 6 hours as needed for Wheezing      aspirin EC 81 MG EC tablet Take 1 tablet by mouth daily 30 tablet 3    HYDROcodone-acetaminophen (NORCO) 7.5-325 MG per tablet Take 1 tablet by mouth 3 times daily .  metFORMIN (GLUCOPHAGE) 1000 MG tablet Take 1,000 mg by mouth 2 times daily (with meals)      lisinopril (PRINIVIL;ZESTRIL) 20 MG tablet Take 40 mg by mouth daily       tamsulosin (FLOMAX) 0.4 MG capsule Take 0.4 mg by mouth daily      finasteride (PROSCAR) 5 MG tablet Take 5 mg by mouth daily      amiodarone (CORDARONE) 200 MG tablet Take 1 tablet by mouth daily 30 tablet 0     No current facility-administered medications on file prior to encounter.        PROBLEM LIST    Patient Active Problem List   Diagnosis    Venous stasis ulcer of both lower extremities without varicose veins (HCC)    PVD (peripheral vascular disease) (HCC)    WD-Venous stasis of both lower extremities    WD-Lymphedema of both lower extremities    WD-Idiopathic chronic venous hypertension of left lower extremity with ulcer ([address]    WD-Traumatic open wound of left lower leg, complicated by diabetes and venous insufficiency    HBO-Nonhealing surgical wound groin & buttock, initial encounter    HBO-Necrotizing fasciitis ([address]    Achilles tendinitis of right lower extremity    WD-Scrotal abscess       REVIEW OF SYSTEMS    Constitutional: negative for anorexia, chills, fatigue, fevers, malaise, sweats and weight loss  Respiratory: negative for pleurisy/chest pain, pneumonia, shortness of breath, sputum, stridor and wheezing  Cardiovascular: negative for lower extremity edema, near-syncope, orthopnea, palpitations, paroxysmal nocturnal dyspnea, syncope and tachypnea  Integument/breast: positive for skin lesion(s)      Objective: There were no vitals taken for this visit. PHYSICAL EXAM  General Appearance: alert and oriented to person, place and time, well-developed and well-nourished, in no acute distress  Pulmonary/Chest: clear to auscultation bilaterally- no wheezes, rales or rhonchi, normal air movement, no respiratory distress  Cardiovascular: normal rate, normal S1 and S2, no gallops, intact distal pulses and no carotid bruits  Dermatologic exam: Visual inspection of the periwound reveals the skin to be normal in turgor and texture, well hydrated and dry  Wound exam: see wound description below in procedure note      Assessment:       Farida Tavares  appears to have a non-healing wound of the scrotum. The etiology of the wound is felt to be an abscess. There are multiple complicating factors including diabetes, obesity and smoking. A comprehensive wound management program would be helpful to heal this wound. Assessments completed include fall risk and nutritional, functional,and psychological status. At this time appropriate care would include: periodic debridement and wound care as below.        Problem List Items Addressed This Visit     WD-Scrotal abscess          Wound 09/20/21 Scrotum #1 right side (Active)   Wound Image   09/20/21 1448   Wound Cleansed Wound cleanser;Cleansed with saline 09/20/21 1448   Offloading for Diabetic Foot Ulcers No offloading required 09/20/21 1448   Wound Length (cm) 0.1 cm 09/20/21 1448   Wound Width (cm) 0.1 cm 09/20/21 1448   Wound Depth (cm) 0.1 cm 09/20/21 1448   Wound Surface Area (cm^2) 0.01 cm^2 09/20/21 1448   Wound Volume (cm^3) 0.001 cm^3 09/20/21 1448   Distance Tunneling (cm) 0 cm 09/20/21 1448   Tunneling Position ___ O'Clock 0 09/20/21 1448   Undermining Starts ___ O'Clock 0 09/20/21 1448   Undermining Ends___ O'Clock 0 09/20/21 1448   Undermining Maxium Distance (cm) 0 09/20/21 1448   Wound Assessment Pink/red 09/20/21 1448   Drainage Amount Small 09/20/21 1448   Drainage Description Clear;Yellow 09/20/21 1448   Odor None 09/20/21 1448   Merary-wound Assessment Intact 09/20/21 1448   Margins Defined edges 09/20/21 1448   Wound Thickness Description not for Pressure Injury Full thickness 09/20/21 1448   Number of days: 0       Patient is much improved with x2 antibiotics, and has about half his course still to take. There is no open lesion to culture, and based on location we will hold off on an I&D for now. If he shows continued improvement next week we may be able to discharge, or place him on an additional course to be safe, as he is predisposed to infections in this region     Patient counseled in length on smoking cessation including benefits of quitting and health risks of continuing to smoke including but not limited to lung cancer, COPD, risk of coronary artery disease. Patient verbalized understanding today, not ready to quit. Diabetes education provided today:    Diabetes pathoetiology, difference between type 1 and type 2 diabetes, and progressive nature of Type 2 DM. Physical activity: advised to exercise 5-7 days a week 30-60 mins at least. Discussed how it affects BS readings. Different diabetic medications. Managing high and low sugar readings. Effects of smoking and diabetes.   Plan:     Discharge instructions:    Discharge Instructions       PHYSICIAN ORDERS AND DISCHARGE INSTRUCTIONS    NOTE: Upon discharge from the [address], you will receive a patient experience survey. We would be grateful if you would take the time to fill this survey out. Wound care order history:     ANDREW's   Right       Left    Date:   Cultures:     Grafts:     Antibiotics:        Continuing wound care orders and information:                Residence:                Continue home health care with:    Your wound-care supplies will be provided by: Wound cleansing:     Do not scrub or use excessive force. Wash hands with soap and water before and after dressing changes. Prior to applying a clean dressing, cleanse wound with normal saline, wound cleanser, or mild soap and water. Ask the physician or nurse before getting the wound(s) wet in a shower    Daily Wound management:   Keep weight off wounds and reposition every 2 hours. Avoid standing for long periods of time. Apply wraps/stockings in AM and remove at bedtime. If swelling is present, elevate legs to the level of the heart or above for 30 minutes 4-5 times a day and/or when sitting. When taking antibiotics take entire prescription as ordered by physician do not stop taking until medicine is all gone. Orders for this week:  09/20/21       Scrotum-- Clean with soap and water, pat dry. Apply ABD Pad to contain drainage. Change as needed. Follow up with Dahlia HENDERSON  In 1 weeks in the wound care center  Call [phone] for any questions or concerns.   Date__________   Time____________        Treatment Note      Written Patient Dismissal Instructions Given            Electronically signed by FAITH Nath CNP on 9/20/2021 at 3:01 PM

## 2021-09-27 ENCOUNTER — HOSPITAL ENCOUNTER (OUTPATIENT)
Dept: WOUND CARE | Age: 66
Discharge: HOME | End: 2021-09-27
Payer: MEDICARE

## 2021-09-27 VITALS
RESPIRATION RATE: 18 BRPM | SYSTOLIC BLOOD PRESSURE: 140 MMHG | DIASTOLIC BLOOD PRESSURE: 79 MMHG | HEART RATE: 79 BPM | TEMPERATURE: 97.6 F

## 2021-09-27 DIAGNOSIS — N49.2 SCROTAL ABSCESS: Primary | ICD-10-CM

## 2021-09-27 PROCEDURE — 99213 OFFICE O/P EST LOW 20 MIN: CPT | Performed by: NURSE PRACTITIONER

## 2021-09-27 PROCEDURE — 99212 OFFICE O/P EST SF 10 MIN: CPT

## 2021-09-27 RX ORDER — LIDOCAINE HYDROCHLORIDE 40 MG/ML
SOLUTION TOPICAL ONCE
Status: DISCONTINUED | OUTPATIENT
Start: 2021-09-27 | End: 2021-09-28 | Stop reason: HOSPADM

## 2021-09-27 RX ORDER — GENTAMICIN SULFATE 1 MG/G
OINTMENT TOPICAL ONCE
Status: CANCELLED | OUTPATIENT
Start: 2021-09-27 | End: 2021-09-27

## 2021-09-27 RX ORDER — LIDOCAINE HYDROCHLORIDE 20 MG/ML
JELLY TOPICAL ONCE
Status: CANCELLED | OUTPATIENT
Start: 2021-09-27 | End: 2021-09-27

## 2021-09-27 RX ORDER — BACITRACIN, NEOMYCIN, POLYMYXIN B 400; 3.5; 5 [USP'U]/G; MG/G; [USP'U]/G
OINTMENT TOPICAL ONCE
Status: CANCELLED | OUTPATIENT
Start: 2021-09-27 | End: 2021-09-27

## 2021-09-27 RX ORDER — LIDOCAINE 50 MG/G
OINTMENT TOPICAL ONCE
Status: CANCELLED | OUTPATIENT
Start: 2021-09-27 | End: 2021-09-27

## 2021-09-27 RX ORDER — BACITRACIN ZINC AND POLYMYXIN B SULFATE 500; 1000 [USP'U]/G; [USP'U]/G
OINTMENT TOPICAL ONCE
Status: CANCELLED | OUTPATIENT
Start: 2021-09-27 | End: 2021-09-27

## 2021-09-27 RX ORDER — GINSENG 100 MG
CAPSULE ORAL ONCE
Status: CANCELLED | OUTPATIENT
Start: 2021-09-27 | End: 2021-09-27

## 2021-09-27 RX ORDER — CLOBETASOL PROPIONATE 0.5 MG/G
OINTMENT TOPICAL ONCE
Status: CANCELLED | OUTPATIENT
Start: 2021-09-27 | End: 2021-09-27

## 2021-09-27 RX ORDER — LIDOCAINE HYDROCHLORIDE 40 MG/ML
SOLUTION TOPICAL ONCE
Status: CANCELLED | OUTPATIENT
Start: 2021-09-27 | End: 2021-09-27

## 2021-09-27 RX ORDER — LIDOCAINE 40 MG/G
CREAM TOPICAL ONCE
Status: CANCELLED | OUTPATIENT
Start: 2021-09-27 | End: 2021-09-27

## 2021-09-27 RX ORDER — BETAMETHASONE DIPROPIONATE 0.05 %
OINTMENT (GRAM) TOPICAL ONCE
Status: CANCELLED | OUTPATIENT
Start: 2021-09-27 | End: 2021-09-27

## 2021-09-27 NOTE — PROGRESS NOTES
Wound Care Center Progress Note       Michael Tavares  AGE: 77 y.o. GENDER: male  : 1955  TODAY'S DATE:  2021        Subjective:     Chief Complaint   Patient presents with    Wound Check         HISTORY of PRESENT ILLNESS    Michael Tavares is a 77 y.o. male who presents to the [address] [place] [address] for  evaluation and treatment of Acute abscess wound of the scrotum. The condition is of mild severity. The ulcer had been present for  approximately 1 week at initial visit to the wound clinic 21. The underlying cause is thought to be an infected hair follicle. The patients care to date has included evaluation at [facility]  ED , where he was placed on keflex and bactrim. The patient has significant underlying medical conditions as below.       Wound Pain Timing/Severity: waxing and waning  Quality of pain: throbbing  Severity of pain:  2 / 10   Modifying Factors: diabetes, obesity and smoking  Associated Signs/Symptoms: edema, erythema, drainage and pain    Diabetes: Yes, regimen, last A1c  Smoking: Yes, Patient counseled in length on smoking cessation including benefits of quitting and health risks of continuing to smoke including but not limited to lung cancer, COPD, risk of coronary artery disease. Patient verbalized understanding today, is not ready to quit. Obesity: Yes  Anticoagulant therapy: Xarelto  Immunosuppression: No  Other History: necrotizing fasciitis with Jarek's gangrene back in 2020      Patient educated on the 6 essential components necessary for wound healing: Circulation, Debridements, Proper Dressings and Topical Wound Products, Infection Control, Edema Control and Offloading. Patient educated on those factors that negatively effect or impact wound healing: smoking, obesity, uncontrolled diabetes, anticoagulant and immunosuppressive regimens, inadequate nutrition, untreated arterial and venous disease if applicable and measures to manage edema.            PAST MEDICAL HISTORY        Diagnosis Date    CHF (congestive heart failure) (HCC)     Chronic ulcer of left leg with fat layer exposed ([address] 12/12/2016    Chronic ulcer of right leg with fat layer exposed ([address] 12/12/2016    Chronic ulcer of right leg with fat layer exposed ([address] 12/12/2016    Diabetes mellitus ([address]     Hypertension     PVD (peripheral vascular disease) ([address] 12/12/2016    Type 2 diabetes mellitus with diabetic peripheral angiopathy without gangrene, without long-term current use of insulin ([address] 12/12/2016    Type 2 diabetes mellitus with diabetic peripheral angiopathy without gangrene, without long-term current use of insulin ([address] 12/12/2016    Venous stasis ulcer of both lower extremities without varicose veins ([address] 12/12/2016    WD-Traumatic open wound of left lower leg, complicated by diabetes and venous insufficiency 10/8/2020    WD-Ulcer of shin, left, with fat layer exposed ([address] 12/12/2016       PAST SURGICAL HISTORY    Past Surgical History:   Procedure Laterality Date    RECTAL SURGERY N/A 11/24/2020    RECTAL PERIRECTAL INCISION AND DRAINAGE performed by Kam Lemos MD at [address] [place] [address] History   Problem Relation Age of Onset    Asthma Mother     Breast Cancer Mother     Cancer Mother     Diabetes Mother     High Blood Pressure Mother     Cancer Father     Early Death Brother     Arthritis Paternal Grandmother        SOCIAL HISTORY    Social History     Tobacco Use    Smoking status: Current Every Day Smoker     Packs/day: 1.00     Years: 60.00     Pack years: 60.00     Types: Cigarettes    Smokeless tobacco: Never Used    Tobacco comment: educated    Substance Use Topics    Alcohol use: Never    Drug use: No       ALLERGIES    No Known Allergies    MEDICATIONS    Current Outpatient Medications on File Prior to Encounter   Medication Sig Dispense Refill    sulfamethoxazole-trimethoprim (BACTRIM DS;SEPTRA DS) 800-160 MG per tablet Take 1 tablet by mouth 2 times daily      clotrimazole-betamethasone (LOTRISONE) 1-0.05 % cream Apply topically 2 times daily to excoriation under abdominal fold and groin area. 45 g 2    magnesium oxide (MAG-OX) 400 MG tablet Take 1 tablet by mouth daily 30 tablet 1    amiodarone (CORDARONE) 200 MG tablet Take 1 tablet by mouth daily 30 tablet 0    rivaroxaban (XARELTO) 15 MG TABS tablet Take 1 tablet by mouth daily 42 tablet 1    metoprolol succinate (TOPROL XL) 25 MG extended release tablet Take 1 tablet by mouth 2 times daily 30 tablet 3    amLODIPine (NORVASC) 10 MG tablet Take 1 tablet by mouth nightly 30 tablet 3    Latanoprost 0.005 % EMUL Apply to eye daily      ibuprofen (ADVIL;MOTRIN) 600 MG tablet Take 1 tablet by mouth every 6 hours as needed for Pain 30 tablet 0    glipiZIDE (GLUCOTROL) 5 MG tablet Take 5 mg by mouth 2 times daily (before meals)      potassium chloride (KLOR-CON M) 20 MEQ extended release tablet Take 20 mEq by mouth daily      ALPRAZolam (XANAX) 0.5 MG tablet Take 0.5 mg by mouth nightly. Albino Ambrosia ipratropium-albuterol (DUONEB) 0.5-2.5 (3) MG/3ML SOLN nebulizer solution Inhale 1 vial into the lungs every 4 hours      albuterol sulfate  (90 Base) MCG/ACT inhaler Inhale 2 puffs into the lungs every 6 hours as needed for Wheezing      aspirin EC 81 MG EC tablet Take 1 tablet by mouth daily 30 tablet 3    HYDROcodone-acetaminophen (NORCO) 7.5-325 MG per tablet Take 1 tablet by mouth 3 times daily .  metFORMIN (GLUCOPHAGE) 1000 MG tablet Take 1,000 mg by mouth 2 times daily (with meals)      lisinopril (PRINIVIL;ZESTRIL) 20 MG tablet Take 40 mg by mouth daily       tamsulosin (FLOMAX) 0.4 MG capsule Take 0.4 mg by mouth daily      finasteride (PROSCAR) 5 MG tablet Take 5 mg by mouth daily       No current facility-administered medications on file prior to encounter. REVIEW OF SYSTEMS    Pertinent items are noted in HPI.     Constitutional: Negative for systemic symptoms including fever, chills and malaise. Objective:      BP (!) 140/79   Pulse 79   Temp 97.6 °F (36.4 °C) (Temporal)   Resp 18     PHYSICAL EXAM    General: The patient is in no acute distress. Mental status:  Patient is appropriate, is  oriented to place and plan of care. Dermatologic exam: Visual inspection of the periwound reveals the skin to be edematous. Wound exam:  see wound description below     All active wounds listed below with today's date are evaluated    Assessment:     Problem List Items Addressed This Visit     WD-Scrotal abscess - Primary    Relevant Medications    lidocaine (XYLOCAINE) 4 % external solution    Other Relevant Orders    Initiate Outpatient Wound Care Protocol          Status of wound progress and description from last visit: The area is pinpoint with scant drainage. Improved since last visit, continue oral antibiotics, follow up in 2 weeks. Plan:     Discharge Instructions       PHYSICIAN ORDERS AND DISCHARGE INSTRUCTIONS     NOTE: Upon discharge from the [address], you will receive a patient experience survey. We would be grateful if you would take the time to fill this survey out.     Wound care order history:                 ANDREW's   Right       Left               Date:              Cultures:                Grafts:                Antibiotics:           Continuing wound care orders and information:                 Residence:                Continue home health care with:               Your wound-care supplies will be provided by:      Wound cleansing:               Do not scrub or use excessive force. Wash hands with soap and water before and after dressing changes. Prior to applying a clean dressing, cleanse wound with normal saline, wound cleanser, or mild soap and water.                Ask the physician or nurse before getting the wound(s) wet in a shower     Daily Wound management:              Keep weight off wounds and reposition every 2 hours. Avoid standing for long periods of time. Apply wraps/stockings in AM and remove at bedtime. If swelling is present, elevate legs to the level of the heart or above for 30 minutes 4-5 times a day and/or when sitting. When taking antibiotics take entire prescription as ordered by physician do not stop taking until medicine is all gone.                                                           Orders for this week:  09/27/21                  Scrotum-- Clean with soap and water, pat dry. Apply ABD Pad to contain drainage. Change as needed.      Follow up with Dahlia HENDERSON  In 2 weeks in the wound care center  Call [phone] for any questions or concerns.   Date__________   Time____________        Treatment Note      Written Patient Dismissal Instructions Given            Electronically signed by FAITH Perez CNP on 9/27/2021 at 2:29 PM

## 2021-09-30 RX ORDER — CEPHALEXIN 500 MG/1
500 CAPSULE ORAL 4 TIMES DAILY
Qty: 40 CAPSULE | Refills: 0 | Status: SHIPPED | OUTPATIENT
Start: 2021-09-30 | End: 2021-10-10

## 2021-10-11 ENCOUNTER — HOSPITAL ENCOUNTER (OUTPATIENT)
Dept: WOUND CARE | Age: 66
Discharge: HOME | End: 2021-10-11
Payer: MEDICARE

## 2021-10-11 VITALS — SYSTOLIC BLOOD PRESSURE: 137 MMHG | RESPIRATION RATE: 18 BRPM | DIASTOLIC BLOOD PRESSURE: 42 MMHG | TEMPERATURE: 97.8 F

## 2021-10-11 DIAGNOSIS — N49.2 SCROTAL ABSCESS: Primary | ICD-10-CM

## 2021-10-11 PROCEDURE — 99212 OFFICE O/P EST SF 10 MIN: CPT

## 2021-10-11 PROCEDURE — 99213 OFFICE O/P EST LOW 20 MIN: CPT | Performed by: NURSE PRACTITIONER

## 2021-10-11 NOTE — PROGRESS NOTES
Wound Care Center Progress Note       Lukas Tavares  AGE: 77 y.o. GENDER: male  : 1955  TODAY'S DATE:  10/11/2021        Subjective:     Chief Complaint   Patient presents with    Wound Check     scrotum         HISTORY of PRESENT ILLNESS    Lukas Tavares is a 77 y. o. male who presents to the Wound Clinic for  evaluation and treatment of Acute abscess wound of the scrotum. The condition is of mild severity. The ulcer had been present for  approximately 1 week at initial visit to the wound clinic 21. Nelsy Holland underlying cause is thought to be an infected hair follicle. The patients care to date has included evaluation at [facility]  ED , where he was placed on keflex and bactrim.  The patient has significant underlying medical conditions as below.       Patient is healed today     Wound Pain Timing/Severity: waxing and waning  Quality of pain: throbbing  Severity of pain:  2 / 10   Modifying Factors: diabetes, obesity and smoking  Associated Signs/Symptoms: edema, erythema, drainage and pain        PAST MEDICAL HISTORY        Diagnosis Date    CHF (congestive heart failure) (HCC)     Chronic ulcer of left leg with fat layer exposed ([address] 2016    Chronic ulcer of right leg with fat layer exposed ([address] 2016    Chronic ulcer of right leg with fat layer exposed ([address] 2016    Diabetes mellitus ([address]     Hypertension     PVD (peripheral vascular disease) ([address] 2016    Type 2 diabetes mellitus with diabetic peripheral angiopathy without gangrene, without long-term current use of insulin ([address] 2016    Type 2 diabetes mellitus with diabetic peripheral angiopathy without gangrene, without long-term current use of insulin ([address] 2016    Venous stasis ulcer of both lower extremities without varicose veins ([address] 2016    WD-Traumatic open wound of left lower leg, complicated by diabetes and venous insufficiency 10/8/2020    WD-Ulcer of shin, left, with fat layer exposed ([address] 12/12/2016       PAST SURGICAL HISTORY    Past Surgical History:   Procedure Laterality Date    RECTAL SURGERY N/A 11/24/2020    RECTAL PERIRECTAL INCISION AND DRAINAGE performed by Piter Yanez MD at [address] [place] [address]    Family History   Problem Relation Age of Onset    Asthma Mother     Breast Cancer Mother     Cancer Mother     Diabetes Mother     High Blood Pressure Mother     Cancer Father     Early Death Brother     Arthritis Paternal Grandmother        SOCIAL HISTORY    Social History     Tobacco Use    Smoking status: Current Every Day Smoker     Packs/day: 1.00     Years: 60.00     Pack years: 60.00     Types: Cigarettes    Smokeless tobacco: Never Used    Tobacco comment: educated    Substance Use Topics    Alcohol use: Never    Drug use: No       ALLERGIES    No Known Allergies    MEDICATIONS    Current Outpatient Medications on File Prior to Encounter   Medication Sig Dispense Refill    sulfamethoxazole-trimethoprim (BACTRIM DS;SEPTRA DS) 800-160 MG per tablet Take 1 tablet by mouth 2 times daily      clotrimazole-betamethasone (LOTRISONE) 1-0.05 % cream Apply topically 2 times daily to excoriation under abdominal fold and groin area. 45 g 2    magnesium oxide (MAG-OX) 400 MG tablet Take 1 tablet by mouth daily 30 tablet 1    rivaroxaban (XARELTO) 15 MG TABS tablet Take 1 tablet by mouth daily 42 tablet 1    Latanoprost 0.005 % EMUL Apply to eye daily      glipiZIDE (GLUCOTROL) 5 MG tablet Take 5 mg by mouth 2 times daily (before meals)      potassium chloride (KLOR-CON M) 20 MEQ extended release tablet Take 20 mEq by mouth daily      ALPRAZolam (XANAX) 0.5 MG tablet Take 0.5 mg by mouth nightly. Lidia Neigh ipratropium-albuterol (DUONEB) 0.5-2.5 (3) MG/3ML SOLN nebulizer solution Inhale 1 vial into the lungs every 4 hours      aspirin EC 81 MG EC tablet Take 1 tablet by mouth daily 30 tablet 3    HYDROcodone-acetaminophen (NORCO) 7.5-325 MG per tablet Take 1 tablet by mouth 3 times daily .  metFORMIN (GLUCOPHAGE) 1000 MG tablet Take 1,000 mg by mouth 2 times daily (with meals)      lisinopril (PRINIVIL;ZESTRIL) 20 MG tablet Take 40 mg by mouth daily       tamsulosin (FLOMAX) 0.4 MG capsule Take 0.4 mg by mouth daily      finasteride (PROSCAR) 5 MG tablet Take 5 mg by mouth daily      amiodarone (CORDARONE) 200 MG tablet Take 1 tablet by mouth daily 30 tablet 0    ibuprofen (ADVIL;MOTRIN) 600 MG tablet Take 1 tablet by mouth every 6 hours as needed for Pain 30 tablet 0    albuterol sulfate  (90 Base) MCG/ACT inhaler Inhale 2 puffs into the lungs every 6 hours as needed for Wheezing       No current facility-administered medications on file prior to encounter. REVIEW OF SYSTEMS    Pertinent items are noted in HPI. Constitutional: Negative for systemic symptoms including fever, chills and malaise. Objective:      BP (!) 137/42   Temp 97.8 °F (36.6 °C) (Temporal)   Resp 18     PHYSICAL EXAM      General: The patient is in no acute distress. Mental status:  Patient is appropriate, is  oriented to place and plan of care. Dermatologic exam: Visual inspection of the periwound reveals the skin to be normal in turgor and texture and dry. Wound exam:  see wound description below     All active wounds listed below with today's date are evaluated           Assessment:       Problem List Items Addressed This Visit     WD-Scrotal abscess - Primary          Status of wound progress and description from last visit:   Healed. Discharge at this time. Patient knows that they may return or call with any questions, comments, or concerns. Discussed strategies for preventing future wounds, including regular compression, daily moisturizing, regular exercise, and performing regular foot checks.   Patient verbalized understanding         Plan:     Discharge Instructions       PHYSICIAN ORDERS AND DISCHARGE INSTRUCTIONS     NOTE: Upon discharge from the

## 2021-12-13 ENCOUNTER — HOSPITAL ENCOUNTER (OUTPATIENT)
Age: 66
Discharge: HOME | End: 2021-12-13
Payer: MEDICARE

## 2021-12-13 LAB
BASOPHILS ABSOLUTE: 0.1 K/CU MM
BASOPHILS RELATIVE PERCENT: 0.6 % (ref 0–1)
DIFFERENTIAL TYPE: ABNORMAL
EOSINOPHILS ABSOLUTE: 0.1 K/CU MM
EOSINOPHILS RELATIVE PERCENT: 0.9 % (ref 0–3)
ESTIMATED AVERAGE GLUCOSE: 128 MG/DL
HBA1C MFR BLD: 6.1 % (ref 4.2–6.3)
HCT VFR BLD CALC: 49.6 % (ref 42–52)
HEMOGLOBIN: 15.6 GM/DL (ref 13.5–18)
IMMATURE NEUTROPHIL %: 0.5 % (ref 0–0.43)
LYMPHOCYTES ABSOLUTE: 2 K/CU MM
LYMPHOCYTES RELATIVE PERCENT: 22.8 % (ref 24–44)
MCH RBC QN AUTO: 30.4 PG (ref 27–31)
MCHC RBC AUTO-ENTMCNC: 31.5 % (ref 32–36)
MCV RBC AUTO: 96.5 FL (ref 78–100)
MONOCYTES ABSOLUTE: 0.5 K/CU MM
MONOCYTES RELATIVE PERCENT: 5.5 % (ref 0–4)
NUCLEATED RBC %: 0 %
PDW BLD-RTO: 17.6 % (ref 11.7–14.9)
PLATELET # BLD: 148 K/CU MM (ref 140–440)
PMV BLD AUTO: 10.9 FL (ref 7.5–11.1)
RBC # BLD: 5.14 M/CU MM (ref 4.6–6.2)
SEGMENTED NEUTROPHILS ABSOLUTE COUNT: 6.2 K/CU MM
SEGMENTED NEUTROPHILS RELATIVE PERCENT: 69.7 % (ref 36–66)
TOTAL IMMATURE NEUTOROPHIL: 0.04 K/CU MM
TOTAL NUCLEATED RBC: 0 K/CU MM
WBC # BLD: 8.9 K/CU MM (ref 4–10.5)

## 2021-12-13 PROCEDURE — 36415 COLL VENOUS BLD VENIPUNCTURE: CPT

## 2021-12-13 PROCEDURE — 83036 HEMOGLOBIN GLYCOSYLATED A1C: CPT

## 2021-12-13 PROCEDURE — 85025 COMPLETE CBC W/AUTO DIFF WBC: CPT

## 2022-04-05 ENCOUNTER — HOSPITAL ENCOUNTER (OUTPATIENT)
Age: 67
Discharge: HOME | End: 2022-04-05
Payer: MEDICARE

## 2022-04-05 LAB
ESTIMATED AVERAGE GLUCOSE: 123 MG/DL
HBA1C MFR BLD: 5.9 % (ref 4.2–6.3)

## 2022-04-05 PROCEDURE — 36415 COLL VENOUS BLD VENIPUNCTURE: CPT

## 2022-04-05 PROCEDURE — 83036 HEMOGLOBIN GLYCOSYLATED A1C: CPT

## 2022-10-11 LAB
A/G RATIO: 1.3 (ref 1.2–2.2)
ALBUMIN SERPL-MCNC: 4 G/DL (ref 3.8–4.8)
ALP BLD-CCNC: 92 IU/L (ref 44–121)
ALT SERPL-CCNC: 25 IU/L (ref 0–44)
AST SERPL-CCNC: 24 IU/L (ref 0–40)
BILIRUB SERPL-MCNC: 0.3 MG/DL (ref 0–1.2)
BUN / CREAT RATIO: 35 (ref 10–24)
BUN BLDV-MCNC: 37 MG/DL (ref 8–27)
CALCIUM SERPL-MCNC: 9 MG/DL (ref 8.6–10.2)
CHLORIDE BLD-SCNC: 100 MMOL/L (ref 96–106)
CHOLESTEROL, TOTAL: 194 MG/DL (ref 100–199)
CO2: 24 MMOL/L (ref 20–29)
CREAT SERPL-MCNC: 1.06 MG/DL (ref 0.76–1.27)
CREATININE URINE: 71.9 MG/DL
ESTIMATED GLOMERULAR FILTRATION RATE CREATININE EQUATION: 77 ML/MIN/1.73
GLOBULIN: 3 G/DL (ref 1.5–4.5)
GLUCOSE BLD-MCNC: 97 MG/DL (ref 70–99)
HBA1C MFR BLD: 5.8 % (ref 4.8–5.6)
HDLC SERPL-MCNC: 65 MG/DL
LDL CHOLESTEROL CALCULATED: 116 MG/DL (ref 0–99)
MAGNESIUM: 2.6 MG/DL (ref 1.6–2.3)
MICROALBUMIN UR-MCNC: 58.9 UG/ML
MICROALBUMIN/CREAT UR-RTO: 82 MG/G CREAT (ref 0–29)
POTASSIUM SERPL-SCNC: 4.5 MMOL/L (ref 3.5–5.2)
SODIUM BLD-SCNC: 139 MMOL/L (ref 134–144)
TOTAL PROTEIN: 7 G/DL (ref 6–8.5)
TRIGL SERPL-MCNC: 72 MG/DL (ref 0–149)
TSH SERPL DL<=0.05 MIU/L-ACNC: 0.46 UIU/ML (ref 0.45–4.5)
VLDLC SERPL CALC-MCNC: 13 MG/DL (ref 5–40)

## 2023-01-20 LAB
BASOPHILS ABSOLUTE: 0 X10E3/UL (ref 0–0.2)
BASOPHILS RELATIVE PERCENT: 0 %
BUN / CREAT RATIO: 27 (ref 10–24)
BUN BLDV-MCNC: 33 MG/DL (ref 8–27)
CALCIUM SERPL-MCNC: 9.3 MG/DL (ref 8.6–10.2)
CHLORIDE BLD-SCNC: 103 MMOL/L (ref 96–106)
CO2: 22 MMOL/L (ref 20–29)
CREAT SERPL-MCNC: 1.22 MG/DL (ref 0.76–1.27)
EOSINOPHILS ABSOLUTE: 0.1 X10E3/UL (ref 0–0.4)
EOSINOPHILS RELATIVE PERCENT: 1 %
ESTIMATED GLOMERULAR FILTRATION RATE CREATININE EQUATION: 65 ML/MIN/1.73
GLUCOSE BLD-MCNC: 94 MG/DL (ref 70–99)
HBA1C MFR BLD: 5.8 % (ref 4.8–5.6)
HCT VFR BLD CALC: 44.1 % (ref 37.5–51)
HEMOGLOBIN: 15.2 G/DL (ref 13–17.7)
IMMATURE GRANS (ABS): 0 X10E3/UL (ref 0–0.1)
IMMATURE GRANULOCYTES: 0 %
LYMPHOCYTES ABSOLUTE: 3.1 X10E3/UL (ref 0.7–3.1)
LYMPHOCYTES RELATIVE PERCENT: 33 %
MCH RBC QN AUTO: 32.1 PG (ref 26.6–33)
MCHC RBC AUTO-ENTMCNC: 34.5 G/DL (ref 31.5–35.7)
MCV RBC AUTO: 93 FL (ref 79–97)
MONOCYTES ABSOLUTE: 0.6 X10E3/UL (ref 0.1–0.9)
MONOCYTES RELATIVE PERCENT: 7 %
NEUTROPHILS ABSOLUTE: 5.4 X10E3/UL (ref 1.4–7)
PDW BLD-RTO: 12.6 % (ref 11.6–15.4)
PLATELET # BLD: 141 X10E3/UL (ref 150–450)
POTASSIUM SERPL-SCNC: 4.7 MMOL/L (ref 3.5–5.2)
RBC # BLD: 4.74 X10E6/UL (ref 4.14–5.8)
SEGMENTED NEUTROPHILS RELATIVE PERCENT: 59 %
SODIUM BLD-SCNC: 138 MMOL/L (ref 134–144)
WBC # BLD: 9.2 X10E3/UL (ref 3.4–10.8)

## 2024-02-02 NOTE — PROGRESS NOTES
Document complete   Wound Care Center Progress Note With Procedure    Gabino Tavares  AGE: 72 y.o. GENDER: male  : 1955  EPISODE DATE:  2020     Subjective:     Chief Complaint   Patient presents with    Wound Check     left leg         HISTORY of PRESENT ILLNESS      Florentina Gowers Coffee is a 72 y.o. male who presents today for wound evaluation of Chronic venous and diabetic ulcer(s) of the left leg. The ulcer is of moderate severity. The underlying cause of the wound is trauma. He is in for f/u- he has been doing well with the compression wrap but still has significant wound today. Wound Pain Timing/Severity: none  Quality of pain: N/A  Severity of pain:  0 / 10   Modifying Factors: edema and venous stasis  Associated Signs/Symptoms: none        PAST MEDICAL HISTORY        Diagnosis Date    CHF (congestive heart failure) (HCC)     Chronic ulcer of left leg with fat layer exposed ([address] 2016    Chronic ulcer of right leg with fat layer exposed ([address] 2016    Chronic ulcer of right leg with fat layer exposed ([address] 2016    Diabetes mellitus ([address]     Hypertension     PVD (peripheral vascular disease) ([address] 2016    Type 2 diabetes mellitus with diabetic peripheral angiopathy without gangrene, without long-term current use of insulin ([address] 2016    Venous stasis ulcer of both lower extremities without varicose veins ([address] 2016       PAST SURGICAL HISTORY    History reviewed. No pertinent surgical history.     FAMILY HISTORY    Family History   Problem Relation Age of Onset    Asthma Mother     Breast Cancer Mother     Cancer Mother     Diabetes Mother     High Blood Pressure Mother     Cancer Father     Early Death Brother     Arthritis Paternal Grandmother        SOCIAL HISTORY    Social History     Tobacco Use    Smoking status: Current Every Day Smoker     Packs/day: 1.00     Years: 60.00     Pack years: 60.00     Types: Cigarettes    Smokeless tobacco: Never Used  Tobacco comment: healing    Substance Use Topics    Alcohol use: Never     Frequency: Never    Drug use: No       ALLERGIES    No Known Allergies    MEDICATIONS    Current Outpatient Medications on File Prior to Encounter   Medication Sig Dispense Refill    Latanoprost 0.005 % EMUL Apply to eye daily      ibuprofen (ADVIL;MOTRIN) 600 MG tablet Take 1 tablet by mouth every 6 hours as needed for Pain 30 tablet 0    glipiZIDE (GLUCOTROL) 5 MG tablet Take 5 mg by mouth 2 times daily (before meals)      potassium chloride (KLOR-CON M) 20 MEQ extended release tablet Take 20 mEq by mouth daily      ALPRAZolam (XANAX) 0.5 MG tablet Take 0.5 mg by mouth nightly. Elder Rued ipratropium-albuterol (DUONEB) 0.5-2.5 (3) MG/3ML SOLN nebulizer solution Inhale 1 vial into the lungs every 4 hours      albuterol sulfate  (90 Base) MCG/ACT inhaler Inhale 2 puffs into the lungs every 6 hours as needed for Wheezing      aspirin EC 81 MG EC tablet Take 1 tablet by mouth daily 30 tablet 3    HYDROcodone-acetaminophen (NORCO) 7.5-325 MG per tablet Take 1 tablet by mouth 3 times daily .  metFORMIN (GLUCOPHAGE) 1000 MG tablet Take 1,000 mg by mouth 2 times daily (with meals)      furosemide (LASIX) 80 MG tablet Take 80 mg by mouth 2 times daily      lisinopril (PRINIVIL;ZESTRIL) 20 MG tablet Take 40 mg by mouth daily       tamsulosin (FLOMAX) 0.4 MG capsule Take 0.4 mg by mouth daily      finasteride (PROSCAR) 5 MG tablet Take 5 mg by mouth daily      metolazone (ZAROXOLYN) 2.5 MG tablet Take 2 tablets by mouth daily 60 tablet 0     No current facility-administered medications on file prior to encounter. REVIEW OF SYSTEMS    Pertinent items are noted in HPI. Constitutional: Negative for systemic symptoms including fever, chills and malaise. Objective:      BP (!) 153/64   Pulse 85   Temp 96.8 °F (36 °C) (Temporal)   Resp 16     PHYSICAL EXAM      General: The patient is in no acute distress. Mental status:  Patient is appropriate, is  oriented to place and plan of care. Dermatologic exam: Visual inspection of the periwound reveals the skin to be normal in turgor and texture  Wound exam: see wound description below in procedure note      Assessment:     Problem List Items Addressed This Visit     WD-Ulcer of shin, left, with fat layer exposed ([address]    WD-Idiopathic chronic venous hypertension of left lower extremity with ulcer ([address]    WD-Traumatic open wound of left lower leg, complicated by diabetes and venous insufficiency - Primary    Relevant Medications    lidocaine (XYLOCAINE) 4 % external solution    collagenase ointment    Other Relevant Orders    Supply: Wound Cleanser    Supply: Wound Dressings    Supply: Cover and Secure        Procedure Note    Indications:  Based on my examination of this patient's wound(s) today, sharp excision into necrotic subcutaneous tissue is required to promote healing and evaluate the extent of previous healing. Performed by: Ilsa Aase, MD    Consent obtained: Yes    Time out taken:  Yes    Pain Control: Anesthetic  Anesthetic: 4% Lidocaine Liquid Topical     Debridement:Excisional Debridement    Using curette the wound(s) was/were sharply debrided down through and including the removal of epidermis, dermis and subcutaneous tissue. Devitalized Tissue Debrided:  fibrin, biofilm, slough, necrotic/eschar and exudate    Pre Debridement Measurements:  Are located in the Wound Documentation Flow Sheet    All active wounds listed below with today's date are evaluated  Wound(s)    debrided this date include # : 1     Post  Debridement Measurements:  Wound 10/16/20 Pretibial Left #1 Left Lateral Lower Leg (Active)   Wound Image   10/30/20 0929   Wound Etiology Other 11/20/20 1526   Dressing Status Clean;Dry; Intact 11/20/20 1636   Wound Cleansed Soap and water 11/20/20 1526   Wound Length (cm) 3 cm 11/20/20 1526   Wound Width (cm) 2 cm 11/20/20 1526   Wound Depth (cm) 0.2 cm 11/20/20 1526   Wound Surface Area (cm^2) 6 cm^2 11/20/20 1526   Change in Wound Size % (l*w) 76.74 11/20/20 1526   Wound Volume (cm^3) 1.2 cm^3 11/20/20 1526   Wound Healing % 53 11/20/20 1526   Post-Procedure Length (cm) 3 cm 11/20/20 1557   Post-Procedure Width (cm) 2 cm 11/20/20 1557   Post-Procedure Depth (cm) 0.2 cm 11/20/20 1557   Post-Procedure Surface Area (cm^2) 6 cm^2 11/20/20 1557   Post-Procedure Volume (cm^3) 1.2 cm^3 11/20/20 1557   Distance Tunneling (cm) 0 cm 11/20/20 1526   Tunneling Position ___ O'Clock 0 11/20/20 1526   Undermining Starts ___ O'Clock 1 11/20/20 1526   Undermining Ends___ O'Clock 3 11/20/20 1526   Undermining Maxium Distance (cm) 0.2 11/20/20 1526   Wound Assessment Granulation tissue;Slough 11/20/20 1526   Drainage Amount Moderate 11/20/20 1526   Drainage Description Serosanguinous 11/20/20 1526   Odor None 11/20/20 1526   Merary-wound Assessment Hyperpigmented 11/20/20 1526   Margins Defined edges 11/20/20 1526   Wound Thickness Description not for Pressure Injury Full thickness 11/20/20 1526   Number of days: 35       Percent of Wound(s) Debrided: approximately 100%    Total  Area  Debrided:  6 sq cm     Bleeding:  Minimal    Hemostasis Achieved:  by pressure    Procedural Pain:  0  / 10     Post Procedural Pain:  0 / 10     Response to treatment:  Well tolerated by patient. Status of wound progress and description from last visit:   Slightly better but still with a lot of necrosis and there was some tunneling. I did debride out the tunneling in the proximal portion of the wound, I also improved the undermining of the distal area. Will add Santyl for some enzymatic debridement with wraps changed next week. Plan:       Discharge Instructions             PHYSICIAN ORDERS AND DISCHARGE INSTRUCTIONS     NOTE: [facility] will receive a patient experience survey.  We would be grateful if you would take the time to fill this survey out.     Wound care order history:                 ANDREW's   Right       Left               Date:              Cultures:                Grafts:                Antibiotics:           Continuing wound care orders and information:                 Residence:  Home              Continue home health care with:               Your wound-care supplies will be provided by:      Wound cleansing:               BK not scrub or use excessive force.              Wash hands with soap and water before and after dressing changes.             RKCNO to applying a clean dressing, cleanse wound with normal saline, wound cleanser, or mild soap and water.               Ask the physician or nurse before getting the wound(s) wet in a shower     Daily Wound management:              Keep weight off wounds and reposition every 2 hours.              Avoid standing for long periods of time.              Apply wraps/stockings in AM and remove at bedtime.              If swelling is present, elevate legs to the level of the heart or above for 30 minutes 4-5 times a day and/or when sitting.                                      When taking antibiotics take entire prescription as ordered by physician do not stop taking until medicine is all gone.                                                      Orders for this week:  11/20/20          Left Lower leg-- Clean with soap and water, pat dry.  A&D to leg and foot.  Apply Santyl  and cover with calcium alginate and ABD, wrap with Coban 2 lite. Leave in place x 1 week         Follow up with Dahlia HENDERSON In 1 weeks in the wound care center  Call [phone] for any questions or concerns.   Date__________   Time____________          Treatment Note Wound 10/16/20 Pretibial Left #1 Left Lateral Lower Leg-Dressing/Treatment: (A&D ointment, Santyl, ca alginate, abd coban 2 lite. )    Written Patient Dismissal Instructions Given            Electronically signed by Yisel Monge MD on 11/20/2020 at 4:50 PM

## 2024-04-05 ENCOUNTER — HOSPITAL ENCOUNTER (OUTPATIENT)
Dept: PULMONOLOGY | Age: 69
Discharge: HOME | End: 2024-04-05

## 2024-04-26 ENCOUNTER — HOSPITAL ENCOUNTER (EMERGENCY)
Age: 69
Discharge: HOME | End: 2024-04-26
Payer: MEDICARE

## 2024-04-26 VITALS
BODY MASS INDEX: 41.77 KG/M2 | RESPIRATION RATE: 16 BRPM | SYSTOLIC BLOOD PRESSURE: 141 MMHG | WEIGHT: 282 LBS | HEART RATE: 63 BPM | DIASTOLIC BLOOD PRESSURE: 61 MMHG | OXYGEN SATURATION: 99 % | TEMPERATURE: 98.3 F | HEIGHT: 69 IN

## 2024-04-26 DIAGNOSIS — S00.461A TICK BITE OF RIGHT EAR, INITIAL ENCOUNTER: ICD-10-CM
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
## 2024-04-26 DIAGNOSIS — M79.5 FOREIGN BODY (FB) IN SOFT TISSUE: Primary | ICD-10-CM

## 2024-04-26 PROCEDURE — 99282 EMERGENCY DEPT VISIT SF MDM: CPT

## 2024-04-26 PROCEDURE — 2500000003 HC RX 250 WO HCPCS: Performed by: NURSE PRACTITIONER

## 2024-04-26 PROCEDURE — 69200 CLEAR OUTER EAR CANAL: CPT

## 2024-04-26 RX ORDER — LIDOCAINE HYDROCHLORIDE 20 MG/ML
10 INJECTION, SOLUTION INFILTRATION; PERINEURAL ONCE
Status: COMPLETED | OUTPATIENT
Start: 2024-04-26 | End: 2024-04-26

## 2024-04-26 RX ADMIN — LIDOCAINE HYDROCHLORIDE 10 ML: 20 INJECTION, SOLUTION INFILTRATION; PERINEURAL at 15:15

## 2024-04-26 ASSESSMENT — ENCOUNTER SYMPTOMS
SHORTNESS OF BREATH: 0
TROUBLE SWALLOWING: 1
VOMITING: 0
DIARRHEA: 0

## 2024-04-26 NOTE — ED NOTES
Pt to ED 10, pt states his wife removed a tick from his R outer ear lobe. Pt believes that a portion of the tick is still inside him. Education attempted. Pt keeps repeating, \"There is a piece of it's mouth inside me.\" This RN visualized a small bump where tick was previously located, not purulent drainage noted, minimal swelling, no bleeding present.

## 2024-04-26 NOTE — ED PROVIDER NOTES
up      DISCHARGE MEDICATIONS:  New Prescriptions    No medications on file          (Please note that portions of this note were completed with a voice recognition program.  Efforts were made to edit the dictations but occasionally words are mis-transcribed.)    FAITH Barbosa CNP (electronically signed)       Teresa Alexandra APRN - CNP  04/26/24 4013

## (undated) DEVICE — SUTURE VCRL SZ 2-0 L27IN ABSRB UD L26MM SH 1/2 CIR J417H

## (undated) DEVICE — SUTURE CHROMIC GUT SZ 3-0 L27IN ABSRB BRN L19MM FS-2 3/8 636H

## (undated) DEVICE — SYRINGE MED 20ML STD CLR PLAS LUERLOCK TIP N CTRL DISP

## (undated) DEVICE — Device: Brand: MEDICAL ACTION INDUSTRIES

## (undated) DEVICE — SUTURE VCRL SZ 3-0 L27IN ABSRB UD L26MM SH 1/2 CIR J416H